# Patient Record
Sex: FEMALE | Race: WHITE | NOT HISPANIC OR LATINO | Employment: OTHER | URBAN - NONMETROPOLITAN AREA
[De-identification: names, ages, dates, MRNs, and addresses within clinical notes are randomized per-mention and may not be internally consistent; named-entity substitution may affect disease eponyms.]

---

## 2018-08-14 ENCOUNTER — OFFICE VISIT (OUTPATIENT)
Dept: INTERNAL MEDICINE | Facility: OTHER | Age: 43
End: 2018-08-14
Attending: NURSE PRACTITIONER
Payer: MEDICARE

## 2018-08-14 VITALS
WEIGHT: 182.5 LBS | SYSTOLIC BLOOD PRESSURE: 110 MMHG | BODY MASS INDEX: 42.23 KG/M2 | TEMPERATURE: 98.2 F | HEART RATE: 76 BPM | DIASTOLIC BLOOD PRESSURE: 62 MMHG | HEIGHT: 55 IN

## 2018-08-14 DIAGNOSIS — M17.0 PRIMARY OSTEOARTHRITIS OF BOTH KNEES: ICD-10-CM

## 2018-08-14 DIAGNOSIS — E66.01 MORBID OBESITY (H): ICD-10-CM

## 2018-08-14 DIAGNOSIS — Z12.31 ENCOUNTER FOR SCREENING MAMMOGRAM FOR BREAST CANCER: ICD-10-CM

## 2018-08-14 DIAGNOSIS — F41.1 GAD (GENERALIZED ANXIETY DISORDER): ICD-10-CM

## 2018-08-14 DIAGNOSIS — L30.9 DERMATITIS: ICD-10-CM

## 2018-08-14 DIAGNOSIS — K21.9 GASTROESOPHAGEAL REFLUX DISEASE WITHOUT ESOPHAGITIS: ICD-10-CM

## 2018-08-14 DIAGNOSIS — N92.6 MENSTRUAL DISORDER: ICD-10-CM

## 2018-08-14 DIAGNOSIS — E03.8 OTHER SPECIFIED HYPOTHYROIDISM: ICD-10-CM

## 2018-08-14 DIAGNOSIS — G47.33 OSA (OBSTRUCTIVE SLEEP APNEA): ICD-10-CM

## 2018-08-14 DIAGNOSIS — Q90.9 DOWN'S SYNDROME: Primary | ICD-10-CM

## 2018-08-14 PROCEDURE — G0463 HOSPITAL OUTPT CLINIC VISIT: HCPCS | Performed by: NURSE PRACTITIONER

## 2018-08-14 PROCEDURE — 99204 OFFICE O/P NEW MOD 45 MIN: CPT | Performed by: NURSE PRACTITIONER

## 2018-08-14 RX ORDER — NORGESTIMATE AND ETHINYL ESTRADIOL 7DAYSX3 28
1 KIT ORAL DAILY
Qty: 84 TABLET | Refills: 3 | Status: SHIPPED | OUTPATIENT
Start: 2018-08-14 | End: 2019-06-08

## 2018-08-14 RX ORDER — TRIAMCINOLONE ACETONIDE 1 MG/G
CREAM TOPICAL
Qty: 30 G | Refills: 3 | Status: SHIPPED | OUTPATIENT
Start: 2018-08-14 | End: 2019-06-11

## 2018-08-14 RX ORDER — CITALOPRAM HYDROBROMIDE 20 MG/1
20 TABLET ORAL DAILY
Qty: 90 TABLET | Refills: 3 | Status: SHIPPED | OUTPATIENT
Start: 2018-08-14 | End: 2019-06-08

## 2018-08-14 RX ORDER — LEVOTHYROXINE SODIUM 175 UG/1
175 TABLET ORAL DAILY
Qty: 90 TABLET | Refills: 3 | Status: SHIPPED | OUTPATIENT
Start: 2018-08-14 | End: 2019-06-08

## 2018-08-14 RX ORDER — ASPIRIN 325 MG
TABLET ORAL
COMMUNITY
Start: 2018-08-14 | End: 2019-01-04

## 2018-08-14 ASSESSMENT — ENCOUNTER SYMPTOMS
AGITATION: 0
NAUSEA: 0
ADENOPATHY: 0
SHORTNESS OF BREATH: 0
DYSURIA: 0
CONSTIPATION: 0
PALPITATIONS: 0
EYE REDNESS: 0
HEARTBURN: 1
SLEEP DISTURBANCE: 0
ABDOMINAL PAIN: 0
COUGH: 0
DIARRHEA: 0
PARESTHESIAS: 0
UNEXPECTED WEIGHT CHANGE: 0
WEAKNESS: 0
BREAST MASS: 0
POLYDIPSIA: 0
ARTHRALGIAS: 0
DYSPHORIC MOOD: 0
EYE DISCHARGE: 0
NUMBNESS: 0
HEMATOCHEZIA: 0
POLYPHAGIA: 0
NERVOUS/ANXIOUS: 0
HEADACHES: 0
VOMITING: 0
WOUND: 0
ROS GI COMMENTS: SEE HPI
FEVER: 0
CONFUSION: 0
MYALGIAS: 0
FREQUENCY: 0
DIZZINESS: 0
WHEEZING: 0
CHILLS: 0
SORE THROAT: 0

## 2018-08-14 ASSESSMENT — ANXIETY QUESTIONNAIRES
2. NOT BEING ABLE TO STOP OR CONTROL WORRYING: NOT AT ALL
5. BEING SO RESTLESS THAT IT IS HARD TO SIT STILL: NOT AT ALL
1. FEELING NERVOUS, ANXIOUS, OR ON EDGE: NOT AT ALL
IF YOU CHECKED OFF ANY PROBLEMS ON THIS QUESTIONNAIRE, HOW DIFFICULT HAVE THESE PROBLEMS MADE IT FOR YOU TO DO YOUR WORK, TAKE CARE OF THINGS AT HOME, OR GET ALONG WITH OTHER PEOPLE: NOT DIFFICULT AT ALL
3. WORRYING TOO MUCH ABOUT DIFFERENT THINGS: NOT AT ALL
7. FEELING AFRAID AS IF SOMETHING AWFUL MIGHT HAPPEN: NOT AT ALL
6. BECOMING EASILY ANNOYED OR IRRITABLE: NOT AT ALL
GAD7 TOTAL SCORE: 0

## 2018-08-14 ASSESSMENT — PATIENT HEALTH QUESTIONNAIRE - PHQ9: 5. POOR APPETITE OR OVEREATING: NOT AT ALL

## 2018-08-14 ASSESSMENT — PAIN SCALES - GENERAL: PAINLEVEL: NO PAIN (0)

## 2018-08-14 NOTE — PROGRESS NOTES
Subjective:  She is here today to establish primary care.  She just moved here a week ago from [place].  She had been living at a group home.  She moved here with her parents.  She has Down syndrome.  She has no congenital cardiac disease.  She has generalized anxiety disorder which is well controlled on Celexa.  She continues to have menses monthly and takes Sprintec due to heavy flow.  She has osteoarthritis of both knees and receives steroid injections about every 3-4 months.  She had this last done a few months ago.  She is not having any pain at this time.  She does continue on diclofenac twice daily for knee pain and that works well.  She has no GI issues except that she will have occasional reflux so she takes Zantac twice daily.  Appetite is good.  She has never had esophagitis or GI bleed or ulcers.  She has hypothyroidism and was diagnosed about 20 years ago.  They are not sure when her last levels were checked.  She does wear hearing aid in her left ear and uses triamcinolone cream every other day to prevent dermatitis.  She has obstructive sleep apnea.  She wears CPAP before but it leaked and caused a corneal ulcer several years ago.  She now refuses to wear CPAP.  She has not had any hospitalizations.  Not sure she has never had mammogram.  She would like to get this scheduled.  She does not have any breast lumps or discharge from nipples.  She is obese.  She does take her dogs for walk usually every day.    Patient Active Problem List   Diagnosis     Down's syndrome     DOROTEO (generalized anxiety disorder)     Menstrual disorder     Primary osteoarthritis of both knees     Gastroesophageal reflux disease without esophagitis     Other specified hypothyroidism     Dermatitis     Encounter for screening mammogram for breast cancer     Morbid obesity (H)     JENNIFER (obstructive sleep apnea)     Past Medical History:   Diagnosis Date     Arthritis of knee     receives injections frequently     Corneal ulcer 2004     caused by leaking of CPAP     JENNIFER (obstructive sleep apnea)     patient refuses CPAP     History reviewed. No pertinent surgical history.  Social History     Social History     Marital status: Single     Spouse name: N/A     Number of children: N/A     Years of education: N/A     Occupational History     Not on file.     Social History Main Topics     Smoking status: Never Smoker     Smokeless tobacco: Never Used     Alcohol use No     Drug use: No     Sexual activity: Not on file     Other Topics Concern     Not on file     Social History Narrative     No narrative on file     Family History   Problem Relation Age of Onset     Asthma Mother      Arthritis Mother      LUNG DISEASE Father      pulmonary fibrosis secondary to chemical exposure     Diabetes Father      Current Outpatient Prescriptions   Medication Sig Dispense Refill     citalopram (CELEXA) 20 MG tablet Take 1 tablet (20 mg) by mouth daily 90 tablet 3     diclofenac (VOLTAREN) 50 MG EC tablet Take 1 tablet (50 mg) by mouth 2 times daily 60 tablet 3     levothyroxine (SYNTHROID/LEVOTHROID) 175 MCG tablet Take 1 tablet (175 mcg) by mouth daily 90 tablet 3     Multiple Vitamins-Minerals (MULTIVITAMIN GUMMIES ADULT) CHEW Take 1 by mouth daily with breakfast       norgestim-eth estrad triphasic (TRINESSA, 28,) 0.18/0.215/0.25 MG-35 MCG per tablet Take 1 tablet by mouth daily 84 tablet 3     ranitidine (ZANTAC) 150 MG tablet Take 1 tablet (150 mg) by mouth 2 times daily 60 tablet 3     triamcinolone (KENALOG) 0.1 % cream Apply to ears every other day 30 g 3     Sulfa drugs      Review of Systems:  Review of Systems   Constitutional: Negative for chills, fever and unexpected weight change.   HENT: Negative for congestion, ear pain and sore throat.    Eyes: Negative for discharge and redness.   Respiratory: Negative for cough, shortness of breath and wheezing.    Cardiovascular: Negative for chest pain, palpitations and peripheral edema.   Gastrointestinal:  "Positive for heartburn. Negative for abdominal pain, constipation, diarrhea, hematochezia, nausea and vomiting.        See HPI   Endocrine: Negative for cold intolerance, heat intolerance, polydipsia, polyphagia and polyuria.   Breasts:  Negative for tenderness, breast mass and discharge.   Genitourinary: Negative for dysuria, frequency and vaginal bleeding.   Musculoskeletal: Negative for arthralgias and myalgias.        See HPI   Skin: Negative for pallor, rash and wound.   Allergic/Immunologic: Negative for immunocompromised state.   Neurological: Negative for dizziness, weakness, numbness, headaches and paresthesias.   Hematological: Negative for adenopathy.   Psychiatric/Behavioral: Negative for agitation, behavioral problems, confusion, dysphoric mood, self-injury and sleep disturbance. The patient is not nervous/anxious.        Objective:   /62 (BP Location: Right arm, Patient Position: Chair, Cuff Size: Adult Large)  Pulse 76  Temp 98.2  F (36.8  C) (Tympanic)  Ht 4' 4.75\" (1.34 m)  Wt 182 lb 8 oz (82.8 kg)  LMP 08/02/2018 (Approximate)  Breastfeeding? No  BMI 46.11 kg/m2  Physical Exam   Constitutional: She is oriented to person, place, and time. She appears well-developed. No distress.   HENT:   Mouth/Throat: Oropharynx is clear and moist. No oropharyngeal exudate.   Eyes: Conjunctivae are normal. No scleral icterus.   Neck: Neck supple. No thyromegaly present.   Cardiovascular: Normal rate and regular rhythm.  Exam reveals no gallop.    Pulmonary/Chest: Effort normal and breath sounds normal. She has no wheezes. She has no rales.   Abdominal: Soft. She exhibits no distension and no mass. There is no tenderness.   Musculoskeletal: She exhibits no edema or tenderness.   Lymphadenopathy:     She has no cervical adenopathy.   Neurological: She is alert and oriented to person, place, and time. Coordination normal.   Skin: Skin is warm. No rash noted. She is not diaphoretic. No pallor. "   Psychiatric: She has a normal mood and affect. Her behavior is normal.   Has Down syndrome.  Pleasant and cooperative.  Delightful young lady   Nursing note and vitals reviewed.      Assessment:    ICD-10-CM    1. Down's syndrome Q90.9 Basic metabolic panel   2. DOROTEO (generalized anxiety disorder) F41.1 citalopram (CELEXA) 20 MG tablet   3. Menstrual disorder N92.6 norgestim-eth estrad triphasic (TRINESSA, 28,) 0.18/0.215/0.25 MG-35 MCG per tablet   4. Primary osteoarthritis of both knees M17.0 diclofenac (VOLTAREN) 50 MG EC tablet     Basic metabolic panel   5. Gastroesophageal reflux disease without esophagitis K21.9 ranitidine (ZANTAC) 150 MG tablet     Basic metabolic panel     CBC with platelets   6. Other specified hypothyroidism E03.8 levothyroxine (SYNTHROID/LEVOTHROID) 175 MCG tablet     Thyrotropin GH     T4 free     Basic metabolic panel   7. Dermatitis L30.9 triamcinolone (KENALOG) 0.1 % cream   8. Encounter for screening mammogram for breast cancer Z12.31 MA Screen Bilateral w/Kermit   9. Morbid obesity (H) E66.01    10. JENNIFER (obstructive sleep apnea) G47.33        Plan:   She will be scheduled for mammography.  Medication refills are provided.  She will continue with same medication and treatment plan.  Discussed that there was an interaction between Celexa and diclofenac since it can increase risk of GI bleed and ulceration.  She currently is having no symptoms.  She does have occasional heartburn which is not new.  Mom will continue to monitor for any concerns.  They would like to stay with same medication and treatment plan even with risk.  Labs today will include TSH, free T4, CBC and basic metabolic panel.  She will continue to take her dog for walks.  Weight loss is encouraged.  She will schedule orthopedic appointment when her knee pain returns.  Otherwise we will follow-up in clinic every 6-12 months, sooner with concerns.    Greater than 45 minutes face-to-face time spent with patient with  greater than 50% in care coordination, counseling, disease management and chart update    JAYASHREE Dorman   8/14/2018  2:48 PM

## 2018-08-14 NOTE — MR AVS SNAPSHOT
After Visit Summary   8/14/2018    Reg Valiente    MRN: [id number]           Patient Information     Date Of Birth          1975        Visit Information        Provider Department      8/14/2018 2:00 PM Mikayla Gtz NP [facility]        Today's Diagnoses     Down's syndrome    -  1    DOROTEO (generalized anxiety disorder)        Menstrual disorder        Primary osteoarthritis of both knees        Gastroesophageal reflux disease without esophagitis        Other specified hypothyroidism        Dermatitis        Encounter for screening mammogram for breast cancer        Morbid obesity (H)        JENNIFER (obstructive sleep apnea)           Follow-ups after your visit        Future tests that were ordered for you today     Open Future Orders        Priority Expected Expires Ordered    MA Screen Bilateral w/Kermit Routine  8/14/2019 8/14/2018    CBC with platelets Routine  8/15/2019 8/14/2018    Thyrotropin GH Routine  8/15/2019 8/14/2018    T4 free Routine  8/15/2019 8/14/2018    Basic metabolic panel Routine  8/15/2019 8/14/2018            Who to contact     If you have questions or need follow up information about today's clinic visit or your schedule please contact [facility] directly at [phone].  Normal or non-critical lab and imaging results will be communicated to you by MyChart, letter or phone within 4 business days after the clinic has received the results. If you do not hear from us within 7 days, please contact the clinic through MyChart or phone. If you have a critical or abnormal lab result, we will notify you by phone as soon as possible.  Submit refill requests through [employer] or call your pharmacy and they will forward the refill request to us. Please allow 3 business days for your refill to be completed.          Additional Information About Your Visit        Care EveryWhere ID     This is your Care EveryWhere ID. This could be  "used by other organizations to access your [place] medical records  [id number]        Your Vitals Were     Pulse Temperature Height Last Period Breastfeeding? BMI (Body Mass Index)    76 98.2  F (36.8  C) (Tympanic) 4' 4.75\" (1.34 m) 08/02/2018 (Approximate) No 46.11 kg/m2       Blood Pressure from Last 3 Encounters:   08/14/18 110/62    Weight from Last 3 Encounters:   08/14/18 182 lb 8 oz (82.8 kg)                 Today's Medication Changes          These changes are accurate as of 8/14/18  2:55 PM.  If you have any questions, ask your nurse or doctor.               Start taking these medicines.        Dose/Directions    citalopram 20 MG tablet   Commonly known as:  celeXA   Used for:  DOROTEO (generalized anxiety disorder)   Started by:  Mikayla Gtz NP        Dose:  20 mg   Take 1 tablet (20 mg) by mouth daily   Quantity:  90 tablet   Refills:  3       diclofenac 50 MG EC tablet   Commonly known as:  VOLTAREN   Used for:  Primary osteoarthritis of both knees   Started by:  Mikayla Gtz NP        Dose:  50 mg   Take 1 tablet (50 mg) by mouth 2 times daily   Quantity:  60 tablet   Refills:  3       levothyroxine 175 MCG tablet   Commonly known as:  SYNTHROID/LEVOTHROID   Used for:  Other specified hypothyroidism   Started by:  Mikayla Gtz NP        Dose:  175 mcg   Take 1 tablet (175 mcg) by mouth daily   Quantity:  90 tablet   Refills:  3       norgestim-eth estrad triphasic 0.18/0.215/0.25 MG-35 MCG per tablet   Commonly known as:  TRINESSA (28)   Used for:  Menstrual disorder   Started by:  Mikayla Gtz NP        Dose:  1 tablet   Take 1 tablet by mouth daily   Quantity:  84 tablet   Refills:  3       ranitidine 150 MG tablet   Commonly known as:  ZANTAC   Used for:  Gastroesophageal reflux disease without esophagitis   Started by:  Mikayla Gtz NP        Dose:  150 mg   Take 1 tablet (150 mg) by mouth 2 times daily   Quantity:  60 tablet   Refills:  3       " triamcinolone 0.1 % cream   Commonly known as:  KENALOG   Used for:  Dermatitis   Started by:  Mikayla Gtz NP        Apply to ears every other day   Quantity:  30 g   Refills:  3            Where to get your medicines      These medications were sent to [facility] - [place], [place] - [address]  [address], [facility] [address]     Phone:  [phone]     citalopram 20 MG tablet    diclofenac 50 MG EC tablet    levothyroxine 175 MCG tablet    norgestim-eth estrad triphasic 0.18/0.215/0.25 MG-35 MCG per tablet    ranitidine 150 MG tablet    triamcinolone 0.1 % cream                Primary Care Provider Office Phone # Fax #    [facility] [phone]       [address]  [facility] [address]        Equal Access to Services     JORGE CHATMAN : Hadii paola sevilla hadasho Soberna, waaxda luqadaha, qaybta kaalmada adeegyada, waxay adamin herminio frye . So [facility] [phone].    ATENCIÓN: Si habla español, tiene a kennedy disposición servicios gratuitos de asistencia lingüística. Osielame al [phone].    We comply with applicable federal civil rights laws and [place] laws. We do not discriminate on the basis of race, color, national origin, age, disability, sex, sexual orientation, or gender identity.            Thank you!     Thank you for choosing [facility]  for your care. Our goal is always to provide you with excellent care. Hearing back from our patients is one way we can continue to improve our services. Please take a few minutes to complete the written survey that you may receive in the mail after your visit with us. Thank you!             Your Updated Medication List - Protect others around you: Learn how to safely use, store and throw away your medicines at www.disposemymeds.org.          This list is accurate as of 8/14/18  2:55 PM.  Always use your most recent med list.                    Brand Name Dispense Instructions for use Diagnosis    citalopram 20 MG tablet    celeXA    90 tablet    Take 1 tablet (20 mg) by mouth daily    DOROTEO (generalized anxiety disorder)       diclofenac 50 MG EC tablet    VOLTAREN    60 tablet    Take 1 tablet (50 mg) by mouth 2 times daily    Primary osteoarthritis of both knees       levothyroxine 175 MCG tablet    SYNTHROID/LEVOTHROID    90 tablet    Take 1 tablet (175 mcg) by mouth daily    Other specified hypothyroidism       MULTIVITAMIN GUMMIES ADULT Chew      Take 1 by mouth daily with breakfast        norgestim-eth estrad triphasic 0.18/0.215/0.25 MG-35 MCG per tablet    TRINESSA (28)    84 tablet    Take 1 tablet by mouth daily    Menstrual disorder       ranitidine 150 MG tablet    ZANTAC    60 tablet    Take 1 tablet (150 mg) by mouth 2 times daily    Gastroesophageal reflux disease without esophagitis       triamcinolone 0.1 % cream    KENALOG    30 g    Apply to ears every other day    Dermatitis

## 2018-08-14 NOTE — NURSING NOTE
"Chief Complaint   Patient presents with     Establish Care       Initial /62 (BP Location: Right arm, Patient Position: Chair, Cuff Size: Adult Large)  Pulse 76  Temp 98.2  F (36.8  C) (Tympanic)  Ht 4' 4.75\" (1.34 m)  Wt 182 lb 8 oz (82.8 kg)  LMP 08/02/2018 (Exact Date)  Breastfeeding? No  BMI 46.11 kg/m2 Estimated body mass index is 46.11 kg/(m^2) as calculated from the following:    Height as of this encounter: 4' 4.75\" (1.34 m).    Weight as of this encounter: 182 lb 8 oz (82.8 kg).      Yareli Dyson LPN, 2:11 PM 8/14/18  "

## 2018-08-15 ASSESSMENT — ANXIETY QUESTIONNAIRES: GAD7 TOTAL SCORE: 0

## 2018-08-15 ASSESSMENT — PATIENT HEALTH QUESTIONNAIRE - PHQ9: SUM OF ALL RESPONSES TO PHQ QUESTIONS 1-9: 0

## 2018-09-25 ENCOUNTER — ALLIED HEALTH/NURSE VISIT (OUTPATIENT)
Dept: FAMILY MEDICINE | Facility: OTHER | Age: 43
End: 2018-09-25
Attending: INTERNAL MEDICINE
Payer: MEDICARE

## 2018-09-25 DIAGNOSIS — Z23 NEED FOR PROPHYLACTIC VACCINATION AND INOCULATION AGAINST INFLUENZA: Primary | ICD-10-CM

## 2018-09-25 PROCEDURE — 90686 IIV4 VACC NO PRSV 0.5 ML IM: CPT

## 2018-09-25 PROCEDURE — G0008 ADMIN INFLUENZA VIRUS VAC: HCPCS

## 2018-09-25 NOTE — MR AVS SNAPSHOT
After Visit Summary   9/25/2018    Reg Valiente    MRN: [id number]           Patient Information     Date Of Birth          1975        Visit Information        Provider Department      9/25/2018 1:15 PM GH INJECTION NURSE [facility]        Today's Diagnoses     Need for prophylactic vaccination and inoculation against influenza    -  1       Follow-ups after your visit        Your next 10 appointments already scheduled     Sep 25, 2018  1:15 PM CDT   Nurse Only with  INJECTION NURSE   [facility]    [address]  [place] [address] [phone]              Who to contact     If you have questions or need follow up information about today's clinic visit or your schedule please contact [facility] directly at [phone].  Normal or non-critical lab and imaging results will be communicated to you by MyChart, letter or phone within 4 business days after the clinic has received the results. If you do not hear from us within 7 days, please contact the clinic through MyChart or phone. If you have a critical or abnormal lab result, we will notify you by phone as soon as possible.  Submit refill requests through [employer] or call your pharmacy and they will forward the refill request to us. Please allow 3 business days for your refill to be completed.          Additional Information About Your Visit        Care EveryWhere ID     This is your Care EveryWhere ID. This could be used by other organizations to access your [facility] medical records  [id number]         Blood Pressure from Last 3 Encounters:   08/14/18 110/62    Weight from Last 3 Encounters:   08/14/18 182 lb 8 oz (82.8 kg)              We Performed the Following     HC FLU VAC PRESRV FREE QUAD SPLIT VIR 3+YRS IM        Primary Care Provider Office Phone # Fax #    [facility] [phone]  [phone]       [address]  [facility] [address]        Equal Access to Services     AISHAJENNY COMPA : Hadii aad ku hadmelinabarbara Joali, waniecyda peterfranklynha, qamaryta kasoledad nanyshawnasha, waxay idiin hayjanicelobo willsmariaelenasean christensen. So [facility] [phone].    ATENCIÓN: Si habla español, tiene a kennedy disposición servicios gratuitos de asistencia lingüística. Llame al [phone].    We comply with applicable federal civil rights laws and [place] laws. We do not discriminate on the basis of race, color, national origin, age, disability, sex, sexual orientation, or gender identity.            Thank you!     Thank you for choosing [facility]  for your care. Our goal is always to provide you with excellent care. Hearing back from our patients is one way we can continue to improve our services. Please take a few minutes to complete the written survey that you may receive in the mail after your visit with us. Thank you!             Your Updated Medication List - Protect others around you: Learn how to safely use, store and throw away your medicines at www.disposemymeds.org.          This list is accurate as of 9/25/18 12:14 PM.  Always use your most recent med list.                   Brand Name Dispense Instructions for use Diagnosis    citalopram 20 MG tablet    celeXA    90 tablet    Take 1 tablet (20 mg) by mouth daily    DOROTEO (generalized anxiety disorder)       diclofenac 50 MG EC tablet    VOLTAREN    60 tablet    Take 1 tablet (50 mg) by mouth 2 times daily    Primary osteoarthritis of both knees       levothyroxine 175 MCG tablet    SYNTHROID/LEVOTHROID    90 tablet    Take 1 tablet (175 mcg) by mouth daily    Other specified hypothyroidism       MULTIVITAMIN GUMMIES ADULT Chew      Take 1 by mouth daily with breakfast        norgestim-eth estrad triphasic 0.18/0.215/0.25 MG-35 MCG per tablet    TRINESSA (28)    84 tablet    Take 1 tablet by mouth daily    Menstrual disorder       ranitidine 150  MG tablet    ZANTAC    60 tablet    Take 1 tablet (150 mg) by mouth 2 times daily    Gastroesophageal reflux disease without esophagitis       triamcinolone 0.1 % cream    KENALOG    30 g    Apply to ears every other day    Dermatitis

## 2018-09-25 NOTE — PROGRESS NOTES

## 2018-12-24 ENCOUNTER — TELEPHONE (OUTPATIENT)
Dept: INTERNAL MEDICINE | Facility: OTHER | Age: 43
End: 2018-12-24

## 2018-12-24 NOTE — TELEPHONE ENCOUNTER
S - Left knee pain worsening in last month.    B - Hx of knee pain. Has had injections which helped some when in [place]. Down Syndrome, can't rate pain.      A - No new injuries. Appears to have a bruise around left knee, with blood under bruised area. Mother denies swelling or redness. Daughter is wincing, visibly uncomfortable. Keeps worsening, unable to do normal activities. Not mobile right now.     R - Mother, judy, is calling for daughter and requesting provider consider anything that can help right now. Agreed to go into ED if necessary or seems unbearable. Jennifer Pinto RN on 12/24/2018 at 10:38 AM

## 2018-12-26 NOTE — TELEPHONE ENCOUNTER
Notified. Transferred to the appointment line.   Yareli Dyson LPN...................12/26/2018   3:13 PM

## 2019-01-04 ENCOUNTER — OFFICE VISIT (OUTPATIENT)
Dept: INTERNAL MEDICINE | Facility: OTHER | Age: 44
End: 2019-01-04
Attending: NURSE PRACTITIONER
Payer: MEDICARE

## 2019-01-04 VITALS
BODY MASS INDEX: 43 KG/M2 | WEIGHT: 170.2 LBS | HEART RATE: 60 BPM | DIASTOLIC BLOOD PRESSURE: 72 MMHG | RESPIRATION RATE: 16 BRPM | SYSTOLIC BLOOD PRESSURE: 110 MMHG

## 2019-01-04 DIAGNOSIS — M17.0 PRIMARY OSTEOARTHRITIS OF BOTH KNEES: Primary | ICD-10-CM

## 2019-01-04 PROCEDURE — G0463 HOSPITAL OUTPT CLINIC VISIT: HCPCS

## 2019-01-04 PROCEDURE — 99213 OFFICE O/P EST LOW 20 MIN: CPT | Performed by: NURSE PRACTITIONER

## 2019-01-04 ASSESSMENT — PAIN SCALES - GENERAL: PAINLEVEL: NO PAIN (0)

## 2019-01-04 NOTE — NURSING NOTE
"Patient presents to the clinic today with complaints of left knee pain.  Patient's mom states there have been multiple crying spells.  Patient's mom states that she used to have injections in the knee.  Mackenzie Cid LPN 1/4/2019   2:41 PM    Chief Complaint   Patient presents with     Knee Pain       Initial /72 (BP Location: Right arm, Patient Position: Sitting, Cuff Size: Adult Regular)   Pulse 60   Resp 16   Wt 77.2 kg (170 lb 3.2 oz)   Breastfeeding? No   BMI 43.00 kg/m   Estimated body mass index is 43 kg/m  as calculated from the following:    Height as of 8/14/18: 1.34 m (4' 4.75\").    Weight as of this encounter: 77.2 kg (170 lb 3.2 oz).  Medication Reconciliation: complete    Mackenzie Cid    "

## 2019-01-04 NOTE — PROGRESS NOTES
Subjective:  She is here today for left knee pain.  She has bilateral arthritis of both knees.  She has received steroid injections in the past that have been beneficial.  She was getting these about every 3-4 months at her previous clinic.  She continues to take diclofenac's twice daily.  Her mom is also given her some Tylenol and that has seemed to help.  She has not had any change in her gait.  No falls or injuries.  She denies current knee pain.  Her mother states that she will deny pain but she can tell that daughter is having pain at her knees with certain activities.  Her mom thinks she would benefit forearm another steroid injection.  She has not had any knee surgeries.    Patient Active Problem List   Diagnosis     Down's syndrome     DOROTEO (generalized anxiety disorder)     Menstrual disorder     Primary osteoarthritis of both knees     Gastroesophageal reflux disease without esophagitis     Other specified hypothyroidism     Dermatitis     Encounter for screening mammogram for breast cancer     Morbid obesity (H)     JENNIFER (obstructive sleep apnea)     Past Medical History:   Diagnosis Date     Arthritis of knee     receives injections frequently     Corneal ulcer 2004    caused by leaking of CPAP     JENNIFER (obstructive sleep apnea)     patient refuses CPAP     History reviewed. No pertinent surgical history.  Social History     Socioeconomic History     Marital status: Single     Spouse name: Not on file     Number of children: Not on file     Years of education: Not on file     Highest education level: Not on file   Social Needs     Financial resource strain: Not on file     Food insecurity - worry: Not on file     Food insecurity - inability: Not on file     Transportation needs - medical: Not on file     Transportation needs - non-medical: Not on file   Occupational History     Not on file   Tobacco Use     Smoking status: Never Smoker     Smokeless tobacco: Never Used   Substance and Sexual Activity      Alcohol use: No     Drug use: No     Sexual activity: Not Currently   Other Topics Concern     Not on file   Social History Narrative     Not on file     Family History   Problem Relation Age of Onset     Asthma Mother      Arthritis Mother      LUNG DISEASE Father         pulmonary fibrosis secondary to chemical exposure     Diabetes Father      Current Outpatient Medications   Medication Sig Dispense Refill     citalopram (CELEXA) 20 MG tablet Take 1 tablet (20 mg) by mouth daily 90 tablet 3     diclofenac (VOLTAREN) 50 MG EC tablet Take 1 tablet (50 mg) by mouth 2 times daily 60 tablet 3     levothyroxine (SYNTHROID/LEVOTHROID) 175 MCG tablet Take 1 tablet (175 mcg) by mouth daily 90 tablet 3     MULTIPLE VITAMIN PO        norgestim-eth estrad triphasic (TRINESSA, 28,) 0.18/0.215/0.25 MG-35 MCG per tablet Take 1 tablet by mouth daily 84 tablet 3     ranitidine (ZANTAC) 150 MG tablet Take 1 tablet (150 mg) by mouth 2 times daily 60 tablet 3     triamcinolone (KENALOG) 0.1 % cream Apply to ears every other day 30 g 3     Sulfa drugs      Review of Systems:  Review of Systems  See HPI  Objective:   /72 (BP Location: Right arm, Patient Position: Sitting, Cuff Size: Adult Regular)   Pulse 60   Resp 16   Wt 77.2 kg (170 lb 3.2 oz)   Breastfeeding? No   BMI 43.00 kg/m    Physical Exam  Pleasant morbidly obese female with Down syndrome.  Alert and oriented x4.  Appears comfortable.  Smiling and in good spirits and accompanied by her mother.  Bilateral knees with crepitation left greater than right.  No tenderness with range of motion.  No ecchymosis, swelling, erythema or warmth.  No popliteal tenderness.    Assessment:    ICD-10-CM    1. Primary osteoarthritis of both knees M17.0 ORTHOPEDICS ADULT REFERRAL       Plan:   She has good range of motion and does not appear to be uncomfortable however her mother feels that she is having more pain than she is actually disclosing.  She would like to be set up to have  corticosteroid injections of the knee left knee.  Patient is referred to either Dr. Peter or 1 of our orthopedic specialist to have this completed.  She will continue with the diclofenac's and may use acetaminophen thousand milligrams twice daily as needed for discomfort.  May also use topical analgesics or warm packs if these provide comfort.    JAYASHREE Dorman   1/4/2019  3:35 PM

## 2019-01-17 DIAGNOSIS — K21.9 GASTROESOPHAGEAL REFLUX DISEASE WITHOUT ESOPHAGITIS: ICD-10-CM

## 2019-01-18 DIAGNOSIS — K21.9 GASTROESOPHAGEAL REFLUX DISEASE WITHOUT ESOPHAGITIS: ICD-10-CM

## 2019-01-18 NOTE — TELEPHONE ENCOUNTER
Prescription approved per [facility] Refill Protocol.  Jennifer Pinto RN on 1/18/2019 at 12:22 PM

## 2019-01-22 NOTE — TELEPHONE ENCOUNTER
ranitidine (ZANTAC) 150 MG tablet 60 tablet 11 1/18/2019  No   Sig: TAKE 1 TABLET BY MOUTH TWICE DAILY   Sent to pharmacy as: ranitidine (ZANTAC) 150 MG tablet   Class: E-Prescribe   Order: [id number]   E-Prescribing Status: Receipt confirmed by pharmacy (1/18/2019 12:22 PM CST)   Printout Tracking     External Result Report   Pharmacy     [facility] [address]     Filled 1/18/2019 #60 X11. Pharmacy alerted. Unable to complete prescription refill per RNMedication Refill Policy.................... Maci Vicente ....................  1/22/2019   8:42 AM

## 2019-01-29 DIAGNOSIS — G89.29 CHRONIC PAIN OF BOTH KNEES: Primary | ICD-10-CM

## 2019-01-29 DIAGNOSIS — M25.562 CHRONIC PAIN OF BOTH KNEES: Primary | ICD-10-CM

## 2019-01-29 DIAGNOSIS — M25.561 CHRONIC PAIN OF BOTH KNEES: Primary | ICD-10-CM

## 2019-03-04 ENCOUNTER — TRANSFERRED RECORDS (OUTPATIENT)
Dept: HEALTH INFORMATION MANAGEMENT | Facility: OTHER | Age: 44
End: 2019-03-04

## 2019-03-18 ENCOUNTER — HOSPITAL ENCOUNTER (OUTPATIENT)
Dept: MAMMOGRAPHY | Facility: OTHER | Age: 44
Discharge: HOME | End: 2019-03-18
Attending: NURSE PRACTITIONER | Admitting: NURSE PRACTITIONER
Payer: MEDICARE

## 2019-03-18 DIAGNOSIS — Z12.31 ENCOUNTER FOR SCREENING MAMMOGRAM FOR BREAST CANCER: ICD-10-CM

## 2019-03-18 PROCEDURE — 77063 BREAST TOMOSYNTHESIS BI: CPT

## 2019-03-26 ENCOUNTER — TELEPHONE (OUTPATIENT)
Dept: INTERNAL MEDICINE | Facility: OTHER | Age: 44
End: 2019-03-26

## 2019-03-26 NOTE — TELEPHONE ENCOUNTER
Patient wanted to remind Sara Vallejo NP, about the form that is in her in box for her to fill out. They also wanted to let her know that Patient is having diarrhea at least three times weekly for the last 6 months and the one dose a day they give her of Imodium is not working. Please Advise.     Zeinab Field LPN on 3/26/2019 at 3:09 PM

## 2019-03-26 NOTE — TELEPHONE ENCOUNTER
Patients Father was notified that Form was ready to  at . Also was notified that the Provider wanted to have the patient seen regarding the diarrhea.  Zeinab Field LPN on 3/26/2019 at 3:45 PM

## 2019-03-26 NOTE — TELEPHONE ENCOUNTER
This form is in [facility] inbox it has not been completed yet.  Yareli Dyson LPN...................3/26/2019   2:27 PM

## 2019-03-26 NOTE — TELEPHONE ENCOUNTER
I was not aware that she had diarrhea nor that she takes imodium.  Sounds like she should have this evaluated. Guardianship paperwork completed

## 2019-05-18 ENCOUNTER — HOSPITAL ENCOUNTER (EMERGENCY)
Facility: OTHER | Age: 44
Discharge: HOME | End: 2019-05-18
Attending: PHYSICIAN ASSISTANT | Admitting: PHYSICIAN ASSISTANT
Payer: MEDICARE

## 2019-05-18 VITALS
OXYGEN SATURATION: 100 % | SYSTOLIC BLOOD PRESSURE: 141 MMHG | RESPIRATION RATE: 20 BRPM | TEMPERATURE: 97 F | DIASTOLIC BLOOD PRESSURE: 86 MMHG | HEART RATE: 69 BPM

## 2019-05-18 DIAGNOSIS — R11.10 VOMITING: ICD-10-CM

## 2019-05-18 LAB
ALBUMIN UR-MCNC: NEGATIVE MG/DL
ANION GAP SERPL CALCULATED.3IONS-SCNC: 6 MMOL/L (ref 3–14)
APPEARANCE UR: CLEAR
BACTERIA #/AREA URNS HPF: ABNORMAL /HPF
BASOPHILS # BLD AUTO: 0 10E9/L (ref 0–0.2)
BASOPHILS NFR BLD AUTO: 0.5 %
BILIRUB UR QL STRIP: NEGATIVE
BUN SERPL-MCNC: 9 MG/DL (ref 7–25)
CALCIUM SERPL-MCNC: 9.1 MG/DL (ref 8.6–10.3)
CHLORIDE SERPL-SCNC: 99 MMOL/L (ref 98–107)
CO2 SERPL-SCNC: 29 MMOL/L (ref 21–31)
COLOR UR AUTO: YELLOW
CREAT SERPL-MCNC: 0.7 MG/DL (ref 0.6–1.2)
DIFFERENTIAL METHOD BLD: NORMAL
EOSINOPHIL # BLD AUTO: 0 10E9/L (ref 0–0.7)
EOSINOPHIL NFR BLD AUTO: 0 %
ERYTHROCYTE [DISTWIDTH] IN BLOOD BY AUTOMATED COUNT: 13.1 % (ref 10–15)
GFR SERPL CREATININE-BSD FRML MDRD: >90 ML/MIN/{1.73_M2}
GLUCOSE SERPL-MCNC: 117 MG/DL (ref 70–105)
GLUCOSE UR STRIP-MCNC: NEGATIVE MG/DL
HCG UR QL: NEGATIVE
HCT VFR BLD AUTO: 39 % (ref 35–47)
HGB BLD-MCNC: 13.1 G/DL (ref 11.7–15.7)
HGB UR QL STRIP: NEGATIVE
IMM GRANULOCYTES # BLD: 0 10E9/L (ref 0–0.4)
IMM GRANULOCYTES NFR BLD: 0.2 %
KETONES UR STRIP-MCNC: NEGATIVE MG/DL
LEUKOCYTE ESTERASE UR QL STRIP: ABNORMAL
LYMPHOCYTES # BLD AUTO: 0.8 10E9/L (ref 0.8–5.3)
LYMPHOCYTES NFR BLD AUTO: 8.9 %
MCH RBC QN AUTO: 32.8 PG (ref 26.5–33)
MCHC RBC AUTO-ENTMCNC: 33.6 G/DL (ref 31.5–36.5)
MCV RBC AUTO: 98 FL (ref 78–100)
MONOCYTES # BLD AUTO: 0.5 10E9/L (ref 0–1.3)
MONOCYTES NFR BLD AUTO: 5.1 %
NEUTROPHILS # BLD AUTO: 7.6 10E9/L (ref 1.6–8.3)
NEUTROPHILS NFR BLD AUTO: 85.3 %
NITRATE UR QL: NEGATIVE
NON-SQ EPI CELLS #/AREA URNS LPF: ABNORMAL /LPF
PH UR STRIP: 7 PH (ref 5–9)
PLATELET # BLD AUTO: 311 10E9/L (ref 150–450)
POTASSIUM SERPL-SCNC: 4 MMOL/L (ref 3.5–5.1)
RBC # BLD AUTO: 3.99 10E12/L (ref 3.8–5.2)
RBC #/AREA URNS AUTO: ABNORMAL /HPF
SODIUM SERPL-SCNC: 134 MMOL/L (ref 134–144)
SOURCE: ABNORMAL
SP GR UR STRIP: 1.01 (ref 1–1.03)
UROBILINOGEN UR STRIP-ACNC: 0.2 EU/DL (ref 0.2–1)
WBC # BLD AUTO: 8.9 10E9/L (ref 4–11)
WBC #/AREA URNS AUTO: ABNORMAL /HPF

## 2019-05-18 PROCEDURE — 80048 BASIC METABOLIC PNL TOTAL CA: CPT | Performed by: PHYSICIAN ASSISTANT

## 2019-05-18 PROCEDURE — 25000128 H RX IP 250 OP 636: Performed by: PHYSICIAN ASSISTANT

## 2019-05-18 PROCEDURE — 99284 EMERGENCY DEPT VISIT MOD MDM: CPT | Mod: 25 | Performed by: PHYSICIAN ASSISTANT

## 2019-05-18 PROCEDURE — 81001 URINALYSIS AUTO W/SCOPE: CPT | Performed by: PHYSICIAN ASSISTANT

## 2019-05-18 PROCEDURE — 87086 URINE CULTURE/COLONY COUNT: CPT | Performed by: PHYSICIAN ASSISTANT

## 2019-05-18 PROCEDURE — 36415 COLL VENOUS BLD VENIPUNCTURE: CPT | Performed by: PHYSICIAN ASSISTANT

## 2019-05-18 PROCEDURE — 85025 COMPLETE CBC W/AUTO DIFF WBC: CPT | Performed by: PHYSICIAN ASSISTANT

## 2019-05-18 PROCEDURE — 81025 URINE PREGNANCY TEST: CPT | Performed by: PHYSICIAN ASSISTANT

## 2019-05-18 PROCEDURE — 93005 ELECTROCARDIOGRAM TRACING: CPT | Performed by: PHYSICIAN ASSISTANT

## 2019-05-18 PROCEDURE — 93010 ELECTROCARDIOGRAM REPORT: CPT | Performed by: INTERNAL MEDICINE

## 2019-05-18 PROCEDURE — 99283 EMERGENCY DEPT VISIT LOW MDM: CPT | Mod: Z6 | Performed by: PHYSICIAN ASSISTANT

## 2019-05-18 PROCEDURE — 96374 THER/PROPH/DIAG INJ IV PUSH: CPT | Performed by: PHYSICIAN ASSISTANT

## 2019-05-18 RX ORDER — ONDANSETRON 2 MG/ML
4 INJECTION INTRAMUSCULAR; INTRAVENOUS ONCE
Status: COMPLETED | OUTPATIENT
Start: 2019-05-18 | End: 2019-05-18

## 2019-05-18 RX ORDER — ONDANSETRON 4 MG/1
4 TABLET, ORALLY DISINTEGRATING ORAL EVERY 8 HOURS PRN
Qty: 10 TABLET | Refills: 0 | Status: SHIPPED | OUTPATIENT
Start: 2019-05-18 | End: 2019-06-06

## 2019-05-18 RX ADMIN — ONDANSETRON HYDROCHLORIDE 4 MG: 2 INJECTION, SOLUTION INTRAMUSCULAR; INTRAVENOUS at 12:29

## 2019-05-18 RX ADMIN — SODIUM CHLORIDE 1000 ML: 9 INJECTION, SOLUTION INTRAVENOUS at 12:29

## 2019-05-18 ASSESSMENT — ENCOUNTER SYMPTOMS
WOUND: 0
BRUISES/BLEEDS EASILY: 0
BACK PAIN: 0
CONFUSION: 0
HEMATURIA: 0
FEVER: 0
ADENOPATHY: 0
CHILLS: 0
SHORTNESS OF BREATH: 0
ABDOMINAL PAIN: 0
CHEST TIGHTNESS: 0

## 2019-05-18 NOTE — DISCHARGE INSTRUCTIONS
Get plenty of fluids and rest. Take medication as needed. I expect episodes of vomiting over the next couple of days, return to ED if you have increasing fevers, pain, intractable pain. Otherwise, follow up with pcp prn.

## 2019-05-18 NOTE — ED AVS SNAPSHOT
[facility]  [address]  [place] [address]  Phone:  [phone]  Fax:  [phone]                                    Reg Valiente   MRN: [id number]    Department:  [facility]   Date of Visit:  5/18/2019           After Visit Summary Signature Page    I have received my discharge instructions, and my questions have been answered. I have discussed any challenges I see with this plan with the nurse or doctor.    ..........................................................................................................................................  Patient/Patient Representative Signature      ..........................................................................................................................................  Patient Representative Print Name and Relationship to Patient    ..................................................               ................................................  Date                                   Time    ..........................................................................................................................................  Reviewed by Signature/Title    ...................................................              ..............................................  Date                                               Time          22EPIC Rev 08/18

## 2019-05-18 NOTE — ED PROVIDER NOTES
History   No chief complaint on file.    HPI  Reg Valiente is a 44 year old female who has a history of Down syndrome is coming by her mother and a chief complaint of vomiting.   accurate history of present illness is difficult to obtain.  It is believed that possibly up to 3 days worth of vomiting, with 1-3 episodes per day.  Patient is afebrile, has no pain or discomfort, no recent travels, no diarrhea, no close sick contacts.  Is reported the patient is still remaining hydrated and is of a normal mental status.    Allergies:  Allergies   Allergen Reactions     Sulfa Drugs        Problem List:    Patient Active Problem List    Diagnosis Date Noted     Down's syndrome 08/14/2018     Priority: Medium     DOORTEO (generalized anxiety disorder) 08/14/2018     Priority: Medium     Menstrual disorder 08/14/2018     Priority: Medium     Primary osteoarthritis of both knees 08/14/2018     Priority: Medium     Gastroesophageal reflux disease without esophagitis 08/14/2018     Priority: Medium     Other specified hypothyroidism 08/14/2018     Priority: Medium     Dermatitis 08/14/2018     Priority: Medium     Encounter for screening mammogram for breast cancer 08/14/2018     Priority: Medium     Morbid obesity (H) 08/14/2018     Priority: Medium     JENNIFER (obstructive sleep apnea) 08/14/2018     Priority: Medium        Past Medical History:    Past Medical History:   Diagnosis Date     Arthritis of knee      Corneal ulcer 2004     JENNIFER (obstructive sleep apnea)        Past Surgical History:    No past surgical history on file.    Family History:    Family History   Problem Relation Age of Onset     Asthma Mother      Arthritis Mother      LUNG DISEASE Father         pulmonary fibrosis secondary to chemical exposure     Diabetes Father        Social History:  Marital Status:  Single [1]  Social History     Tobacco Use     Smoking status: Never Smoker     Smokeless tobacco: Never Used   Substance Use Topics     Alcohol  use: No     Drug use: No        Medications:      citalopram (CELEXA) 20 MG tablet   diclofenac (VOLTAREN) 50 MG EC tablet   levothyroxine (SYNTHROID/LEVOTHROID) 175 MCG tablet   MULTIPLE VITAMIN PO   norgestim-eth estrad triphasic (TRINESSA, 28,) 0.18/0.215/0.25 MG-35 MCG per tablet   ondansetron (ZOFRAN ODT) 4 MG ODT tab   ranitidine (ZANTAC) 150 MG tablet   triamcinolone (KENALOG) 0.1 % cream         Review of Systems   Constitutional: Negative for chills and fever.   HENT: Negative for congestion.    Eyes: Negative for visual disturbance.   Respiratory: Negative for chest tightness and shortness of breath.    Cardiovascular: Negative for chest pain.   Gastrointestinal: Negative for abdominal pain.   Genitourinary: Negative for hematuria.   Musculoskeletal: Negative for back pain.   Skin: Negative for rash and wound.   Neurological: Negative for syncope.   Hematological: Negative for adenopathy. Does not bruise/bleed easily.   Psychiatric/Behavioral: Negative for confusion.       Physical Exam   BP: 141/86  Pulse: 69  Temp: 97  F (36.1  C)  Resp: 20  SpO2: 100 %      Physical Exam   Constitutional: She appears well-developed and well-nourished. No distress.   HENT:   Head: Normocephalic and atraumatic.   Eyes: Conjunctivae are normal. No scleral icterus.   Neck: Neck supple.   Cardiovascular: Normal rate and regular rhythm.   Murmur heard.  Pulmonary/Chest: Effort normal and breath sounds normal. No respiratory distress.   Abdominal: Soft. Bowel sounds are normal. There is no tenderness.   Musculoskeletal: She exhibits no deformity.   Lymphadenopathy:     She has no cervical adenopathy.   Neurological: She is alert.   Skin: Skin is warm and dry. No rash noted. She is not diaphoretic.   Psychiatric: She has a normal mood and affect.       ED Course        Procedures          EKG read at 1205, HR 60, NSR, no ST changes    Critical Care time:  none               No results found for this or any previous visit (from  the past 24 hour(s)).    Medications   0.9% sodium chloride BOLUS (0 mLs Intravenous Stopped 5/18/19 1320)   ondansetron (ZOFRAN) injection 4 mg (4 mg Intravenous Given 5/18/19 1229)       Assessments & Plan (with Medical Decision Making)   Pt nontoxic in NAD. Hx of down's, HPI difficult to obtain. No complaints of pain. VSS, afebrile. Given fluids, zofran.     No concerning findings on lab work. UA has skin cells, will cx. Pt has not vomited while in the ED appears to be thriving. She will try conservative rx at home, her mother is in agreement with plan, returning if worsening. PT discharged.     Levi Miller PA-C    I have reviewed the nursing notes.    I have reviewed the findings, diagnosis, plan and need for follow up with the patient.          Medication List      Started    ondansetron 4 MG ODT tab  Commonly known as:  ZOFRAN ODT  4 mg, Oral, EVERY 8 HOURS PRN            Final diagnoses:   Vomiting       5/18/2019   [facility]     Levi Miller PA  05/21/19 [id number]

## 2019-05-18 NOTE — ED TRIAGE NOTES
Has had emesis x5 in the last 2 days.  Denies diarrhea or belly pain.  No trouble with urinating, urinating once this morning.   Drinking fluids well

## 2019-05-19 LAB
BACTERIA SPEC CULT: NORMAL
SPECIMEN SOURCE: NORMAL

## 2019-06-06 ENCOUNTER — OFFICE VISIT (OUTPATIENT)
Dept: FAMILY MEDICINE | Facility: OTHER | Age: 44
End: 2019-06-06
Attending: NURSE PRACTITIONER
Payer: MEDICARE

## 2019-06-06 VITALS
HEART RATE: 64 BPM | RESPIRATION RATE: 18 BRPM | WEIGHT: 156.6 LBS | BODY MASS INDEX: 36.24 KG/M2 | OXYGEN SATURATION: 93 % | HEIGHT: 55 IN | DIASTOLIC BLOOD PRESSURE: 64 MMHG | TEMPERATURE: 98.6 F | SYSTOLIC BLOOD PRESSURE: 108 MMHG

## 2019-06-06 DIAGNOSIS — R11.10 VOMITING AND DIARRHEA: Primary | ICD-10-CM

## 2019-06-06 DIAGNOSIS — R19.7 VOMITING AND DIARRHEA: Primary | ICD-10-CM

## 2019-06-06 PROCEDURE — G0463 HOSPITAL OUTPT CLINIC VISIT: HCPCS

## 2019-06-06 PROCEDURE — 83516 IMMUNOASSAY NONANTIBODY: CPT | Mod: ZL | Performed by: NURSE PRACTITIONER

## 2019-06-06 PROCEDURE — 99213 OFFICE O/P EST LOW 20 MIN: CPT | Performed by: NURSE PRACTITIONER

## 2019-06-06 PROCEDURE — 36415 COLL VENOUS BLD VENIPUNCTURE: CPT | Mod: ZL | Performed by: NURSE PRACTITIONER

## 2019-06-06 ASSESSMENT — PAIN SCALES - GENERAL: PAINLEVEL: NO PAIN (0)

## 2019-06-06 ASSESSMENT — MIFFLIN-ST. JEOR: SCORE: 1166.74

## 2019-06-06 NOTE — PROGRESS NOTES
Subjective     Reg Valiente is a 44 year old female who presents to clinic today for the following health issues:    HPI   Diarrhea  Onset: about 1 month ago    Description:   Consistency of stool: loose  Blood in stool: no   Number of loose stools in past 24 hours: 1    Progression of Symptoms:  same    Accompanying Signs & Symptoms:  Fever: no   Nausea or vomiting; YES- occurs without warning, does not report upset stomach prior to emesis  Abdominal pain: no   Episodes of constipation: no   Weight loss: YES- has lost approx 13# since January without intention    History:   Ill contacts: no - does go to school three days a week  Recent use of antibiotics: no    Recent travels: no          Recent medication-new or changes(Rx or OTC): no     Precipitating factors:   Eating baked beans will always cause diarrhea; has not eaten baked beans for about a year    Alleviating factors:   nothing    Therapies Tried and outcome:  Pepto, immodium; Outcome: some times it helps, some times it does not help    Patient has Down's Syndrome and history is very difficult to obtain accurately. Mom with her in office today, is currently living at home though is planning to move to a new group home in the semi-near future. Mom has not been able to determine if there is anything that predisposes her to vomiting or diarrhea episodes, patient repeatedly states baked beans is the issue though she has not had this food for at least the past year. Mom further states that she has absolutely no warning when the vomiting may occur. States last week they were at [employer] when she had an episode of vomiting, had not eaten that morning prior to going to the restaurant and abruptly vomited, which landed on the table across the aisle from them. Does mention that Reg has lost some weight, and further states that she thinks some of it may be intentional on the part of Reg. Mom states the ondansetron that she was given on ER discharge did  help, though states that she gave Reg the medication without any nausea; Reg did not have any emesis on those particular days.       Patient Active Problem List   Diagnosis     Down's syndrome     DOROTEO (generalized anxiety disorder)     Menstrual disorder     Primary osteoarthritis of both knees     Gastroesophageal reflux disease without esophagitis     Other specified hypothyroidism     Dermatitis     Encounter for screening mammogram for breast cancer     Morbid obesity (H)     JENNIFER (obstructive sleep apnea)     History reviewed. No pertinent surgical history.    Social History     Tobacco Use     Smoking status: Never Smoker     Smokeless tobacco: Never Used   Substance Use Topics     Alcohol use: No     Family History   Problem Relation Age of Onset     Asthma Mother      Arthritis Mother      LUNG DISEASE Father         pulmonary fibrosis secondary to chemical exposure     Diabetes Father          Current Outpatient Medications   Medication Sig Dispense Refill     citalopram (CELEXA) 20 MG tablet Take 1 tablet (20 mg) by mouth daily 90 tablet 3     diclofenac (VOLTAREN) 50 MG EC tablet Take 1 tablet (50 mg) by mouth 2 times daily 60 tablet 3     levothyroxine (SYNTHROID/LEVOTHROID) 175 MCG tablet Take 1 tablet (175 mcg) by mouth daily 90 tablet 3     MULTIPLE VITAMIN PO        norgestim-eth estrad triphasic (TRINESSA, 28,) 0.18/0.215/0.25 MG-35 MCG per tablet Take 1 tablet by mouth daily 84 tablet 3     ranitidine (ZANTAC) 150 MG tablet TAKE 1 TABLET BY MOUTH TWICE DAILY 60 tablet 11     triamcinolone (KENALOG) 0.1 % cream Apply to ears every other day (Patient not taking: Reported on 6/6/2019) 30 g 3     Allergies   Allergen Reactions     Sulfa Drugs        Reviewed and updated as needed this visit by Provider  Tobacco  Allergies  Meds  Problems  Med Hx  Surg Hx  Fam Hx  Soc Hx          Review of Systems   ROS COMP: As above      Objective    /64   Pulse 64   Temp 98.6  F (37  C) (Temporal)   " Resp 18   Ht 1.34 m (4' 4.75\")   Wt 71 kg (156 lb 9.6 oz)   LMP  (LMP Unknown)   SpO2 93%   BMI 39.57 kg/m    Body mass index is 39.57 kg/m .  Physical Exam   GENERAL: alert, no distress and obese  NECK: no adenopathy, no asymmetry, masses, or scars and thyroid normal to palpation  RESP: lungs clear to auscultation - no rales, rhonchi or wheezes  CV: regular rate and rhythm, normal S1 S2, no S3 or S4, no murmur, click or rub, no peripheral edema and peripheral pulses strong  ABDOMEN: soft, nontender, without hepatosplenomegaly or masses, liver span normal to percussion, bowel sounds normal and exam difficult due to size of abd and extreme tickleishness of pt.   MS: no gross musculoskeletal defects noted, no edema  SKIN: no suspicious lesions or rashes  NEURO: Normal strength and tone, mentation intact and speech normal  PSYCH: mentation appears normal for pt, tangential, affect normal/bright and difficult to focus in visit    Diagnostic Test Results:  Labs reviewed in Epic  Pending        Assessment & Plan     1. Vomiting and diarrhea  Ongoing issue for the last month, episodes of emesis without warning and near constant diarrhea. Was in ER in May for same, labs were completed and there was no concerning findings on exam. She was sent home with zofran. Mom states she did give Reg the zofran without any complaints of nausea, and further states that on the days she gave the medication, she did not have any emesis. Here today as symptoms have continued and they want to know what is causing this. 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 Will complete stool culture and ova/parasite as well as draw a TTG for possible celiac. Will schedule abd US for possible pyloric stenosis and referral has been placed for general surgery for possible colonoscopy. Discussed OTC medications that may improve symptoms, though will " not resolve underlying issue.   - Stool culture; Future  - Ova and Parasite Exam Routine; Future  - US Abdomen Complete; Future  - Tissue transglutaminase Ab IgA and IgG; Future  - GENERAL SURG ADULT REFERRAL  - Tissue transglutaminase Ab IgA and IgG       Yojana Reyes NP  [facility]

## 2019-06-06 NOTE — LETTER
Radha 10, 2019      Reg Valiente  [address] [facility] [address]        Dear ,    We are writing to inform you of your test results.    The blood tests for celiac disease are both negative. Follow up as previously scheduled with general surgery to determine appropriateness of colonoscopy/endoscopy.     Resulted Orders   Tissue transglutaminase Ab IgA and IgG   Result Value Ref Range    Tissue Transglutaminase Antibody IgA 1 <7 U/mL      Comment:      Negative  The tTG-IgA assay has limited utility for patients with decreased levels of   IgA. Screening for celiac disease should include IgA testing to rule out   selective IgA deficiency and to guide selection and interpretation of   serological testing. tTG-IgG testing may be positive in celiac disease   patients with IgA deficiency.      Tissue Transglutaminase Mey IgG 2 <7 U/mL      Comment:      Negative       If you have any questions or concerns, please call the clinic at the number listed above.       Sincerely,        Yojana Reyes NP

## 2019-06-06 NOTE — NURSING NOTE
"Chief Complaint   Patient presents with     RECHECK     Vomitting     Diarrhea     progressing         Initial /64   Pulse 64   Temp 98.6  F (37  C) (Temporal)   Resp 18   Ht 1.34 m (4' 4.75\")   Wt 71 kg (156 lb 9.6 oz)   LMP  (LMP Unknown)   SpO2 93%   BMI 39.57 kg/m   Estimated body mass index is 39.57 kg/m  as calculated from the following:    Height as of this encounter: 1.34 m (4' 4.75\").    Weight as of this encounter: 71 kg (156 lb 9.6 oz).    Medication Reconciliation: complete      Norma J. Gosselin, LPN  "

## 2019-06-07 ENCOUNTER — TELEPHONE (OUTPATIENT)
Dept: FAMILY MEDICINE | Facility: OTHER | Age: 44
End: 2019-06-07

## 2019-06-07 NOTE — TELEPHONE ENCOUNTER
Arturo call mom and let her know that if Reg is not willing to do the BM testing, there really isn't anything else to do right now. She should keep her appt with Dr Kee next week.

## 2019-06-07 NOTE — TELEPHONE ENCOUNTER
Patient mom stopped and cannot get patient to do the BM testing.   Is there something else they can go?   Please call mom or dad back.   Thank You   Georgina Wells on 6/7/2019 at 11:39 AM

## 2019-06-07 NOTE — TELEPHONE ENCOUNTER
After verifying patient's name and date of birth, patient was given the below information.  Norma J. Gosselin....6/7/2019 3:40 PM

## 2019-06-08 DIAGNOSIS — N92.6 MENSTRUAL DISORDER: ICD-10-CM

## 2019-06-08 DIAGNOSIS — E03.8 OTHER SPECIFIED HYPOTHYROIDISM: ICD-10-CM

## 2019-06-08 DIAGNOSIS — F41.1 GAD (GENERALIZED ANXIETY DISORDER): ICD-10-CM

## 2019-06-10 LAB
TTG IGA SER-ACNC: 1 U/ML
TTG IGG SER-ACNC: 2 U/ML

## 2019-06-11 ENCOUNTER — HOSPITAL ENCOUNTER (OUTPATIENT)
Dept: ULTRASOUND IMAGING | Facility: OTHER | Age: 44
Discharge: HOME | End: 2019-06-11
Attending: NURSE PRACTITIONER | Admitting: SURGERY
Payer: MEDICARE

## 2019-06-11 ENCOUNTER — OFFICE VISIT (OUTPATIENT)
Dept: SURGERY | Facility: OTHER | Age: 44
End: 2019-06-11
Attending: NURSE PRACTITIONER
Payer: MEDICARE

## 2019-06-11 VITALS
TEMPERATURE: 99.3 F | HEART RATE: 64 BPM | HEIGHT: 55 IN | RESPIRATION RATE: 16 BRPM | DIASTOLIC BLOOD PRESSURE: 60 MMHG | BODY MASS INDEX: 36.33 KG/M2 | SYSTOLIC BLOOD PRESSURE: 98 MMHG | WEIGHT: 157 LBS

## 2019-06-11 DIAGNOSIS — R11.10 VOMITING AND DIARRHEA: ICD-10-CM

## 2019-06-11 DIAGNOSIS — R19.7 VOMITING AND DIARRHEA: ICD-10-CM

## 2019-06-11 DIAGNOSIS — G47.33 OSA (OBSTRUCTIVE SLEEP APNEA): ICD-10-CM

## 2019-06-11 DIAGNOSIS — K80.50 CHOLEDOCHOLITHIASIS: Primary | ICD-10-CM

## 2019-06-11 PROCEDURE — G0463 HOSPITAL OUTPT CLINIC VISIT: HCPCS | Mod: 25

## 2019-06-11 PROCEDURE — G0463 HOSPITAL OUTPT CLINIC VISIT: HCPCS

## 2019-06-11 PROCEDURE — 76705 ECHO EXAM OF ABDOMEN: CPT

## 2019-06-11 PROCEDURE — 99203 OFFICE O/P NEW LOW 30 MIN: CPT | Performed by: SURGERY

## 2019-06-11 RX ORDER — CHOLESTYRAMINE 4 G/9G
4 POWDER, FOR SUSPENSION ORAL 2 TIMES DAILY WITH MEALS
Qty: 378 G | Refills: 1 | Status: SHIPPED | OUTPATIENT
Start: 2019-06-11 | End: 2019-06-12

## 2019-06-11 ASSESSMENT — PAIN SCALES - GENERAL: PAINLEVEL: NO PAIN (0)

## 2019-06-11 ASSESSMENT — MIFFLIN-ST. JEOR: SCORE: 1168.56

## 2019-06-11 NOTE — PROGRESS NOTES
OFFICE CONSULTATION NOTE  Patient Name: Reg Valiente  Address: [address] [place] [place] [address]  Age:44 year old  Sex: female     Primary Care Physician: Mikayla Gtz NP    I was requested tosee this patient in consultation by Yojana Reyes NP for evaluation of vomiting and diarrhea. A copy of this note will be sent to Mikayla Gtz NP and Yojana.    HPI:  The patient is 44 year old female with episodes of vomiting and diarrhea. These have been going on for more than 6 weeks. The patient was seen in the ED on 5/18. She had normal labs and got some zofran-this seemed to help a bit but patient still having episodes of sudden vomiting with no complaint of pain or nausea before she vomits. She has been having diarrhea as well. She was seen in clinic and US ordered. TTG was checked and was normal. Stool studies ordered-patient hasn't been able to provide an adequate specimen.  Difficult to get an accurate history from patient due to her developmental delay. Her parents report multiple loose stools daily. No light colored stools. Patient hasn't had any dark urine. Patient denies fevers. The US showed stones in the gall bladder, common duct and intrahepatic ducts. No visualized pericholecystic fluid or wall thickening. The common duct was 10 mm.    CONSULTATION ASSESSMENT AND PLAN/RECOMMENDATIONS:   Gall stones, common bile duct stones and intrahepatic stones with biliary dilation-  I discussed with the patient the pathophysiology of gallbladder disease and biliary stones with the patient and her family. This may be the cause of her vomiting and diarrhea or it may not. It needs specialty evaluation and treatment in any case. Discussed option for GI referral and patient's family prefers [facility] physicians. Discussed that patient will need consult and also likely ERCP and other procedures. Encouraged low fat diet in the mean time. They expressed understanding. Will check hepatic panel and  lipase today. Will start cholestyramine to see if that helps with the diarrhea. They will continue to try to obtain a stool sample for testing. We will call with lab results. GI will call them to set up consult/procedure dates. If there is worsening of vomiting or diarrhea, they will call or be seen in the ed.  The patient and her family deny questions at this time    REVIEW OF SYSTEMS  GENERAL: No fevers or chills. Denies fatigue. Weight 170 in January-today 157. 13  [place] weight loss.  HEENT: No sinus drainage. No changes with vision or hearing. No difficulty swallowing.   LYMPHATICS:  No swollen nodes in axilla, neck or groin.  CARDIOVASCULAR: Denies chest pain, palpitations and dyspnea on exertion.  PULMONARY: No shortness of breath or cough. No increase in sputum production. Has JENNIFER  GI: Denies melena, bright red blood instools. No hematemesis. No constipation.  : No dysuria or hematuria.  SKIN: No recent rashes or ulcers.   HEMATOLOGY:  No history of easy bruising or bleeding.  ENDOCRINE:  No history of diabetes. Has hypothyroidism.  NEUROLOGY:  No history of seizures or headaches. No recent motor or sensory changes.  Past Medical History:   Diagnosis Date     Arthritis of knee     receives injections frequently     Corneal ulcer 2004    caused by leaking of CPAP     JENNIFER (obstructive sleep apnea)     patient refuses CPAP     History reviewed. No pertinent surgical history.  CURRENT MEDS  Current Outpatient Medications   Medication     cholestyramine (QUESTRAN) 4 GM/DOSE powder     citalopram (CELEXA) 20 MG tablet     diclofenac (VOLTAREN) 50 MG EC tablet     levothyroxine (SYNTHROID/LEVOTHROID) 175 MCG tablet     MULTIPLE VITAMIN PO     norgestim-eth estrad triphasic (TRINESSA, 28,) 0.18/0.215/0.25 MG-35 MCG per tablet     ranitidine (ZANTAC) 150 MG tablet     No current facility-administered medications for this visit.        Allergies   Allergen Reactions     Sulfa Drugs      Family History   Problem  "Relation Age of Onset     Asthma Mother      Arthritis Mother      LUNG DISEASE Father         pulmonary fibrosis secondary to chemical exposure     Diabetes Father      Social History     Socioeconomic History     Marital status: Single     Spouse name: None     Number of children: None     Years of education: None     Highest education level: None   Occupational History     None   Social Needs     Financial resource strain: None     Food insecurity:     Worry: None     Inability: None     Transportation needs:     Medical: None     Non-medical: None   Tobacco Use     Smoking status: Never Smoker     Smokeless tobacco: Never Used   Substance and Sexual Activity     Alcohol use: No     Drug use: No     Sexual activity: Not Currently   Lifestyle     Physical activity:     Days per week: None     Minutes per session: None     Stress: None   Relationships     Social connections:     Talks on phone: None     Gets together: None     Attends Jewish service: None     Active member of club or organization: None     Attends meetings of clubs or organizations: None     Relationship status: None     Intimate partner violence:     Fear of current or ex partner: None     Emotionally abused: None     Physically abused: None     Forced sexual activity: None   Other Topics Concern     None   Social History Narrative     None        The above history was reviewed 6/11/2019.  PHYSICAL EXAM  Vitals: BP 98/60 (BP Location: Right arm, Patient Position: Sitting, Cuff Size: Adult Regular)   Pulse 64   Temp 99.3  F (37.4  C) (Tympanic)   Resp 16   Ht 1.34 m (4' 4.75\")   Wt 71.2 kg (157 lb)   LMP  (LMP Unknown)   BMI 39.67 kg/m    BMI= Body mass index is 39.67 kg/m .  GENERAL: patient in no acute distress. Pleasant and cooperative with exam and interview.   HEENT: Head-normocephalic. Eyes-no scleral icterus, pupils equal, round, and reactive to light. Nose-no nasal drainage. No lesions. Mouth-oral mucosa pink and moist, no " lesions.  NECK: Supple. No thyroid nodules. Trachea midline.  LYMPHATICS:  No cervical, axillary or supraclavicular adenopathy.  ABDOMEN: Non distended. Bowel sounds active. Soft, no hepatosplenomegaly palpable. No peritoneal signs. Patient denies tenderness but winces with deep palpation in the ruq.   SKIN: Pink, warm and dry. No jaundice.  NEURO:  Cranial nerves II-XII grossly intact. Alert and oriented.  PSYCH: Appropriate mood and affect.    IMAGING/LAB  I personally reviewed patient's recent labs and US. I reviewed the US images with Dr. Mobley-report as follows:  PROCEDURES: US ABDOMEN LIMITED     HISTORY: . has been having ongoing emesis and diarrhea, has no warning  prior to episode, usually vomits once and then done, does not  correlate to meals; Vomiting and diarrhea; Vomiting and diarrhea.     TECHNIQUE: Grayscale ultrasound of the upper abdomen was performed.     COMPARISON: None.      FINDINGS:     LIVER: There is intrahepatic biliary ductal dilatation. Stones are  seen within multiple central intrahepatic ducts, with shadowing. No  suspicious intrahepatic mass is identified.     GALLBLADDER: There is a wall echo shadow sign, with impaction of the  gallbladder by extensive shadowing stones. Trace pericholecystic fluid  is present. No gallbladder wall thickening or sonographic Lala's  sign was elicited. The common bile duct is impacted with stones  measuring up to 10 mm in diameter.     ABDOMINAL AORTA AND IVC: Portions of the superior IVC and abdominal  aorta are visualized.     PANCREAS: No gross pancreatic ductal dilatation is identified.     OTHER: Survey imaging of the right kidney demonstrates no  hydronephrosis. No free fluid is seen.. The stomach appears grossly  normal.                                                                      IMPRESSION:      Choledocholithiasis. Numerous stones are impacted within the common  bile duct, which is distended to at least 10 mm. Stones are  also  present within the central intrahepatic biliary ducts. There is  peripheral intrahepatic biliary ductal dilatation. No gross pancreatic  ductal dilatation seen.     The gallbladder is completely filled with shadowing stones, resulting  in the wall echo shadow sign. There is trace pericholecystic fluid but  no wall thickening or sonographic Lala's sign.     Recommend surgical consultation. Consider MRCP/MR liver for further  assessment if clinically warranted.     SARAH BROWN MD

## 2019-06-11 NOTE — NURSING NOTE
"Chief Complaint   Patient presents with     Consult     vomiting and diarrhea       Initial BP 98/60 (BP Location: Right arm, Patient Position: Sitting, Cuff Size: Adult Regular)   Pulse 64   Temp 99.3  F (37.4  C) (Tympanic)   Resp 16   Ht 1.34 m (4' 4.75\")   Wt 71.2 kg (157 lb)   LMP  (LMP Unknown)   BMI 39.67 kg/m   Estimated body mass index is 39.67 kg/m  as calculated from the following:    Height as of this encounter: 1.34 m (4' 4.75\").    Weight as of this encounter: 71.2 kg (157 lb).  Medication Reconciliation: complete    Britney Gutierrez LPN    "

## 2019-06-12 ENCOUNTER — OFFICE VISIT (OUTPATIENT)
Dept: INTERNAL MEDICINE | Facility: OTHER | Age: 44
End: 2019-06-12
Attending: NURSE PRACTITIONER
Payer: MEDICARE

## 2019-06-12 VITALS
HEART RATE: 64 BPM | SYSTOLIC BLOOD PRESSURE: 120 MMHG | HEIGHT: 55 IN | RESPIRATION RATE: 16 BRPM | OXYGEN SATURATION: 98 % | TEMPERATURE: 97.6 F | WEIGHT: 158.8 LBS | DIASTOLIC BLOOD PRESSURE: 80 MMHG | BODY MASS INDEX: 36.75 KG/M2

## 2019-06-12 DIAGNOSIS — R11.10 VOMITING AND DIARRHEA: ICD-10-CM

## 2019-06-12 DIAGNOSIS — N92.6 MENSTRUAL DISORDER: ICD-10-CM

## 2019-06-12 DIAGNOSIS — F41.1 GAD (GENERALIZED ANXIETY DISORDER): ICD-10-CM

## 2019-06-12 DIAGNOSIS — M17.0 PRIMARY OSTEOARTHRITIS OF BOTH KNEES: ICD-10-CM

## 2019-06-12 DIAGNOSIS — R19.7 VOMITING AND DIARRHEA: ICD-10-CM

## 2019-06-12 DIAGNOSIS — G47.33 OSA (OBSTRUCTIVE SLEEP APNEA): ICD-10-CM

## 2019-06-12 DIAGNOSIS — Q90.9 DOWN'S SYNDROME: Primary | ICD-10-CM

## 2019-06-12 DIAGNOSIS — K21.9 GASTROESOPHAGEAL REFLUX DISEASE WITHOUT ESOPHAGITIS: ICD-10-CM

## 2019-06-12 DIAGNOSIS — K80.50 GALL STONES, COMMON BILE DUCT: ICD-10-CM

## 2019-06-12 DIAGNOSIS — E66.01 MORBID OBESITY (H): ICD-10-CM

## 2019-06-12 DIAGNOSIS — E03.8 OTHER SPECIFIED HYPOTHYROIDISM: ICD-10-CM

## 2019-06-12 PROCEDURE — G0463 HOSPITAL OUTPT CLINIC VISIT: HCPCS | Mod: 25

## 2019-06-12 PROCEDURE — G0463 HOSPITAL OUTPT CLINIC VISIT: HCPCS

## 2019-06-12 PROCEDURE — 99396 PREV VISIT EST AGE 40-64: CPT | Mod: GY | Performed by: NURSE PRACTITIONER

## 2019-06-12 RX ORDER — NORGESTIMATE AND ETHINYL ESTRADIOL
KIT
Qty: 84 TABLET | Refills: 3 | Status: SHIPPED | OUTPATIENT
Start: 2019-06-12 | End: 2019-06-25 | Stop reason: ALTCHOICE

## 2019-06-12 RX ORDER — CITALOPRAM HYDROBROMIDE 20 MG/1
TABLET ORAL
Qty: 90 TABLET | Refills: 3 | Status: SHIPPED | OUTPATIENT
Start: 2019-06-12 | End: 2020-07-06

## 2019-06-12 RX ORDER — LEVOTHYROXINE SODIUM 175 UG/1
TABLET ORAL
Qty: 90 TABLET | Refills: 3 | Status: SHIPPED | OUTPATIENT
Start: 2019-06-12 | End: 2020-07-06

## 2019-06-12 RX ORDER — CHOLESTYRAMINE 4 G/9G
4 POWDER, FOR SUSPENSION ORAL 2 TIMES DAILY WITH MEALS
Qty: 378 G | Refills: 1 | Status: SHIPPED | OUTPATIENT
Start: 2019-06-12 | End: 2019-07-26

## 2019-06-12 ASSESSMENT — ENCOUNTER SYMPTOMS
TREMORS: 0
TROUBLE SWALLOWING: 0
APPETITE CHANGE: 0
VOMITING: 1
ANAL BLEEDING: 0
PALPITATIONS: 0
UNEXPECTED WEIGHT CHANGE: 0
DIZZINESS: 0
ACTIVITY CHANGE: 0
WEAKNESS: 0
MYALGIAS: 0
WOUND: 0
ENDOCRINE NEGATIVE: 1
SHORTNESS OF BREATH: 0
NUMBNESS: 0
FATIGUE: 0
SEIZURES: 0
NAUSEA: 1
FLANK PAIN: 0
SPEECH DIFFICULTY: 0
NERVOUS/ANXIOUS: 1
CHEST TIGHTNESS: 0
ARTHRALGIAS: 1
ABDOMINAL PAIN: 1
HEMATOCHEZIA: 0
ADENOPATHY: 0
DIARRHEA: 1
COUGH: 0

## 2019-06-12 ASSESSMENT — MIFFLIN-ST. JEOR: SCORE: 1189.31

## 2019-06-12 ASSESSMENT — PAIN SCALES - GENERAL: PAINLEVEL: NO PAIN (0)

## 2019-06-12 NOTE — NURSING NOTE
Chief Complaint   Patient presents with     Physical     adrienne forms       Medication Reconciliation: complete  Jane Alejandro LPN  ..................6/12/2019   2:37 PM

## 2019-06-12 NOTE — TELEPHONE ENCOUNTER
Called patient's dad back and they clarified that they would like to try [facility], by [facility].  Britney Gutierrez LPN .......6/12/2019 11:30 AM

## 2019-06-12 NOTE — PROGRESS NOTES
Subjective:  She is here today for physical examination.  She is moving into [facility] next week and needs a physical examination and paperwork completed for the group home.  She is not in a good mood today because she was in the middle of an activity and needed to come to this appointment.  Her parents accompany her to this appointment.  Patient has Down syndrome.  She has dysmenorrhea and is on oral contraceptives.  She is never been sexually active.  She has never had a Pap smear and mom claims her to have that examination as it would cause too much stress for her.  She does have generalized anxiety disorder and is on Celexa which is working well.  She also has hypothyroidism and is on replacement therapy.  No current symptoms of thyroid dysfunction.  She had recent labs in August that were stable.  She has osteoarthritis of both knees and some activities do cause discomfort.  It does help if she uses Voltaren as needed.  She has obstructive sleep apnea and refuses CPAP.  She is morbidly obese.  Over the last couple weeks she has had more problems with nausea vomiting and diarrhea.  This is been intermittent.  She was seen by Dr. Kee yesterday and had labs with a normal hepatic function panel.  She had abdominal ultrasound which showed gallstones, bile duct stones and biliary duct dilation.  She is referred to gastroenterology.  She was prescribed cholestyramine yesterday but has not yet started the medication.  She is not have any pain nausea vomiting or diarrhea at this time.  Last tetanus was 1991.  She does not want this updated today.  She is planning to get a mantoux at group [place].    Patient Active Problem List   Diagnosis     Down's syndrome     DOROTEO (generalized anxiety disorder)     Menstrual disorder     Primary osteoarthritis of both knees     Gastroesophageal reflux disease without esophagitis     Other specified hypothyroidism     Dermatitis     Encounter for screening mammogram for breast  cancer     Morbid obesity (H)     JENNIFER (obstructive sleep apnea)     Past Medical History:   Diagnosis Date     Arthritis of knee     receives injections frequently     Corneal ulcer 2004    caused by leaking of CPAP     JENNIFER (obstructive sleep apnea)     patient refuses CPAP     History reviewed. No pertinent surgical history.  Social History     Socioeconomic History     Marital status: Single     Spouse name: Not on file     Number of children: Not on file     Years of education: Not on file     Highest education level: Not on file   Occupational History     Not on file   Social Needs     Financial resource strain: Not on file     Food insecurity:     Worry: Not on file     Inability: Not on file     Transportation needs:     Medical: Not on file     Non-medical: Not on file   Tobacco Use     Smoking status: Never Smoker     Smokeless tobacco: Never Used   Substance and Sexual Activity     Alcohol use: No     Drug use: No     Sexual activity: Not Currently   Lifestyle     Physical activity:     Days per week: Not on file     Minutes per session: Not on file     Stress: Not on file   Relationships     Social connections:     Talks on phone: Not on file     Gets together: Not on file     Attends [employer] service: Not on file     Active member of club or organization: Not on file     Attends meetings of clubs or organizations: Not on file     Relationship status: Not on file     Intimate partner violence:     Fear of current or ex partner: Not on file     Emotionally abused: Not on file     Physically abused: Not on file     Forced sexual activity: Not on file   Other Topics Concern     Not on file   Social History Narrative     Not on file     Family History   Problem Relation Age of Onset     Asthma Mother      Arthritis Mother      LUNG DISEASE Father         pulmonary fibrosis secondary to chemical exposure     Diabetes Father      Current Outpatient Medications   Medication Sig Dispense Refill     citalopram  "(CELEXA) 20 MG tablet Take 1 tablet (20 mg) by mouth daily 90 tablet 3     diclofenac (VOLTAREN) 50 MG EC tablet Take 1 tablet (50 mg) by mouth 2 times daily 60 tablet 3     levothyroxine (SYNTHROID/LEVOTHROID) 175 MCG tablet Take 1 tablet (175 mcg) by mouth daily 90 tablet 3     MULTIPLE VITAMIN PO        norgestim-eth estrad triphasic (TRINESSA, 28,) 0.18/0.215/0.25 MG-35 MCG per tablet Take 1 tablet by mouth daily 84 tablet 3     ranitidine (ZANTAC) 150 MG tablet TAKE 1 TABLET BY MOUTH TWICE DAILY 60 tablet 11     cholestyramine (QUESTRAN) 4 GM/DOSE powder Take 4 g by mouth 2 times daily (with meals) (Patient not taking: Reported on 6/12/2019) 378 g 1     Sulfa drugs      Review of Systems:  Review of Systems   Constitutional: Negative for activity change, appetite change, fatigue and unexpected weight change.   HENT: Negative for congestion and trouble swallowing.    Eyes: Negative for visual disturbance.        Wears glasses, no recent eye exam   Respiratory: Negative for cough, chest tightness and shortness of breath.    Cardiovascular: Negative for chest pain, palpitations and peripheral edema.   Gastrointestinal: Positive for abdominal pain, diarrhea, nausea and vomiting. Negative for anal bleeding and hematochezia.   Endocrine: Negative.    Breasts:         Normal mammogram 2018   Genitourinary: Negative for flank pain and pelvic pain.   Musculoskeletal: Positive for arthralgias. Negative for myalgias.   Skin: Negative for wound.   Allergic/Immunologic: Negative for immunocompromised state.   Neurological: Negative for dizziness, tremors, seizures, speech difficulty, weakness and numbness.   Hematological: Negative for adenopathy.   Psychiatric/Behavioral: The patient is nervous/anxious.         Down syndrome       Objective:   /80 (BP Location: Right arm, Patient Position: Sitting, Cuff Size: Adult Regular)   Pulse 64   Temp 97.6  F (36.4  C) (Tympanic)   Resp 16   Ht 1.36 m (4' 5.54\")   Wt 72 " kg (158 lb 12.8 oz)   LMP  (LMP Unknown)   SpO2 98%   BMI 38.94 kg/m    Physical Exam  Pleasant morbidly obese young lady with Down syndrome.  She is somewhat reserved today as she is upset with her parents.  Appears comfortable and is in a safe environment.  Skin color pink.  Sclera nonicteric.  Conjunctiva noninflamed.  TMs clear bilaterally.  Wears hearing aid in left ear.  Oral mucosa pink and moist.  Throat without erythema.  Neck supple without adenopathy.  No thyromegaly.  No thyroid tenderness.  Lung fields clear to auscultation.  Cardiovascular regular rate and rhythm with no murmur clicks rubs or S3 auscultated.  Abdomen is soft and obese without masses, tenderness and organomegaly.  No suprapubic tenderness.  Extremities without edema.  She has some excoriated acne lesions on her chin.  No other rashes identified.    Assessment:    ICD-10-CM    1. Down's syndrome Q90.9    2. Menstrual disorder N92.6    3. DOROTEO (generalized anxiety disorder) F41.1    4. Other specified hypothyroidism E03.8    5. Gastroesophageal reflux disease without esophagitis K21.9    6. Gall stones, common bile duct K80.50    7. Primary osteoarthritis of both knees M17.0    8. JENNIFER (obstructive sleep apnea) G47.33    9. Morbid obesity (H) E66.01        Plan:   Patient is up-to-date on laboratory studies.  Paperwork for group home is completed.  She would like to refrain from having Tdap updated today as she is in a poor mood but would be okay at next visit.  She will continue with same medication and treatment plan.  She is in the process of being referred to gastroenterologist due to her recent GI issues.  They are waiting to receive call back on this appointment.  She will start the cholestyramine.  She refuses treatment for her sleep apnea.  Continue with oral contraceptives.  Patient has never been sexually active and declines Pap and pelvic examination.  Her mother feels this is also what is best for patient as it would be quite  upsetting.    JAYASHREE Dorman   6/12/2019  3:12 PM

## 2019-06-12 NOTE — NURSING NOTE
Visual Acuity Screening   Visual acuity OD (right eye): 10/ 32   Visual acuity OS (left eye): 10/ 25   Visual acuity OU (both eyes): 10/ 25   Corrective lenses worn: Yes  Jane Alejandro LPN .............6/12/2019  3:29 PM

## 2019-06-12 NOTE — TELEPHONE ENCOUNTER
[id number] sent Rx request for the following:      CITALOPRAM 20MG TABLET  Sig: TAKE 1 TABLET BY MOUTH EVERY DAY  Last Prescription Date:   8/14/18  Last Fill Qty/Refills:         90, R-3 ([facility])      LEVOTHYROXINE 175MCG TABLET  Sig: TAKE 1 TABLET BY MOUTH DAILY  Last Prescription Date:   8/14/18  Last Fill Qty/Refills:         90, R-3 ([facility])      TRI-PREVIFEM 28-DAY TABLET  Sig: TAKE 1 TABLET BY MOUTH EVERY DAY  Last Prescription Date:   8/14/18  Last Fill Qty/Refills:         84, R-3 ([facility])      Last Office Visit:              Today (PCP)- currently arrived  Future Office visit:           None.    Routing refill request to provider for review/approval because:  Pt currently arrived for appointment with PCP.    Jennifer Oh RN .............. 6/12/2019  2:42 PM

## 2019-06-13 RX ORDER — NORGESTIMATE AND ETHINYL ESTRADIOL 7DAYSX3 28
1 KIT ORAL DAILY
Qty: 84 TABLET | Refills: 3 | Status: SHIPPED | OUTPATIENT
Start: 2019-06-13 | End: 2020-05-26

## 2019-06-13 RX ORDER — LEVOTHYROXINE SODIUM 175 UG/1
175 TABLET ORAL DAILY
Qty: 90 TABLET | Refills: 0 | Status: SHIPPED | OUTPATIENT
Start: 2019-06-13 | End: 2019-06-25 | Stop reason: ALTCHOICE

## 2019-06-13 NOTE — TELEPHONE ENCOUNTER
"Requested Prescriptions   Pending Prescriptions Disp Refills     levothyroxine (SYNTHROID/LEVOTHROID) 175 MCG tablet 90 tablet 3     Sig: Take 1 tablet (175 mcg) by mouth daily       Thyroid Protocol Passed - 6/12/2019 10:37 AM        Passed - Patient is 12 years or older        Passed - Recent (12 mo) or future (30 days) visit within the authorizing provider's specialty     Patient had office visit in the last 12 months or has a visit in the next 30 days with authorizing provider or within the authorizing provider's specialty.  See \"Patient Info\" tab in inbasket, or \"Choose Columns\" in Meds & Orders section of the refill encounter.              Passed - Medication is active on med list        Passed - Normal TSH on file in past 12 months     No lab results found.           Passed - No active pregnancy on record     If patient is pregnant or has had a positive pregnancy test, please check TSH.          Passed - No positive pregnancy test in past 12 months     If patient is pregnant or has had a positive pregnancy test, please check TSH.          norgestim-eth estrad triphasic (TRINESSA, 28,) 0.18/0.215/0.25 MG-35 MCG tablet 84 tablet 3     Sig: Take 1 tablet by mouth daily       Contraceptives Protocol Passed - 6/12/2019 10:37 AM        Passed - Patient is not a current smoker if age is 35 or older        Passed - Recent (12 mo) or future (30 days) visit within the authorizing provider's specialty     Patient had office visit in the last 12 months or has a visit in the next 30 days with authorizing provider or within the authorizing provider's specialty.  See \"Patient Info\" tab in inbasket, or \"Choose Columns\" in Meds & Orders section of the refill encounter.              Passed - Medication is active on med list        Passed - No active pregnancy on record        Passed - No positive pregnancy test in past 12 months        LOV 0612/19  Prescription approved per [facility] Refill Protocol.    "

## 2019-06-14 RX ORDER — CHOLESTYRAMINE 4 G/9G
POWDER, FOR SUSPENSION ORAL
Qty: 756 G | Refills: 1 | OUTPATIENT
Start: 2019-06-14

## 2019-06-17 ENCOUNTER — CARE COORDINATION (OUTPATIENT)
Dept: GASTROENTEROLOGY | Facility: CLINIC | Age: 44
End: 2019-06-17

## 2019-06-17 DIAGNOSIS — K80.50 COMMON BILE DUCT STONE: Primary | ICD-10-CM

## 2019-06-17 NOTE — PROGRESS NOTES
[facility]     Referring provider: Susy Kee MD    Referred to: [facility]     Provider Requested: na     Referral Received:  6/17/19     Records received: in EPIC     Images received: in EPIC    Evaluation for:  Choledocholithiasis     Clinical History (per RN review):     44 year old female with episodes of vomiting and diarrhea. These have been going on for more than 6 weeks. The patient was seen in the ED on 5/18. Patient has Downs Syndrome, parents are contact, lives in group home.     The US showed stones in the gall bladder, common duct and intrahepatic ducts. No visualized pericholecystic fluid or wall thickening. The common duct was 10 mm.    Surg note from Susy Kee MD  6/11/19    Discussed option for GI referral and patient's family prefers [facility] physicians. Discussed that patient will need consult and also likely ERCP    LABS WNL    IMAGING  US ABDOMEN LIMITED- 6/11/19- In EPIC    IMPRESSION:      Choledocholithiasis. Numerous stones are impacted within the common  bile duct, which is distended to at least 10 mm. Stones are also  present within the central intrahepatic biliary ducts. There is  peripheral intrahepatic biliary ductal dilatation. No gross pancreatic  ductal dilatation seen.     The gallbladder is completely filled with shadowing stones, resulting  in the wall echo shadow sign. There is trace pericholecystic fluid but  no wall thickening or sonographic Lala's sign.     Recommend surgical consultation. Consider MRCP/MR liver for further  assessment if clinically warranted.     Current Outpatient Medications   Medication     cholestyramine (QUESTRAN) 4 GM/DOSE powder     citalopram (CELEXA) 20 MG tablet     diclofenac (VOLTAREN) 50 MG EC tablet     levothyroxine (SYNTHROID/LEVOTHROID) 175 MCG tablet     levothyroxine (SYNTHROID/LEVOTHROID) 175 MCG tablet     MULTIPLE VITAMIN PO     norgestim-eth estrad triphasic (TRINESSA, 28,) 0.18/0.215/0.25 MG-35 MCG  tablet     ranitidine (ZANTAC) 150 MG tablet     TRI-PREVIFEM 0.18/0.215/0.25 MG-35 MCG tablet     No current facility-administered medications for this visit.        Past Medical History:   Diagnosis Date     Arthritis of knee     receives injections frequently     Corneal ulcer 2004    caused by leaking of CPAP     JENNIFER (obstructive sleep apnea)     patient refuses CPAP       No past surgical history on file.    Family History   Problem Relation Age of Onset     Asthma Mother      Arthritis Mother      LUNG DISEASE Father         pulmonary fibrosis secondary to chemical exposure     Diabetes Father        Social History     Tobacco Use     Smoking status: Never Smoker     Smokeless tobacco: Never Used   Substance Use Topics     Alcohol use: No         MD review date:   MD Decision for clinic consultation/Orders:            Referral updates/Patient contacted:

## 2019-06-18 NOTE — PROGRESS NOTES
Per Dr. Alvarado    Please schedule ERCP with me next Monday. If she develops fevers, chills or cholangitis, need to get admitted for earlier ERCP   Needs pre-op clearance     Contacted patients Mom, left message regarding procedure and scheduling.    Nida Padilla, RN Care Coordinator

## 2019-06-20 ENCOUNTER — TELEPHONE (OUTPATIENT)
Dept: SURGERY | Facility: OTHER | Age: 44
End: 2019-06-20

## 2019-06-20 NOTE — TELEPHONE ENCOUNTER
Father also had questions about her medicine.  We did give him the [facility] number for gastroenterology.  Referral was sent on 6/13/19 to the [facility]

## 2019-06-20 NOTE — TELEPHONE ENCOUNTER
Father stopped at the desk to talk to Dr Kee.  He states it is important.  She has gallstones.  Dr Kee referred her to someone in the [facility] but no one has called them to make that appointment.  Please advise.

## 2019-06-21 ENCOUNTER — TELEPHONE (OUTPATIENT)
Dept: GASTROENTEROLOGY | Facility: CLINIC | Age: 44
End: 2019-06-21

## 2019-06-21 NOTE — PROGRESS NOTES
Talked to patients dad, Kvng and mom, Pita about procedure indicated. Gabi had reached out to GI in [facility], made an appointment on 7/2, asked that I cancel it so we could do procedure here.     Reviewed ERCP procedure. Patient has Downs Syndrome. Orders placed, referred to scheduling.     Will get pre-op physical done locally and will fax a copy to us along with bringing a hard copy with them. Fax number given. [phone]    Blood thinner -  no  ASA - no  Diabetic - no  NSAIDS: no    A pre-op nurse will call 1-2 days prior to the procedure.     Is advised to be NPO/no solid food 8 hours before the procedure. Ok to drink clear liquids (Water, Apple Juice or Gatorade) up to 2 hours prior to procedure.     Post Procedure: start with clear liquids and then advance as tolerated.     Advised if she had fevers to present to ER right away for treatment. Kvng verbalized understanding.     iNda Padilla, RN Care Coordinator

## 2019-06-21 NOTE — TELEPHONE ENCOUNTER
Spoke to patient's parents in regards to scheduled procedure. Informed them that the patient is scheduled with Dr. Thomas on 7/1/19. Informed them the patient will need an updated pre-op physical within 30 days of her procedure. They stated the patient will have this done locally. Informed them she will need a  and someone to monitor her for 24 hours after the procedure. Informed them all scheduling details will be sent to the address listed on Epic. Address confirmed on this call.     6/21/19 333pm

## 2019-06-21 NOTE — PROGRESS NOTES
Parents state that they have guardianship paperwork and were advised to bring paperwork with to procedure.    Nida Padilla RN Care Coordinator

## 2019-06-25 ENCOUNTER — OFFICE VISIT (OUTPATIENT)
Dept: FAMILY MEDICINE | Facility: OTHER | Age: 44
End: 2019-06-25
Attending: NURSE PRACTITIONER
Payer: MEDICARE

## 2019-06-25 ENCOUNTER — TELEPHONE (OUTPATIENT)
Dept: INTERNAL MEDICINE | Facility: OTHER | Age: 44
End: 2019-06-25

## 2019-06-25 VITALS
SYSTOLIC BLOOD PRESSURE: 108 MMHG | BODY MASS INDEX: 36.38 KG/M2 | DIASTOLIC BLOOD PRESSURE: 72 MMHG | WEIGHT: 157.2 LBS | TEMPERATURE: 98.2 F | OXYGEN SATURATION: 98 % | HEIGHT: 55 IN | RESPIRATION RATE: 16 BRPM

## 2019-06-25 DIAGNOSIS — Q90.9 DOWN'S SYNDROME: ICD-10-CM

## 2019-06-25 DIAGNOSIS — F41.1 GAD (GENERALIZED ANXIETY DISORDER): ICD-10-CM

## 2019-06-25 DIAGNOSIS — R19.7 VOMITING AND DIARRHEA: Primary | ICD-10-CM

## 2019-06-25 DIAGNOSIS — K80.50 CHOLEDOCHOLITHIASIS: ICD-10-CM

## 2019-06-25 DIAGNOSIS — E66.01 MORBID OBESITY (H): ICD-10-CM

## 2019-06-25 DIAGNOSIS — E03.8 OTHER SPECIFIED HYPOTHYROIDISM: ICD-10-CM

## 2019-06-25 DIAGNOSIS — G47.33 OSA (OBSTRUCTIVE SLEEP APNEA): ICD-10-CM

## 2019-06-25 DIAGNOSIS — N92.6 MENSTRUAL DISORDER: Primary | ICD-10-CM

## 2019-06-25 DIAGNOSIS — R11.10 VOMITING AND DIARRHEA: Primary | ICD-10-CM

## 2019-06-25 DIAGNOSIS — K21.9 GASTROESOPHAGEAL REFLUX DISEASE WITHOUT ESOPHAGITIS: ICD-10-CM

## 2019-06-25 PROCEDURE — G0463 HOSPITAL OUTPT CLINIC VISIT: HCPCS

## 2019-06-25 PROCEDURE — 99214 OFFICE O/P EST MOD 30 MIN: CPT | Performed by: NURSE PRACTITIONER

## 2019-06-25 RX ORDER — MULTIVITAMIN
1 TABLET ORAL DAILY
Qty: 30 TABLET | Refills: 11 | Status: SHIPPED | OUTPATIENT
Start: 2019-06-25 | End: 2020-08-19

## 2019-06-25 ASSESSMENT — PAIN SCALES - GENERAL: PAINLEVEL: NO PAIN (0)

## 2019-06-25 ASSESSMENT — MIFFLIN-ST. JEOR: SCORE: 1165.49

## 2019-06-25 NOTE — NURSING NOTE
"Patient presents to clinic today for pre op exam.     Date of Surgery: 7/1/2019   Type of Surgery: ERCP  Surgeon: Dr. Thomas [facility]  Hospital:  [facility]  Fax:     Fever/Chills or other infectious symptoms in past month: no  >10lb weight loss in past two months: yes  O2 SAT: 97%    Health Care Directive/Code status:  Full Code  Hx of blood transfusions:   no  Td up to date:  yes  History of VRE/MRSA:  no Date: NA    Preoperative Evaluation: Obstructive Sleep Apnea screening    S: Snore -  Do you snore loudly? (louder than talking or loud enough to be heard through closed doors) NO  T: Tired - Do you often feel tired, fatigued, or sleepy during the daytime?no  O: Observed - Has anyone ever observed you stop breathing during your sleep?no  P: Pressure - Do you have or are you being treated for high blood pressure?no  B: BMI - BMI greater than 35kg/m2?no  A: Age - Age over 50 years old?no  N: Neck - Neck circumference greater than 40 cm?no 38 cm  G: Gender - Gender: Male?no    Total number of \"YES\" responses:  0    Scoring: Low risk of JENNIFER 0-2  At Risk of JENNIFER: >3 High Risk of JENNIFER: 5-8    Vicente Del Rosario 6/25/2019 1:00 PM    No LMP recorded (lmp unknown).  Medication Reconciliation: complete    Vicente Del Rosario LPN  6/25/2019 1:06 PM    Patient's contact number at [facility] is Maci at [phone]  Or [phone].      "

## 2019-06-25 NOTE — TELEPHONE ENCOUNTER
Called patient's dad and he stated that they do have an appointment at the  on July 1st.  He is questioning how long Reg is supposed to take the Questran for.  Britney Gutierrez LPN .......6/25/2019 8:37 AM

## 2019-06-25 NOTE — TELEPHONE ENCOUNTER
Incoming fax. Patient has moved to [place]. Please send prescription for multivitamin 1 tab daily, so that they may add them to bubble pack. Jane Alejandro LPN .............6/25/2019  8:29 AM

## 2019-06-25 NOTE — TELEPHONE ENCOUNTER
Called patient's parents-her diarrhea has been better but she did have one episode. Recommend she keep taking the cholestyramine until she sees GI/hepatobilliary at the of. They expressed understanding. Susy Kee MD on 6/25/2019 at 11:46 AM

## 2019-06-25 NOTE — PROGRESS NOTES
----------------- PREOPERATIVE EXAM ------------------  6/25/2019    SUBJECTIVE:  Reg Valiente is a 44 year old female here for preoperative optimization.    I was asked to see Reg Valiente by Dr. Alvarado for preoperative optimization     Date of Surgery: July 1, 2019  Type of Surgery: ERCP  Surgeon: Dr Alvarado  Hospital: [facility]    HPI: Presents with both parents for preoperative review for upcoming endoscopic retrograde cholangiopancreatogram for Choledocholithiasis.  She reports she is not having any abdominal pain at this time and the cholestyramine is mildly effective  She had initially presented to the ED with vomiting and diarrhea May 2019.  She saw Dr. Kee in general surgery who had started her on cholestyramine which overall is helpful controlling diarrhea when she remembers to take it.  Was referred to [facility] gastro[facility]ology for above procedure  She recently moved to [facility] for Down syndrome and DD.  She does have a history of JENNIFER with CPAP however parents reports she does not like to wear the CPAP and they believe that she threw it in the garbage and it is not retrievable  Parents reports she has never worn  Consistently.  Parents report no tobacco history and no known surgery history.  She has no known allergies to anesthesia or sedation other than her sulfa allergy--- no known latex or adhesive sensitivity or allergy  No history of cardiac or respiratory conditions--- parents and patient report able to tolerate > 4 METS        Fever/Chills or other infectious symptoms in past month:  none  >10lb weight loss in past two months:  none    Patient Active Problem List    Diagnosis Date Noted     Choledocholithiasis 06/25/2019     Priority: Medium     Down's syndrome 08/14/2018     Priority: Medium     DOROTEO (generalized anxiety disorder) 08/14/2018     Priority: Medium     Menstrual disorder 08/14/2018      Priority: Medium     Primary osteoarthritis of both knees 08/14/2018     Priority: Medium     Gastroesophageal reflux disease without esophagitis 08/14/2018     Priority: Medium     Other specified hypothyroidism 08/14/2018     Priority: Medium     Dermatitis 08/14/2018     Priority: Medium     Encounter for screening mammogram for breast cancer 08/14/2018     Priority: Medium     Morbid obesity (H) 08/14/2018     Priority: Medium     JENNIFER (obstructive sleep apnea) 08/14/2018     Priority: Medium       Past Medical History:   Diagnosis Date     Arthritis of knee     receives injections frequently     Corneal ulcer 2004    caused by leaking of CPAP     JENNIFER (obstructive sleep apnea)     patient refuses CPAP       Past Surgical History:   Procedure Laterality Date     NO HISTORY OF SURGERY      as reported by parents       Family History   Problem Relation Age of Onset     Asthma Mother      Arthritis Mother      LUNG DISEASE Father         pulmonary fibrosis secondary to chemical exposure     Diabetes Father      Cerebrovascular Disease Father 90     Cerebrovascular Disease Maternal Grandmother 90     Heart Failure Paternal Grandfather         uncertain which  replaced     Coronary Artery Disease No family hx of      Heart Disease No family hx of      Bleeding Diathesis No family hx of      Pancreatic Cancer No family hx of      Colorectal Cancer No family hx of        Social History     Tobacco Use     Smoking status: Never Smoker     Smokeless tobacco: Never Used   Substance Use Topics     Alcohol use: No     Drug use: No       Current Outpatient Medications   Medication Sig Dispense Refill     cholestyramine (QUESTRAN) 4 GM/DOSE powder Take 4 g by mouth 2 times daily (with meals) 378 g 1     citalopram (CELEXA) 20 MG tablet TAKE 1 TABLET BY MOUTH EVERY DAY 90 tablet 3     diclofenac (VOLTAREN) 50 MG EC tablet Take 1 tablet (50 mg) by mouth 2 times daily 60 tablet 3     levothyroxine (SYNTHROID/LEVOTHROID) 175  "MCG tablet TAKE 1 TABLET BY MOUTH DAILY 90 tablet 3     Multiple vitamin TABS Take 1 tablet by mouth daily 30 tablet 11     norgestim-eth estrad triphasic (TRINESSA, 28,) 0.18/0.215/0.25 MG-35 MCG tablet Take 1 tablet by mouth daily 84 tablet 3     ranitidine (ZANTAC) 150 MG tablet TAKE 1 TABLET BY MOUTH TWICE DAILY 60 tablet 11       Allergies:  Allergies   Allergen Reactions     Sulfa Drugs        ROS:    Surgical: Parents report no previous surgery history, no family history of adverse or allergies to anesthesia or sedation    Denies family hx of bleeding tendencies, anesthesia complications, or other problems with surgery.     -------------------------------------------------------------    PHYSICAL EXAM:  /72 (BP Location: Left arm, Patient Position: Sitting, Cuff Size: Adult Regular)   Temp 98.2  F (36.8  C) (Tympanic)   Resp 16   Ht 1.334 m (4' 4.5\")   Wt 71.3 kg (157 lb 3.2 oz)   LMP  (LMP Unknown)   Breastfeeding? No   BMI 40.10 kg/m      EXAM:  General Appearance: Pleasant, alert, appropriate appearance for age. No acute distress  Head Exam: Normal. Normocephalic, atraumatic.  Eyes: PERRL, EOMI  Ears: Normal TM's bilaterally. Normal auditory canals and external ears.   OroPharynx: Normal buccal mucosa. Normal pharynx.  Neck: Supple, no masses or nodes, no lymphadenopathy.    Lungs: Normal chest wall and respirations. Clear to auscultation, no wheezes or crackles.  Cardiovascular: Regular rate and rhythm. S1, S2  Gastrointestinal: Soft, nontender, no abnormal masses or organomegaly. BS normal   Musculoskeletal: No edema.  Skin: no concerning or new rashes.  Neurologic Exam: CN 2-12  intact.  Normal gait.    Psychiatric Exam: Alert and oriented, appropriate affect.      EKG:    Final result (Resulted: 5/18/2019 12:04 PM)    Impression     Normal EKG with a rate of 60.  Kalin Anaya MD       ---------------------------------------------------------------    Results for orders placed or " performed during the hospital encounter of 06/11/19   US Abdomen Limited    Narrative    PROCEDURES: US ABDOMEN LIMITED    HISTORY: . has been having ongoing emesis and diarrhea, has no warning  prior to episode, usually vomits once and then done, does not  correlate to meals; Vomiting and diarrhea; Vomiting and diarrhea.    TECHNIQUE: Grayscale ultrasound of the upper abdomen was performed.    COMPARISON: None.     FINDINGS:    LIVER: There is intrahepatic biliary ductal dilatation. Stones are  seen within multiple central intrahepatic ducts, with shadowing. No  suspicious intrahepatic mass is identified.    GALLBLADDER: There is a wall echo shadow sign, with impaction of the  gallbladder by extensive shadowing stones. Trace pericholecystic fluid  is present. No gallbladder wall thickening or sonographic Lala's  sign was elicited. The common bile duct is impacted with stones  measuring up to 10 mm in diameter.    ABDOMINAL AORTA AND IVC: Portions of the superior IVC and abdominal  aorta are visualized.    PANCREAS: No gross pancreatic ductal dilatation is identified.    OTHER: Survey imaging of the right kidney demonstrates no  hydronephrosis. No free fluid is seen.. The stomach appears grossly  normal.      Impression    IMPRESSION:     Choledocholithiasis. Numerous stones are impacted within the common  bile duct, which is distended to at least 10 mm. Stones are also  present within the central intrahepatic biliary ducts. There is  peripheral intrahepatic biliary ductal dilatation. No gross pancreatic  ductal dilatation seen.    The gallbladder is completely filled with shadowing stones, resulting  in the wall echo shadow sign. There is trace pericholecystic fluid but  no wall thickening or sonographic Lala's sign.    Recommend surgical consultation. Consider MRCP/MR liver for further  assessment if clinically warranted.    SARAH BROWN MD       ASSESSEMENT AND PLAN:    Patient is ready and optimized  for schedule procedure    Wrote out specific directions and recommended calling surgery center for specific instructions prior to procedure regarding when to stop fluids  And taking daily medication with sips before n.p.o.    Directed to stop diclofenac--we will after procedure--recommend using ice, topicals, Tylenol as needed    Medications reconciled to the best of my ability with limited information and limited records from previous residential home.        1. Vomiting and diarrhea      2. Down's syndrome      3. DOROTEO (generalized anxiety disorder)      4. Gastroesophageal reflux disease without esophagitis      5. Morbid obesity (H)      6. JENNIFER (obstructive sleep apnea) reports does not wear CPAP mask  Had recommended to bring CPAP mask and machine to surgery center--parents feel it was probably thrown away-in her previous adult foster home    7. Other specified hypothyroidism      8. Choledocholithiasis      PRE OP RECOMMENDATIONS:  Patient is on chronic pain medications none   Patient is on antiplatlet/anticoagulation medication none  Other medications that need adjustment perioperatively none    Other:  Patient was advised to call our office and the surgical services with any change in condition or new symptoms if they were to develop between today and their surgical date.  Especially any cardiopulmonary symptoms or symptoms concerning for an infection.    Mily Zaldivar 6/25/2019

## 2019-06-25 NOTE — PATIENT INSTRUCTIONS
Bring CPAP machine and mask to surgery center    Call surgery center for specific instructions before procedure by Thursday morning to find out when to stop all oral fluids intake  And when to take her regular morning medications with sips before scheduled procedure time  You could skip questran morning of procedure    If at any time she becomes ill or fever or cough--call surgery center    Stop diclofenac until after surgery    Tylenol is always ok to use for knee pain

## 2019-06-25 NOTE — Clinical Note
Please send preoperative notes recent labs, hard copy of EKG with final interpretation for [facility] ERCP procedure scheduled July 1thRob Zaldivar, APRN CNP June 25, 2019

## 2019-07-01 ENCOUNTER — APPOINTMENT (OUTPATIENT)
Dept: GENERAL RADIOLOGY | Facility: CLINIC | Age: 44
End: 2019-07-01
Attending: INTERNAL MEDICINE
Payer: MEDICARE

## 2019-07-01 ENCOUNTER — ANESTHESIA EVENT (OUTPATIENT)
Dept: SURGERY | Facility: CLINIC | Age: 44
End: 2019-07-01
Payer: MEDICARE

## 2019-07-01 ENCOUNTER — ANESTHESIA (OUTPATIENT)
Dept: SURGERY | Facility: CLINIC | Age: 44
End: 2019-07-01
Payer: MEDICARE

## 2019-07-01 ENCOUNTER — HOSPITAL ENCOUNTER (OUTPATIENT)
Facility: CLINIC | Age: 44
Setting detail: OBSERVATION
Discharge: HOME | End: 2019-07-02
Attending: INTERNAL MEDICINE | Admitting: INTERNAL MEDICINE
Payer: MEDICARE

## 2019-07-01 PROBLEM — K91.89 POST-ERCP ACUTE PANCREATITIS: Status: ACTIVE | Noted: 2019-07-01

## 2019-07-01 PROBLEM — K85.90 POST-ERCP ACUTE PANCREATITIS: Status: ACTIVE | Noted: 2019-07-01

## 2019-07-01 LAB
ALBUMIN SERPL-MCNC: 3.1 G/DL (ref 3.4–5)
ALP SERPL-CCNC: 87 U/L (ref 40–150)
ALT SERPL W P-5'-P-CCNC: 47 U/L (ref 0–50)
AMYLASE SERPL-CCNC: 42 U/L (ref 30–110)
AMYLASE SERPL-CCNC: 496 U/L (ref 30–110)
ANION GAP SERPL CALCULATED.3IONS-SCNC: 6 MMOL/L (ref 3–14)
AST SERPL W P-5'-P-CCNC: 33 U/L (ref 0–45)
B-HCG SERPL-ACNC: <1 IU/L (ref 0–5)
BILIRUB SERPL-MCNC: 0.6 MG/DL (ref 0.2–1.3)
BUN SERPL-MCNC: 14 MG/DL (ref 7–30)
CALCIUM SERPL-MCNC: 8.7 MG/DL (ref 8.5–10.1)
CHLORIDE SERPL-SCNC: 107 MMOL/L (ref 94–109)
CO2 SERPL-SCNC: 27 MMOL/L (ref 20–32)
CREAT SERPL-MCNC: 0.73 MG/DL (ref 0.52–1.04)
ERCP: NORMAL
ERYTHROCYTE [DISTWIDTH] IN BLOOD BY AUTOMATED COUNT: 14.3 % (ref 10–15)
GFR SERPL CREATININE-BSD FRML MDRD: >90 ML/MIN/{1.73_M2}
GLUCOSE BLDC GLUCOMTR-MCNC: 125 MG/DL (ref 70–99)
GLUCOSE BLDC GLUCOMTR-MCNC: 88 MG/DL (ref 70–99)
GLUCOSE SERPL-MCNC: 85 MG/DL (ref 70–99)
HCT VFR BLD AUTO: 41.2 % (ref 35–47)
HGB BLD-MCNC: 13 G/DL (ref 11.7–15.7)
LIPASE SERPL-CCNC: 113 U/L (ref 73–393)
LIPASE SERPL-CCNC: ABNORMAL U/L (ref 73–393)
MCH RBC QN AUTO: 32.7 PG (ref 26.5–33)
MCHC RBC AUTO-ENTMCNC: 31.6 G/DL (ref 31.5–36.5)
MCV RBC AUTO: 104 FL (ref 78–100)
PLATELET # BLD AUTO: 272 10E9/L (ref 150–450)
POTASSIUM SERPL-SCNC: 4.1 MMOL/L (ref 3.4–5.3)
PROT SERPL-MCNC: 7.4 G/DL (ref 6.8–8.8)
RBC # BLD AUTO: 3.98 10E12/L (ref 3.8–5.2)
SODIUM SERPL-SCNC: 140 MMOL/L (ref 133–144)
WBC # BLD AUTO: 5.1 10E9/L (ref 4–11)

## 2019-07-01 PROCEDURE — 36000061 ZZH SURGERY LEVEL 3 W FLUORO 1ST 30 MIN - UMMC: Performed by: INTERNAL MEDICINE

## 2019-07-01 PROCEDURE — 36415 COLL VENOUS BLD VENIPUNCTURE: CPT | Performed by: INTERNAL MEDICINE

## 2019-07-01 PROCEDURE — 25800030 ZZH RX IP 258 OP 636: Performed by: PHYSICIAN ASSISTANT

## 2019-07-01 PROCEDURE — 25500064 ZZH RX 255 OP 636: Performed by: INTERNAL MEDICINE

## 2019-07-01 PROCEDURE — 25000132 ZZH RX MED GY IP 250 OP 250 PS 637: Mod: GY | Performed by: PHYSICIAN ASSISTANT

## 2019-07-01 PROCEDURE — 25000125 ZZHC RX 250: Performed by: INTERNAL MEDICINE

## 2019-07-01 PROCEDURE — 83690 ASSAY OF LIPASE: CPT | Performed by: INTERNAL MEDICINE

## 2019-07-01 PROCEDURE — 00000146 ZZHCL STATISTIC GLUCOSE BY METER IP

## 2019-07-01 PROCEDURE — 40000279 XR SURGERY CARM FLUORO GREATER THAN 5 MIN W STILLS: Mod: TC

## 2019-07-01 PROCEDURE — 25800030 ZZH RX IP 258 OP 636

## 2019-07-01 PROCEDURE — G0378 HOSPITAL OBSERVATION PER HR: HCPCS

## 2019-07-01 PROCEDURE — C1769 GUIDE WIRE: HCPCS | Performed by: INTERNAL MEDICINE

## 2019-07-01 PROCEDURE — 36000059 ZZH SURGERY LEVEL 3 EA 15 ADDTL MIN UMMC: Performed by: INTERNAL MEDICINE

## 2019-07-01 PROCEDURE — 25000128 H RX IP 250 OP 636: Performed by: NURSE ANESTHETIST, CERTIFIED REGISTERED

## 2019-07-01 PROCEDURE — 71000027 ZZH RECOVERY PHASE 2 EACH 15 MINS: Performed by: INTERNAL MEDICINE

## 2019-07-01 PROCEDURE — 71000015 ZZH RECOVERY PHASE 1 LEVEL 2 EA ADDTL HR: Performed by: INTERNAL MEDICINE

## 2019-07-01 PROCEDURE — 85027 COMPLETE CBC AUTOMATED: CPT | Performed by: INTERNAL MEDICINE

## 2019-07-01 PROCEDURE — 71000014 ZZH RECOVERY PHASE 1 LEVEL 2 FIRST HR: Performed by: INTERNAL MEDICINE

## 2019-07-01 PROCEDURE — 25000125 ZZHC RX 250: Performed by: NURSE ANESTHETIST, CERTIFIED REGISTERED

## 2019-07-01 PROCEDURE — 27210794 ZZH OR GENERAL SUPPLY STERILE: Performed by: INTERNAL MEDICINE

## 2019-07-01 PROCEDURE — 40000171 ZZH STATISTIC PRE-PROCEDURE ASSESSMENT III: Performed by: INTERNAL MEDICINE

## 2019-07-01 PROCEDURE — 37000009 ZZH ANESTHESIA TECHNICAL FEE, EACH ADDTL 15 MIN: Performed by: INTERNAL MEDICINE

## 2019-07-01 PROCEDURE — 25800030 ZZH RX IP 258 OP 636: Performed by: NURSE ANESTHETIST, CERTIFIED REGISTERED

## 2019-07-01 PROCEDURE — 99207 ZZC APP CREDIT; MD BILLING SHARED VISIT: CPT | Performed by: PHYSICIAN ASSISTANT

## 2019-07-01 PROCEDURE — 25000566 ZZH SEVOFLURANE, EA 15 MIN: Performed by: INTERNAL MEDICINE

## 2019-07-01 PROCEDURE — 80053 COMPREHEN METABOLIC PANEL: CPT | Performed by: INTERNAL MEDICINE

## 2019-07-01 PROCEDURE — C1726 CATH, BAL DIL, NON-VASCULAR: HCPCS | Performed by: INTERNAL MEDICINE

## 2019-07-01 PROCEDURE — 25000132 ZZH RX MED GY IP 250 OP 250 PS 637: Mod: GY | Performed by: INTERNAL MEDICINE

## 2019-07-01 PROCEDURE — 99218 ZZC INITIAL OBSERVATION CARE,LEVL I: CPT | Mod: AI | Performed by: INTERNAL MEDICINE

## 2019-07-01 PROCEDURE — 84702 CHORIONIC GONADOTROPIN TEST: CPT | Performed by: ANESTHESIOLOGY

## 2019-07-01 PROCEDURE — 37000008 ZZH ANESTHESIA TECHNICAL FEE, 1ST 30 MIN: Performed by: INTERNAL MEDICINE

## 2019-07-01 PROCEDURE — 82150 ASSAY OF AMYLASE: CPT | Performed by: INTERNAL MEDICINE

## 2019-07-01 PROCEDURE — C1877 STENT, NON-COAT/COV W/O DEL: HCPCS | Performed by: INTERNAL MEDICINE

## 2019-07-01 PROCEDURE — 25800030 ZZH RX IP 258 OP 636: Performed by: ANESTHESIOLOGY

## 2019-07-01 DEVICE — STENT FREEMAN PANCREA FLEX 4FRX09CM W/O FLANGE SGL PIGTAIL
Type: IMPLANTABLE DEVICE | Status: NON-FUNCTIONAL
Removed: 2019-07-24

## 2019-07-01 DEVICE — IMPLANTABLE DEVICE
Type: IMPLANTABLE DEVICE | Status: NON-FUNCTIONAL
Removed: 2019-07-24

## 2019-07-01 RX ORDER — CHOLESTYRAMINE 4 G/9G
4 POWDER, FOR SUSPENSION ORAL 2 TIMES DAILY
Status: DISCONTINUED | OUTPATIENT
Start: 2019-07-01 | End: 2019-07-02 | Stop reason: HOSPADM

## 2019-07-01 RX ORDER — METOPROLOL TARTRATE 1 MG/ML
1-2 INJECTION, SOLUTION INTRAVENOUS EVERY 5 MIN PRN
Status: DISCONTINUED | OUTPATIENT
Start: 2019-07-01 | End: 2019-07-01 | Stop reason: HOSPADM

## 2019-07-01 RX ORDER — LIDOCAINE 40 MG/G
CREAM TOPICAL
Status: DISCONTINUED | OUTPATIENT
Start: 2019-07-01 | End: 2019-07-01 | Stop reason: HOSPADM

## 2019-07-01 RX ORDER — SODIUM CHLORIDE, SODIUM LACTATE, POTASSIUM CHLORIDE, CALCIUM CHLORIDE 600; 310; 30; 20 MG/100ML; MG/100ML; MG/100ML; MG/100ML
INJECTION, SOLUTION INTRAVENOUS
Status: COMPLETED
Start: 2019-07-01 | End: 2019-07-01

## 2019-07-01 RX ORDER — LIDOCAINE 40 MG/G
CREAM TOPICAL
Status: DISCONTINUED | OUTPATIENT
Start: 2019-07-01 | End: 2019-07-02 | Stop reason: HOSPADM

## 2019-07-01 RX ORDER — INDOMETHACIN 50 MG/1
100 SUPPOSITORY RECTAL
Status: DISCONTINUED | OUTPATIENT
Start: 2019-07-01 | End: 2019-07-01

## 2019-07-01 RX ORDER — NICOTINE POLACRILEX 4 MG
15-30 LOZENGE BUCCAL
Status: DISCONTINUED | OUTPATIENT
Start: 2019-07-01 | End: 2019-07-02 | Stop reason: HOSPADM

## 2019-07-01 RX ORDER — ONDANSETRON 4 MG/1
4 TABLET, ORALLY DISINTEGRATING ORAL EVERY 30 MIN PRN
Status: DISCONTINUED | OUTPATIENT
Start: 2019-07-01 | End: 2019-07-01 | Stop reason: HOSPADM

## 2019-07-01 RX ORDER — EPHEDRINE SULFATE 50 MG/ML
INJECTION, SOLUTION INTRAMUSCULAR; INTRAVENOUS; SUBCUTANEOUS PRN
Status: DISCONTINUED | OUTPATIENT
Start: 2019-07-01 | End: 2019-07-01

## 2019-07-01 RX ORDER — CHOLESTYRAMINE 4 G/9G
4 POWDER, FOR SUSPENSION ORAL 2 TIMES DAILY
Status: DISCONTINUED | OUTPATIENT
Start: 2019-07-01 | End: 2019-07-01

## 2019-07-01 RX ORDER — SODIUM CHLORIDE, SODIUM LACTATE, POTASSIUM CHLORIDE, CALCIUM CHLORIDE 600; 310; 30; 20 MG/100ML; MG/100ML; MG/100ML; MG/100ML
INJECTION, SOLUTION INTRAVENOUS CONTINUOUS
Status: DISCONTINUED | OUTPATIENT
Start: 2019-07-01 | End: 2019-07-01 | Stop reason: HOSPADM

## 2019-07-01 RX ORDER — NALOXONE HYDROCHLORIDE 0.4 MG/ML
.1-.4 INJECTION, SOLUTION INTRAMUSCULAR; INTRAVENOUS; SUBCUTANEOUS
Status: DISCONTINUED | OUTPATIENT
Start: 2019-07-01 | End: 2019-07-02 | Stop reason: HOSPADM

## 2019-07-01 RX ORDER — ONDANSETRON 2 MG/ML
4 INJECTION INTRAMUSCULAR; INTRAVENOUS EVERY 6 HOURS PRN
Status: DISCONTINUED | OUTPATIENT
Start: 2019-07-01 | End: 2019-07-02 | Stop reason: HOSPADM

## 2019-07-01 RX ORDER — LEVOTHYROXINE SODIUM 175 UG/1
175 TABLET ORAL
Status: DISCONTINUED | OUTPATIENT
Start: 2019-07-02 | End: 2019-07-02 | Stop reason: HOSPADM

## 2019-07-01 RX ORDER — CITALOPRAM HYDROBROMIDE 20 MG/1
20 TABLET ORAL DAILY
Status: DISCONTINUED | OUTPATIENT
Start: 2019-07-02 | End: 2019-07-02 | Stop reason: HOSPADM

## 2019-07-01 RX ORDER — NALOXONE HYDROCHLORIDE 0.4 MG/ML
.1-.4 INJECTION, SOLUTION INTRAMUSCULAR; INTRAVENOUS; SUBCUTANEOUS
Status: DISCONTINUED | OUTPATIENT
Start: 2019-07-01 | End: 2019-07-01

## 2019-07-01 RX ORDER — ONDANSETRON 2 MG/ML
4 INJECTION INTRAMUSCULAR; INTRAVENOUS EVERY 30 MIN PRN
Status: DISCONTINUED | OUTPATIENT
Start: 2019-07-01 | End: 2019-07-01 | Stop reason: HOSPADM

## 2019-07-01 RX ORDER — DEXAMETHASONE SODIUM PHOSPHATE 4 MG/ML
INJECTION, SOLUTION INTRA-ARTICULAR; INTRALESIONAL; INTRAMUSCULAR; INTRAVENOUS; SOFT TISSUE PRN
Status: DISCONTINUED | OUTPATIENT
Start: 2019-07-01 | End: 2019-07-01

## 2019-07-01 RX ORDER — INDOMETHACIN 50 MG/1
100 SUPPOSITORY RECTAL
Status: COMPLETED | OUTPATIENT
Start: 2019-07-01 | End: 2019-07-01

## 2019-07-01 RX ORDER — HYDROMORPHONE HYDROCHLORIDE 1 MG/ML
.3-.5 INJECTION, SOLUTION INTRAMUSCULAR; INTRAVENOUS; SUBCUTANEOUS EVERY 5 MIN PRN
Status: DISCONTINUED | OUTPATIENT
Start: 2019-07-01 | End: 2019-07-01 | Stop reason: HOSPADM

## 2019-07-01 RX ORDER — LEVOFLOXACIN 5 MG/ML
INJECTION, SOLUTION INTRAVENOUS PRN
Status: DISCONTINUED | OUTPATIENT
Start: 2019-07-01 | End: 2019-07-01

## 2019-07-01 RX ORDER — FLUMAZENIL 0.1 MG/ML
0.2 INJECTION, SOLUTION INTRAVENOUS
Status: ACTIVE | OUTPATIENT
Start: 2019-07-01 | End: 2019-07-02

## 2019-07-01 RX ORDER — ALBUTEROL SULFATE 0.83 MG/ML
2.5 SOLUTION RESPIRATORY (INHALATION) EVERY 4 HOURS PRN
Status: DISCONTINUED | OUTPATIENT
Start: 2019-07-01 | End: 2019-07-01 | Stop reason: HOSPADM

## 2019-07-01 RX ORDER — ONDANSETRON 2 MG/ML
INJECTION INTRAMUSCULAR; INTRAVENOUS PRN
Status: DISCONTINUED | OUTPATIENT
Start: 2019-07-01 | End: 2019-07-01

## 2019-07-01 RX ORDER — FLUCONAZOLE 2 MG/ML
INJECTION, SOLUTION INTRAVENOUS PRN
Status: DISCONTINUED | OUTPATIENT
Start: 2019-07-01 | End: 2019-07-01

## 2019-07-01 RX ORDER — HYDRALAZINE HYDROCHLORIDE 20 MG/ML
2.5-5 INJECTION INTRAMUSCULAR; INTRAVENOUS EVERY 10 MIN PRN
Status: DISCONTINUED | OUTPATIENT
Start: 2019-07-01 | End: 2019-07-01 | Stop reason: HOSPADM

## 2019-07-01 RX ORDER — FENTANYL CITRATE 50 UG/ML
INJECTION, SOLUTION INTRAMUSCULAR; INTRAVENOUS PRN
Status: DISCONTINUED | OUTPATIENT
Start: 2019-07-01 | End: 2019-07-01

## 2019-07-01 RX ORDER — IOPAMIDOL 510 MG/ML
INJECTION, SOLUTION INTRAVASCULAR PRN
Status: DISCONTINUED | OUTPATIENT
Start: 2019-07-01 | End: 2019-07-01 | Stop reason: HOSPADM

## 2019-07-01 RX ORDER — MULTIVITAMIN,THERAPEUTIC
1 TABLET ORAL DAILY
Status: DISCONTINUED | OUTPATIENT
Start: 2019-07-02 | End: 2019-07-02 | Stop reason: HOSPADM

## 2019-07-01 RX ORDER — DEXTROSE MONOHYDRATE 25 G/50ML
25-50 INJECTION, SOLUTION INTRAVENOUS
Status: DISCONTINUED | OUTPATIENT
Start: 2019-07-01 | End: 2019-07-02 | Stop reason: HOSPADM

## 2019-07-01 RX ORDER — PROPOFOL 10 MG/ML
INJECTION, EMULSION INTRAVENOUS PRN
Status: DISCONTINUED | OUTPATIENT
Start: 2019-07-01 | End: 2019-07-01

## 2019-07-01 RX ORDER — FENTANYL CITRATE 50 UG/ML
25-50 INJECTION, SOLUTION INTRAMUSCULAR; INTRAVENOUS
Status: DISCONTINUED | OUTPATIENT
Start: 2019-07-01 | End: 2019-07-01 | Stop reason: HOSPADM

## 2019-07-01 RX ORDER — LIDOCAINE HYDROCHLORIDE 20 MG/ML
INJECTION, SOLUTION INFILTRATION; PERINEURAL PRN
Status: DISCONTINUED | OUTPATIENT
Start: 2019-07-01 | End: 2019-07-01

## 2019-07-01 RX ORDER — ACETAMINOPHEN 650 MG/1
650 SUPPOSITORY RECTAL EVERY 4 HOURS PRN
Status: DISCONTINUED | OUTPATIENT
Start: 2019-07-01 | End: 2019-07-02 | Stop reason: HOSPADM

## 2019-07-01 RX ORDER — ACETAMINOPHEN 325 MG/1
650 TABLET ORAL EVERY 4 HOURS PRN
Status: DISCONTINUED | OUTPATIENT
Start: 2019-07-01 | End: 2019-07-02 | Stop reason: HOSPADM

## 2019-07-01 RX ORDER — ONDANSETRON 4 MG/1
4 TABLET, ORALLY DISINTEGRATING ORAL EVERY 6 HOURS PRN
Status: DISCONTINUED | OUTPATIENT
Start: 2019-07-01 | End: 2019-07-02 | Stop reason: HOSPADM

## 2019-07-01 RX ORDER — NORGESTIMATE AND ETHINYL ESTRADIOL 7DAYSX3 28
1 KIT ORAL DAILY
Status: DISCONTINUED | OUTPATIENT
Start: 2019-07-02 | End: 2019-07-02 | Stop reason: HOSPADM

## 2019-07-01 RX ADMIN — CHOLESTYRAMINE 4 G: 4 POWDER, FOR SUSPENSION ORAL at 22:32

## 2019-07-01 RX ADMIN — MIDAZOLAM 1 MG: 1 INJECTION INTRAMUSCULAR; INTRAVENOUS at 12:17

## 2019-07-01 RX ADMIN — ONDANSETRON 4 MG: 2 INJECTION INTRAMUSCULAR; INTRAVENOUS at 13:38

## 2019-07-01 RX ADMIN — PROPOFOL 150 MG: 10 INJECTION, EMULSION INTRAVENOUS at 12:28

## 2019-07-01 RX ADMIN — SODIUM CHLORIDE, POTASSIUM CHLORIDE, SODIUM LACTATE AND CALCIUM CHLORIDE: 600; 310; 30; 20 INJECTION, SOLUTION INTRAVENOUS at 23:02

## 2019-07-01 RX ADMIN — SUGAMMADEX 200 MG: 100 INJECTION, SOLUTION INTRAVENOUS at 13:40

## 2019-07-01 RX ADMIN — SODIUM CHLORIDE, POTASSIUM CHLORIDE, SODIUM LACTATE AND CALCIUM CHLORIDE: 600; 310; 30; 20 INJECTION, SOLUTION INTRAVENOUS at 13:45

## 2019-07-01 RX ADMIN — Medication 100 MG: at 12:28

## 2019-07-01 RX ADMIN — SODIUM CHLORIDE, POTASSIUM CHLORIDE, SODIUM LACTATE AND CALCIUM CHLORIDE 500 ML: 600; 310; 30; 20 INJECTION, SOLUTION INTRAVENOUS at 23:00

## 2019-07-01 RX ADMIN — SODIUM CHLORIDE, POTASSIUM CHLORIDE, SODIUM LACTATE AND CALCIUM CHLORIDE 1500 ML: 600; 310; 30; 20 INJECTION, SOLUTION INTRAVENOUS at 18:17

## 2019-07-01 RX ADMIN — MIDAZOLAM 1 MG: 1 INJECTION INTRAMUSCULAR; INTRAVENOUS at 12:15

## 2019-07-01 RX ADMIN — Medication 10 MG: at 12:47

## 2019-07-01 RX ADMIN — PHENYLEPHRINE HYDROCHLORIDE 100 MCG: 10 INJECTION INTRAVENOUS at 13:17

## 2019-07-01 RX ADMIN — DEXAMETHASONE SODIUM PHOSPHATE 4 MG: 4 INJECTION, SOLUTION INTRA-ARTICULAR; INTRALESIONAL; INTRAMUSCULAR; INTRAVENOUS; SOFT TISSUE at 12:43

## 2019-07-01 RX ADMIN — SODIUM CHLORIDE, POTASSIUM CHLORIDE, SODIUM LACTATE AND CALCIUM CHLORIDE: 600; 310; 30; 20 INJECTION, SOLUTION INTRAVENOUS at 12:15

## 2019-07-01 RX ADMIN — RANITIDINE 150 MG: 150 TABLET ORAL at 20:58

## 2019-07-01 RX ADMIN — ROCURONIUM BROMIDE 30 MG: 10 INJECTION INTRAVENOUS at 12:35

## 2019-07-01 RX ADMIN — GLUCAGON HYDROCHLORIDE 0.4 MG: KIT at 13:07

## 2019-07-01 RX ADMIN — LIDOCAINE HYDROCHLORIDE 100 MG: 20 INJECTION, SOLUTION INFILTRATION; PERINEURAL at 12:28

## 2019-07-01 RX ADMIN — FENTANYL CITRATE 100 MCG: 50 INJECTION, SOLUTION INTRAMUSCULAR; INTRAVENOUS at 12:28

## 2019-07-01 RX ADMIN — FENTANYL CITRATE 25 MCG: 50 INJECTION, SOLUTION INTRAMUSCULAR; INTRAVENOUS at 13:50

## 2019-07-01 RX ADMIN — FENTANYL CITRATE 50 MCG: 50 INJECTION, SOLUTION INTRAMUSCULAR; INTRAVENOUS at 13:11

## 2019-07-01 RX ADMIN — LEVOFLOXACIN 500 MG: 5 INJECTION, SOLUTION INTRAVENOUS at 12:35

## 2019-07-01 RX ADMIN — ROCURONIUM BROMIDE 10 MG: 10 INJECTION INTRAVENOUS at 13:11

## 2019-07-01 RX ADMIN — Medication 10 MG: at 12:35

## 2019-07-01 ASSESSMENT — MIFFLIN-ST. JEOR: SCORE: 1227.88

## 2019-07-01 NOTE — PROGRESS NOTES
Amylase was elevated at 496. Pt was doing well and tolerated two apple juice. Lipase is pending    She has received 2 litres of Ringer Lactate    Pt's parents were asked to stay in town and if pt develops any abdominal pain, to be brought to our emergency room    Additionally she was asked to be on a clear liquid diet today and advance only as tolerated

## 2019-07-01 NOTE — ANESTHESIA PREPROCEDURE EVALUATION
Anesthesia Pre-Procedure Evaluation    Patient: Reg Valiente   MRN:     [id number] Gender:   female   Age:    44 year old :      1975        Preoperative Diagnosis: Common Bile Duct Stone   Procedure(s):  Endoscopic Retrograde Cholangiopancreatogram     Past Medical History:   Diagnosis Date     Arthritis of knee     receives injections frequently     Corneal ulcer     caused by leaking of CPAP     Down's syndrome      Gastroesophageal reflux disease      JENNIFER (obstructive sleep apnea)     patient refuses CPAP      Past Surgical History:   Procedure Laterality Date     NO HISTORY OF SURGERY      as reported by parents          Anesthesia Evaluation     .             ROS/MED HX    ENT/Pulmonary:     (+)sleep apnea, , . .    Neurologic:     (+)other neuro Down's Syndrome    Cardiovascular:         METS/Exercise Tolerance:     Hematologic:         Musculoskeletal:         GI/Hepatic:     (+) GERD Asymptomatic on medication,       Renal/Genitourinary:         Endo:     (+) thyroid problem hypothyroidism, Obesity, .      Psychiatric:         Infectious Disease:         Malignancy:         Other:    (+) other significant disability Developmental delay                       PHYSICAL EXAM:   Mental Status/Neuro: A/A/O   Airway: Facies: Challenging; Thick Neck; Micrognathia  Mallampati: IV  Mouth/Opening: Full  TM distance: > 6 cm  Neck ROM: Full   Respiratory: Auscultation: CTAB     Resp. Rate: Normal     Resp. Effort: Normal      CV: Rhythm: Regular  Rate: Age appropriate  Heart: Normal Sounds   Comments:      Dental: Normal                  Lab Results   Component Value Date    WBC 8.9 2019    HGB 13.1 2019    HCT 39.0 2019     2019     2019    POTASSIUM 4.0 2019    CHLORIDE 99 2019    CO2 29 2019    BUN 9 2019    CR 0.70 2019     (H) 2019    FANG 9.1 2019    ALBUMIN 3.6 2019    PROTTOTAL 7.3 2019     "ALT 25 06/11/2019    AST 24 06/11/2019    ALKPHOS 65 06/11/2019    BILITOTAL 0.5 06/11/2019    LIPASE 25 06/11/2019    T4 1.44 08/14/2018    HCG Negative 05/18/2019       Preop Vitals  BP Readings from Last 3 Encounters:   07/01/19 121/72   06/25/19 108/72   06/12/19 120/80    Pulse Readings from Last 3 Encounters:   07/01/19 56   06/12/19 64   06/11/19 64      Resp Readings from Last 3 Encounters:   07/01/19 16   06/25/19 16   06/12/19 16    SpO2 Readings from Last 3 Encounters:   07/01/19 96%   06/25/19 98%   06/12/19 98%      Temp Readings from Last 1 Encounters:   07/01/19 36.8  C (98.2  F) (Oral)    Ht Readings from Last 1 Encounters:   07/01/19 1.448 m (4' 9\")      Wt Readings from Last 1 Encounters:   07/01/19 70.4 kg (155 lb 3.3 oz)    Estimated body mass index is 33.59 kg/m  as calculated from the following:    Height as of this encounter: 1.448 m (4' 9\").    Weight as of this encounter: 70.4 kg (155 lb 3.3 oz).     LDA:  Peripheral IV 05/18/19 Left Hand (Active)   Number of days: 44            Assessment:   ASA SCORE: 3    NPO Status: > 2 hours since completed Clear Liquids; > 6 hours since completed Solid Foods   Documentation: H&P complete; Preop Testing complete; Consents complete   Proceeding: Proceed without further delay  Tobacco Use:  NO Active use of Tobacco/UNKNOWN Tobacco use status     Plan:   Anes. Type:  General   Pre-Induction: Midazolam IV; Acetaminophen PO   Induction:  IV (Standard)   Airway: Oral ETT   Access/Monitoring: PIV   Maintenance: Balanced   Emergence: Procedure Site   Logistics: Same Day Surgery     Postop Pain/Sedation Strategy:  Standard-Options: Opioids PRN     PONV Management:  Adult Risk Factors: Female, Non-Smoker, Postop Opioids                         Joe Richmond MD  "

## 2019-07-01 NOTE — ANESTHESIA CARE TRANSFER NOTE
Patient: Reg Valiente    Procedure(s):  Endoscopic Retrograde Cholangiopancreatogram, dilation, stent , sludge and stone removal, sphincterotomy    Diagnosis: Common Bile Duct Stone  Diagnosis Additional Information: No value filed.    Anesthesia Type:   No value filed.     Note:  Airway :Nasal Cannula  Patient transferred to:PACU  Handoff Report: Identifed the Patient, Identified the Reponsible Provider, Reviewed the pertinent medical history, Discussed the surgical course, Reviewed Intra-OP anesthesia mangement and issues during anesthesia, Set expectations for post-procedure period and Allowed opportunity for questions and acknowledgement of understanding      Vitals: (Last set prior to Anesthesia Care Transfer)    CRNA VITALS  7/1/2019 1323 - 7/1/2019 1409      7/1/2019             NIBP:  104/64    Ht Rate:  75    SpO2:  100 %                Electronically Signed By: EB Gibson CRNA  July 1, 2019  2:09 PM

## 2019-07-01 NOTE — H&P
General acute hospital, [place]    Internal Medicine History and Physical - Gold Service       Date of Admission: 7/1/2019    Assessment & Plan  Reg Valiente is a 44 year old woman with a history of Down's syndrome, generalized anxiety disorder, hypothyroidism, JENNIFER, chronic bilateral knee pain, menstrual disorder, and GERD who had a history of nausea/vomiting and diarrhea with OP abdominal US (6/11/19) showing choledocholithiasis. Patient is s/p ERCP today by Dr. Alvarado who was admitted to observation on 7/1/2019 for post-ERCP acute pancreatitis for hydration and continued monitoring.     Post-ERCP acute pancreatitis:   Choledocholithiasis   Hx of nausea/vomiting and diarrhea   Pt with a hx of N/V/D that started ~6 weeks ago. OP work-up w/ PCP included CMP, CBC, Lipase, TTG which were unremarkable with abdominal US from 6/11/19 showing choledocholithiasis with numerous stones impacted w/in the CBD, which was distended to at least 10mm. No e/o cholecystitis. Saw Dr. Kee, General Surgery on 6/11/19 who recommended referral to GI for likely ERCP and was started on cholestyramine for diarrhea at that time. Ova/Parasite and Stool culture ordered by PCP but have yet to be obtained. She was admitted this morning for scheduled ERCP with Dr. Guru Alvarado. ERCP was notable for very unstable scope position throughout procedure. Double wire cannulation was performed and a PD stent was present briefly but got dislodged and was removed. Cholangiogram showed multiple stones in CBD and intrahepatics. Sludge and majority of stones were removed. However, a significant amount of stones remainedn CBD, therefore, a 10Fr by 7cm Sofflex stent was placed in CBD for adequate biliary drainage for residual stones. Post-op course complicated by elevated Lipase 12,125 (pre-op 113) and amylase 496 (42) consistent with post-ERCP acute pancreatitis. Had received 2L of LR pre-operatively.  GI aware of  elevated lipase and recommend admission to observation for continued hydration and monitoring. Recommend keeping patient NPO overnight with additional 1.5L of LR with reassessment of amylase and lipase in AM. Currently, patient afebrile, VSS. She is without abdominal pain, nausea/vomiting. Abdominal exam benign without guarding, rigidity or rebound tenderness.   -Give 1.5 L bolus of LR overnight per GI recs   -Keep NPO except meds/ice chips overnight; will plan to advance diet in AM pending GI recommendations    -Acetaminophen prn for pain; please call IM if pain worsens   -Will hold narcotics at this time given patient w/o acute pain   -Ondansetron prn for nausea   -Repeat CBC, CMP, amylase and lipase in AM   -Continue PTA cholestyramine   -Will defer stool cultures and ova/parasite studies to PCP and GI   -GI recommends repeat ERCP in 3-4 weeks to re-evaluate and remove the residual stones/sludge and stent   -Avoid aspirin and nonsteroidal anti-inflammatory medicines for 3 days  -BG checks q4hrs while NPO  -Hypoglycemia protocol                                              Hypothyroidism: Continue PTA Levothyroxine 175mcg daily     JENNIFER: Has hx of JENNIFER, however per patient and patient's mother does not use CPAP at nighttime. Deferring at this time.   -Continue continuous pulse ox     GERD: On PTA Ranitidine 150 BID. No acute concerns.   -Continue PTA Ranitidine     Hx of menstrual disorder/menorrhagia:  Hx of menorrhagia, on PTA Norgestim-eth estrad triphasic daily. Currently menstruating.   -Continue PTA OCP     DOROTEO: Continue PTA Celexa 20mg daily     Hx of chronic bilateral knee pain: On PTA diclofenac. Held PTA for ERCP.   -Continue to PTA diclofenac hold for a total of 3 days s/p ERCP     Diet: NPO overnight   Fluids: 1,500mL of LR   DVT Prophylaxis: Pneumatic compression devices   Code Status: Full Code     Disposition Plan   Expected discharge: Tomorrow; recommended to prior living arrangement once  amylase/lipase has improved, patient tolerating normal diet, and pain and nausea controlled and pending Gastroenterology recommendations.     Discussed patient's case and plan collaboratively with Dr. Jared Howell, Internal Medicine who agrees with plan of care.     Ines Beal PA-C  Hospitalist Service  [facility]  Pager [phone]    Chief Complaint   Post-ERCP acute pancreatitis     History is obtained from the patient and the patient's parent(s)    History of Present Illness     Patient and patient's mother report a history of vomiting and diarrhea for approximately 6 weeks. Was seen in the ED on 5/18/19 for similar symptoms, workup was unrevealing at that time and was discharged home with Zofran. She then followed up with her PCP on 6/6/19. At that time, stool culture and ova/parasite and TTG were ordered. TTG was negative and stool culture and ova/parasite unable to be obtained. US of the abdomen was obtained showing choledocholithiasis with numerous stones impacted within the CBD, which was distended to at least 10mm. Stones are also present in the central intrahepatic biliary ductal dilation. The gallbladder was completely filled with shadowing stones, however, without e/o cholecystitis. She was referred to General Surgery and was seen by Dr. Kee on 6/11/19. At that time, Dr. Kee, Surgery recommended specialty evaluation/GI referral for likely ERCP. She was started on cholestyramine at that time to assess if this would help with diarrhea. Hepatic panel/lipase obtained were WNL. She was referred to GI who recommended ERCP for choledocholithasis which was performed this morning by Dr. Guru Alvarado.     Per review of the ERCP post-operative note; the major papilla appeared normal. Scope position was very unstable throughout the procedure. Double wire cannulation was performed. A PD stent was present briefly but got dislodged during biliary interventions and was removed.  "Cholangiogram showed multiple stones in CBD and intrahepatics. Sludge and majority of stones removed. A significant amount of stones remained in CBD. A 10Fr by 7cm Sofflex stent was placed in CBD for adequate biliary drainage since there were residual stones. Pt was observed for two hours and received 2L lactated ringers with recheck of amylase and lipase. Post-operative lipase and amylase noted to be elevated at Lipase 12,125 (pre-op 113) and amylase 496 (pre-op 42) consistent with post-ERCP pancreatitis. CBC and CMP pre-operatively WNL other than glucose 119.     Currently, patient reports no acute concerns. She denies any fevers, chills, abdominal pain, nausea/vomiting, dysuria, urinary retention, chest pain or shortness of breath. She states she, \"feels hungry.\" She is interested in doing a puzzle. Patient's mother reports continued malodorous loose stools, which continue, but are unchanged since her nausea/vomiting and diarrhea started. No hx of melena or hematochezia. Patient reports she is currently on her menstrual period. Does not wear her CPAP machine at nighttime. No other acute concerns at this time.     Review of Systems   10 point ROS performed and negative unless otherwise noted in HPI    Past Medical History    I have reviewed this patient's medical history and updated it with pertinent information if needed.   Past Medical History:   Diagnosis Date     Arthritis of knee     receives injections frequently     Corneal ulcer 2004    caused by leaking of CPAP     Down's syndrome      Gastroesophageal reflux disease      JENNIFER (obstructive sleep apnea)     patient refuses CPAP        Past Surgical History   I have reviewed this patient's surgical history and updated it with pertinent information if needed.  Past Surgical History:   Procedure Laterality Date     NO HISTORY OF SURGERY      as reported by parents        Social History   Social History     Tobacco Use     Smoking status: Never Smoker     " "Smokeless tobacco: Never Used   Substance Use Topics     Alcohol use: No     Drug use: No       Family History   I have reviewed this patient's family history and updated it with pertinent information if needed.   Family History   Problem Relation Age of Onset     Asthma Mother      Arthritis Mother      LUNG DISEASE Father         pulmonary fibrosis secondary to chemical exposure     Diabetes Father      Cerebrovascular Disease Father 90     Cerebrovascular Disease Maternal Grandmother 90     Heart Failure Paternal Grandfather         uncertain which  replaced     Coronary Artery Disease No family hx of      Heart Disease No family hx of      Bleeding Diathesis No family hx of      Pancreatic Cancer No family hx of      Colorectal Cancer No family hx of        Prior to Admission Medications   Prior to Admission Medications   Prescriptions Last Dose Informant Patient Reported? Taking?   Multiple vitamin TABS 6/30/2019 at 0900  No Yes   Sig: Take 1 tablet by mouth daily   cholestyramine (QUESTRAN) 4 GM/DOSE powder 6/29/2019 at 2000  No No   Sig: Take 4 g by mouth 2 times daily (with meals)   citalopram (CELEXA) 20 MG tablet 7/1/2019 at 0700  No Yes   Sig: TAKE 1 TABLET BY MOUTH EVERY DAY   diclofenac (VOLTAREN) 50 MG EC tablet 6/30/2019 at 0900  No Yes   Sig: Take 1 tablet (50 mg) by mouth 2 times daily   levothyroxine (SYNTHROID/LEVOTHROID) 175 MCG tablet 7/1/2019 at 0700  No Yes   Sig: TAKE 1 TABLET BY MOUTH DAILY   norgestim-eth estrad triphasic (TRINESSA, 28,) 0.18/0.215/0.25 MG-35 MCG tablet Unknown at Unknown time  No No   Sig: Take 1 tablet by mouth daily   ranitidine (ZANTAC) 150 MG tablet Unknown at Unknown time  No No   Sig: TAKE 1 TABLET BY MOUTH TWICE DAILY      Facility-Administered Medications: None     Allergies   Allergies   Allergen Reactions     Sulfa Drugs        Physical Exam   /82   Pulse 64   Temp 98.5  F (36.9  C) (Oral)   Resp 16   Ht 1.448 m (4' 9\")   Wt 70.4 kg (155 lb 3.3 " oz)   LMP 07/01/2019   SpO2 98%   BMI 33.59 kg/m    GENERAL: Alert and oriented x 3. No acute distress. Sitting up in bed comfortably. Pleasant and cooperative.   HEENT: Anicteric sclera. PERRL. Mucous membranes moist and without lesions.   CV: RRR. S1, S2. No murmurs appreciated.   RESPIRATORY: Effort normal on room air. Lungs CTAB with no wheezing, rales, rhonchi.   GI: Abdomen obese, soft and non distended, bowel sounds present. No epigastric or abdominal tenderness, rebound, or guarding. No ecchymosis.   MUSCULOSKELETAL: No joint swelling or tenderness.   NEUROLOGICAL: No focal deficits. Moves all extremities.   EXTREMITIES: No peripheral edema. Intact bilateral pedal pulses.   SKIN: No jaundice. No rashes.     Data   Data reviewed today: I reviewed all medications, new labs and imaging results over the last 24 hours.

## 2019-07-01 NOTE — OR NURSING
Patient has Downs syndrome. Is oriented to self and remembers where she is when reminded. Very coorperative

## 2019-07-01 NOTE — OR NURSING
Dr. Alvarado came and seen patient. Dr. Alvarado wants patient to get Lac Ringers at 250 ml for 1000ml and to get amylase and lipase in 2 hours.

## 2019-07-01 NOTE — ANESTHESIA POSTPROCEDURE EVALUATION
Anesthesia POST Procedure Evaluation    Patient: Reg Valiente   MRN:     [id number] Gender:   female   Age:    44 year old :      1975        Preoperative Diagnosis: Common Bile Duct Stone   Procedure(s):  Endoscopic Retrograde Cholangiopancreatogram, dilation, stent , sludge and stone removal, sphincterotomy   Postop Comments: No value filed.       Anesthesia Type:  General  No value filed.    Reportable Event: NO     PAIN: Uncomplicated   Sign Out status: Comfortable, Well controlled pain     PONV: No PONV   Sign Out status:  No Nausea or Vomiting     Neuro/Psych: Uneventful perioperative course   Sign Out Status: Preoperative baseline     Airway/Resp.: Uneventful perioperative course   Sign Out Status: Non labored breathing, age appropriate RR; Resp. Status within EXPECTED Parameters     CV: Uneventful perioperative course   Sign Out status: Appropriate BP and perfusion indices; Appropriate HR/Rhythm     Disposition:   Sign Out in:  PACU  Disposition:  Phase II; Home  Recovery Course: Uneventful  Follow-Up: Not required           Last Anesthesia Record Vitals:  CRNA VITALS  2019 1323 - 2019 1423      2019             NIBP:  104/64    Ht Rate:  75    SpO2:  100 %          Last PACU Vitals:  Vitals Value Taken Time   BP 94/57 2019  3:00 PM   Temp 36.3  C (97.4  F) 2019  2:30 PM   Pulse 64 2019  3:00 PM   Resp 16 2019  2:45 PM   SpO2 93 % 2019  3:04 PM   Temp src     NIBP 104/64 2019  1:59 PM   Pulse     SpO2 100 % 2019  1:59 PM   Resp     Temp     Ht Rate 75 2019  1:59 PM   Temp 2     Vitals shown include unvalidated device data.      Electronically Signed By: Joe Richmond MD, 2019, 3:06 PM

## 2019-07-01 NOTE — DISCHARGE INSTRUCTIONS
Same-Day Surgery   Adult Discharge Orders & Instructions     For 24 hours after surgery:  1. Get plenty of rest.  A responsible adult must stay with you for at least 24 hours after you leave the hospital.   2. Pain medication can slow your reflexes. Do not drive or use heavy equipment.  If you have weakness or tingling, don't drive or use heavy equipment until this feeling goes away.  3. Mixing alcohol and pain medication can cause dizziness and slow your breathing. It can even be fatal. Do not drink alcohol while taking pain medication.  4. Avoid strenuous or risky activities.  Ask for help when climbing stairs.   5. You may feel lightheaded.  If so, sit for a few minutes before standing.  Have someone help you get up.   6. If you have nausea (feel sick to your stomach), drink only clear liquids such as apple juice, ginger ale, broth or 7-Up.  Rest may also help.  Be sure to drink enough fluids.  Move to a regular diet as you feel able. Take pain medications with a small amount of solid food, such as toast or crackers, to avoid nausea.   7. A slight fever is normal. Call the doctor if your fever is over 100 F (37.7 C) (taken under the tongue) or lasts longer than 24 hours.  8. You may have a dry mouth, muscle aches, trouble sleeping or a sore throat.  These symptoms should go away after 24 hours.  9. Do not make important or legal decisions.   Pain Management:      1. Take pain medication (if prescribed) for pain as directed by your physician.        2. WARNING: If the pain medication you have been prescribed contains Tylenol  (acetaminophen), DO NOT take additional doses of Tylenol (acetaminophen).     Call your doctor for any of the followin.  Signs of infection (fever, growing tenderness at the surgery site, severe pain, a large amount of drainage or bleeding, foul-smelling drainage, redness, swelling).    2.  It has been over 8 to 10 hours since surgery and you are still not able to urinate (pee).    3.   Headache for over 24 hours.      To contact a doctor, call _Dr. Guru Alvarado @ [phone]_ or:      [phone] and ask for the Resident On Call for:          __________GI_____ (answered 24 hours a day)      Emergency Department:  [place] [facility]: [phone]  [place] [facility]: [phone]               Rev. 10/2014     Repeat ERCP in 3-4 weeks

## 2019-07-02 VITALS
OXYGEN SATURATION: 100 % | HEIGHT: 57 IN | TEMPERATURE: 98.3 F | WEIGHT: 155.2 LBS | BODY MASS INDEX: 33.48 KG/M2 | HEART RATE: 64 BPM | RESPIRATION RATE: 16 BRPM | SYSTOLIC BLOOD PRESSURE: 108 MMHG | DIASTOLIC BLOOD PRESSURE: 70 MMHG

## 2019-07-02 LAB
ALBUMIN SERPL-MCNC: 2.8 G/DL (ref 3.4–5)
ALP SERPL-CCNC: 75 U/L (ref 40–150)
ALT SERPL W P-5'-P-CCNC: 39 U/L (ref 0–50)
AMYLASE SERPL-CCNC: 1152 U/L (ref 30–110)
ANION GAP SERPL CALCULATED.3IONS-SCNC: 6 MMOL/L (ref 3–14)
AST SERPL W P-5'-P-CCNC: 25 U/L (ref 0–45)
BILIRUB SERPL-MCNC: 0.6 MG/DL (ref 0.2–1.3)
BUN SERPL-MCNC: 9 MG/DL (ref 7–30)
CALCIUM SERPL-MCNC: 8.8 MG/DL (ref 8.5–10.1)
CHLORIDE SERPL-SCNC: 106 MMOL/L (ref 94–109)
CO2 SERPL-SCNC: 26 MMOL/L (ref 20–32)
CREAT SERPL-MCNC: 0.63 MG/DL (ref 0.52–1.04)
ERYTHROCYTE [DISTWIDTH] IN BLOOD BY AUTOMATED COUNT: 14 % (ref 10–15)
GFR SERPL CREATININE-BSD FRML MDRD: >90 ML/MIN/{1.73_M2}
GLUCOSE BLDC GLUCOMTR-MCNC: 114 MG/DL (ref 70–99)
GLUCOSE BLDC GLUCOMTR-MCNC: 131 MG/DL (ref 70–99)
GLUCOSE BLDC GLUCOMTR-MCNC: 96 MG/DL (ref 70–99)
GLUCOSE BLDC GLUCOMTR-MCNC: 96 MG/DL (ref 70–99)
GLUCOSE SERPL-MCNC: 102 MG/DL (ref 70–99)
HCT VFR BLD AUTO: 38.2 % (ref 35–47)
HGB BLD-MCNC: 12.2 G/DL (ref 11.7–15.7)
LIPASE SERPL-CCNC: 7858 U/L (ref 73–393)
MCH RBC QN AUTO: 31.9 PG (ref 26.5–33)
MCHC RBC AUTO-ENTMCNC: 31.9 G/DL (ref 31.5–36.5)
MCV RBC AUTO: 100 FL (ref 78–100)
PLATELET # BLD AUTO: 267 10E9/L (ref 150–450)
POTASSIUM SERPL-SCNC: 3.7 MMOL/L (ref 3.4–5.3)
PROT SERPL-MCNC: 6.7 G/DL (ref 6.8–8.8)
RBC # BLD AUTO: 3.82 10E12/L (ref 3.8–5.2)
SODIUM SERPL-SCNC: 138 MMOL/L (ref 133–144)
WBC # BLD AUTO: 8.5 10E9/L (ref 4–11)

## 2019-07-02 PROCEDURE — 00000146 ZZHCL STATISTIC GLUCOSE BY METER IP

## 2019-07-02 PROCEDURE — G0378 HOSPITAL OBSERVATION PER HR: HCPCS

## 2019-07-02 PROCEDURE — 99207 ZZC APP CREDIT; MD BILLING SHARED VISIT: CPT | Performed by: PHYSICIAN ASSISTANT

## 2019-07-02 PROCEDURE — 83690 ASSAY OF LIPASE: CPT | Performed by: PHYSICIAN ASSISTANT

## 2019-07-02 PROCEDURE — 25000132 ZZH RX MED GY IP 250 OP 250 PS 637: Mod: GY | Performed by: INTERNAL MEDICINE

## 2019-07-02 PROCEDURE — 99217 ZZC OBSERVATION CARE DISCHARGE: CPT | Performed by: STUDENT IN AN ORGANIZED HEALTH CARE EDUCATION/TRAINING PROGRAM

## 2019-07-02 PROCEDURE — 80053 COMPREHEN METABOLIC PANEL: CPT | Performed by: PHYSICIAN ASSISTANT

## 2019-07-02 PROCEDURE — 25000132 ZZH RX MED GY IP 250 OP 250 PS 637: Mod: GY | Performed by: PHYSICIAN ASSISTANT

## 2019-07-02 PROCEDURE — 85027 COMPLETE CBC AUTOMATED: CPT | Performed by: PHYSICIAN ASSISTANT

## 2019-07-02 PROCEDURE — 82150 ASSAY OF AMYLASE: CPT | Performed by: PHYSICIAN ASSISTANT

## 2019-07-02 PROCEDURE — 36415 COLL VENOUS BLD VENIPUNCTURE: CPT | Performed by: PHYSICIAN ASSISTANT

## 2019-07-02 RX ADMIN — THERA TABS 1 TABLET: TAB at 08:27

## 2019-07-02 RX ADMIN — LEVOTHYROXINE SODIUM 175 MCG: 175 TABLET ORAL at 08:27

## 2019-07-02 RX ADMIN — CITALOPRAM HYDROBROMIDE 20 MG: 20 TABLET ORAL at 08:27

## 2019-07-02 RX ADMIN — RANITIDINE 150 MG: 150 TABLET ORAL at 08:27

## 2019-07-02 RX ADMIN — CHOLESTYRAMINE 4 G: 4 POWDER, FOR SUSPENSION ORAL at 08:28

## 2019-07-02 NOTE — PLAN OF CARE
Outpatient/Observation goals to be met before discharge home:  -diagnostic tests and consults completed and resulted - no, AM labs  -vital signs normal or at patient baseline - yes  -tolerating oral intake to maintain hydration - NPO  -safe disposition plan has been identified - no  -amylase and lipase improving - pending     Nurse to notify provider when observation goals have been met and patient is ready for discharge.

## 2019-07-02 NOTE — PROGRESS NOTES
DC instructions given to pt, mom and dad, verbalized understanding.  All belongings with pt, IV DC'd and documented. Pt discharged

## 2019-07-03 ENCOUNTER — PATIENT OUTREACH (OUTPATIENT)
Dept: CARE COORDINATION | Facility: CLINIC | Age: 44
End: 2019-07-03

## 2019-07-03 NOTE — PROGRESS NOTES
Call 2 straight to voice mail  Voice mail box is full  Unable to leave message   No further post discharge calls were made at this time

## 2019-07-08 ENCOUNTER — CARE COORDINATION (OUTPATIENT)
Dept: GASTROENTEROLOGY | Facility: CLINIC | Age: 44
End: 2019-07-08

## 2019-07-08 DIAGNOSIS — K80.50 COMMON BILE DUCT STONE: Primary | ICD-10-CM

## 2019-07-08 NOTE — PROGRESS NOTES
Post ERCP (7/1/19) with Dr. Alvarado: Follow-up     Post procedure recommendations: - Will recommend repeat ERCP in 3-4 weeks to re-evaluate and remove the residual stones/sludge and stent    Orders placed: ERCP     Spoke to parents and she is feeling fine.    Clinic contact and scheduling numbers verified for future questions/concerns.      NATANAEL Paredes Dr., Dr. Torres, & Dr. Alvarado  Advanced Endoscopy  [phone]

## 2019-07-15 ENCOUNTER — TELEPHONE (OUTPATIENT)
Dept: GASTROENTEROLOGY | Facility: CLINIC | Age: 44
End: 2019-07-15

## 2019-07-15 NOTE — TELEPHONE ENCOUNTER
Spoke to patient's father Kvng in regards to scheduled procedure. Informed Kvng the patient is scheduled with Dr. Thomas on 7/24/19. Informed Kvng the patient will need a  and someone to monitor her for 24 hours after the procedure. Informed Kvng all scheduling details will be sent to the address listed on Epic. Address confirmed on this call.     7/15/19 212pm

## 2019-07-22 NOTE — OR NURSING
Pt lives in Group Home. Downs Syndrome. Parents are legal Guardian, will be with pt and were instructed to bring Legal Docs on DOS.

## 2019-07-24 ENCOUNTER — ANESTHESIA (OUTPATIENT)
Dept: SURGERY | Facility: CLINIC | Age: 44
End: 2019-07-24
Payer: MEDICARE

## 2019-07-24 ENCOUNTER — APPOINTMENT (OUTPATIENT)
Dept: GENERAL RADIOLOGY | Facility: CLINIC | Age: 44
End: 2019-07-24
Attending: INTERNAL MEDICINE
Payer: MEDICARE

## 2019-07-24 ENCOUNTER — HOSPITAL ENCOUNTER (OUTPATIENT)
Facility: CLINIC | Age: 44
Discharge: NURSING, REHAB OR LONG-TERM CARE | End: 2019-07-24
Attending: INTERNAL MEDICINE | Admitting: INTERNAL MEDICINE
Payer: MEDICARE

## 2019-07-24 ENCOUNTER — ANESTHESIA EVENT (OUTPATIENT)
Dept: SURGERY | Facility: CLINIC | Age: 44
End: 2019-07-24
Payer: MEDICARE

## 2019-07-24 VITALS
HEART RATE: 69 BPM | WEIGHT: 148.15 LBS | TEMPERATURE: 97.3 F | RESPIRATION RATE: 16 BRPM | HEIGHT: 57 IN | DIASTOLIC BLOOD PRESSURE: 84 MMHG | BODY MASS INDEX: 31.96 KG/M2 | OXYGEN SATURATION: 92 % | SYSTOLIC BLOOD PRESSURE: 125 MMHG

## 2019-07-24 LAB
ALBUMIN SERPL-MCNC: 3 G/DL (ref 3.4–5)
ALP SERPL-CCNC: 536 U/L (ref 40–150)
ALT SERPL W P-5'-P-CCNC: 111 U/L (ref 0–50)
AMYLASE SERPL-CCNC: 39 U/L (ref 30–110)
ANION GAP SERPL CALCULATED.3IONS-SCNC: 5 MMOL/L (ref 3–14)
AST SERPL W P-5'-P-CCNC: 73 U/L (ref 0–45)
B-HCG SERPL-ACNC: <1 IU/L (ref 0–5)
B-HCG SERPL-ACNC: NORMAL IU/L (ref 0–5)
BILIRUB SERPL-MCNC: 0.9 MG/DL (ref 0.2–1.3)
BUN SERPL-MCNC: 12 MG/DL (ref 7–30)
CALCIUM SERPL-MCNC: 9 MG/DL (ref 8.5–10.1)
CHLORIDE SERPL-SCNC: 106 MMOL/L (ref 94–109)
CO2 SERPL-SCNC: 27 MMOL/L (ref 20–32)
CREAT SERPL-MCNC: 0.75 MG/DL (ref 0.52–1.04)
ERYTHROCYTE [DISTWIDTH] IN BLOOD BY AUTOMATED COUNT: 14.1 % (ref 10–15)
GFR SERPL CREATININE-BSD FRML MDRD: >90 ML/MIN/{1.73_M2}
GLUCOSE BLDC GLUCOMTR-MCNC: 91 MG/DL (ref 70–99)
GLUCOSE SERPL-MCNC: 97 MG/DL (ref 70–99)
HCG SERPL QL: ABNORMAL
HCT VFR BLD AUTO: 42.3 % (ref 35–47)
HGB BLD-MCNC: 13.3 G/DL (ref 11.7–15.7)
LIPASE SERPL-CCNC: 110 U/L (ref 73–393)
MCH RBC QN AUTO: 32 PG (ref 26.5–33)
MCHC RBC AUTO-ENTMCNC: 31.4 G/DL (ref 31.5–36.5)
MCV RBC AUTO: 102 FL (ref 78–100)
PLATELET # BLD AUTO: 343 10E9/L (ref 150–450)
POTASSIUM SERPL-SCNC: 3.7 MMOL/L (ref 3.4–5.3)
PROT SERPL-MCNC: 8.1 G/DL (ref 6.8–8.8)
RBC # BLD AUTO: 4.15 10E12/L (ref 3.8–5.2)
SODIUM SERPL-SCNC: 138 MMOL/L (ref 133–144)
WBC # BLD AUTO: 6.7 10E9/L (ref 4–11)

## 2019-07-24 PROCEDURE — 84702 CHORIONIC GONADOTROPIN TEST: CPT | Performed by: INTERNAL MEDICINE

## 2019-07-24 PROCEDURE — 25000566 ZZH SEVOFLURANE, EA 15 MIN: Performed by: INTERNAL MEDICINE

## 2019-07-24 PROCEDURE — 25000125 ZZHC RX 250: Performed by: ANESTHESIOLOGY

## 2019-07-24 PROCEDURE — 36000055 ZZH SURGERY LEVEL 2 W FLUORO 1ST 30 MIN - UMMC: Performed by: INTERNAL MEDICINE

## 2019-07-24 PROCEDURE — C1876 STENT, NON-COA/NON-COV W/DEL: HCPCS | Performed by: INTERNAL MEDICINE

## 2019-07-24 PROCEDURE — 25800030 ZZH RX IP 258 OP 636: Performed by: ANESTHESIOLOGY

## 2019-07-24 PROCEDURE — 37000008 ZZH ANESTHESIA TECHNICAL FEE, 1ST 30 MIN: Performed by: INTERNAL MEDICINE

## 2019-07-24 PROCEDURE — 84703 CHORIONIC GONADOTROPIN ASSAY: CPT | Performed by: INTERNAL MEDICINE

## 2019-07-24 PROCEDURE — 25000125 ZZHC RX 250: Performed by: INTERNAL MEDICINE

## 2019-07-24 PROCEDURE — 71000015 ZZH RECOVERY PHASE 1 LEVEL 2 EA ADDTL HR: Performed by: INTERNAL MEDICINE

## 2019-07-24 PROCEDURE — 25000128 H RX IP 250 OP 636: Performed by: NURSE ANESTHETIST, CERTIFIED REGISTERED

## 2019-07-24 PROCEDURE — 40000279 XR SURGERY CARM FLUORO GREATER THAN 5 MIN W STILLS: Mod: TC

## 2019-07-24 PROCEDURE — 27210794 ZZH OR GENERAL SUPPLY STERILE: Performed by: INTERNAL MEDICINE

## 2019-07-24 PROCEDURE — 71000027 ZZH RECOVERY PHASE 2 EACH 15 MINS: Performed by: INTERNAL MEDICINE

## 2019-07-24 PROCEDURE — 36415 COLL VENOUS BLD VENIPUNCTURE: CPT | Performed by: INTERNAL MEDICINE

## 2019-07-24 PROCEDURE — 25500064 ZZH RX 255 OP 636: Performed by: INTERNAL MEDICINE

## 2019-07-24 PROCEDURE — 71000014 ZZH RECOVERY PHASE 1 LEVEL 2 FIRST HR: Performed by: INTERNAL MEDICINE

## 2019-07-24 PROCEDURE — C1769 GUIDE WIRE: HCPCS | Performed by: INTERNAL MEDICINE

## 2019-07-24 PROCEDURE — 82962 GLUCOSE BLOOD TEST: CPT

## 2019-07-24 PROCEDURE — 36000053 ZZH SURGERY LEVEL 2 EA 15 ADDTL MIN - UMMC: Performed by: INTERNAL MEDICINE

## 2019-07-24 PROCEDURE — 80053 COMPREHEN METABOLIC PANEL: CPT | Performed by: INTERNAL MEDICINE

## 2019-07-24 PROCEDURE — C1726 CATH, BAL DIL, NON-VASCULAR: HCPCS | Performed by: INTERNAL MEDICINE

## 2019-07-24 PROCEDURE — 83690 ASSAY OF LIPASE: CPT | Performed by: INTERNAL MEDICINE

## 2019-07-24 PROCEDURE — 25000125 ZZHC RX 250: Performed by: NURSE ANESTHETIST, CERTIFIED REGISTERED

## 2019-07-24 PROCEDURE — 37000009 ZZH ANESTHESIA TECHNICAL FEE, EACH ADDTL 15 MIN: Performed by: INTERNAL MEDICINE

## 2019-07-24 PROCEDURE — 85027 COMPLETE CBC AUTOMATED: CPT | Performed by: INTERNAL MEDICINE

## 2019-07-24 PROCEDURE — 40000170 ZZH STATISTIC PRE-PROCEDURE ASSESSMENT II: Performed by: INTERNAL MEDICINE

## 2019-07-24 PROCEDURE — 82150 ASSAY OF AMYLASE: CPT | Performed by: INTERNAL MEDICINE

## 2019-07-24 PROCEDURE — 25000128 H RX IP 250 OP 636: Performed by: ANESTHESIOLOGY

## 2019-07-24 DEVICE — STENT JOHLIN PANCREA WEDGE 10FRX20CM W/INTRO G26827
Type: IMPLANTABLE DEVICE | Site: BILE DUCT | Status: NON-FUNCTIONAL
Removed: 2019-08-26

## 2019-07-24 RX ORDER — LEVOFLOXACIN 5 MG/ML
INJECTION, SOLUTION INTRAVENOUS PRN
Status: DISCONTINUED | OUTPATIENT
Start: 2019-07-24 | End: 2019-07-24

## 2019-07-24 RX ORDER — MEPERIDINE HYDROCHLORIDE 25 MG/ML
12.5 INJECTION INTRAMUSCULAR; INTRAVENOUS; SUBCUTANEOUS
Status: DISCONTINUED | OUTPATIENT
Start: 2019-07-24 | End: 2019-07-24 | Stop reason: HOSPADM

## 2019-07-24 RX ORDER — ONDANSETRON 4 MG/1
4 TABLET, ORALLY DISINTEGRATING ORAL EVERY 30 MIN PRN
Status: DISCONTINUED | OUTPATIENT
Start: 2019-07-24 | End: 2019-07-24 | Stop reason: HOSPADM

## 2019-07-24 RX ORDER — ONDANSETRON 2 MG/ML
4 INJECTION INTRAMUSCULAR; INTRAVENOUS ONCE
Status: COMPLETED | OUTPATIENT
Start: 2019-07-24 | End: 2019-07-24

## 2019-07-24 RX ORDER — INDOMETHACIN 50 MG/1
SUPPOSITORY RECTAL PRN
Status: DISCONTINUED | OUTPATIENT
Start: 2019-07-24 | End: 2019-07-24 | Stop reason: HOSPADM

## 2019-07-24 RX ORDER — NALOXONE HYDROCHLORIDE 0.4 MG/ML
.1-.4 INJECTION, SOLUTION INTRAMUSCULAR; INTRAVENOUS; SUBCUTANEOUS
Status: DISCONTINUED | OUTPATIENT
Start: 2019-07-24 | End: 2019-07-24 | Stop reason: HOSPADM

## 2019-07-24 RX ORDER — PROPOFOL 10 MG/ML
INJECTION, EMULSION INTRAVENOUS PRN
Status: DISCONTINUED | OUTPATIENT
Start: 2019-07-24 | End: 2019-07-24

## 2019-07-24 RX ORDER — INDOMETHACIN 50 MG/1
100 SUPPOSITORY RECTAL
Status: DISCONTINUED | OUTPATIENT
Start: 2019-07-24 | End: 2019-07-24 | Stop reason: HOSPADM

## 2019-07-24 RX ORDER — LIDOCAINE HYDROCHLORIDE 20 MG/ML
INJECTION, SOLUTION INFILTRATION; PERINEURAL PRN
Status: DISCONTINUED | OUTPATIENT
Start: 2019-07-24 | End: 2019-07-24

## 2019-07-24 RX ORDER — SODIUM CHLORIDE, SODIUM LACTATE, POTASSIUM CHLORIDE, CALCIUM CHLORIDE 600; 310; 30; 20 MG/100ML; MG/100ML; MG/100ML; MG/100ML
INJECTION, SOLUTION INTRAVENOUS CONTINUOUS
Status: DISCONTINUED | OUTPATIENT
Start: 2019-07-24 | End: 2019-07-24 | Stop reason: HOSPADM

## 2019-07-24 RX ORDER — DEXAMETHASONE SODIUM PHOSPHATE 4 MG/ML
INJECTION, SOLUTION INTRA-ARTICULAR; INTRALESIONAL; INTRAMUSCULAR; INTRAVENOUS; SOFT TISSUE PRN
Status: DISCONTINUED | OUTPATIENT
Start: 2019-07-24 | End: 2019-07-24

## 2019-07-24 RX ORDER — ONDANSETRON 2 MG/ML
INJECTION INTRAMUSCULAR; INTRAVENOUS PRN
Status: DISCONTINUED | OUTPATIENT
Start: 2019-07-24 | End: 2019-07-24

## 2019-07-24 RX ORDER — DIMENHYDRINATE 50 MG/ML
25 INJECTION, SOLUTION INTRAMUSCULAR; INTRAVENOUS
Status: DISCONTINUED | OUTPATIENT
Start: 2019-07-24 | End: 2019-07-24 | Stop reason: HOSPADM

## 2019-07-24 RX ORDER — HYDRALAZINE HYDROCHLORIDE 20 MG/ML
2.5-5 INJECTION INTRAMUSCULAR; INTRAVENOUS EVERY 10 MIN PRN
Status: DISCONTINUED | OUTPATIENT
Start: 2019-07-24 | End: 2019-07-24 | Stop reason: HOSPADM

## 2019-07-24 RX ORDER — HYDROMORPHONE HYDROCHLORIDE 1 MG/ML
.3-.5 INJECTION, SOLUTION INTRAMUSCULAR; INTRAVENOUS; SUBCUTANEOUS EVERY 10 MIN PRN
Status: DISCONTINUED | OUTPATIENT
Start: 2019-07-24 | End: 2019-07-24 | Stop reason: HOSPADM

## 2019-07-24 RX ORDER — IOPAMIDOL 510 MG/ML
INJECTION, SOLUTION INTRAVASCULAR PRN
Status: DISCONTINUED | OUTPATIENT
Start: 2019-07-24 | End: 2019-07-24 | Stop reason: HOSPADM

## 2019-07-24 RX ORDER — FLUMAZENIL 0.1 MG/ML
0.2 INJECTION, SOLUTION INTRAVENOUS
Status: DISCONTINUED | OUTPATIENT
Start: 2019-07-24 | End: 2019-07-24 | Stop reason: HOSPADM

## 2019-07-24 RX ORDER — LIDOCAINE 40 MG/G
CREAM TOPICAL
Status: DISCONTINUED | OUTPATIENT
Start: 2019-07-24 | End: 2019-07-24 | Stop reason: HOSPADM

## 2019-07-24 RX ORDER — EPHEDRINE SULFATE 50 MG/ML
INJECTION, SOLUTION INTRAMUSCULAR; INTRAVENOUS; SUBCUTANEOUS PRN
Status: DISCONTINUED | OUTPATIENT
Start: 2019-07-24 | End: 2019-07-24

## 2019-07-24 RX ORDER — DEXTROSE, SODIUM CHLORIDE, SODIUM LACTATE, POTASSIUM CHLORIDE, AND CALCIUM CHLORIDE 5; .6; .31; .03; .02 G/100ML; G/100ML; G/100ML; G/100ML; G/100ML
500 INJECTION, SOLUTION INTRAVENOUS
Status: COMPLETED | OUTPATIENT
Start: 2019-07-24 | End: 2019-07-24

## 2019-07-24 RX ORDER — ONDANSETRON 2 MG/ML
4 INJECTION INTRAMUSCULAR; INTRAVENOUS EVERY 30 MIN PRN
Status: DISCONTINUED | OUTPATIENT
Start: 2019-07-24 | End: 2019-07-24 | Stop reason: HOSPADM

## 2019-07-24 RX ORDER — FENTANYL CITRATE 50 UG/ML
INJECTION, SOLUTION INTRAMUSCULAR; INTRAVENOUS PRN
Status: DISCONTINUED | OUTPATIENT
Start: 2019-07-24 | End: 2019-07-24

## 2019-07-24 RX ORDER — SCOLOPAMINE TRANSDERMAL SYSTEM 1 MG/1
1 PATCH, EXTENDED RELEASE TRANSDERMAL ONCE
Status: COMPLETED | OUTPATIENT
Start: 2019-07-24 | End: 2019-07-24

## 2019-07-24 RX ORDER — FENTANYL CITRATE 50 UG/ML
25-50 INJECTION, SOLUTION INTRAMUSCULAR; INTRAVENOUS EVERY 5 MIN PRN
Status: DISCONTINUED | OUTPATIENT
Start: 2019-07-24 | End: 2019-07-24 | Stop reason: HOSPADM

## 2019-07-24 RX ADMIN — ONDANSETRON 4 MG: 2 INJECTION INTRAMUSCULAR; INTRAVENOUS at 16:24

## 2019-07-24 RX ADMIN — MIDAZOLAM 2 MG: 1 INJECTION INTRAMUSCULAR; INTRAVENOUS at 13:55

## 2019-07-24 RX ADMIN — SODIUM CHLORIDE, POTASSIUM CHLORIDE, SODIUM LACTATE AND CALCIUM CHLORIDE: 600; 310; 30; 20 INJECTION, SOLUTION INTRAVENOUS at 13:43

## 2019-07-24 RX ADMIN — GLUCAGON HYDROCHLORIDE 0.4 MG: KIT at 15:47

## 2019-07-24 RX ADMIN — ROCURONIUM BROMIDE 10 MG: 10 INJECTION INTRAVENOUS at 15:45

## 2019-07-24 RX ADMIN — Medication 5 MG: at 14:11

## 2019-07-24 RX ADMIN — SCOPALAMINE 1 PATCH: 1 PATCH, EXTENDED RELEASE TRANSDERMAL at 16:28

## 2019-07-24 RX ADMIN — LIDOCAINE HYDROCHLORIDE 80 MG: 20 INJECTION, SOLUTION INFILTRATION; PERINEURAL at 13:55

## 2019-07-24 RX ADMIN — DEXAMETHASONE SODIUM PHOSPHATE 6 MG: 4 INJECTION, SOLUTION INTRA-ARTICULAR; INTRALESIONAL; INTRAMUSCULAR; INTRAVENOUS; SOFT TISSUE at 14:21

## 2019-07-24 RX ADMIN — ROCURONIUM BROMIDE 50 MG: 10 INJECTION INTRAVENOUS at 13:58

## 2019-07-24 RX ADMIN — SODIUM CHLORIDE, SODIUM LACTATE, POTASSIUM CHLORIDE, CALCIUM CHLORIDE AND DEXTROSE MONOHYDRATE 500 ML: 5; 600; 310; 30; 20 INJECTION, SOLUTION INTRAVENOUS at 16:40

## 2019-07-24 RX ADMIN — ONDANSETRON 4 MG: 2 INJECTION INTRAMUSCULAR; INTRAVENOUS at 15:56

## 2019-07-24 RX ADMIN — SUGAMMADEX 200 MG: 100 INJECTION, SOLUTION INTRAVENOUS at 16:07

## 2019-07-24 RX ADMIN — FENTANYL CITRATE 25 MCG: 50 INJECTION, SOLUTION INTRAMUSCULAR; INTRAVENOUS at 15:45

## 2019-07-24 RX ADMIN — PROPOFOL 100 MG: 10 INJECTION, EMULSION INTRAVENOUS at 13:55

## 2019-07-24 RX ADMIN — FENTANYL CITRATE 25 MCG: 50 INJECTION, SOLUTION INTRAMUSCULAR; INTRAVENOUS at 14:46

## 2019-07-24 RX ADMIN — Medication 5 MG: at 14:26

## 2019-07-24 RX ADMIN — FENTANYL CITRATE 25 MCG: 50 INJECTION, SOLUTION INTRAMUSCULAR; INTRAVENOUS at 15:55

## 2019-07-24 RX ADMIN — SODIUM CHLORIDE, POTASSIUM CHLORIDE, SODIUM LACTATE AND CALCIUM CHLORIDE: 600; 310; 30; 20 INJECTION, SOLUTION INTRAVENOUS at 15:50

## 2019-07-24 RX ADMIN — FENTANYL CITRATE 25 MCG: 50 INJECTION, SOLUTION INTRAMUSCULAR; INTRAVENOUS at 15:00

## 2019-07-24 RX ADMIN — FENTANYL CITRATE 50 MCG: 50 INJECTION, SOLUTION INTRAMUSCULAR; INTRAVENOUS at 13:55

## 2019-07-24 RX ADMIN — LEVOFLOXACIN 500 MG: 5 INJECTION, SOLUTION INTRAVENOUS at 14:17

## 2019-07-24 RX ADMIN — Medication 5 MG: at 14:10

## 2019-07-24 ASSESSMENT — MIFFLIN-ST. JEOR: SCORE: 1195.88

## 2019-07-24 NOTE — DISCHARGE INSTRUCTIONS
Same-Day Surgery   Adult Discharge Orders & Instructions     For 24 hours after surgery:  1. Get plenty of rest.  A responsible adult must stay with you for at least 24 hours after you leave the hospital.   2. Pain medication can slow your reflexes. Do not drive or use heavy equipment.  If you have weakness or tingling, don't drive or use heavy equipment until this feeling goes away.  3. Mixing alcohol and pain medication can cause dizziness and slow your breathing. It can even be fatal. Do not drink alcohol while taking pain medication.  4. Avoid strenuous or risky activities.  Ask for help when climbing stairs.   5. You may feel lightheaded.  If so, sit for a few minutes before standing.  Have someone help you get up.   6. If you have nausea (feel sick to your stomach), drink only clear liquids such as apple juice, ginger ale, broth or 7-Up.  Rest may also help.  Be sure to drink enough fluids.  Move to a regular diet as you feel able. Take pain medications with a small amount of solid food, such as toast or crackers, to avoid nausea.   7. A slight fever is normal. Call the doctor if your fever is over 100 F (37.7 C) (taken under the tongue) or lasts longer than 24 hours.  8. You may have a dry mouth, muscle aches, trouble sleeping or a sore throat.  These symptoms should go away after 24 hours.  9. Do not make important or legal decisions.   Pain Management:      1. Take pain medication (if prescribed) for pain as directed by your physician.        2. WARNING: If the pain medication you have been prescribed contains Tylenol  (acetaminophen), DO NOT take additional doses of Tylenol (acetaminophen).     Call your doctor for any of the followin.  Signs of infection (fever, growing tenderness at the surgery site, severe pain, a large amount of drainage or bleeding, foul-smelling drainage, redness, swelling).    2.  It has been over 8 to 10 hours since surgery and you are still not able to urinate (pee).    3.   Headache for over 24 hours.      To contact a doctor, call _Dr. Guru Alvarado @ [phone]__ or:      [phone] and ask for the Resident On Call for:          ________GI__ (answered 24 hours a day)      Emergency Department:  [place] [facility]: [phone]  [place] [facility]: [phone]               Rev. 10/2014

## 2019-07-24 NOTE — ANESTHESIA PREPROCEDURE EVALUATION
Anesthesia Pre-Procedure Evaluation    Patient: Reg Valiente   MRN:     [id number] Gender:   female   Age:    44 year old :      1975        Preoperative Diagnosis: Common Bile Duct Stone   Procedure(s):  Endoscopic Retrograde Cholangiopancreatogram     Past Medical History:   Diagnosis Date     Arthritis of knee     receives injections frequently     Corneal ulcer     caused by leaking of CPAP     Down's syndrome      Gastroesophageal reflux disease      JENNIFER (obstructive sleep apnea)     patient refuses CPAP      Past Surgical History:   Procedure Laterality Date     ENDOSCOPIC RETROGRADE CHOLANGIOPANCREATOGRAM N/A 2019    Procedure: Endoscopic Retrograde Cholangiopancreatogram, dilation, stent , sludge and stone removal, sphincterotomy;  Surgeon: Guru Harper Alvarado MD;  Location: [facility] OR     NO HISTORY OF SURGERY      as reported by parents          Anesthesia Evaluation     . Pt has had prior anesthetic.            ROS/MED HX    ENT/Pulmonary:     (+)sleep apnea, doesn't use CPAP , . .    Neurologic:     (+)other neuro Down's Syndrome    Cardiovascular:         METS/Exercise Tolerance:     Hematologic:         Musculoskeletal:         GI/Hepatic:     (+) GERD Asymptomatic on medication,       Renal/Genitourinary:         Endo:     (+) thyroid problem hypothyroidism, Obesity, .      Psychiatric:         Infectious Disease:         Malignancy:         Other:    (+) other significant disability Developmental delay                       PHYSICAL EXAM:   Mental Status/Neuro: A/A/O   Airway: Facies: Feasible  Mallampati: Not Assessed  Mouth/Opening: Limited  TM distance: < 6 cm  Neck ROM: Limited   Respiratory: Auscultation: CTAB     Resp. Rate: Normal     Resp. Effort: Normal      CV: Rhythm: Regular   Comments:      Dental:  Dental Comments: Full mouth of teeth, with poor occlusion and multiple malpositions. Tongue seems large for rather small oral cavity. Patient not fully  "cooperative, so exam was limited                Lab Results   Component Value Date    WBC 8.5 07/02/2019    HGB 12.2 07/02/2019    HCT 38.2 07/02/2019     07/02/2019     07/02/2019    POTASSIUM 3.7 07/02/2019    CHLORIDE 106 07/02/2019    CO2 26 07/02/2019    BUN 9 07/02/2019    CR 0.63 07/02/2019     (H) 07/02/2019    FANG 8.8 07/02/2019    ALBUMIN 2.8 (L) 07/02/2019    PROTTOTAL 6.7 (L) 07/02/2019    ALT 39 07/02/2019    AST 25 07/02/2019    ALKPHOS 75 07/02/2019    BILITOTAL 0.6 07/02/2019    LIPASE 7,858 (H) 07/02/2019    AMYLASE 1,152 (H) 07/02/2019    T4 1.44 08/14/2018    HCG Negative 05/18/2019       Preop Vitals  BP Readings from Last 3 Encounters:   07/02/19 108/70   06/25/19 108/72   06/12/19 120/80    Pulse Readings from Last 3 Encounters:   07/01/19 64   06/12/19 64   06/11/19 64      Resp Readings from Last 3 Encounters:   07/02/19 16   06/25/19 16   06/12/19 16    SpO2 Readings from Last 3 Encounters:   07/02/19 100%   06/25/19 98%   06/12/19 98%      Temp Readings from Last 1 Encounters:   07/02/19 36.8  C (98.3  F) (Oral)    Ht Readings from Last 1 Encounters:   07/01/19 1.448 m (4' 9\")      Wt Readings from Last 1 Encounters:   07/01/19 70.4 kg (155 lb 3.3 oz)    Estimated body mass index is 33.59 kg/m  as calculated from the following:    Height as of 7/1/19: 1.448 m (4' 9\").    Weight as of 7/1/19: 70.4 kg (155 lb 3.3 oz).     LDA:            Assessment:   ASA SCORE: 3    NPO Status: > 2 hours since completed Clear Liquids; > 6 hours since completed Solid Foods   Documentation: H&P complete; Preop Testing complete; Consents complete   Proceeding: Proceed without further delay  Tobacco Use:  NO Active use of Tobacco/UNKNOWN Tobacco use status     Plan:   Anes. Type:  General   Pre-Induction: Midazolam IV   Induction:  IV (Standard)   Airway: Oral ETT   Access/Monitoring: PIV   Maintenance: Balanced   Emergence: Procedure Site   Logistics: Same Day Surgery     Postop " Pain/Sedation Strategy:  Standard-Options: Opioids PRN     PONV Management:  Adult Risk Factors: Female, Non-Smoker, Postop Opioids  Prevention: Ondansetron; Benzodiazepines     CONSENT: Direct conversation   Plan and risks discussed with: Power of ; Mother   Blood Products: Consent Deferred (Minimal Blood Loss)                         Jennifer Figueroa MD

## 2019-07-24 NOTE — ANESTHESIA CARE TRANSFER NOTE
Patient: Reg Valiente    Procedure(s):  Endoscopic Retrograde Cholangiopancreatography with Billary Stone Extraction,Dilation             and stent exchange    Diagnosis: Common Bile Duct Stone  Diagnosis Additional Information: No value filed.    Anesthesia Type:   No value filed.     Note:  Airway :Nasal Cannula  Patient transferred to:PACU  Comments: Patient transferred to PACU. VSS. + nausea/emesis. Dr. Medellin aware. Scopolamine patch applied behind left ear, D5W LR 500mL bolus started. Report given to receiving RN.Handoff Report: Identifed the Patient, Identified the Reponsible Provider, Reviewed the pertinent medical history, Discussed the surgical course, Reviewed Intra-OP anesthesia mangement and issues during anesthesia, Set expectations for post-procedure period and Allowed opportunity for questions and acknowledgement of understanding      Vitals: (Last set prior to Anesthesia Care Transfer)    CRNA VITALS  7/24/2019 1542 - 7/24/2019 1634      7/24/2019             NIBP:  106/66    Pulse:  76                Electronically Signed By: EB Pimentel CRNA  July 24, 2019  4:34 PM

## 2019-07-24 NOTE — BRIEF OP NOTE
[facility] Brief Operative Note    Pre-operative diagnosis: Common Bile Duct Stone   Post-operative diagnosis Choledocholithiasis    Procedure: Procedure(s):  Endoscopic Retrograde Cholangiopancreatography with Biliary Sphincterotomy and stent exchange   Surgeon: GURU PIERRE   Assistants(s): Edward Martinez MD   Estimated blood loss: None    Specimens: None   Findings: -Extensive choledocholithiasis  -Extension of sphincterotomy and dilation  -Stone extraction  -Stent exchange     Recommendations:                      -Monitor in PACU, then discharge            -Ice chips for now. Light liquid dinner tonight                                                        -Resume medication                                                        -Discharge home    Implants:    Implant Name Type Inv. Item Serial No.  Lot No. LRB No. Used   STENT COTTON-REDDY ([place]) BILIARY SOF-FLEX [id number] Stent STENT COTTON-REDDY ([place]) BILIARY SOF-FLEX [id number]  [employer] [id number] N/A 1   STENT ESCOBAR PANCREA FLEX [id number] W/O FLANGE SGL PIGTAIL Stent STENT ESCOBAR PANCREA FLEX [id number] W/O FLANGE SGL PIGTAIL  [employer] N84- N/A 1          Edward Martinez MD  Interventional Endoscopy Fellow

## 2019-07-24 NOTE — ANESTHESIA POSTPROCEDURE EVALUATION
Anesthesia POST Procedure Evaluation    Patient: Reg Valiente   MRN:     [id number] Gender:   female   Age:    44 year old :      1975        Preoperative Diagnosis: Common Bile Duct Stone   Procedure(s):  Endoscopic Retrograde Cholangiopancreatography with Billary Stone Extraction,Dilation             and stent exchange   Postop Comments: No value filed.       Anesthesia Type:  General  No value filed.    Reportable Event: YES     PAIN: Uncomplicated   Sign Out status: Comfortable, Well controlled pain     PONV: PONV Occurence     Symptoms:  Nausea + recurrent Emesis/Retching     Interventions: Scop. patch; 5-HT3 antagonist (D5LR)   Sign Out status:  No Nausea or Vomiting     Airway/Resp.: Uneventful perioperative course   Sign Out Status: Non labored breathing, age appropriate RR; Resp. Status within EXPECTED Parameters     CV: Uneventful perioperative course   Sign Out status: Appropriate BP and perfusion indices; Appropriate HR/Rhythm     Disposition:   Sign Out in:  PACU  Disposition:  Phase II; Home  Recovery Course: Uneventful           Last Anesthesia Record Vitals:  CRNA VITALS  2019 1542 - 2019 1642      2019             NIBP:  106/66    Pulse:  76          Last PACU Vitals:  Vitals Value Taken Time   /66 2019  4:28 PM   Temp     Pulse 85 2019  4:28 PM   Resp     SpO2 99 % 2019  4:48 PM   Temp src     NIBP 106/66 2019  4:30 PM   Pulse 76 2019  4:30 PM   SpO2     Resp     Temp     Ht Rate     Temp 2     Vitals shown include unvalidated device data.      Electronically Signed By: Opal Medellin MD, 2019, 4:49 PM

## 2019-07-25 LAB — ERCP: NORMAL

## 2019-07-26 ENCOUNTER — PATIENT OUTREACH (OUTPATIENT)
Dept: GASTROENTEROLOGY | Facility: CLINIC | Age: 44
End: 2019-07-26

## 2019-07-26 DIAGNOSIS — K80.50 COMMON BILE DUCT STONE: Primary | ICD-10-CM

## 2019-07-26 RX ORDER — URSODIOL 300 MG/1
300 CAPSULE ORAL 2 TIMES DAILY
Qty: 60 CAPSULE | Refills: 11 | Status: ON HOLD | OUTPATIENT
Start: 2019-07-26 | End: 2019-09-09

## 2019-07-26 NOTE — PROGRESS NOTES
Called [facility] [place] to discuss prescription change, discontinue cholestyramine and start Urisidiol BID per Dr. Alvarado    Med orders placed, eprescribed. Talked to  Maci regarding medication change.    Maci says Reg went back to her day program today, doing OK after procedure.       Nida Padilla, RN Care Coordinator

## 2019-07-29 ENCOUNTER — CARE COORDINATION (OUTPATIENT)
Dept: GASTROENTEROLOGY | Facility: CLINIC | Age: 44
End: 2019-07-29

## 2019-07-29 DIAGNOSIS — K80.50 COMMON BILE DUCT STONE: Primary | ICD-10-CM

## 2019-07-29 NOTE — PROGRESS NOTES
Post ERCP (7/24/19) with Dr. Alvarado: Follow-up     Post procedure recommendations:   - As requested by mother, patient is not tolerating   Cholestryamine, will switch her to Ursodiol 300 mg BID   - Will recommend repeat ERCP in 4 weeks to remove the residual stone in the left intrahepatic  - If patient develops fever, chills, jaundice or passes dark urine or has worsening of LFTs before scheduled ERCP, will recommend patient to call us immediately for consideration of an earlier urgent ERCP    Orders placed: Medication prescribed already.   ERCP      Called group home but no answer. Called Pita (mother) but no answer and unable to leave .      NATANAEL Paredes Dr., Dr. Torres, & Dr. Alvarado  [facility]  [phone]

## 2019-08-05 NOTE — PROGRESS NOTES
Spoke to Maci the  at her group home and she states that Reg is feeling well. Advised of the recommendations.     NATANAEL Paredes Dr., Dr. Torres, & Dr. Alvarado  [facility]  [phone]

## 2019-08-06 ENCOUNTER — TELEPHONE (OUTPATIENT)
Dept: INTERNAL MEDICINE | Facility: OTHER | Age: 44
End: 2019-08-06

## 2019-08-06 NOTE — TELEPHONE ENCOUNTER
Spoke with father. Sports will be bowling and swimming. Patient has recovered from N/V. Patient had stones removed 2 weeks ago. Jane Alejandro LPN .............8/6/2019  8:33 AM

## 2019-08-06 NOTE — TELEPHONE ENCOUNTER
I spoke with patient's parents Niko and Pita.  They report that in the 1990s patient had x-rays to evaluate for AAS instability and these were all normal.  She has no symptoms of AAS.  She will not be doing any diving and will not do the butterfly stroke regardless.  The forms were all completed based on her physical from June 21, 2019 and also postoperative state from recent ERCP with stent placement.  Parents report that she is not having any nausea, vomiting or abdominal pain.  Parents still have to complete a couple of questions on page 1 otherwise form is ready for pickup.

## 2019-08-06 NOTE — TELEPHONE ENCOUNTER
Spoke with patient's father and mother and they both report that around 20 years ago patient did have atlantoaxial instability x-rays that were negative for instability.  She also will be participating in swimming however there will not be a butterfly stroke nor any type of diving.

## 2019-08-09 ENCOUNTER — TELEPHONE (OUTPATIENT)
Dept: GASTROENTEROLOGY | Facility: CLINIC | Age: 44
End: 2019-08-09

## 2019-08-09 NOTE — TELEPHONE ENCOUNTER
LVM for patient's father in regards to scheduled procedure with Dr. Thomas. Left direct line for patient to call to go over scheduling details.     8/9/19 129pm

## 2019-08-12 ENCOUNTER — TELEPHONE (OUTPATIENT)
Dept: GASTROENTEROLOGY | Facility: CLINIC | Age: 44
End: 2019-08-12

## 2019-08-12 NOTE — TELEPHONE ENCOUNTER
Spoke to patient's Father Niko in regards to scheduled procedure. Informed him the patient is scheduled with Dr. Thomas on 8/26/19. Informed him the patient will need an updated pre-op physical within 30 days of her procedure. Niko stated the patient is going to have this done locally. Informed him the patient will need a  and someone to monitor her for 24 hours after the procedure. Informed Niko all scheduling details will be sent to the address listed on Epic. Address confirmed on this call.     8/12/19 1021am

## 2019-08-14 ENCOUNTER — OFFICE VISIT (OUTPATIENT)
Dept: FAMILY MEDICINE | Facility: OTHER | Age: 44
End: 2019-08-14
Attending: NURSE PRACTITIONER
Payer: MEDICARE

## 2019-08-14 ENCOUNTER — HOSPITAL ENCOUNTER (OUTPATIENT)
Dept: GENERAL RADIOLOGY | Facility: OTHER | Age: 44
Discharge: HOME | End: 2019-08-14
Attending: NURSE PRACTITIONER | Admitting: NURSE PRACTITIONER
Payer: MEDICARE

## 2019-08-14 VITALS
TEMPERATURE: 98.7 F | HEART RATE: 61 BPM | BODY MASS INDEX: 33.01 KG/M2 | RESPIRATION RATE: 20 BRPM | DIASTOLIC BLOOD PRESSURE: 74 MMHG | SYSTOLIC BLOOD PRESSURE: 124 MMHG | WEIGHT: 153 LBS | HEIGHT: 57 IN | OXYGEN SATURATION: 99 %

## 2019-08-14 DIAGNOSIS — M25.521 RIGHT ELBOW PAIN: ICD-10-CM

## 2019-08-14 DIAGNOSIS — M25.521 RIGHT ELBOW PAIN: Primary | ICD-10-CM

## 2019-08-14 DIAGNOSIS — M77.01 EPICONDYLITIS ELBOW, MEDIAL, RIGHT: ICD-10-CM

## 2019-08-14 PROCEDURE — G0463 HOSPITAL OUTPT CLINIC VISIT: HCPCS

## 2019-08-14 PROCEDURE — 73070 X-RAY EXAM OF ELBOW: CPT | Mod: RT

## 2019-08-14 PROCEDURE — 99213 OFFICE O/P EST LOW 20 MIN: CPT | Performed by: NURSE PRACTITIONER

## 2019-08-14 PROCEDURE — G0463 HOSPITAL OUTPT CLINIC VISIT: HCPCS | Mod: 25

## 2019-08-14 ASSESSMENT — ENCOUNTER SYMPTOMS
ARTHRALGIAS: 1
JOINT SWELLING: 1

## 2019-08-14 ASSESSMENT — MIFFLIN-ST. JEOR: SCORE: 1217.88

## 2019-08-14 NOTE — PROGRESS NOTES
"  SUBJECTIVE:   Reg Valiente is a pleasant 44 year old down syndrome female who presents to clinic today for the following health issues:    HPI  Patient presents for evaluation of right elbow pain x 12 hours. She notes no pain yesterday, but pain started this morning. She has not treated the pain. She iced it once at school. No history of elbow surgery. No fever/chills. No wounds present. No reported trauma to the arm, but patient is a poor historian. \"I think I just slept on it wrong.\"     Patient Active Problem List    Diagnosis Date Noted     Post-ERCP acute pancreatitis 07/01/2019     Priority: Medium     Choledocholithiasis 06/25/2019     Priority: Medium     Down's syndrome 08/14/2018     Priority: Medium     DOROTEO (generalized anxiety disorder) 08/14/2018     Priority: Medium     Menstrual disorder 08/14/2018     Priority: Medium     Primary osteoarthritis of both knees 08/14/2018     Priority: Medium     Gastroesophageal reflux disease without esophagitis 08/14/2018     Priority: Medium     Other specified hypothyroidism 08/14/2018     Priority: Medium     Dermatitis 08/14/2018     Priority: Medium     Encounter for screening mammogram for breast cancer 08/14/2018     Priority: Medium     Morbid obesity (H) 08/14/2018     Priority: Medium     JENNIFER (obstructive sleep apnea) 08/14/2018     Priority: Medium     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     Mom reports negative AAS evaluation in the 1990's     Corneal ulcer 2004    caused by leaking of CPAP     Down's syndrome      Gastroesophageal reflux disease      JENNIFER (obstructive sleep apnea)     patient refuses CPAP      Past Surgical History:   Procedure Laterality Date     ENDOSCOPIC RETROGRADE CHOLANGIOPANCREATOGRAM N/A 7/1/2019    Procedure: Endoscopic Retrograde Cholangiopancreatogram, dilation, stent , sludge and stone removal, " "sphincterotomy;  Surgeon: Guru Harper Alvarado MD;  Location: [facility] OR     ENDOSCOPIC RETROGRADE CHOLANGIOPANCREATOGRAPHY, EXCHANGE TUBE/STENT N/A 7/24/2019    Procedure: Endoscopic Retrograde Cholangiopancreatography with Billary Stone Extraction,Dilation             and stent exchange;  Surgeon: Guru Harper Alvarado MD;  Location: [facility] OR     NO HISTORY OF SURGERY      as reported by parents       Review of Systems   Musculoskeletal: Positive for arthralgias and joint swelling.        OBJECTIVE:     /74 (BP Location: Left arm, Patient Position: Sitting, Cuff Size: Adult Regular)   Pulse 61   Temp 98.7  F (37.1  C) (Tympanic)   Resp 20   Ht 1.448 m (4' 9\")   Wt 69.4 kg (153 lb)   SpO2 99%   Breastfeeding? No   BMI 33.11 kg/m    Body mass index is 33.11 kg/m .  Physical Exam   Exam is limited due to pain. She holds her right arm to her chest.   She is hesitant to flex elbow and can not bring it to 90 degrees due to pain. Tenderness at medial epicondyl. Shoulder intact. Wrist intact. Pulses intact. No bruising. No deformity. No crepitus noted.     Diagnostic Test Results:  Xray -PROCEDURE:  XR ELBOW RT 2 VW     HISTORY: Right elbow pain.     COMPARISON:  None.     TECHNIQUE:  2 views right elbow.     FINDINGS:  Multiple ossific fragments project over the anterior elbow  joint, possibly free bodies. Mild irregularity along the outer margin  of the right radial neck is suggestive of an age indeterminant  traumatic fracture, correlation with priors would be helpful. Consider  follow-up.       ASSESSMENT/PLAN:   1. Right elbow pain  Does not look like an acute fracture, discussed case with radiologist. Exam was difficult due to patient compliance and pain. Patient has no history of previous injury to this elbow. Consider re-imaging in one week if pain still present. I do not think she would tolerate sitting still for a MRI. We will sling for the next 3 days in addition to " tylenol/ibuprofen as needed and ice therapy. Staff thinks she will be compliant with sling. Follow-up for further imaging next week if no improvement.   - ARM SLING S    2. Epicondylitis elbow, medial, right  Likely some joint inflammation treat with ice, NSAIDs.       Emilia Xiao FNP-[facility] AND [facility]

## 2019-08-14 NOTE — NURSING NOTE
Patient presents to clinic with [place] assistant experiencing right elbow pain with movement since this morning.    Medication Reconciliation: complete    Jennifer Loja LPN

## 2019-08-16 ENCOUNTER — TELEPHONE (OUTPATIENT)
Dept: GASTROENTEROLOGY | Facility: CLINIC | Age: 44
End: 2019-08-16

## 2019-08-16 DIAGNOSIS — K80.50 COMMON BILE DUCT STONE: Primary | ICD-10-CM

## 2019-08-16 NOTE — TELEPHONE ENCOUNTER
Spoke with Shalini from patients group home. She reports the patient is running a low grade fever- 99.3    Denies nausea, vomiting, jaundice, dark urine, chills/night sweats. She thinks that she is feeling ok. She slept through the night, ate breakfast and went to work.     She woke up on the 14th not moving her arm as much. She was in a sling for about a day.  Possible bone calcification but they need another xray.    Call back number is [phone]     Spoke to Dr. Alvarado and he would like to get LFT and CBC. Next steps are depending on the results of those labs.      Spoke to Shalini and she is aware of the plan. Labs faxed to [phone].    NATANAEL Paredes Dr., Dr. Torres, & Dr. Alvarado  [facility]  [phone]

## 2019-08-19 ENCOUNTER — OFFICE VISIT (OUTPATIENT)
Dept: FAMILY MEDICINE | Facility: OTHER | Age: 44
End: 2019-08-19
Attending: PHYSICIAN ASSISTANT
Payer: MEDICARE

## 2019-08-19 VITALS
WEIGHT: 150.4 LBS | SYSTOLIC BLOOD PRESSURE: 102 MMHG | OXYGEN SATURATION: 97 % | DIASTOLIC BLOOD PRESSURE: 62 MMHG | RESPIRATION RATE: 20 BRPM | BODY MASS INDEX: 32.55 KG/M2 | HEART RATE: 62 BPM | TEMPERATURE: 98.4 F

## 2019-08-19 DIAGNOSIS — M25.521 RIGHT ELBOW PAIN: Primary | ICD-10-CM

## 2019-08-19 DIAGNOSIS — K80.50 COMMON BILE DUCT STONE: ICD-10-CM

## 2019-08-19 LAB
ALBUMIN SERPL-MCNC: 3.4 G/DL (ref 3.5–5.7)
ALP SERPL-CCNC: 238 U/L (ref 34–104)
ALT SERPL W P-5'-P-CCNC: 55 U/L (ref 7–52)
AST SERPL W P-5'-P-CCNC: 42 U/L (ref 13–39)
BILIRUB DIRECT SERPL-MCNC: 0.2 MG/DL (ref 0–0.2)
BILIRUB SERPL-MCNC: 0.5 MG/DL (ref 0.3–1)
ERYTHROCYTE [DISTWIDTH] IN BLOOD BY AUTOMATED COUNT: 13.8 % (ref 10–15)
HCT VFR BLD AUTO: 37.4 % (ref 35–47)
HGB BLD-MCNC: 12.2 G/DL (ref 11.7–15.7)
MCH RBC QN AUTO: 32.2 PG (ref 26.5–33)
MCHC RBC AUTO-ENTMCNC: 32.6 G/DL (ref 31.5–36.5)
MCV RBC AUTO: 99 FL (ref 78–100)
PLATELET # BLD AUTO: 348 10E9/L (ref 150–450)
PROT SERPL-MCNC: 7 G/DL (ref 6.4–8.9)
RBC # BLD AUTO: 3.79 10E12/L (ref 3.8–5.2)
WBC # BLD AUTO: 8.1 10E9/L (ref 4–11)

## 2019-08-19 PROCEDURE — 85027 COMPLETE CBC AUTOMATED: CPT | Mod: ZL | Performed by: INTERNAL MEDICINE

## 2019-08-19 PROCEDURE — 99213 OFFICE O/P EST LOW 20 MIN: CPT | Performed by: PHYSICIAN ASSISTANT

## 2019-08-19 PROCEDURE — G0463 HOSPITAL OUTPT CLINIC VISIT: HCPCS

## 2019-08-19 PROCEDURE — 80076 HEPATIC FUNCTION PANEL: CPT | Mod: ZL | Performed by: INTERNAL MEDICINE

## 2019-08-19 PROCEDURE — 36415 COLL VENOUS BLD VENIPUNCTURE: CPT | Mod: ZL | Performed by: INTERNAL MEDICINE

## 2019-08-19 ASSESSMENT — PAIN SCALES - GENERAL: PAINLEVEL: NO PAIN (0)

## 2019-08-19 NOTE — NURSING NOTE
Chief Complaint   Patient presents with     Follow Up         Medication Reconciliation: complete    Magdalene Casanova, LPN

## 2019-08-19 NOTE — PROGRESS NOTES
Nursing Notes:   Magdalene Casanova LPN  8/19/2019 12:45 PM  Signed  Chief Complaint   Patient presents with     Follow Up         Medication Reconciliation: complete    Magdalene Casanova LPN      HPI:    Reg Valiente is a 44 year old female who presents for elbow follow-up.  Patient was previously seen on 8/14/2019.  At that time she was having right elbow pain for 12 hours.  X-ray was suggestive of a possible age-indeterminate traumatic fracture.  Recheck was recommended if symptoms are persistent.  Patient's caregiver states that she wear the right elbow sling for approximately 3 days and then wanted to stop wearing it.  Her elbow pain has resolved.  No further right elbow pain.  No swelling or bruising.  Able to fully move her elbow without discomfort.  Not taking Tylenol or ibuprofen.  Not using ice or heat at this time.    Patient has a history of common bile duct stones.  Has been seen a gastroenterologist.  They requested a CBC and hepatic function panel to be completed.  Patient would like her lab work done today.    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     Mom reports negative AAS evaluation in the 1990's     Corneal ulcer 2004    caused by leaking of CPAP     Down's syndrome      Gastroesophageal reflux disease      JENNIFER (obstructive sleep apnea)     patient refuses CPAP       Past Surgical History:   Procedure Laterality Date     ENDOSCOPIC RETROGRADE CHOLANGIOPANCREATOGRAM N/A 7/1/2019    Procedure: Endoscopic Retrograde Cholangiopancreatogram, dilation, stent , sludge and stone removal, sphincterotomy;  Surgeon: Guru Harper Alvarado MD;  Location: [facility] OR     ENDOSCOPIC RETROGRADE CHOLANGIOPANCREATOGRAPHY, EXCHANGE TUBE/STENT N/A 7/24/2019    Procedure: Endoscopic Retrograde Cholangiopancreatography with Billary Stone Extraction,Dilation             and  stent exchange;  Surgeon: Guru Harper Alvarado MD;  Location: [facility] OR     NO HISTORY OF SURGERY      as reported by parents       Family History   Problem Relation Age of Onset     Asthma Mother      Arthritis Mother      LUNG DISEASE Father         pulmonary fibrosis secondary to chemical exposure     Diabetes Father      Cerebrovascular Disease Father 90     Cerebrovascular Disease Maternal Grandmother 90     Heart Failure Paternal Grandfather         uncertain which  replaced     Coronary Artery Disease No family hx of      Heart Disease No family hx of      Bleeding Diathesis No family hx of      Pancreatic Cancer No family hx of      Colorectal Cancer No family hx of        Social History     Tobacco Use     Smoking status: Never Smoker     Smokeless tobacco: Never Used   Substance Use Topics     Alcohol use: No       Current Outpatient Medications   Medication Sig Dispense Refill     citalopram (CELEXA) 20 MG tablet TAKE 1 TABLET BY MOUTH EVERY DAY 90 tablet 3     diclofenac (VOLTAREN) 50 MG EC tablet Take 1 tablet (50 mg) by mouth 2 times daily 60 tablet 3     levothyroxine (SYNTHROID/LEVOTHROID) 175 MCG tablet TAKE 1 TABLET BY MOUTH DAILY 90 tablet 3     Multiple vitamin TABS Take 1 tablet by mouth daily 30 tablet 11     norgestim-eth estrad triphasic (TRINESSA, 28,) 0.18/0.215/0.25 MG-35 MCG tablet Take 1 tablet by mouth daily 84 tablet 3     ranitidine (ZANTAC) 150 MG tablet TAKE 1 TABLET BY MOUTH TWICE DAILY 60 tablet 11     ursodiol (ACTIGALL) 300 MG capsule Take 1 capsule (300 mg) by mouth 2 times daily With food 60 capsule 11       Allergies   Allergen Reactions     Sulfa Drugs        REVIEW OF SYSTEMS:  Refer to HPI.    EXAM:   Vitals:    /62 (BP Location: Right arm, Patient Position: Sitting, Cuff Size: Adult Regular)   Pulse 62   Temp 98.4  F (36.9  C)   Resp 20   Wt 68.2 kg (150 lb 6.4 oz)   SpO2 97%   BMI 32.55 kg/m      General Appearance: Pleasant, alert,  appropriate appearance for age. No acute distress  Musculoskeletal Exam: Full ROM of upper extremities without discomfort.  Patient has full range of motion of right elbow without discomfort.  No pain with palpation of the right elbow and right upper extremity.  No bruising or swelling appreciated.  Skin: no rash or abnormalities  Neurologic Exam: Nonfocal, normal gross motor, tone coordination and no tremor.  Psychiatric Exam: Alert and oriented -appropriate affect.    PHQ Depression Screen  PHQ-9 SCORE 8/14/2018   PHQ-9 Total Score 0       ASSESSMENT AND PLAN:      ICD-10-CM    1. Right elbow pain M25.521    2. Common bile duct stone K80.50 CBC with platelets     Hepatic panel         Right elbow pain: Pain has currently resolved.  No repeat x-rays are warranted at this time.  Continue close monitoring.  Return if symptoms return or if they change.    History of common bile duct stone: Completed CBC and hepatic panel for monitoring.  Results will be sent to the patient's gastroenterologist for further work-up and treatment recommendations.    Anastasiya Jonas PA-C..................8/19/2019 11:39 AM

## 2019-08-21 ENCOUNTER — PATIENT OUTREACH (OUTPATIENT)
Dept: GASTROENTEROLOGY | Facility: CLINIC | Age: 44
End: 2019-08-21

## 2019-08-21 NOTE — PROGRESS NOTES
Returned call. Verified they talked to staff and had all questions answered.     Nida Padilla, RN Care Coordinator

## 2019-08-23 ENCOUNTER — OFFICE VISIT (OUTPATIENT)
Dept: INTERNAL MEDICINE | Facility: OTHER | Age: 44
End: 2019-08-23
Attending: NURSE PRACTITIONER
Payer: MEDICARE

## 2019-08-23 ENCOUNTER — HOSPITAL ENCOUNTER (OUTPATIENT)
Dept: GENERAL RADIOLOGY | Facility: OTHER | Age: 44
Discharge: HOME | End: 2019-08-23
Attending: NURSE PRACTITIONER | Admitting: NURSE PRACTITIONER
Payer: MEDICARE

## 2019-08-23 VITALS
HEIGHT: 55 IN | BODY MASS INDEX: 34.3 KG/M2 | SYSTOLIC BLOOD PRESSURE: 102 MMHG | TEMPERATURE: 95.6 F | OXYGEN SATURATION: 95 % | WEIGHT: 148.2 LBS | HEART RATE: 81 BPM | RESPIRATION RATE: 16 BRPM | DIASTOLIC BLOOD PRESSURE: 62 MMHG

## 2019-08-23 DIAGNOSIS — Z01.818 PREOPERATIVE EXAMINATION: ICD-10-CM

## 2019-08-23 DIAGNOSIS — G47.33 OSA (OBSTRUCTIVE SLEEP APNEA): ICD-10-CM

## 2019-08-23 DIAGNOSIS — F41.1 GAD (GENERALIZED ANXIETY DISORDER): ICD-10-CM

## 2019-08-23 DIAGNOSIS — E03.8 OTHER SPECIFIED HYPOTHYROIDISM: Primary | ICD-10-CM

## 2019-08-23 DIAGNOSIS — Q90.9 DOWN'S SYNDROME: ICD-10-CM

## 2019-08-23 DIAGNOSIS — M25.512 ACUTE PAIN OF LEFT SHOULDER: ICD-10-CM

## 2019-08-23 DIAGNOSIS — K21.9 GASTROESOPHAGEAL REFLUX DISEASE WITHOUT ESOPHAGITIS: ICD-10-CM

## 2019-08-23 DIAGNOSIS — K80.50 CHOLEDOCHOLITHIASIS: ICD-10-CM

## 2019-08-23 DIAGNOSIS — M19.012 PRIMARY OSTEOARTHRITIS OF LEFT SHOULDER: ICD-10-CM

## 2019-08-23 LAB
ANION GAP SERPL CALCULATED.3IONS-SCNC: 8 MMOL/L (ref 3–14)
BUN SERPL-MCNC: 10 MG/DL (ref 7–25)
CALCIUM SERPL-MCNC: 9.5 MG/DL (ref 8.6–10.3)
CHLORIDE SERPL-SCNC: 99 MMOL/L (ref 98–107)
CO2 SERPL-SCNC: 30 MMOL/L (ref 21–31)
CREAT SERPL-MCNC: 0.85 MG/DL (ref 0.6–1.2)
CRP SERPL-MCNC: 10.2 MG/L
ERYTHROCYTE [DISTWIDTH] IN BLOOD BY AUTOMATED COUNT: 13.5 % (ref 10–15)
ERYTHROCYTE [SEDIMENTATION RATE] IN BLOOD BY WESTERGREN METHOD: 79 MM/H (ref 1–15)
GFR SERPL CREATININE-BSD FRML MDRD: 73 ML/MIN/{1.73_M2}
GLUCOSE SERPL-MCNC: 155 MG/DL (ref 70–105)
HCT VFR BLD AUTO: 39.7 % (ref 35–47)
HGB BLD-MCNC: 13.2 G/DL (ref 11.7–15.7)
MCH RBC QN AUTO: 32.5 PG (ref 26.5–33)
MCHC RBC AUTO-ENTMCNC: 33.2 G/DL (ref 31.5–36.5)
MCV RBC AUTO: 98 FL (ref 78–100)
PLATELET # BLD AUTO: 360 10E9/L (ref 150–450)
POTASSIUM SERPL-SCNC: 3.9 MMOL/L (ref 3.5–5.1)
RBC # BLD AUTO: 4.06 10E12/L (ref 3.8–5.2)
RHEUMATOID FACT SER NEPH-ACNC: <14 IU/ML (ref 0–20)
SODIUM SERPL-SCNC: 137 MMOL/L (ref 134–144)
T4 FREE SERPL-MCNC: 1.32 NG/DL (ref 0.6–1.6)
TSH SERPL DL<=0.05 MIU/L-ACNC: 4.76 IU/ML (ref 0.34–5.6)
URATE SERPL-MCNC: 5.3 MG/DL (ref 4.4–7.6)
WBC # BLD AUTO: 11.7 10E9/L (ref 4–11)

## 2019-08-23 PROCEDURE — 73030 X-RAY EXAM OF SHOULDER: CPT | Mod: LT

## 2019-08-23 PROCEDURE — 36415 COLL VENOUS BLD VENIPUNCTURE: CPT | Mod: ZL | Performed by: NURSE PRACTITIONER

## 2019-08-23 PROCEDURE — 80048 BASIC METABOLIC PNL TOTAL CA: CPT | Mod: ZL | Performed by: NURSE PRACTITIONER

## 2019-08-23 PROCEDURE — 86431 RHEUMATOID FACTOR QUANT: CPT | Mod: ZL | Performed by: NURSE PRACTITIONER

## 2019-08-23 PROCEDURE — 84550 ASSAY OF BLOOD/URIC ACID: CPT | Mod: ZL | Performed by: NURSE PRACTITIONER

## 2019-08-23 PROCEDURE — 99215 OFFICE O/P EST HI 40 MIN: CPT | Performed by: NURSE PRACTITIONER

## 2019-08-23 PROCEDURE — 86618 LYME DISEASE ANTIBODY: CPT | Mod: ZL | Performed by: NURSE PRACTITIONER

## 2019-08-23 PROCEDURE — G0463 HOSPITAL OUTPT CLINIC VISIT: HCPCS | Mod: 25

## 2019-08-23 PROCEDURE — 86140 C-REACTIVE PROTEIN: CPT | Mod: ZL | Performed by: NURSE PRACTITIONER

## 2019-08-23 PROCEDURE — 85027 COMPLETE CBC AUTOMATED: CPT | Mod: ZL | Performed by: NURSE PRACTITIONER

## 2019-08-23 PROCEDURE — 84439 ASSAY OF FREE THYROXINE: CPT | Mod: ZL | Performed by: NURSE PRACTITIONER

## 2019-08-23 PROCEDURE — G0463 HOSPITAL OUTPT CLINIC VISIT: HCPCS

## 2019-08-23 PROCEDURE — 85652 RBC SED RATE AUTOMATED: CPT | Mod: ZL | Performed by: NURSE PRACTITIONER

## 2019-08-23 PROCEDURE — 84443 ASSAY THYROID STIM HORMONE: CPT | Mod: ZL | Performed by: NURSE PRACTITIONER

## 2019-08-23 ASSESSMENT — MIFFLIN-ST. JEOR: SCORE: 1131.86

## 2019-08-23 ASSESSMENT — PAIN SCALES - GENERAL: PAINLEVEL: MODERATE PAIN (5)

## 2019-08-23 NOTE — PROGRESS NOTES
----------------- PREOPERATIVE EXAM ------------------  8/23/2019    SUBJECTIVE:  Reg Valiente is a 44 year old femalehere for preoperative optimization.    I was asked to see Reg Valiente by Dr. Alvarado for a preoperative optimization due to planned ERCP with stent removal.  Patient has had a history of gallstones.  She was having symptoms of weight loss, nausea and vomiting however that has resolved.    Patient has a history of obstructive sleep apnea but intolerant to CPAP, GERD which is alleviated with ranitidine, anxiety disorder and recent history of joint pain.  A couple weeks ago she was complaining of right elbow plain.  She was seen on 2 occasions with a negative evaluation.  That started about a month ago.  The right elbow pain has subsided.  She is now complaining of left shoulder pain.  She is complained of this twice in the last couple of weeks.  She denies any injury.  She is not able to move the shoulder due to pain.  There is no erythema or warmth.  She does take Voltaren tablets twice daily but is not effective today.  She does not do any repetitive activity with the shoulder.  No history of rheumatologic disorders, joint infections nor tick bites.    Nursing Notes:   Jane Alejandro LPN  8/23/2019  9:25 AM  Signed  Chief Complaint   Patient presents with     Pre-Op Exam       Medication Reconciliation: complete  Jane Alejandro LPN  ..................8/23/2019   9:06 AM   Date of Surgery: 8-26-19  Surgeon: Dr. Thomas  Type of Surgery: stent removal  Hospital:  [facility]    Fever/Chills or other infectious symptoms in past month: no  >10lb weight loss in past two months: yes      Health Care Directive/Code status:  yes  Hx of bloodtransfusions:   no   Td up to date:  7-24-91  History of VRE/MRSA:  no    Preoperative Evaluation: Obstructive Sleep Apnea screening    S:Snore -  Do you snore loudly? (louder than talking or loud enough to be heard through closed doors)no  T: Tired -  "Do you often feel tired, fatigued, or sleepy during the daytime?no  O: Observed - Has anyone ever observed you stop breathing during your sleep?no  P: Pressure - Do you have or areyou being treated for high blood pressure?no  B: BMI - BMI greater than 35kg/m2?yes 37.2  A: Age - Age over 50 years old?no  N: Neck - Neck circumferencegreater than 40 cm?no 36.83  G:Gender - Gender: Male?no    Totalnumber of \"YES\" responses:  1    Scoring: Low risk of JENNIFER 0-2  At Risk of JENNIFER: >3 High Risk ofOSA: 5-8    Jane Alejandro LPN LPN....................  8/23/2019   9:06 AM      Patient Active Problem List    Diagnosis Date Noted     Post-ERCP acute pancreatitis 07/01/2019     Priority: Medium     Choledocholithiasis 06/25/2019     Priority: Medium     Down's syndrome 08/14/2018     Priority: Medium     DOROTEO (generalized anxiety disorder) 08/14/2018     Priority: Medium     Menstrual disorder 08/14/2018     Priority: Medium     Primary osteoarthritis of both knees 08/14/2018     Priority: Medium     Gastroesophageal reflux disease without esophagitis 08/14/2018     Priority: Medium     Other specified hypothyroidism 08/14/2018     Priority: Medium     Dermatitis 08/14/2018     Priority: Medium     Encounter for screening mammogram for breast cancer 08/14/2018     Priority: Medium     Morbid obesity (H) 08/14/2018     Priority: Medium     JENNIFER (obstructive sleep apnea) 08/14/2018     Priority: Medium       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     Mom reports negative AAS evaluation in the 1990's     Corneal ulcer 2004    caused by leaking of CPAP     Down's syndrome      Gastroesophageal reflux disease      JENNIFER (obstructive sleep apnea)     patient refuses CPAP       Past Surgical History:   Procedure Laterality Date     ENDOSCOPIC RETROGRADE CHOLANGIOPANCREATOGRAM N/A 7/1/2019    Procedure: Endoscopic " Retrograde Cholangiopancreatogram, dilation, stent , sludge and stone removal, sphincterotomy;  Surgeon: Guru Harper Alvarado MD;  Location: [facility] OR     ENDOSCOPIC RETROGRADE CHOLANGIOPANCREATOGRAPHY, EXCHANGE TUBE/STENT N/A 7/24/2019    Procedure: Endoscopic Retrograde Cholangiopancreatography with Billary Stone Extraction,Dilation             and stent exchange;  Surgeon: Guru Harper Alvarado MD;  Location: [facility] OR     NO HISTORY OF SURGERY      as reported by parents       Current Outpatient Medications   Medication Sig Dispense Refill     citalopram (CELEXA) 20 MG tablet TAKE 1 TABLET BY MOUTH EVERY DAY 90 tablet 3     diclofenac (VOLTAREN) 50 MG EC tablet Take 1 tablet (50 mg) by mouth 2 times daily 60 tablet 3     levothyroxine (SYNTHROID/LEVOTHROID) 175 MCG tablet TAKE 1 TABLET BY MOUTH DAILY 90 tablet 3     Multiple vitamin TABS Take 1 tablet by mouth daily 30 tablet 11     norgestim-eth estrad triphasic (TRINESSA, 28,) 0.18/0.215/0.25 MG-35 MCG tablet Take 1 tablet by mouth daily 84 tablet 3     ranitidine (ZANTAC) 150 MG tablet TAKE 1 TABLET BY MOUTH TWICE DAILY 60 tablet 11     ursodiol (ACTIGALL) 300 MG capsule Take 1 capsule (300 mg) by mouth 2 times daily With food 60 capsule 11       Recent use of ibuprofen, aspirin or aleve: Diclofenac  Diclofenac  Allergies:  Allergies   Allergen Reactions     Sulfa Drugs        Latex allergy  No    Family History   Problem Relation Age of Onset     Asthma Mother      Arthritis Mother      LUNG DISEASE Father         pulmonary fibrosis secondary to chemical exposure     Diabetes Father      Cerebrovascular Disease Father 90     Cerebrovascular Disease Maternal Grandmother 90     Heart Failure Paternal Grandfather         uncertain which  replaced     Coronary Artery Disease No family hx of      Heart Disease No family hx of      Bleeding Diathesis No family hx of      Pancreatic Cancer No family hx of      Colorectal Cancer No  family hx of        Denies family hx of bleeding tendencies, anesthesia complications, or other problems with surgery.      Social History     Socioeconomic History     Marital status: Single     Spouse name: Not on file     Number of children: Not on file     Years of education: Not on file     Highest education level: Not on file   Occupational History     Not on file   Social Needs     Financial resource strain: Not on file     Food insecurity:     Worry: Not on file     Inability: Not on file     Transportation needs:     Medical: Not on file     Non-medical: Not on file   Tobacco Use     Smoking status: Never Smoker     Smokeless tobacco: Never Used   Substance and Sexual Activity     Alcohol use: No     Drug use: No     Sexual activity: Not Currently     Birth control/protection: Pill   Lifestyle     Physical activity:     Days per week: Not on file     Minutes per session: Not on file     Stress: Not on file   Relationships     Social connections:     Talks on phone: Not on file     Gets together: Not on file     Attends Taoism service: Not on file     Active member of club or organization: Not on file     Attends meetings of clubs or organizations: Not on file     Relationship status: Not on file     Intimate partner violence:     Fear of current or ex partner: Not on file     Emotionally abused: Not on file     Physically abused: Not on file     Forced sexual activity: Not on file   Other Topics Concern     Not on file   Social History Narrative    Moving to [facility] June 20, 2019         Surgical ROS:    surgical:  patient denies previous complications from prior surgeries including but not limited to prolonged bleeding, anesthesia complications, dysrhythmias, surgical wound infections, or prolonged hospital stay.      Comprehensive REVIEW OF SYSTEMS:      Constitutional: Negative for fever, chills and fatigue .   Eyes: Negative for irritation and redness .  Ears, nose, mouth, throat, and face:  "Negative for ear drainage, nasal congestion, sore throat and hoarseness .  Respiratory: Negative for cough, sputum production , hemoptysis, dyspnea  and wheezing .  Cardiovascular: Negative for chest discomfort, palpitations and lightheadedness .  Gastrointestinal: Negative for dysphagia, nausea, vomiting, melena, diarrhea, constipation and abdominal pain.  Genitourinary: Negative for frequency, dysuria, urinary incontinence, hematuria.  Integument/breast: Negative for rash.   Hematologic/lymphatic: Negative for easy bruising, bleeding, lymphadenopathy and petechiae.   Musculoskeletal: Negative for myalgias. + for left shoulder pain, acute   Neurological: Negative for headaches, dizziness, vertigo, seizures and weakness.   Psychiatric: Negative for anxiety, depression, panic attacks and suicidal ideations.       -------------------------------------------------------------    PHYSICAL EXAM:  /62 (BP Location: Right arm, Patient Position: Sitting, Cuff Size: Adult Regular)   Pulse 81   Temp 95.6  F (35.3  C) (Temporal)   Resp 16   Ht 1.345 m (4' 4.95\")   Wt 67.2 kg (148 lb 3.2 oz)   SpO2 95%   BMI 37.16 kg/m      EXAM:  General Appearance: Pleasant, alert, appropriate appearance for age. No acute distress. Down syndrome.  Head Exam: Normal. Normocephalic, atraumatic.  Eye Exam: Sclera non-icteric. Conjunctiva non-inflamed.  Ear Exam: Wears hearing aides bilaterally.   OroPharynx Exam: Oral mucosa pink and moist. No erythema or exudate of throat.  Neck Exam: Supple, no masses or lymphadenopathy  Thyroid Exam:  No nodules or thyromegaly, non-tender.  Chest/Respiratory Exam: Normal chest wall and respirations. Clear to auscultation.  Cardiovascular Exam: Regular rate and rhythm. S1, S2, no murmur or S3 gallop.  Gastrointestinal Exam: Soft, nontender, no masses or organomegaly. BS x 4. No abdominal bruit or pulsatile masses.  Lymphatic Exam: No axillary  adenopathy  Musculoskeletal Exam: No swelling, " erythema or warmth of left shoulder and elbow.  Normal ROM of elbow.  Unable to move left shoulder due to pain.  Pain at joint line of left shoulder.  Skin: no rash or abnormalities  Neurologic Exam: appropriate tone, strength and coordination. No tremor.  Psychiatric Exam: Alert and oriented, appropriate affect.    PHQ Depression Screen  PHQ-9 SCORE 8/14/2018   PHQ-9 Total Score 0       Patient can walk up a flight of stairs without becoming short of breath or having chest pain: YES   Patient is able to tolerate greater than 4 METs of activity without any cardiopulmonary symptoms.    LABS:  (non-fasting)     Results for orders placed or performed in visit on 08/23/19   T4 free   Result Value Ref Range    T4 Free 1.32 0.60 - 1.60 ng/dL   Thyrotropin GH   Result Value Ref Range    Thyrotropin 4.76 0.34 - 5.60 IU/mL   Uric acid   Result Value Ref Range    Uric Acid 5.3 4.4 - 7.6 mg/dL   Rheumatoid factor   Result Value Ref Range    Rheumatoid Factor <14 <14 IU/mL   Basic metabolic panel   Result Value Ref Range    Sodium 137 134 - 144 mmol/L    Potassium 3.9 3.5 - 5.1 mmol/L    Chloride 99 98 - 107 mmol/L    Carbon Dioxide 30 21 - 31 mmol/L    Anion Gap 8 3 - 14 mmol/L    Glucose 155 (H) 70 - 105 mg/dL    Urea Nitrogen 10 7 - 25 mg/dL    Creatinine 0.85 0.60 - 1.20 mg/dL    GFR Estimate 73 >60 mL/min/[1.73_m2]    GFR Estimate If Black 88 >60 mL/min/[1.73_m2]    Calcium 9.5 8.6 - 10.3 mg/dL   Erythrocyte sedimentation rate auto   Result Value Ref Range    Sed Rate 79 (H) 1 - 15 mm/h   CRP inflammation   Result Value Ref Range    CRP Inflammation 10.2 (H) <0.5 mg/L   CBC with platelets   Result Value Ref Range    WBC 11.7 (H) 4.0 - 11.0 10e9/L    RBC Count 4.06 3.8 - 5.2 10e12/L    Hemoglobin 13.2 11.7 - 15.7 g/dL    Hematocrit 39.7 35.0 - 47.0 %    MCV 98 78 - 100 fl    MCH 32.5 26.5 - 33.0 pg    MCHC 33.2 31.5 - 36.5 g/dL    RDW 13.5 10.0 - 15.0 %    Platelet Count 360 150 - 450 10e9/L         CXR: Not  applicable    EKG: May 18, 2019: Normal sinus rhythm, rate 60      PROCEDURE:  XR SHOULDER LT G/E 3 VW     HISTORY: Acute pain of left shoulder     COMPARISON:  None.     TECHNIQUE:  2 views of the left shoulder were obtained.     FINDINGS:  There are advanced degenerative changes of the glenohumeral  joint including joint space narrowing and osteophytes. No definite  acute fracture or dislocation, although images are limited slightly by  positioning due to the patient's mental status.                                                                       IMPRESSION: Advanced degenerative disease.       SIVA LAM MD    ---------------------------------------------------------------    No family or personal history of problems with bleeding or anesthetia. Patient's perioperative risk is minimized and no further cardiopulmonary workup is neccesary.  Please contact the office with any questions or concerns.      ICD-10-CM    1. Other specified hypothyroidism E03.8 Thyrotropin GH     T4 free     T4 free     Thyrotropin GH   2. JENNIFER (obstructive sleep apnea) G47.33    3. Gastroesophageal reflux disease without esophagitis K21.9    4. DOROTEO (generalized anxiety disorder) F41.1    5. Down's syndrome Q90.9    6. Acute pain of left shoulder M25.512 CBC with platelets     CRP inflammation     Erythrocyte sedimentation rate auto     Rheumatoid factor     Uric acid     XR Shoulder Left G/E 3 Views     Uric acid     Rheumatoid factor     Erythrocyte sedimentation rate auto     CRP inflammation     CBC with platelets     Lyme Disease Ab with reflex to WB Serum     Lyme Disease Ab with reflex to WB Serum   7. Primary osteoarthritis of left shoulder M19.012    8. Choledocholithiasis K80.50 CBC with platelets     Basic metabolic panel     Basic metabolic panel     CBC with platelets   9. Preoperative examination Z01.818          ASSESSEMNT AND PLAN:  Patient is cleared for ERCP with stent removal on August 26, 2019 at [facility]  [place].  There is a plan in the  future for cholecystectomy.  She is having new pain at the left shoulder which is secondary to advance degenerative arthritis she has elevated sed rate and CRP however this may be secondary to gallbladder disease as well.  She is taking diclofenac tablet twice daily.  Recommend applying ice.  If not calming then would recommend that she be evaluated by orthopedics for further treatment plan.  She will avoid aspirin 1 week prior to surgery.  She will discuss with her surgeon if taking the diclofenac prior to surgery is appropriate.    Greater than 40 minutes were spent in counseling andcoordination of care.    Mikayla Gtz NP..................8/23/2019 9:40 AM

## 2019-08-23 NOTE — NURSING NOTE
"Chief Complaint   Patient presents with     Pre-Op Exam       Medication Reconciliation: complete  Jane Alejandro LPN  ..................8/23/2019   9:06 AM   Date of Surgery: 8-26-19  Surgeon: Dr. Thomas  Type of Surgery: stent removal  [facility]ospital[facility]    Fever/Chills or other infectious symptoms in past month: no  >10lb weight loss in past two months: yes      Health Care Directive/Code status:  yes  Hx of bloodtransfusions:   no   Td up to date:  7-24-91  History of VRE/MRSA:  no    Preoperative Evaluation: Obstructive Sleep Apnea screening    S:Snore -  Do you snore loudly? (louder than talking or loud enough to be heard through closed doors)no  T: Tired - Do you often feel tired, fatigued, or sleepy during the daytime?no  O: Observed - Has anyone ever observed you stop breathing during your sleep?no  P: Pressure - Do you have or areyou being treated for high blood pressure?no  B: BMI - BMI greater than 35kg/m2?yes 37.2  A: Age - Age over 50 years old?no  N: Neck - Neck circumferencegreater than 40 cm?no 36.83  G:Gender - Gender: Male?no    Totalnumber of \"YES\" responses:  1    Scoring: Low risk of JENNIFER 0-2  At Risk of JENNIFER: >3 High Risk ofOSA: 5-8    Jane Alejandro LPN LPN....................  8/23/2019   9:06 AM    "

## 2019-08-25 ENCOUNTER — OFFICE VISIT (OUTPATIENT)
Dept: FAMILY MEDICINE | Facility: OTHER | Age: 44
End: 2019-08-25
Attending: NURSE PRACTITIONER
Payer: MEDICARE

## 2019-08-25 ENCOUNTER — ANESTHESIA EVENT (OUTPATIENT)
Dept: SURGERY | Facility: CLINIC | Age: 44
End: 2019-08-25
Payer: MEDICARE

## 2019-08-25 VITALS
RESPIRATION RATE: 17 BRPM | TEMPERATURE: 100.2 F | OXYGEN SATURATION: 93 % | HEIGHT: 57 IN | HEART RATE: 96 BPM | SYSTOLIC BLOOD PRESSURE: 104 MMHG | WEIGHT: 148 LBS | DIASTOLIC BLOOD PRESSURE: 62 MMHG | BODY MASS INDEX: 31.93 KG/M2

## 2019-08-25 DIAGNOSIS — R50.9 FEVER IN ADULT: Primary | ICD-10-CM

## 2019-08-25 DIAGNOSIS — M25.561 ACUTE PAIN OF RIGHT KNEE: ICD-10-CM

## 2019-08-25 PROCEDURE — G0463 HOSPITAL OUTPT CLINIC VISIT: HCPCS

## 2019-08-25 PROCEDURE — 99213 OFFICE O/P EST LOW 20 MIN: CPT | Performed by: NURSE PRACTITIONER

## 2019-08-25 ASSESSMENT — ENCOUNTER SYMPTOMS
ABDOMINAL PAIN: 0
FREQUENCY: 0
FEVER: 1
NEUROLOGICAL NEGATIVE: 1
GASTROINTESTINAL NEGATIVE: 1
DYSURIA: 0
ARTHRALGIAS: 1

## 2019-08-25 ASSESSMENT — PAIN SCALES - GENERAL: PAINLEVEL: EXTREME PAIN (8)

## 2019-08-25 ASSESSMENT — MIFFLIN-ST. JEOR: SCORE: 1195.2

## 2019-08-25 NOTE — PROGRESS NOTES
SUBJECTIVE:   Reg Valiente is a 44 year old female who presents to clinic today for the following health issues:    EVENS Finney is a 44-year-old female with a history of Down syndrome who presents with her parents as she is c/o of right knee pain x 2-3 days and is is refusing to walk on her right leg and they are using a wheelchair to get her around.  Caregivers report there is been no injury.  Taking Tylenol for symptoms.  She has a history of bilateral osteoarthritis of both knees.  Note that she is now not wanting to drink fluids as she does not want to go to the bathroom because she does not want to walk on the leg.  Today patient is fairly fixated on her weight loss, reporting she has normally weighed 210 lbs and is now at 148.  PCP is assessing this.  Discussing her eating habits with her staff in the group home would likely be beneficial to determine the cause of the weight loss.  Patient was recently evaluated by PCP for her H&P for her upcoming procedure tomorrow where she is to have an ERCP and have the stent removed from her pancreas.  Caregivers are also concerned with her vital signs today, her temp upon rooming is 100.2.  They reported that when they picked her up from the group home the staff had noted she had a fever as well.  This is concerning due to her upcoming surgery, they were to bring her back in should she develop a fever but this is difficult for them as its traveling quite a distance.  They have called the [facility] for direction on what they should do but have not received a call back yet.  Today they are requesting a lab work to  assess the status of her stent and for infection.      Patient Active Problem List    Diagnosis Date Noted     Post-ERCP acute pancreatitis 07/01/2019     Priority: Medium     Choledocholithiasis 06/25/2019     Priority: Medium     Down's syndrome 08/14/2018     Priority: Medium     DOROTEO (generalized anxiety disorder) 08/14/2018     Priority: Medium      Menstrual disorder 08/14/2018     Priority: Medium     Primary osteoarthritis of both knees 08/14/2018     Priority: Medium     Gastroesophageal reflux disease without esophagitis 08/14/2018     Priority: Medium     Other specified hypothyroidism 08/14/2018     Priority: Medium     Dermatitis 08/14/2018     Priority: Medium     Encounter for screening mammogram for breast cancer 08/14/2018     Priority: Medium     Morbid obesity (H) 08/14/2018     Priority: Medium     JENNIFER (obstructive sleep apnea) 08/14/2018     Priority: Medium     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     Mom reports negative AAS evaluation in the 1990's     Corneal ulcer 2004    caused by leaking of CPAP     Down's syndrome      Gastroesophageal reflux disease      JENNIFER (obstructive sleep apnea)     patient refuses CPAP      Past Surgical History:   Procedure Laterality Date     ENDOSCOPIC RETROGRADE CHOLANGIOPANCREATOGRAM N/A 7/1/2019    Procedure: Endoscopic Retrograde Cholangiopancreatogram, dilation, stent , sludge and stone removal, sphincterotomy;  Surgeon: Guru Harper Alvarado MD;  Location: [facility] OR     ENDOSCOPIC RETROGRADE CHOLANGIOPANCREATOGRAPHY, EXCHANGE TUBE/STENT N/A 7/24/2019    Procedure: Endoscopic Retrograde Cholangiopancreatography with Billary Stone Extraction,Dilation             and stent exchange;  Surgeon: Guru Harper Alvarado MD;  Location: [facility] OR     NO HISTORY OF SURGERY      as reported by parents     Family History   Problem Relation Age of Onset     Asthma Mother      Arthritis Mother      LUNG DISEASE Father         pulmonary fibrosis secondary to chemical exposure     Diabetes Father      Cerebrovascular Disease Father 90     Cerebrovascular Disease Maternal Grandmother 90     Heart Failure Paternal Grandfather         uncertain which  replaced     Coronary Artery  "Disease No family hx of      Heart Disease No family hx of      Bleeding Diathesis No family hx of      Pancreatic Cancer No family hx of      Colorectal Cancer No family hx of      Social History     Tobacco Use     Smoking status: Never Smoker     Smokeless tobacco: Never Used   Substance Use Topics     Alcohol use: No     Social History     Social History Narrative    Moving to [facility] home June 20, 2019     Current Outpatient Medications   Medication Sig Dispense Refill     citalopram (CELEXA) 20 MG tablet TAKE 1 TABLET BY MOUTH EVERY DAY 90 tablet 3     diclofenac (VOLTAREN) 50 MG EC tablet Take 1 tablet (50 mg) by mouth 2 times daily 60 tablet 3     levothyroxine (SYNTHROID/LEVOTHROID) 175 MCG tablet TAKE 1 TABLET BY MOUTH DAILY 90 tablet 3     Multiple vitamin TABS Take 1 tablet by mouth daily 30 tablet 11     norgestim-eth estrad triphasic (TRINESSA, 28,) 0.18/0.215/0.25 MG-35 MCG tablet Take 1 tablet by mouth daily 84 tablet 3     ranitidine (ZANTAC) 150 MG tablet TAKE 1 TABLET BY MOUTH TWICE DAILY 60 tablet 11     ursodiol (ACTIGALL) 300 MG capsule Take 1 capsule (300 mg) by mouth 2 times daily With food 60 capsule 11     Allergies   Allergen Reactions     Sulfa Drugs        Review of Systems   Constitutional: Positive for fever.   Gastrointestinal: Negative.  Negative for abdominal pain.   Genitourinary: Negative for dysuria, frequency and urgency.   Musculoskeletal: Positive for arthralgias.   Skin: Negative.    Neurological: Negative.         OBJECTIVE:     /62   Pulse 96   Temp 100.2  F (37.9  C) (Tympanic)   Resp 17   Ht 1.448 m (4' 9\")   Wt 67.1 kg (148 lb)   SpO2 93%   BMI 32.03 kg/m    Body mass index is 32.03 kg/m .  Physical Exam   Constitutional: She appears well-developed and well-nourished. No distress.   HENT:   Head: Normocephalic.   Eyes: Conjunctivae are normal. No scleral icterus.   Pulmonary/Chest: Effort normal.   Musculoskeletal:   Point tenderness to medial aspect of " right knee, no ecchymosis or erythema. Difficult to assess knee due to body habitus and refusal to stand or lift the leg.   Neurological: She is alert.   Skin: Skin is warm and dry. She is not diaphoretic.   Psychiatric: She has a normal mood and affect.   Nursing note and vitals reviewed.      Diagnostic Test Results:  none     ASSESSMENT/PLAN:       ICD-10-CM    1. Fever in adult R50.9 CANCELED: CBC with platelets differential     CANCELED: CRP inflammation   2. Acute pain of right knee M25.561 CANCELED: XR Knee Right 3 Views     PLAN:   Orders were placed for right knee x-ray and lab work to assess her fever.  While parents were waiting, they received a call and have decided to drive down to the [place] to have fever evaluated. They will follow up with PCP or ortho for her knee.  Patient is discharged under the care of her parents as they plan to leave now for the [facility].  Disclaimer:  This note consists of words and symbols derived from keyboarding, dictation, or using voice recognition software. As a result, there may be errors in the script that have gone undetected. Please consider this when interpreting information found in this note.      EB Lomas, NP-C  8/25/2019 at 3:01 PM  [facility]

## 2019-08-25 NOTE — NURSING NOTE
"Chief Complaint   Patient presents with     Musculoskeletal Problem     right knee   patient is here for pain in her right knee. Patient states that it hurts real bad and is a reoccurring problem. Patient has tried taking tylenol with some relief. Patient has had no surgeries on it and has had steriod injections before.     Initial /62   Pulse 96   Temp 100.2  F (37.9  C) (Tympanic)   Resp 17   Ht 1.448 m (4' 9\")   Wt 67.1 kg (148 lb)   SpO2 93%   BMI 32.03 kg/m   Estimated body mass index is 32.03 kg/m  as calculated from the following:    Height as of this encounter: 1.448 m (4' 9\").    Weight as of this encounter: 67.1 kg (148 lb).  Medication Reconciliation: complete    Susy Eisenberg LPN  "

## 2019-08-26 ENCOUNTER — TELEPHONE (OUTPATIENT)
Dept: GASTROENTEROLOGY | Facility: CLINIC | Age: 44
End: 2019-08-26

## 2019-08-26 ENCOUNTER — APPOINTMENT (OUTPATIENT)
Dept: GENERAL RADIOLOGY | Facility: CLINIC | Age: 44
End: 2019-08-26
Attending: INTERNAL MEDICINE
Payer: MEDICARE

## 2019-08-26 ENCOUNTER — ANESTHESIA (OUTPATIENT)
Dept: SURGERY | Facility: CLINIC | Age: 44
End: 2019-08-26
Payer: MEDICARE

## 2019-08-26 ENCOUNTER — HOSPITAL ENCOUNTER (OUTPATIENT)
Facility: CLINIC | Age: 44
Discharge: NURSING, REHAB OR LONG-TERM CARE | End: 2019-08-26
Attending: INTERNAL MEDICINE | Admitting: INTERNAL MEDICINE
Payer: MEDICARE

## 2019-08-26 VITALS
BODY MASS INDEX: 33.06 KG/M2 | HEIGHT: 57 IN | TEMPERATURE: 98.4 F | SYSTOLIC BLOOD PRESSURE: 101 MMHG | DIASTOLIC BLOOD PRESSURE: 61 MMHG | RESPIRATION RATE: 18 BRPM | HEART RATE: 85 BPM | OXYGEN SATURATION: 98 % | WEIGHT: 153.22 LBS

## 2019-08-26 LAB
ALBUMIN SERPL-MCNC: 2.4 G/DL (ref 3.4–5)
ALP SERPL-CCNC: 132 U/L (ref 40–150)
ALT SERPL W P-5'-P-CCNC: 39 U/L (ref 0–50)
AMYLASE SERPL-CCNC: 20 U/L (ref 30–110)
ANION GAP SERPL CALCULATED.3IONS-SCNC: 8 MMOL/L (ref 3–14)
AST SERPL W P-5'-P-CCNC: 16 U/L (ref 0–45)
B BURGDOR IGG+IGM SER QL: 0.2 (ref 0–0.89)
BILIRUB SERPL-MCNC: 0.6 MG/DL (ref 0.2–1.3)
BUN SERPL-MCNC: 12 MG/DL (ref 7–30)
CALCIUM SERPL-MCNC: 9.5 MG/DL (ref 8.5–10.1)
CHLORIDE SERPL-SCNC: 100 MMOL/L (ref 94–109)
CO2 SERPL-SCNC: 30 MMOL/L (ref 20–32)
CREAT SERPL-MCNC: 0.62 MG/DL (ref 0.52–1.04)
ERCP: NORMAL
ERYTHROCYTE [DISTWIDTH] IN BLOOD BY AUTOMATED COUNT: 13.8 % (ref 10–15)
GFR SERPL CREATININE-BSD FRML MDRD: >90 ML/MIN/{1.73_M2}
GLUCOSE BLDC GLUCOMTR-MCNC: 105 MG/DL (ref 70–99)
GLUCOSE SERPL-MCNC: 104 MG/DL (ref 70–99)
HCG SERPL QL: POSITIVE
HCT VFR BLD AUTO: 37.7 % (ref 35–47)
HGB BLD-MCNC: 12.2 G/DL (ref 11.7–15.7)
LIPASE SERPL-CCNC: 54 U/L (ref 73–393)
MCH RBC QN AUTO: 31.9 PG (ref 26.5–33)
MCHC RBC AUTO-ENTMCNC: 32.4 G/DL (ref 31.5–36.5)
MCV RBC AUTO: 98 FL (ref 78–100)
PLATELET # BLD AUTO: 337 10E9/L (ref 150–450)
POTASSIUM SERPL-SCNC: 3.2 MMOL/L (ref 3.4–5.3)
PROT SERPL-MCNC: 8 G/DL (ref 6.8–8.8)
RBC # BLD AUTO: 3.83 10E12/L (ref 3.8–5.2)
SODIUM SERPL-SCNC: 138 MMOL/L (ref 133–144)
WBC # BLD AUTO: 19.2 10E9/L (ref 4–11)

## 2019-08-26 PROCEDURE — 80053 COMPREHEN METABOLIC PANEL: CPT | Performed by: INTERNAL MEDICINE

## 2019-08-26 PROCEDURE — C1769 GUIDE WIRE: HCPCS | Performed by: INTERNAL MEDICINE

## 2019-08-26 PROCEDURE — 83690 ASSAY OF LIPASE: CPT | Performed by: INTERNAL MEDICINE

## 2019-08-26 PROCEDURE — 84703 CHORIONIC GONADOTROPIN ASSAY: CPT | Performed by: ANESTHESIOLOGY

## 2019-08-26 PROCEDURE — 85027 COMPLETE CBC AUTOMATED: CPT | Performed by: INTERNAL MEDICINE

## 2019-08-26 PROCEDURE — 25800030 ZZH RX IP 258 OP 636: Performed by: NURSE ANESTHETIST, CERTIFIED REGISTERED

## 2019-08-26 PROCEDURE — 25000125 ZZHC RX 250: Performed by: NURSE ANESTHETIST, CERTIFIED REGISTERED

## 2019-08-26 PROCEDURE — 37000009 ZZH ANESTHESIA TECHNICAL FEE, EACH ADDTL 15 MIN: Performed by: INTERNAL MEDICINE

## 2019-08-26 PROCEDURE — 36000055 ZZH SURGERY LEVEL 2 W FLUORO 1ST 30 MIN - UMMC: Performed by: INTERNAL MEDICINE

## 2019-08-26 PROCEDURE — C1726 CATH, BAL DIL, NON-VASCULAR: HCPCS | Performed by: INTERNAL MEDICINE

## 2019-08-26 PROCEDURE — 36000053 ZZH SURGERY LEVEL 2 EA 15 ADDTL MIN - UMMC: Performed by: INTERNAL MEDICINE

## 2019-08-26 PROCEDURE — 71000027 ZZH RECOVERY PHASE 2 EACH 15 MINS: Performed by: INTERNAL MEDICINE

## 2019-08-26 PROCEDURE — 40000170 ZZH STATISTIC PRE-PROCEDURE ASSESSMENT II: Performed by: INTERNAL MEDICINE

## 2019-08-26 PROCEDURE — 37000008 ZZH ANESTHESIA TECHNICAL FEE, 1ST 30 MIN: Performed by: INTERNAL MEDICINE

## 2019-08-26 PROCEDURE — 40000279 XR SURGERY CARM FLUORO GREATER THAN 5 MIN W STILLS: Mod: TC

## 2019-08-26 PROCEDURE — 25000128 H RX IP 250 OP 636: Performed by: NURSE ANESTHETIST, CERTIFIED REGISTERED

## 2019-08-26 PROCEDURE — 27210794 ZZH OR GENERAL SUPPLY STERILE: Performed by: INTERNAL MEDICINE

## 2019-08-26 PROCEDURE — 25000125 ZZHC RX 250: Performed by: INTERNAL MEDICINE

## 2019-08-26 PROCEDURE — 71000014 ZZH RECOVERY PHASE 1 LEVEL 2 FIRST HR: Performed by: INTERNAL MEDICINE

## 2019-08-26 PROCEDURE — 36415 COLL VENOUS BLD VENIPUNCTURE: CPT | Performed by: INTERNAL MEDICINE

## 2019-08-26 PROCEDURE — 82150 ASSAY OF AMYLASE: CPT | Performed by: INTERNAL MEDICINE

## 2019-08-26 PROCEDURE — 25000565 ZZH ISOFLURANE, EA 15 MIN: Performed by: INTERNAL MEDICINE

## 2019-08-26 PROCEDURE — 82962 GLUCOSE BLOOD TEST: CPT

## 2019-08-26 PROCEDURE — 25500064 ZZH RX 255 OP 636: Performed by: INTERNAL MEDICINE

## 2019-08-26 PROCEDURE — 71000015 ZZH RECOVERY PHASE 1 LEVEL 2 EA ADDTL HR: Performed by: INTERNAL MEDICINE

## 2019-08-26 RX ORDER — IOPAMIDOL 510 MG/ML
INJECTION, SOLUTION INTRAVASCULAR PRN
Status: DISCONTINUED | OUTPATIENT
Start: 2019-08-26 | End: 2019-08-26 | Stop reason: HOSPADM

## 2019-08-26 RX ORDER — NALOXONE HYDROCHLORIDE 0.4 MG/ML
.1-.4 INJECTION, SOLUTION INTRAMUSCULAR; INTRAVENOUS; SUBCUTANEOUS
Status: CANCELLED | OUTPATIENT
Start: 2019-08-26 | End: 2019-08-27

## 2019-08-26 RX ORDER — LIDOCAINE 40 MG/G
CREAM TOPICAL
Status: DISCONTINUED | OUTPATIENT
Start: 2019-08-26 | End: 2019-08-26 | Stop reason: HOSPADM

## 2019-08-26 RX ORDER — MEPERIDINE HYDROCHLORIDE 25 MG/ML
12.5 INJECTION INTRAMUSCULAR; INTRAVENOUS; SUBCUTANEOUS
Status: DISCONTINUED | OUTPATIENT
Start: 2019-08-26 | End: 2019-08-26 | Stop reason: HOSPADM

## 2019-08-26 RX ORDER — LIDOCAINE HYDROCHLORIDE 20 MG/ML
INJECTION, SOLUTION INFILTRATION; PERINEURAL PRN
Status: DISCONTINUED | OUTPATIENT
Start: 2019-08-26 | End: 2019-08-26

## 2019-08-26 RX ORDER — INDOMETHACIN 50 MG/1
100 SUPPOSITORY RECTAL
Status: COMPLETED | OUTPATIENT
Start: 2019-08-26 | End: 2019-08-26

## 2019-08-26 RX ORDER — FENTANYL CITRATE 50 UG/ML
INJECTION, SOLUTION INTRAMUSCULAR; INTRAVENOUS PRN
Status: DISCONTINUED | OUTPATIENT
Start: 2019-08-26 | End: 2019-08-26

## 2019-08-26 RX ORDER — FLUMAZENIL 0.1 MG/ML
0.2 INJECTION, SOLUTION INTRAVENOUS
Status: CANCELLED | OUTPATIENT
Start: 2019-08-26 | End: 2019-08-27

## 2019-08-26 RX ORDER — ONDANSETRON 2 MG/ML
INJECTION INTRAMUSCULAR; INTRAVENOUS PRN
Status: DISCONTINUED | OUTPATIENT
Start: 2019-08-26 | End: 2019-08-26

## 2019-08-26 RX ORDER — SODIUM CHLORIDE, SODIUM LACTATE, POTASSIUM CHLORIDE, CALCIUM CHLORIDE 600; 310; 30; 20 MG/100ML; MG/100ML; MG/100ML; MG/100ML
INJECTION, SOLUTION INTRAVENOUS CONTINUOUS PRN
Status: DISCONTINUED | OUTPATIENT
Start: 2019-08-26 | End: 2019-08-26

## 2019-08-26 RX ORDER — SODIUM CHLORIDE, SODIUM LACTATE, POTASSIUM CHLORIDE, CALCIUM CHLORIDE 600; 310; 30; 20 MG/100ML; MG/100ML; MG/100ML; MG/100ML
INJECTION, SOLUTION INTRAVENOUS CONTINUOUS
Status: DISCONTINUED | OUTPATIENT
Start: 2019-08-26 | End: 2019-08-26 | Stop reason: HOSPADM

## 2019-08-26 RX ORDER — LEVOFLOXACIN 5 MG/ML
INJECTION, SOLUTION INTRAVENOUS PRN
Status: DISCONTINUED | OUTPATIENT
Start: 2019-08-26 | End: 2019-08-26

## 2019-08-26 RX ORDER — PROPOFOL 10 MG/ML
INJECTION, EMULSION INTRAVENOUS PRN
Status: DISCONTINUED | OUTPATIENT
Start: 2019-08-26 | End: 2019-08-26

## 2019-08-26 RX ORDER — NALOXONE HYDROCHLORIDE 0.4 MG/ML
.1-.4 INJECTION, SOLUTION INTRAMUSCULAR; INTRAVENOUS; SUBCUTANEOUS
Status: DISCONTINUED | OUTPATIENT
Start: 2019-08-26 | End: 2019-08-26 | Stop reason: HOSPADM

## 2019-08-26 RX ORDER — ONDANSETRON 2 MG/ML
4 INJECTION INTRAMUSCULAR; INTRAVENOUS EVERY 30 MIN PRN
Status: DISCONTINUED | OUTPATIENT
Start: 2019-08-26 | End: 2019-08-26 | Stop reason: HOSPADM

## 2019-08-26 RX ORDER — ONDANSETRON 4 MG/1
4 TABLET, ORALLY DISINTEGRATING ORAL EVERY 30 MIN PRN
Status: DISCONTINUED | OUTPATIENT
Start: 2019-08-26 | End: 2019-08-26 | Stop reason: HOSPADM

## 2019-08-26 RX ADMIN — PHENYLEPHRINE HYDROCHLORIDE 100 MCG: 10 INJECTION INTRAVENOUS at 14:12

## 2019-08-26 RX ADMIN — PHENYLEPHRINE HYDROCHLORIDE 100 MCG: 10 INJECTION INTRAVENOUS at 14:22

## 2019-08-26 RX ADMIN — LIDOCAINE HYDROCHLORIDE 60 MG: 20 INJECTION, SOLUTION INFILTRATION; PERINEURAL at 13:33

## 2019-08-26 RX ADMIN — ROCURONIUM BROMIDE 50 MG: 10 INJECTION INTRAVENOUS at 13:33

## 2019-08-26 RX ADMIN — FENTANYL CITRATE 50 MCG: 50 INJECTION, SOLUTION INTRAMUSCULAR; INTRAVENOUS at 13:33

## 2019-08-26 RX ADMIN — SODIUM CHLORIDE, POTASSIUM CHLORIDE, SODIUM LACTATE AND CALCIUM CHLORIDE: 600; 310; 30; 20 INJECTION, SOLUTION INTRAVENOUS at 13:22

## 2019-08-26 RX ADMIN — PHENYLEPHRINE HYDROCHLORIDE 100 MCG: 10 INJECTION INTRAVENOUS at 13:52

## 2019-08-26 RX ADMIN — LEVOFLOXACIN 500 MG: 5 INJECTION, SOLUTION INTRAVENOUS at 13:59

## 2019-08-26 RX ADMIN — PHENYLEPHRINE HYDROCHLORIDE 100 MCG: 10 INJECTION INTRAVENOUS at 14:09

## 2019-08-26 RX ADMIN — PROPOFOL 150 MG: 10 INJECTION, EMULSION INTRAVENOUS at 13:33

## 2019-08-26 RX ADMIN — PHENYLEPHRINE HYDROCHLORIDE 100 MCG: 10 INJECTION INTRAVENOUS at 13:55

## 2019-08-26 RX ADMIN — PHENYLEPHRINE HYDROCHLORIDE 100 MCG: 10 INJECTION INTRAVENOUS at 14:04

## 2019-08-26 RX ADMIN — ONDANSETRON 4 MG: 2 INJECTION INTRAMUSCULAR; INTRAVENOUS at 14:30

## 2019-08-26 RX ADMIN — SUGAMMADEX 150 MG: 100 INJECTION, SOLUTION INTRAVENOUS at 14:30

## 2019-08-26 ASSESSMENT — MIFFLIN-ST. JEOR: SCORE: 1218.88

## 2019-08-26 NOTE — ANESTHESIA CARE TRANSFER NOTE
Patient: Reg Valiente    Procedure(s):  Endoscopic Retrograde Cholangiopancreatogram with biliary stent removal and stone removal    Diagnosis: Common Bile Duct Stone  Diagnosis Additional Information: No value filed.    Anesthesia Type:   General     Note:  Airway :Blow-by  Patient transferred to:PACU  Handoff Report: Identifed the Patient, Identified the Reponsible Provider, Reviewed the pertinent medical history, Discussed the surgical course, Reviewed Intra-OP anesthesia mangement and issues during anesthesia, Set expectations for post-procedure period and Allowed opportunity for questions and acknowledgement of understanding      Vitals: (Last set prior to Anesthesia Care Transfer)    CRNA VITALS  8/26/2019 1407 - 8/26/2019 1450      8/26/2019             Resp Rate (observed):  4  (Abnormal)                 Electronically Signed By: EB Kwong CRNA  August 26, 2019  2:50 PM

## 2019-08-26 NOTE — TELEPHONE ENCOUNTER
CITALOPRAM 20MG TABLETS   Last Written Prescription Date: 6/12/2019  Last Fill Quantity:90,   # refills: 3  Last Office Visit: 8/19/2019  Future Office visit:       Routing refill request to provider for review/approval because:  Drug interaction between Diclofenac and Citalopram  Unable to complete prescription refill per RNMedication Refill Policy.................... Madelyn Sosa RN ....................  8/26/2019   3:52 PM

## 2019-08-26 NOTE — OR NURSING
1638 patient arrived to phase 2 via  Cart with NST , patient dressed and placed in wheelchair, assessment completed , no distress noted , Dr Alvarado at bedside and spoke with parents , 1720 patient left for discharge via wheelchair with NST and parents at side. MF

## 2019-08-26 NOTE — DISCHARGE INSTRUCTIONS
[facility]  Same-Day Surgery   Adult Discharge Orders & Instructions     For 24 hours after surgery    1. Get plenty of rest.  A responsible adult must stay with you for at least 24 hours after you leave the hospital.   2. Do not drive or use heavy equipment.  If you have weakness or tingling, don't drive or use heavy equipment until this feeling goes away.  3. Do not drink alcohol.  4. Avoid strenuous or risky activities.  Ask for help when climbing stairs.   5. You may feel lightheaded.  IF so, sit for a few minutes before standing.  Have someone help you get up.   6. If you have nausea (feel sick to your stomach): Drink only clear liquids such as apple juice, ginger ale, broth or 7-Up.  Rest may also help.  Be sure to drink enough fluids.  Move to a regular diet as you feel able.  7. You may have a slight fever. Call the doctor if your fever is over 100 F (37.7 C) (taken under the tongue) or lasts longer than 24 hours.  8. You may have a dry mouth, a sore throat, muscle aches or trouble sleeping.  These should go away after 24 hours.  9. Do not make important or legal decisions.   Call your doctor for any of the followin.  Signs of infection (fever, growing tenderness at the surgery site, a large amount of drainage or bleeding, severe pain, foul-smelling drainage, redness, swelling).    2. It has been over 8 to 10 hours since surgery and you are still not able to urinate (pass water).      To contact a doctor, call Dr. Guru Alvarado @ [phone] or:    X   [phone] and ask for the resident on call for GI   (answered 24 hours a day)  X   Emergency Department:    [facility]: [phone]       (TTY for hearing impaired: [phone])

## 2019-08-26 NOTE — ANESTHESIA PREPROCEDURE EVALUATION
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-up in      Mom reports negative AAS evaluation in the      Corneal ulcer 2004    caused by leaking of CPAP     Down's syndrome      Gastroesophageal reflux disease      JENNIFER (obstructive sleep apnea)     patient refuses CPAP      Past Surgical History:   Procedure Laterality Date     ENDOSCOPIC RETROGRADE CHOLANGIOPANCREATOGRAM N/A 2019    Procedure: Endoscopic Retrograde Cholangiopancreatogram, dilation, stent , sludge and stone removal, sphincterotomy;  Surgeon: Guru Harper Alvarado MD;  Location: [facility] OR     ENDOSCOPIC RETROGRADE CHOLANGIOPANCREATOGRAPHY, EXCHANGE TUBE/STENT N/A 2019    Procedure: Endoscopic Retrograde Cholangiopancreatography with Billary Stone Extraction,Dilation             and stent exchange;  Surgeon: Guru Harper Alvarado MD;  Location: [facility] OR     NO HISTORY OF SURGERY      as reported by parents          Anesthesia Evaluation     . Pt has had prior anesthetic. Type: General    History of anesthetic complications   - PONV  Review PACU notes from 19      ROS/MED HX    ENT/Pulmonary:     (+)sleep apnea, , . .    Neurologic: Comment: Down syndrome    (+)Developmental delay      Cardiovascular:  - neg cardiovascular ROS   (+) ----. : . . . :. . Previous cardiac testing date:results:date: results:ECG reviewed date:2019 results:NSR date: results:          METS/Exercise Tolerance:     Hematologic:  - neg hematologic  ROS       Musculoskeletal:  - neg musculoskeletal ROS       GI/Hepatic:     (+) GERD Asymptomatic on medication, Other GI/Hepatic  Choledocholithiasis s/p biliary stents      Renal/Genitourinary:  - ROS Renal section negative       Endo:     (+) thyroid problem hypothyroidism, Obesity, .      Psychiatric:  - neg psychiatric ROS       Infectious Disease:  - neg infectious disease ROS       Malignancy:      - no malignancy   Other:    - neg other ROS                     PHYSICAL EXAM:   Mental Status/Neuro: A/A/O   Airway: Facies: Feasible  Mallampati: II  Mouth/Opening: Limited  TM distance: < 6 cm  Neck ROM: Not Assessed   Respiratory: Auscultation: CTAB     Resp. Rate: Normal     Resp. Effort: Normal      CV: Rhythm: Regular  Rate: Age appropriate  Heart: Normal Sounds  Edema: None   Comments:      Dental: Normal Dentition                LABS:  CBC:   Lab Results   Component Value Date    WBC 19.2 (H) 08/26/2019    WBC 11.7 (H) 08/23/2019    HGB 12.2 08/26/2019    HGB 13.2 08/23/2019    HCT 37.7 08/26/2019    HCT 39.7 08/23/2019     08/26/2019     08/23/2019     BMP:   Lab Results   Component Value Date     08/23/2019     07/24/2019    POTASSIUM 3.9 08/23/2019    POTASSIUM 3.7 07/24/2019    CHLORIDE 99 08/23/2019    CHLORIDE 106 07/24/2019    CO2 30 08/23/2019    CO2 27 07/24/2019    BUN 10 08/23/2019    BUN 12 07/24/2019    CR 0.85 08/23/2019    CR 0.75 07/24/2019     (H) 08/23/2019    GLC 97 07/24/2019     COAGS: No results found for: PTT, INR, FIBR  POC:   Lab Results   Component Value Date     (H) 08/26/2019    HCG Negative 05/18/2019    HCGS (A) 07/24/2019     Unsatisfactory specimen - collected in wrong container     OTHER:   Lab Results   Component Value Date    FANG 9.5 08/23/2019    ALBUMIN 3.4 (L) 08/19/2019    PROTTOTAL 7.0 08/19/2019    ALT 55 (H) 08/19/2019    AST 42 (H) 08/19/2019    ALKPHOS 238 (H) 08/19/2019    BILITOTAL 0.5 08/19/2019    LIPASE 110 07/24/2019    AMYLASE 39 07/24/2019    T4 1.32 08/23/2019    CRP 10.2 (H) 08/23/2019    SED 79 (H) 08/23/2019        Preop Vitals    BP  "Readings from Last 3 Encounters:   08/26/19 109/89   08/25/19 104/62   08/23/19 102/62    Pulse Readings from Last 3 Encounters:   08/26/19 79   08/25/19 96   08/23/19 81      Resp Readings from Last 3 Encounters:   08/26/19 18   08/25/19 17   08/23/19 16    SpO2 Readings from Last 3 Encounters:   08/26/19 99%   08/25/19 93%   08/23/19 95%      Temp Readings from Last 1 Encounters:   08/26/19 36.7  C (98  F) (Oral)    Ht Readings from Last 1 Encounters:   08/26/19 1.448 m (4' 9\")      Wt Readings from Last 1 Encounters:   08/26/19 69.5 kg (153 lb 3.5 oz)    Estimated body mass index is 33.16 kg/m  as calculated from the following:    Height as of this encounter: 1.448 m (4' 9\").    Weight as of this encounter: 69.5 kg (153 lb 3.5 oz).     LDA:        Assessment:   ASA SCORE: 3    H&P: History and physical reviewed and following examination; no interval change.   Smoking Status:  Non-Smoker/Unknown   NPO Status: NPO Appropriate     Plan:   Anes. Type:  General   Pre-Medication: None   Induction:  IV (Standard)   Airway: ETT; Oral   Access/Monitoring: PIV   Maintenance: Balanced     Postop Plan:   Postop Pain: Opioids  Postop Sedation/Airway: Not planned  Disposition: Outpatient     PONV Management:   Adult Risk Factors: Female, Non-Smoker, Postop Opioids   Prevention: Ondansetron, Dexamethasone, Dopamine Antagonist     CONSENT: Direct conversation   Plan and risks discussed with: Parents; Patient   Blood Products: Consent Deferred (Minimal Blood Loss)                   Santhosh Weber DO  "

## 2019-08-26 NOTE — TELEPHONE ENCOUNTER
Spoke to both parents and they do not want to cancel the procedure. They are on way to hospital right now.     NATANA[address] Paredes Dr., Dr. Torres, & Dr. Alvarado  [facility]  [phone]

## 2019-08-26 NOTE — ANESTHESIA POSTPROCEDURE EVALUATION
Anesthesia POST Procedure Evaluation    Patient: Reg Valiente   MRN:     [id number] Gender:   female   Age:    44 year old :      1975        Preoperative Diagnosis: Common Bile Duct Stone   Procedure(s):  Endoscopic Retrograde Cholangiopancreatogram with biliary stent removal and stone removal   Postop Comments: No value filed.       Anesthesia Type:  Not documented  General    Reportable Event: NO     PAIN: Uncomplicated   Sign Out status: Comfortable, Well controlled pain     PONV: No PONV   Sign Out status:  No Nausea or Vomiting     Neuro/Psych: Uneventful perioperative course   Sign Out Status: Preoperative baseline; Age appropriate mentation     Airway/Resp.: Uneventful perioperative course   Sign Out Status: Non labored breathing, age appropriate RR; Resp. Status within EXPECTED Parameters     CV: Uneventful perioperative course   Sign Out status: Appropriate BP and perfusion indices; Appropriate HR/Rhythm     Disposition:   Sign Out in:  PACU  Disposition:  Phase II; Home  Recovery Course: Uneventful  Follow-Up: Not required           Last Anesthesia Record Vitals:  CRNA VITALS  2019 1407 - 2019 1507      2019             Resp Rate (observed):  4  (Abnormal)           Last PACU Vitals:  Vitals Value Taken Time   /67 2019  4:00 PM   Temp 36.6  C (97.9  F) 2019  3:15 PM   Pulse 92 2019  4:00 PM   Resp 20 2019  4:00 PM   SpO2 94 % 2019  4:03 PM   Temp src     NIBP     Pulse     SpO2     Resp     Temp     Ht Rate     Temp 2     Vitals shown include unvalidated device data.      Electronically Signed By: Santhosh Weber DO, 2019, 4:04 PM

## 2019-08-26 NOTE — TELEPHONE ENCOUNTER
[facility] Call Center    Phone Message    May a detailed message be left on voicemail: yes    Reason for Call: Other: Pts father called and LVM requesting for pt's ERCP to be cancelled today, 8/26. Thanks!     Action Taken: Message routed to:  [facility] ([facility]): Eliana

## 2019-08-27 ENCOUNTER — PATIENT OUTREACH (OUTPATIENT)
Dept: GASTROENTEROLOGY | Facility: CLINIC | Age: 44
End: 2019-08-27

## 2019-08-27 RX ORDER — CITALOPRAM HYDROBROMIDE 20 MG/1
TABLET ORAL
Qty: 90 TABLET | Refills: 3 | Status: SHIPPED | OUTPATIENT
Start: 2019-08-27 | End: 2020-07-06

## 2019-08-27 NOTE — PROGRESS NOTES
Received a call from Christiano BALLARD from the group home where pt resides. Asking if there is any follow up needed for pt and any changes in medications. Discussed with Dr. Sher for further instructions and or follow up.   Called Christiano with the following up  No stents  All stones removed  Pt has a name of surgeon in [place] as Dr. Sher is recommending that pt have her gallbladder removed.   That parents, sister of pt and pt had this discussion with provider. Pt needs to have cholecystectomy  in 1 to 2 months as high chance of developing stones.  No change in meds  No activity restrictions.    Discussed reasons for pt to go to emergency room  Increase nausea and vomiting  Increase  abdominal or upper right quadrant pain  Abdomen distended   RN given the off number to contact if issues after normal business hours.

## 2019-08-28 ENCOUNTER — OFFICE VISIT (OUTPATIENT)
Dept: INTERNAL MEDICINE | Facility: OTHER | Age: 44
End: 2019-08-28
Attending: NURSE PRACTITIONER
Payer: MEDICARE

## 2019-08-28 ENCOUNTER — HOSPITAL ENCOUNTER (OUTPATIENT)
Dept: GENERAL RADIOLOGY | Facility: OTHER | Age: 44
Discharge: HOME | End: 2019-08-28
Attending: NURSE PRACTITIONER | Admitting: NURSE PRACTITIONER
Payer: MEDICARE

## 2019-08-28 VITALS
SYSTOLIC BLOOD PRESSURE: 100 MMHG | TEMPERATURE: 96.4 F | DIASTOLIC BLOOD PRESSURE: 60 MMHG | HEART RATE: 54 BPM | RESPIRATION RATE: 16 BRPM | OXYGEN SATURATION: 96 %

## 2019-08-28 DIAGNOSIS — M25.571 ACUTE RIGHT ANKLE PAIN: ICD-10-CM

## 2019-08-28 DIAGNOSIS — M25.571 ACUTE RIGHT ANKLE PAIN: Primary | ICD-10-CM

## 2019-08-28 DIAGNOSIS — S93.401A SPRAIN OF RIGHT ANKLE, UNSPECIFIED LIGAMENT, INITIAL ENCOUNTER: ICD-10-CM

## 2019-08-28 DIAGNOSIS — K80.50 CHOLEDOCHOLITHIASIS: ICD-10-CM

## 2019-08-28 PROCEDURE — 73610 X-RAY EXAM OF ANKLE: CPT | Mod: RT

## 2019-08-28 PROCEDURE — G0463 HOSPITAL OUTPT CLINIC VISIT: HCPCS

## 2019-08-28 PROCEDURE — G0463 HOSPITAL OUTPT CLINIC VISIT: HCPCS | Mod: 25

## 2019-08-28 PROCEDURE — 99214 OFFICE O/P EST MOD 30 MIN: CPT | Performed by: NURSE PRACTITIONER

## 2019-08-28 NOTE — NURSING NOTE
Chief Complaint   Patient presents with     difficulty walking       Medication Reconciliation: complete  Jane Alejandro LPN  ..................8/28/2019   10:33 AM

## 2019-08-28 NOTE — PROGRESS NOTES
Subjective:  She is here today for pain at the right ankle which is been occurring for the past 4 days.  She has Down syndrome.  She tells me that she (slept wrong).  Her parents thought maybe it was her right knee bothering her again as she has severe arthritis.  She is morbidly obese.  She is not wanting to walk on the foot.  It is not been red or swollen.  There is pain when she moves the right ankle inward.  No fever or chills.  She did have ERCP with stents from the gallbladder removed on Monday.  She was discharged yesterday and is done well.  No nausea or vomiting.  She needs cholecystectomy and they would like referral to local surgeon it is difficult to get her back and forth to the [facility] for these appointments.    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     Mom reports negative AAS evaluation in the 1990's     Corneal ulcer 2004    caused by leaking of CPAP     Down's syndrome      Gastroesophageal reflux disease      JENNIFER (obstructive sleep apnea)     patient refuses CPAP     Past Surgical History:   Procedure Laterality Date     ENDOSCOPIC RETROGRADE CHOLANGIOPANCREATOGRAM N/A 7/1/2019    Procedure: Endoscopic Retrograde Cholangiopancreatogram, dilation, stent , sludge and stone removal, sphincterotomy;  Surgeon: Guru Harper Alvarado MD;  Location: [facility] OR     ENDOSCOPIC RETROGRADE CHOLANGIOPANCREATOGRAM N/A 8/26/2019    Procedure:  Endoscopic Retrograde Cholangiopancreatogram with biliary stent removal and stone removal;  Surgeon: Guru Harper Alvarado MD;  Location: [facility] OR     ENDOSCOPIC RETROGRADE CHOLANGIOPANCREATOGRAPHY, EXCHANGE TUBE/STENT N/A 7/24/2019    Procedure: Endoscopic Retrograde Cholangiopancreatography with Billary Stone Extraction,Dilation             and stent exchange;  Surgeon: Guru Harper Alvarado MD;  Location: [facility] OR     NO HISTORY OF SURGERY      as reported by parents     Social History     Socioeconomic History     Marital status: Single     Spouse name: Not on file     Number of children: Not on file     Years of education: Not on file     Highest education level: Not on file   Occupational History     Not on file   Social Needs     Financial resource strain: Not on file     Food insecurity:     Worry: Not on file     Inability: Not on file     Transportation needs:     Medical: Not on file     Non-medical: Not on file   Tobacco Use     Smoking status: Never Smoker     Smokeless tobacco: Never Used   Substance and Sexual Activity     Alcohol use: No     Drug use: No     Sexual activity: Not Currently     Birth control/protection: Pill   Lifestyle     Physical activity:     Days per week: Not on file     Minutes per session: Not on file     Stress: Not on file   Relationships     Social connections:     Talks on phone: Not on file     Gets together: Not on file     Attends Judaism service: Not on file     Active member of club or organization: Not on file     Attends meetings of clubs or organizations: Not on file     Relationship status: Not on file     Intimate partner violence:     Fear of current or ex partner: Not on file     Emotionally abused: Not on file     Physically abused: Not on file     Forced sexual activity: Not on file   Other Topics Concern     Parent/sibling w/ CABG, MI or angioplasty before 65F 55M? Not Asked   Social History Narrative    Moving to [facility]  June 20, 2019     Family History   Problem Relation Age of Onset     Asthma Mother      Arthritis Mother      LUNG DISEASE Father         pulmonary fibrosis secondary to chemical exposure     Diabetes Father      Cerebrovascular Disease Father 90     Cerebrovascular Disease Maternal Grandmother 90     Heart Failure Paternal Grandfather         uncertain which  replaced     Coronary Artery Disease No family hx of      Heart Disease No family hx of      Bleeding Diathesis No family hx of      Pancreatic Cancer No family hx of      Colorectal Cancer No family hx of      Current Outpatient Medications   Medication Sig Dispense Refill     citalopram (CELEXA) 20 MG tablet TAKE 1 TABLET BY MOUTH DAILY 90 tablet 3     citalopram (CELEXA) 20 MG tablet TAKE 1 TABLET BY MOUTH EVERY DAY 90 tablet 3     diclofenac (VOLTAREN) 50 MG EC tablet Take 1 tablet (50 mg) by mouth 2 times daily 60 tablet 3     levothyroxine (SYNTHROID/LEVOTHROID) 175 MCG tablet TAKE 1 TABLET BY MOUTH DAILY 90 tablet 3     Multiple vitamin TABS Take 1 tablet by mouth daily 30 tablet 11     norgestim-eth estrad triphasic (TRINESSA, 28,) 0.18/0.215/0.25 MG-35 MCG tablet Take 1 tablet by mouth daily 84 tablet 3     ranitidine (ZANTAC) 150 MG tablet TAKE 1 TABLET BY MOUTH TWICE DAILY 60 tablet 11     ursodiol (ACTIGALL) 300 MG capsule Take 1 capsule (300 mg) by mouth 2 times daily With food 60 capsule 11     Sulfa drugs      Review of Systems:  Review of Systems  See HPI  Objective:   /60 (BP Location: Right arm, Patient Position: Sitting, Cuff Size: Adult Regular)   Pulse 54   Temp 96.4  F (35.8  C) (Tympanic)   Resp 16   SpO2 96%   Physical Exam  Pleasant female with Down syndrome.  Morbidly obese.  Alert and pleasant.  Accompanied by both of her parents.  Skin color pink.  Mucous memories moist.  Lung fields clear to auscultation.  Cardiovascular.  Right knee with crepitation but no pain with flexion or extension.  Mild right ankle swelling.   No erythema or warmth.  No edema of the right lower extremity. she has mild to moderate discomfort with ankle inversion and eversion but normal plantar and dorsiflexion.  No erythema or warmth.  DPPT intact.  Capillary refill less than 3 seconds.  No deep calf tenderness.  Right ankle x-ray:  Moderate arthritic changes with no acute fractures identified, mild soft tissue swelling of the right ankle.  Assessment:    ICD-10-CM    1. Acute right ankle pain M25.571 XR Ankle Right G/E 3 Views   2. Choledocholithiasis K80.50        Plan:   1.  Patient is treated with a soft ankle splint to prevent inversion and eversion of the right ankle.  Up as tolerated for ambulation.  She does take diclofenac.  Follow-up in 2-3 weeks if not resolved, sooner if getting worse.  2.  She is referred to general surgeon for evaluation to discuss cholecystectomy.    JAYASHREE Dorman   8/28/2019  10:49 AM

## 2019-09-03 ENCOUNTER — OFFICE VISIT (OUTPATIENT)
Dept: SURGERY | Facility: OTHER | Age: 44
End: 2019-09-03
Attending: NURSE PRACTITIONER
Payer: MEDICARE

## 2019-09-03 VITALS
HEIGHT: 57 IN | TEMPERATURE: 98.1 F | WEIGHT: 143.4 LBS | SYSTOLIC BLOOD PRESSURE: 104 MMHG | BODY MASS INDEX: 30.94 KG/M2 | DIASTOLIC BLOOD PRESSURE: 60 MMHG | HEART RATE: 66 BPM

## 2019-09-03 DIAGNOSIS — K80.50 CHOLEDOCHOLITHIASIS: ICD-10-CM

## 2019-09-03 PROCEDURE — 99213 OFFICE O/P EST LOW 20 MIN: CPT | Performed by: SURGERY

## 2019-09-03 PROCEDURE — G0463 HOSPITAL OUTPT CLINIC VISIT: HCPCS

## 2019-09-03 ASSESSMENT — PAIN SCALES - GENERAL: PAINLEVEL: NO PAIN (0)

## 2019-09-03 ASSESSMENT — MIFFLIN-ST. JEOR: SCORE: 1174.34

## 2019-09-03 NOTE — NURSING NOTE
"Chief Complaint   Patient presents with     Consult     gall stones       Initial /60 (BP Location: Right arm, Patient Position: Sitting, Cuff Size: Adult Regular)   Pulse 66   Temp 98.1  F (36.7  C) (Temporal)   Wt 65 kg (143 lb 6.4 oz)   Breastfeeding? No   BMI 31.03 kg/m   Estimated body mass index is 31.03 kg/m  as calculated from the following:    Height as of 8/26/19: 1.448 m (4' 9\").    Weight as of this encounter: 65 kg (143 lb 6.4 oz).  Medication Reconciliation: complete    Selena Ashley LPN  "

## 2019-09-03 NOTE — H&P (VIEW-ONLY)
OFFICE CONSULTATION NOTE  Patient Name: Reg Valiente  Address: [address] [facility] [address]  Age:44 year old  Sex: female     Primary Care Physician: Mikayla Gtz    I was requested to see this patient in consultation by Mikayla Gtz for evaluation of hx of choledocholithiasis. A copy of this note will be sent to Mikayla Gtz.    HPI:   The patient is 44 year old female with episodes nausea and vomiting that lead to workup and discovery of choledocholithiasis. These have been going on for since June. The patient was seen 6/11.  An US was ordered and low fat diet was recommended.   The diarrhea has improve. Pt did not have difficulty with any of the ERCPs. The US showed stones and common duct dilation with stones with  no pericholecystic fluid or wall thickening. The common duct was 10mm.    REVIEW OF SYSTEMS  GENERAL: No fevers or chills. Denies fatigue, recent weight loss.  HEENT: No sinus drainage. No changes with vision or hearing. No difficulty swallowing.   LYMPHATICS:  No swollen nodes inaxilla, neck or groin.  CARDIOVASCULAR: Denies chest pain, palpitations and dyspnea on exertion.  PULMONARY: No shortness of breath or cough. No increase in sputum production.  GI: Denies melena, bright red blood instools. No hematemesis. No constipation or diarrhea.  : No dysuria or hematuria.  SKIN: No recent rashes or ulcers.   HEMATOLOGY:  No history of easy bruising or bleeding.  ENDOCRINE:  No history of diabetes orthyroid problems.  NEUROLOGY:  No history of seizures or headaches. No motor or sensory changes.    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     Mom reports negative AAS evaluation in the 1990's     Corneal ulcer 2004    caused by leaking of CPAP     Down's syndrome      Gastroesophageal reflux disease      JENNIFER  (obstructive sleep apnea)     patient refuses CPAP       PAST SURGICAL HISTORY    Past Surgical History:   Procedure Laterality Date     ENDOSCOPIC RETROGRADE CHOLANGIOPANCREATOGRAM N/A 7/1/2019    Procedure: Endoscopic Retrograde Cholangiopancreatogram, dilation, stent , sludge and stone removal, sphincterotomy;  Surgeon: Guru Harper Alvarado MD;  Location: [facility] OR     ENDOSCOPIC RETROGRADE CHOLANGIOPANCREATOGRAM N/A 8/26/2019    Procedure: Endoscopic Retrograde Cholangiopancreatogram with biliary stent removal and stone removal;  Surgeon: Guru Harper Alvarado MD;  Location: [facility] OR     ENDOSCOPIC RETROGRADE CHOLANGIOPANCREATOGRAPHY, EXCHANGE TUBE/STENT N/A 7/24/2019    Procedure: Endoscopic Retrograde Cholangiopancreatography with Billary Stone Extraction,Dilation             and stent exchange;  Surgeon: Guru Harper Alvarado MD;  Location: [facility] OR     NO HISTORY OF SURGERY      as reported by parents       CURRENT MEDS    Current Outpatient Medications on File Prior to Visit:  citalopram (CELEXA) 20 MG tablet TAKE 1 TABLET BY MOUTH DAILY   citalopram (CELEXA) 20 MG tablet TAKE 1 TABLET BY MOUTH EVERY DAY   diclofenac (VOLTAREN) 50 MG EC tablet Take 1 tablet (50 mg) by mouth 2 times daily   levothyroxine (SYNTHROID/LEVOTHROID) 175 MCG tablet TAKE 1 TABLET BY MOUTH DAILY   Multiple vitamin TABS Take 1 tablet by mouth daily   norgestim-eth estrad triphasic (TRINESSA, 28,) 0.18/0.215/0.25 MG-35 MCG tablet Take 1 tablet by mouth daily   ranitidine (ZANTAC) 150 MG tablet TAKE 1 TABLET BY MOUTH TWICE DAILY   ursodiol (ACTIGALL) 300 MG capsule Take 1 capsule (300 mg) by mouth 2 times daily With food     No current facility-administered medications on file prior to visit.   ALLERGIES/SENSITIVITIES  Allergies   Allergen Reactions     Sulfa Drugs      FAMILY HISTORY    Family History   Problem Relation Age of Onset     Asthma Mother      Arthritis Mother      LUNG DISEASE Father          pulmonary fibrosis secondary to chemical exposure     Diabetes Father      Cerebrovascular Disease Father 90     Cerebrovascular Disease Maternal Grandmother 90     Heart Failure Paternal Grandfather         uncertain which  replaced     Coronary Artery Disease No family hx of      Heart Disease No family hx of      Bleeding Diathesis No family hx of      Pancreatic Cancer No family hx of      Colorectal Cancer No family hx of        SOCIAL HISTORY  Social History     Socioeconomic History     Marital status: Single     Spouse name: Not on file     Number of children: Not on file     Years of education: Not on file     Highest education level: Not on file   Occupational History     Not on file   Social Needs     Financial resource strain: Not on file     Food insecurity:     Worry: Not on file     Inability: Not on file     Transportation needs:     Medical: Not on file     Non-medical: Not on file   Tobacco Use     Smoking status: Never Smoker     Smokeless tobacco: Never Used   Substance and Sexual Activity     Alcohol use: No     Drug use: No     Sexual activity: Not Currently     Birth control/protection: Pill   Lifestyle     Physical activity:     Days per week: Not on file     Minutes per session: Not on file     Stress: Not on file   Relationships     Social connections:     Talks on phone: Not on file     Gets together: Not on file     Attends Ep[employer]alian service: Not on file     Active member of club or organization: Not on file     Attends meetings of clubs or organizations: Not on file     Relationship status: Not on file     Intimate partner violence:     Fear of current or ex partner: Not on file     Emotionally abused: Not on file     Physically abused: Not on file     Forced sexual activity: Not on file   Other Topics Concern     Parent/sibling w/ CABG, MI or angioplasty before 65F 55M? Not Asked   Social History Narrative    Moving to [facility] June 20, 2019     The above history was  "reviewed 9/3/2019.  PHYSICAL EXAM  /60 (BP Location: Right arm, Patient Position: Sitting, Cuff Size: Adult Regular)   Pulse 66   Temp 98.1  F (36.7  C) (Temporal)   Ht 1.448 m (4' 9\")   Wt 65 kg (143 lb 6.4 oz)   Breastfeeding? No   BMI 31.03 kg/m      GENERAL: Healthy appearing patient in no acute distress.   HEENT: Pt has small facial features consistent with down syndrome.  Eyes-no scleral icterus, pupils equal, round, and reactive to light. Nose-no nasal drainage. No lesions. Mouth-oral mucosapink and moist, no lesions.  NECK: Supple. No thyroid nodules. Trachea midline.  LYMPHATICS:  No cervical, axillary or supraclavicular adenopathy.  CV: Regular rate and rhythm, no murmurs. No peripheral edema.  LUNGS:  No respiratory distress. Clear bilaterally to auscultation.  ABDOMEN: Non distended. Bowel sounds active. Soft, non-tender, no hepatosplenomegaly or hernias. No peritoneal signs.  SKIN: Pink, warm and dry. Nojaundice. No rash.  NEURO:  Cranial nerves II-XII grossly intact. Alert and oriented.  PSYCH: Appropriate mood and affect.      IMAGING/LAB  I personally reviewed patient's labs and X-rays    CONSULTATION ASSESSMENT AND PLAN/RECOMMENDATIONS:   I discussed with the patient the pathophysiology of gallbladder disease and gallstones with the patient. We specifically discussed the treament of choledocholithiasis with laproscopic cholecystectomy. I explained the risks, benefits and alternatives to surgical treatment including the specific risks of infection, bleeding, injury to the common bile duct and other abdominal organs, post cholecystectomy diarrhea, post op bile leak, and continued abdominal pain. We discussed the possible need for open surgery. The patient expressed understanding and wishes to proceed. Informed consent paperwork was completed.    This will be scheduled at a time that is convenient for the patient. The patient will call with questions or concerns prior to the surgery. The " patient denies questions at this time      Arvind Bailey MD on 9/3/2019 at 1:09 PM

## 2019-09-03 NOTE — PROGRESS NOTES
OFFICE CONSULTATION NOTE  Patient Name: Reg Valiente  Address: [address] [facility] [address]  Age:44 year old  Sex: female     Primary Care Physician: Mikayla Gtz    I was requested to see this patient in consultation by Mikayla Gtz for evaluation of hx of choledocholithiasis. A copy of this note will be sent to Mikayla Gtz.    HPI:   The patient is 44 year old female with episodes nausea and vomiting that lead to workup and discovery of choledocholithiasis. These have been going on for since June. The patient was seen 6/11.  An US was ordered and low fat diet was recommended.   The diarrhea has improve. Pt did not have difficulty with any of the ERCPs. The US showed stones and common duct dilation with stones with  no pericholecystic fluid or wall thickening. The common duct was 10mm.    REVIEW OF SYSTEMS  GENERAL: No fevers or chills. Denies fatigue, recent weight loss.  HEENT: No sinus drainage. No changes with vision or hearing. No difficulty swallowing.   LYMPHATICS:  No swollen nodes inaxilla, neck or groin.  CARDIOVASCULAR: Denies chest pain, palpitations and dyspnea on exertion.  PULMONARY: No shortness of breath or cough. No increase in sputum production.  GI: Denies melena, bright red blood instools. No hematemesis. No constipation or diarrhea.  : No dysuria or hematuria.  SKIN: No recent rashes or ulcers.   HEMATOLOGY:  No history of easy bruising or bleeding.  ENDOCRINE:  No history of diabetes orthyroid problems.  NEUROLOGY:  No history of seizures or headaches. No motor or sensory changes.    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     Mom reports negative AAS evaluation in the 1990's     Corneal ulcer 2004    caused by leaking of CPAP     Down's syndrome      Gastroesophageal reflux disease      JENNIFER  (obstructive sleep apnea)     patient refuses CPAP       PAST SURGICAL HISTORY    Past Surgical History:   Procedure Laterality Date     ENDOSCOPIC RETROGRADE CHOLANGIOPANCREATOGRAM N/A 7/1/2019    Procedure: Endoscopic Retrograde Cholangiopancreatogram, dilation, stent , sludge and stone removal, sphincterotomy;  Surgeon: Guru Harper Alvarado MD;  Location: [facility]     ENDOSCOPIC RETROGRADE CHOLANGIOPANCREATOGRAM N/A 8/26/2019    Procedure: Endoscopic Retrograde Cholangiopancreatogram with biliary stent removal and stone removal;  Surgeon: Guru Harper Alvarado MD;  Location: [facility]     ENDOSCOPIC RETROGRADE CHOLANGIOPANCREATOGRAPHY, EXCHANGE TUBE/STENT N/A 7/24/2019    Procedure: Endoscopic Retrograde Cholangiopancreatography with Billary Stone Extraction,Dilation             and stent exchange;  Surgeon: Guru Harper Alvarado MD;  Location: [facility] OR     NO HISTORY OF SURGERY      as reported by parents       CURRENT MEDS    Current Outpatient Medications on File Prior to Visit:  citalopram (CELEXA) 20 MG tablet TAKE 1 TABLET BY MOUTH DAILY   citalopram (CELEXA) 20 MG tablet TAKE 1 TABLET BY MOUTH EVERY DAY   diclofenac (VOLTAREN) 50 MG EC tablet Take 1 tablet (50 mg) by mouth 2 times daily   levothyroxine (SYNTHROID/LEVOTHROID) 175 MCG tablet TAKE 1 TABLET BY MOUTH DAILY   Multiple vitamin TABS Take 1 tablet by mouth daily   norgestim-eth estrad triphasic (TRINESSA, 28,) 0.18/0.215/0.25 MG-35 MCG tablet Take 1 tablet by mouth daily   ranitidine (ZANTAC) 150 MG tablet TAKE 1 TABLET BY MOUTH TWICE DAILY   ursodiol (ACTIGALL) 300 MG capsule Take 1 capsule (300 mg) by mouth 2 times daily With food     No current facility-administered medications on file prior to visit.   ALLERGIES/SENSITIVITIES  Allergies   Allergen Reactions     Sulfa Drugs      FAMILY HISTORY    Family History   Problem Relation Age of Onset     Asthma Mother      Arthritis Mother      LUNG DISEASE Father          pulmonary fibrosis secondary to chemical exposure     Diabetes Father      Cerebrovascular Disease Father 90     Cerebrovascular Disease Maternal Grandmother 90     Heart Failure Paternal Grandfather         uncertain which  replaced     Coronary Artery Disease No family hx of      Heart Disease No family hx of      Bleeding Diathesis No family hx of      Pancreatic Cancer No family hx of      Colorectal Cancer No family hx of        SOCIAL HISTORY  Social History     Socioeconomic History     Marital status: Single     Spouse name: Not on file     Number of children: Not on file     Years of education: Not on file     Highest education level: Not on file   Occupational History     Not on file   Social Needs     Financial resource strain: Not on file     Food insecurity:     Worry: Not on file     Inability: Not on file     Transportation needs:     Medical: Not on file     Non-medical: Not on file   Tobacco Use     Smoking status: Never Smoker     Smokeless tobacco: Never Used   Substance and Sexual Activity     Alcohol use: No     Drug use: No     Sexual activity: Not Currently     Birth control/protection: Pill   Lifestyle     Physical activity:     Days per week: Not on file     Minutes per session: Not on file     Stress: Not on file   Relationships     Social connections:     Talks on phone: Not on file     Gets together: Not on file     Attends Pentecostal service: Not on file     Active member of club or organization: Not on file     Attends meetings of clubs or organizations: Not on file     Relationship status: Not on file     Intimate partner violence:     Fear of current or ex partner: Not on file     Emotionally abused: Not on file     Physically abused: Not on file     Forced sexual activity: Not on file   Other Topics Concern     Parent/sibling w/ CABG, MI or angioplasty before 65F 55M? Not Asked   Social History Narrative    Moving to [facility] June 20, 2019     The above history was  "reviewed 9/3/2019.  PHYSICAL EXAM  /60 (BP Location: Right arm, Patient Position: Sitting, Cuff Size: Adult Regular)   Pulse 66   Temp 98.1  F (36.7  C) (Temporal)   Ht 1.448 m (4' 9\")   Wt 65 kg (143 lb 6.4 oz)   Breastfeeding? No   BMI 31.03 kg/m      GENERAL: Healthy appearing patient in no acute distress.   HEENT: Pt has small facial features consistent with down syndrome.  Eyes-no scleral icterus, pupils equal, round, and reactive to light. Nose-no nasal drainage. No lesions. Mouth-oral mucosapink and moist, no lesions.  NECK: Supple. No thyroid nodules. Trachea midline.  LYMPHATICS:  No cervical, axillary or supraclavicular adenopathy.  CV: Regular rate and rhythm, no murmurs. No peripheral edema.  LUNGS:  No respiratory distress. Clear bilaterally to auscultation.  ABDOMEN: Non distended. Bowel sounds active. Soft, non-tender, no hepatosplenomegaly or hernias. No peritoneal signs.  SKIN: Pink, warm and dry. Nojaundice. No rash.  NEURO:  Cranial nerves II-XII grossly intact. Alert and oriented.  PSYCH: Appropriate mood and affect.      IMAGING/LAB  I personally reviewed patient's labs and X-rays    CONSULTATION ASSESSMENT AND PLAN/RECOMMENDATIONS:   I discussed with the patient the pathophysiology of gallbladder disease and gallstones with the patient. We specifically discussed the treament of choledocholithiasis with laproscopic cholecystectomy. I explained the risks, benefits and alternatives to surgical treatment including the specific risks of infection, bleeding, injury to the common bile duct and other abdominal organs, post cholecystectomy diarrhea, post op bile leak, and continued abdominal pain. We discussed the possible need for open surgery. The patient expressed understanding and wishes to proceed. Informed consent paperwork was completed.    This will be scheduled at a time that is convenient for the patient. The patient will call with questions or concerns prior to the surgery. The " patient denies questions at this time      Arvind Bailey MD on 9/3/2019 at 1:09 PM

## 2019-09-04 ENCOUNTER — CARE COORDINATION (OUTPATIENT)
Dept: GASTROENTEROLOGY | Facility: CLINIC | Age: 44
End: 2019-09-04

## 2019-09-04 NOTE — PROGRESS NOTES
Post ERCP (8/26/19) with Dr. Alvarado: Follow-up     Post procedure recommendations: No further ERCP is warranted unless there is new cholangitis or worsening LFTs from biliary obstruction    - Will recommend cholecystectomy since the gall bladder was noted to be filled with multiple shadowing stones and bile duct stone is likely to recur unless she has     cholecystectomy. Father wants to pursue this locally as [place] and have multiple difficulties in pursuing further care at [facility]. Recommended him to reach out to his primary provider for a surgical   referral at [place] which is relatively closer and more accessible     Orders placed: None at this time      See note from Elena Jeter.      [address]NA[address] Paredes Dr., Dr. Torres, & Dr. Alvarado  [facility]  [phone]

## 2019-09-05 ENCOUNTER — DOCUMENTATION ONLY (OUTPATIENT)
Dept: OTHER | Facility: CLINIC | Age: 44
End: 2019-09-05

## 2019-09-06 ENCOUNTER — ANESTHESIA EVENT (OUTPATIENT)
Dept: SURGERY | Facility: OTHER | Age: 44
End: 2019-09-06
Payer: MEDICARE

## 2019-09-09 ENCOUNTER — ANESTHESIA (OUTPATIENT)
Dept: SURGERY | Facility: OTHER | Age: 44
End: 2019-09-09
Payer: MEDICARE

## 2019-09-09 ENCOUNTER — HOSPITAL ENCOUNTER (OUTPATIENT)
Facility: OTHER | Age: 44
Discharge: HOME | End: 2019-09-09
Attending: SURGERY | Admitting: SURGERY
Payer: MEDICARE

## 2019-09-09 ENCOUNTER — HOSPITAL ENCOUNTER (OUTPATIENT)
Dept: GENERAL RADIOLOGY | Facility: OTHER | Age: 44
End: 2019-09-09
Attending: SURGERY | Admitting: SURGERY
Payer: MEDICARE

## 2019-09-09 VITALS
TEMPERATURE: 97.3 F | RESPIRATION RATE: 18 BRPM | HEART RATE: 77 BPM | OXYGEN SATURATION: 94 % | DIASTOLIC BLOOD PRESSURE: 64 MMHG | SYSTOLIC BLOOD PRESSURE: 112 MMHG

## 2019-09-09 DIAGNOSIS — Z76.89 ENCOUNTER FOR CHOLECYSTECTOMY: ICD-10-CM

## 2019-09-09 DIAGNOSIS — Z90.49 S/P LAPAROSCOPIC CHOLECYSTECTOMY: Primary | ICD-10-CM

## 2019-09-09 LAB — B-HCG SERPL-ACNC: 1 IU/L

## 2019-09-09 PROCEDURE — 37000009 ZZH ANESTHESIA TECHNICAL FEE, EACH ADDTL 15 MIN: Performed by: SURGERY

## 2019-09-09 PROCEDURE — 25000128 H RX IP 250 OP 636: Performed by: SURGERY

## 2019-09-09 PROCEDURE — 25800025 ZZH RX 258: Performed by: SURGERY

## 2019-09-09 PROCEDURE — 36000063 ZZH SURGERY LEVEL 4 EA 15 ADDTL MIN: Performed by: SURGERY

## 2019-09-09 PROCEDURE — 84702 CHORIONIC GONADOTROPIN TEST: CPT | Mod: GZ | Performed by: SURGERY

## 2019-09-09 PROCEDURE — 25800030 ZZH RX IP 258 OP 636: Performed by: NURSE ANESTHETIST, CERTIFIED REGISTERED

## 2019-09-09 PROCEDURE — 47563 LAPARO CHOLECYSTECTOMY/GRAPH: CPT | Performed by: SURGERY

## 2019-09-09 PROCEDURE — 25000128 H RX IP 250 OP 636: Performed by: NURSE ANESTHETIST, CERTIFIED REGISTERED

## 2019-09-09 PROCEDURE — 37000008 ZZH ANESTHESIA TECHNICAL FEE, 1ST 30 MIN: Performed by: SURGERY

## 2019-09-09 PROCEDURE — 27210794 ZZH OR GENERAL SUPPLY STERILE: Performed by: SURGERY

## 2019-09-09 PROCEDURE — 47563 LAPARO CHOLECYSTECTOMY/GRAPH: CPT | Performed by: NURSE ANESTHETIST, CERTIFIED REGISTERED

## 2019-09-09 PROCEDURE — 25000566 ZZH SEVOFLURANE, EA 15 MIN: Performed by: SURGERY

## 2019-09-09 PROCEDURE — 25000125 ZZHC RX 250: Performed by: NURSE ANESTHETIST, CERTIFIED REGISTERED

## 2019-09-09 PROCEDURE — 88304 TISSUE EXAM BY PATHOLOGIST: CPT

## 2019-09-09 PROCEDURE — 40000306 ZZH STATISTIC PRE PROC ASSESS II: Performed by: SURGERY

## 2019-09-09 PROCEDURE — 71000014 ZZH RECOVERY PHASE 1 LEVEL 2 FIRST HR: Performed by: SURGERY

## 2019-09-09 PROCEDURE — 25500064 ZZH RX 255 OP 636: Performed by: SURGERY

## 2019-09-09 PROCEDURE — 36000093 ZZH SURGERY LEVEL 4 1ST 30 MIN: Performed by: SURGERY

## 2019-09-09 PROCEDURE — 71000027 ZZH RECOVERY PHASE 2 EACH 15 MINS: Performed by: SURGERY

## 2019-09-09 PROCEDURE — 25000125 ZZHC RX 250: Performed by: SURGERY

## 2019-09-09 PROCEDURE — 40000278 XR SURGERY CARM FLUORO LESS THAN 5 MIN

## 2019-09-09 RX ORDER — ONDANSETRON 2 MG/ML
4 INJECTION INTRAMUSCULAR; INTRAVENOUS EVERY 30 MIN PRN
Status: DISCONTINUED | OUTPATIENT
Start: 2019-09-09 | End: 2019-09-09 | Stop reason: HOSPADM

## 2019-09-09 RX ORDER — ATROPINE SULFATE 0.4 MG/ML
AMPUL (ML) INJECTION PRN
Status: DISCONTINUED | OUTPATIENT
Start: 2019-09-09 | End: 2019-09-09

## 2019-09-09 RX ORDER — AMOXICILLIN 250 MG
1 CAPSULE ORAL DAILY
COMMUNITY
Start: 2019-09-09 | End: 2019-09-12

## 2019-09-09 RX ORDER — CEFAZOLIN SODIUM 1 G/3ML
1 INJECTION, POWDER, FOR SOLUTION INTRAMUSCULAR; INTRAVENOUS SEE ADMIN INSTRUCTIONS
Status: DISCONTINUED | OUTPATIENT
Start: 2019-09-09 | End: 2019-09-09 | Stop reason: HOSPADM

## 2019-09-09 RX ORDER — ACYCLOVIR 200 MG/1
CAPSULE ORAL PRN
Status: DISCONTINUED | OUTPATIENT
Start: 2019-09-09 | End: 2019-09-09 | Stop reason: HOSPADM

## 2019-09-09 RX ORDER — KETAMINE HYDROCHLORIDE 50 MG/ML
INJECTION, SOLUTION INTRAMUSCULAR; INTRAVENOUS PRN
Status: DISCONTINUED | OUTPATIENT
Start: 2019-09-09 | End: 2019-09-09

## 2019-09-09 RX ORDER — EPHEDRINE SULFATE 50 MG/ML
INJECTION, SOLUTION INTRAVENOUS PRN
Status: DISCONTINUED | OUTPATIENT
Start: 2019-09-09 | End: 2019-09-09

## 2019-09-09 RX ORDER — ACETAMINOPHEN 10 MG/ML
INJECTION, SOLUTION INTRAVENOUS PRN
Status: DISCONTINUED | OUTPATIENT
Start: 2019-09-09 | End: 2019-09-09

## 2019-09-09 RX ORDER — OXYCODONE AND ACETAMINOPHEN 5; 325 MG/1; MG/1
1 TABLET ORAL EVERY 6 HOURS PRN
Qty: 12 TABLET | Refills: 0 | Status: SHIPPED | OUTPATIENT
Start: 2019-09-09 | End: 2019-09-24

## 2019-09-09 RX ORDER — LIDOCAINE HYDROCHLORIDE 20 MG/ML
INJECTION, SOLUTION INFILTRATION; PERINEURAL PRN
Status: DISCONTINUED | OUTPATIENT
Start: 2019-09-09 | End: 2019-09-09

## 2019-09-09 RX ORDER — DEXAMETHASONE SODIUM PHOSPHATE 4 MG/ML
INJECTION, SOLUTION INTRA-ARTICULAR; INTRALESIONAL; INTRAMUSCULAR; INTRAVENOUS; SOFT TISSUE PRN
Status: DISCONTINUED | OUTPATIENT
Start: 2019-09-09 | End: 2019-09-09

## 2019-09-09 RX ORDER — GLYCOPYRROLATE 0.2 MG/ML
INJECTION, SOLUTION INTRAMUSCULAR; INTRAVENOUS PRN
Status: DISCONTINUED | OUTPATIENT
Start: 2019-09-09 | End: 2019-09-09

## 2019-09-09 RX ORDER — ONDANSETRON 2 MG/ML
INJECTION INTRAMUSCULAR; INTRAVENOUS PRN
Status: DISCONTINUED | OUTPATIENT
Start: 2019-09-09 | End: 2019-09-09

## 2019-09-09 RX ORDER — BUPIVACAINE HYDROCHLORIDE AND EPINEPHRINE 5; 5 MG/ML; UG/ML
INJECTION, SOLUTION PERINEURAL PRN
Status: DISCONTINUED | OUTPATIENT
Start: 2019-09-09 | End: 2019-09-09 | Stop reason: HOSPADM

## 2019-09-09 RX ORDER — FENTANYL CITRATE 50 UG/ML
25-50 INJECTION, SOLUTION INTRAMUSCULAR; INTRAVENOUS EVERY 5 MIN PRN
Status: DISCONTINUED | OUTPATIENT
Start: 2019-09-09 | End: 2019-09-09 | Stop reason: HOSPADM

## 2019-09-09 RX ORDER — SODIUM CHLORIDE, SODIUM LACTATE, POTASSIUM CHLORIDE, CALCIUM CHLORIDE 600; 310; 30; 20 MG/100ML; MG/100ML; MG/100ML; MG/100ML
INJECTION, SOLUTION INTRAVENOUS CONTINUOUS
Status: DISCONTINUED | OUTPATIENT
Start: 2019-09-09 | End: 2019-09-09 | Stop reason: HOSPADM

## 2019-09-09 RX ORDER — PROPOFOL 10 MG/ML
INJECTION, EMULSION INTRAVENOUS CONTINUOUS PRN
Status: DISCONTINUED | OUTPATIENT
Start: 2019-09-09 | End: 2019-09-09

## 2019-09-09 RX ORDER — NEOSTIGMINE METHYLSULFATE 1 MG/ML
VIAL (ML) INJECTION PRN
Status: DISCONTINUED | OUTPATIENT
Start: 2019-09-09 | End: 2019-09-09

## 2019-09-09 RX ORDER — CEFAZOLIN SODIUM 2 G/100ML
2 INJECTION, SOLUTION INTRAVENOUS
Status: COMPLETED | OUTPATIENT
Start: 2019-09-09 | End: 2019-09-09

## 2019-09-09 RX ORDER — KETOROLAC TROMETHAMINE 30 MG/ML
INJECTION, SOLUTION INTRAMUSCULAR; INTRAVENOUS PRN
Status: DISCONTINUED | OUTPATIENT
Start: 2019-09-09 | End: 2019-09-09

## 2019-09-09 RX ORDER — FENTANYL CITRATE 50 UG/ML
INJECTION, SOLUTION INTRAMUSCULAR; INTRAVENOUS PRN
Status: DISCONTINUED | OUTPATIENT
Start: 2019-09-09 | End: 2019-09-09

## 2019-09-09 RX ORDER — PROPOFOL 10 MG/ML
INJECTION, EMULSION INTRAVENOUS PRN
Status: DISCONTINUED | OUTPATIENT
Start: 2019-09-09 | End: 2019-09-09

## 2019-09-09 RX ORDER — ONDANSETRON 4 MG/1
4 TABLET, ORALLY DISINTEGRATING ORAL EVERY 30 MIN PRN
Status: DISCONTINUED | OUTPATIENT
Start: 2019-09-09 | End: 2019-09-09 | Stop reason: HOSPADM

## 2019-09-09 RX ORDER — HYDROMORPHONE HYDROCHLORIDE 1 MG/ML
.3-.5 INJECTION, SOLUTION INTRAMUSCULAR; INTRAVENOUS; SUBCUTANEOUS EVERY 10 MIN PRN
Status: DISCONTINUED | OUTPATIENT
Start: 2019-09-09 | End: 2019-09-09 | Stop reason: HOSPADM

## 2019-09-09 RX ORDER — NALOXONE HYDROCHLORIDE 0.4 MG/ML
.1-.4 INJECTION, SOLUTION INTRAMUSCULAR; INTRAVENOUS; SUBCUTANEOUS
Status: DISCONTINUED | OUTPATIENT
Start: 2019-09-09 | End: 2019-09-09 | Stop reason: HOSPADM

## 2019-09-09 RX ORDER — MEPERIDINE HYDROCHLORIDE 50 MG/ML
12.5 INJECTION INTRAMUSCULAR; INTRAVENOUS; SUBCUTANEOUS
Status: DISCONTINUED | OUTPATIENT
Start: 2019-09-09 | End: 2019-09-09 | Stop reason: HOSPADM

## 2019-09-09 RX ORDER — LIDOCAINE 40 MG/G
CREAM TOPICAL
Status: DISCONTINUED | OUTPATIENT
Start: 2019-09-09 | End: 2019-09-09 | Stop reason: HOSPADM

## 2019-09-09 RX ADMIN — NEOSTIGMINE METHYLSULFATE 4 MG: 1 INJECTION INTRAVENOUS at 14:01

## 2019-09-09 RX ADMIN — KETAMINE HYDROCHLORIDE 25 MG: 50 INJECTION, SOLUTION INTRAMUSCULAR; INTRAVENOUS at 13:18

## 2019-09-09 RX ADMIN — ONDANSETRON HYDROCHLORIDE 4 MG: 2 INJECTION, SOLUTION INTRAMUSCULAR; INTRAVENOUS at 14:42

## 2019-09-09 RX ADMIN — MIDAZOLAM 1 MG: 1 INJECTION INTRAMUSCULAR; INTRAVENOUS at 12:56

## 2019-09-09 RX ADMIN — EPHEDRINE SULFATE 10 MG: 50 INJECTION, SOLUTION INTRAVENOUS at 13:10

## 2019-09-09 RX ADMIN — PROPOFOL 100 MCG/KG/MIN: 10 INJECTION, EMULSION INTRAVENOUS at 13:04

## 2019-09-09 RX ADMIN — EPHEDRINE SULFATE 10 MG: 50 INJECTION, SOLUTION INTRAVENOUS at 13:32

## 2019-09-09 RX ADMIN — ATROPINE SULFATE 0.1 MG: 0.4 INJECTION, SOLUTION INTRAMUSCULAR; INTRAVENOUS; SUBCUTANEOUS at 13:02

## 2019-09-09 RX ADMIN — LIDOCAINE HYDROCHLORIDE 80 MG: 20 INJECTION, SOLUTION INFILTRATION; PERINEURAL at 12:55

## 2019-09-09 RX ADMIN — SODIUM CHLORIDE, POTASSIUM CHLORIDE, SODIUM LACTATE AND CALCIUM CHLORIDE: 600; 310; 30; 20 INJECTION, SOLUTION INTRAVENOUS at 12:54

## 2019-09-09 RX ADMIN — FENTANYL CITRATE 25 MCG: 50 INJECTION, SOLUTION INTRAMUSCULAR; INTRAVENOUS at 14:08

## 2019-09-09 RX ADMIN — ACETAMINOPHEN 1000 MG: 10 INJECTION, SOLUTION INTRAVENOUS at 13:17

## 2019-09-09 RX ADMIN — FENTANYL CITRATE 25 MCG: 50 INJECTION, SOLUTION INTRAMUSCULAR; INTRAVENOUS at 13:46

## 2019-09-09 RX ADMIN — DEXAMETHASONE SODIUM PHOSPHATE 4 MG: 4 INJECTION, SOLUTION INTRA-ARTICULAR; INTRALESIONAL; INTRAMUSCULAR; INTRAVENOUS; SOFT TISSUE at 13:17

## 2019-09-09 RX ADMIN — ROCURONIUM BROMIDE 40 MG: 10 INJECTION INTRAVENOUS at 12:56

## 2019-09-09 RX ADMIN — ONDANSETRON 4 MG: 2 INJECTION INTRAMUSCULAR; INTRAVENOUS at 12:56

## 2019-09-09 RX ADMIN — FENTANYL CITRATE 25 MCG: 50 INJECTION, SOLUTION INTRAMUSCULAR; INTRAVENOUS at 14:05

## 2019-09-09 RX ADMIN — GLYCOPYRROLATE 0.6 MG: 0.2 INJECTION, SOLUTION INTRAMUSCULAR; INTRAVENOUS at 14:01

## 2019-09-09 RX ADMIN — PROPOFOL 140 MG: 10 INJECTION, EMULSION INTRAVENOUS at 12:56

## 2019-09-09 RX ADMIN — CEFAZOLIN SODIUM 2 G: 2 INJECTION, SOLUTION INTRAVENOUS at 13:01

## 2019-09-09 RX ADMIN — KETOROLAC TROMETHAMINE 30 MG: 30 INJECTION, SOLUTION INTRAMUSCULAR at 13:48

## 2019-09-09 RX ADMIN — FENTANYL CITRATE 25 MCG: 50 INJECTION, SOLUTION INTRAMUSCULAR; INTRAVENOUS at 13:18

## 2019-09-09 NOTE — OR NURSING
Patient has been discharged to home at 1600 via wheelchair accompanied by parents    Written discharge instructions were provided to parents.  Prescriptions were filled at [facility] pharmacy and picked up by patients dad. She denies any pain, nausea, or dizziness upon discharge.     Patient and adult caring for them verbalize understanding of discharge instructions including no driving until tomorrow and no longer taking narcotic pain medications - no operating mechanical equipment and no making any important decisions.They understand reason for discharge, and necessary follow-up appointments.      Diana Rooney RN

## 2019-09-09 NOTE — ANESTHESIA POSTPROCEDURE EVALUATION
Patient: Reg Valiente    Procedure(s):  CHOLECYSTECTOMY, LAPAROSCOPIC, WITH CHOLANGIOGRAM    Diagnosis:choloedocholitaia s/p ERCP  Diagnosis Additional Information: No value filed.    Anesthesia Type:  General, ETT    Note:  Anesthesia Post Evaluation    Patient location during evaluation: Phase 2  Patient participation: Able to fully participate in evaluation  Level of consciousness: awake and alert  Pain management: adequate  Airway patency: patent  Cardiovascular status: acceptable  Respiratory status: acceptable  Hydration status: acceptable  PONV: none     Anesthetic complications: None          Last vitals:  Vitals:    09/09/19 1515 09/09/19 1530 09/09/19 1545   BP: 100/66 115/61 112/64   Pulse: 74 79 77   Resp:      Temp:      SpO2: 95% 94%          Electronically Signed By: EB LAWLER CRNA  September 9, 2019  4:35 PM

## 2019-09-09 NOTE — ANESTHESIA CARE TRANSFER NOTE
Patient: Reg Valiente    Procedure(s):  CHOLECYSTECTOMY, LAPAROSCOPIC, WITH CHOLANGIOGRAM    Diagnosis: choloedocholitaia s/p ERCP  Diagnosis Additional Information: No value filed.    Anesthesia Type:   General, ETT     Note:  Airway :Face Mask  Patient transferred to:PACU  Handoff Report: Identifed the Patient, Identified the Reponsible Provider, Reviewed the pertinent medical history, Discussed the surgical course, Reviewed Intra-OP anesthesia mangement and issues during anesthesia, Set expectations for post-procedure period and Allowed opportunity for questions and acknowledgement of understanding      Vitals: (Last set prior to Anesthesia Care Transfer)    CRNA VITALS  9/9/2019 1350 - 9/9/2019 1423      9/9/2019             Resp Rate (observed):  1  (Abnormal)     Resp Rate (set):  10                Electronically Signed By: EB GARCIA CRNA  September 9, 2019  2:23 PM

## 2019-09-09 NOTE — INTERVAL H&P NOTE
Previous qualitative HCG was followed up with quantitative HCG that was 1 (negative range)    Arvind Bailey MD on 9/9/2019 at 12:42 PM

## 2019-09-09 NOTE — BRIEF OP NOTE
[facility]    Brief Operative Note    Pre-operative diagnosis: choloedocholitaia s/p ERCP  Post-operative diagnosis * No post-op diagnosis entered *  Procedure: Procedure(s):  CHOLECYSTECTOMY, LAPAROSCOPIC, WITH CHOLANGIOGRAM  Surgeon: Surgeon(s) and Role:     * Arvind Bailey MD - Primary  Anesthesia: General   Estimated blood loss: Less than 10 ml  Drains: None  Specimens:   ID Type Source Tests Collected by Time Destination   A :  Tissue Gallbladder and Contents SURGICAL PATHOLOGY EXAM Arvind Bailey MD 9/9/2019  1:19 PM      Findings:   None.  Complications: None.  Implants:  * No implants in log *

## 2019-09-09 NOTE — OR NURSING
PACU Transfer Note    Reg Valiente was transferred to 730 via cart.  Equipment used for transport:  none.  Accompanied by:  Rocio Gomez RN  Prescriptions were: signed and faxed to [facility] pharmacy    PACU Respiratory Event Documentation     1) Episodes of Apnea greater than or equal to 10 seconds: 0    2) Bradypnea - less than 8 breaths per minute: 0    3) Pain score on 0 to 10 scale: 0    4) Pain-sedation mismatch (yes or no): no    5) Repeated 02 desaturation less than 90% (yes or no): no    Anesthesia notified? (yes or no): na    Any of the above events occuring repeatedly in separate 30 minute intervals may be considered recurrent PACU respiratory events.    Patient stable and meets phase 1 discharge criteria for transport from PACU.  Simran Gomez RN on 9/9/2019 at 2:56 PM

## 2019-09-09 NOTE — DISCHARGE INSTRUCTIONS
[facility]  Adult Discharge Orders & Instructions      For 24 hours after surgery:  1. Get plenty of rest.  A responsible adult must stay with you for at least 24 hours after you leave the hospital.   2. You may feel lightheaded.  IF so, sit for a few minutes before standing.  Have someone help you get up.   3. You may have a slight fever. Call the doctor if your fever is over 101 F (38.3 C) (taken under the tongue) or lasts longer than 24 hours.  4. You may have a dry mouth, a sore throat, muscle aches or trouble sleeping.  These should go away after 24 hours.  5. Do not make important or legal decisions.  6.   Do not drive or use heavy equipment.  If you have weakness or tingling, don't drive or use heavy equipment until this feeling goes away.                                                                                                                                                                         To contact a doctor, call    [phone]______________

## 2019-09-09 NOTE — OP NOTE
PREOPERATIVE  DIAGNOSIS: Choledocholithiasis.       POSTOPERATIVE DIAGNOSIS:Chronic calculous cholecystitis, recent choledocholithiasis s/p ERCP.      PROCEDURE PERFORMED:  Laparoscopic cholecystectomy with cholangiogram.    SURGEON:  Arvind Bailey MD     ASSISTANT: Circulator: Gloria Pepe RN  Relief Circulator: Lauren Mcqueen RN  Scrub Person: Brenda Gordon  First Assistant: Rosetta Bailey RN  Pre-Op Nurse: Lamont Reed RN    ANESTHESIA: GeneralCRNA Independent: Vidhya Bhagat APRN CRNA    FAMILYPHYSICIAN: Mikayla Gtz    REFERRING PROVIDER:  Mikayla Gtz    INDICATION FOR THE PROCEDURE:  The patient is a 44 year old y.o. female with a  history of nausea and vomiting and was found to have choledocholithiasis. This required several ERCPs.  The patient's ultrasound showed stones and choledocholithiasis.  She developed continued symptoms.  The liver functions were within normal limits.     PROCEDURE:  After adequate general anesthesia, the patient was prepped and draped in the usual sterile fashion.   A supraumbilical 5 mm incision was made and verses needle inserted. This was confirmed by drop test. The abdomen was insufflated with carbondioxide to a pressure of 15 mmHg.  The abdomen entered using the Visiport technique. Under direct vision, an 11 mm epigastric and two 5 mm right-sided ports were placed.  The initial port site was inspected and was unremarkable.  The gallbladder had chronic inflammatory adhesions.  These were taken down bluntly.  The gallbladder was then grasped and held on traction.    Dissection was carried down to  Hair s pouch .  The cystic duct was then able to be visualized.  That was dissected freecircumferentially.  The kirkpatrick clamp and catheter were inserted and flushed.  Cholangiogram images were obtained. The CBD and hepatic ducts showed good filling with no filling defect. The catheter was removed and the cystic duct was triply clipped and  divided.  Adjacent to that was the cystic artery and that was dissected free circumferentially, doubly clipped and divided.  The gallbladder was then taken out of the hepatic bed using the hook cautery and maintaining good hemostasis throughout.  There was no spillage of stones or bile.  Th egallbladder was placed in an pouch and removed through the epigastric port site with some dilation.  There was no spillage.  The right upper quadrant was irrigated with a liter of normal saline.  There was goodhemostasis.  The three 5 mm ports were removed under direct vision.  There was good hemostasis.   The abdomen was deflated and the epigastric port removed.  The epigastric fascia defect was closed with an 0 Vicryl suture.The wound was infiltrated with 0.25% Marcaine and the skin closed with 4-0 Monocryl intracuticular suture.  Exofin was applied.  The patient was taken to the recovery room in stable condition.    Arvind Bailey MD

## 2019-09-09 NOTE — ANESTHESIA PREPROCEDURE EVALUATION
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-up in      Mom reports negative AAS evaluation in the      Corneal ulcer 2004    caused by leaking of CPAP     Down's syndrome      Gastroesophageal reflux disease      JENNIFER (obstructive sleep apnea)     patient refuses CPAP     Past Surgical History:   Procedure Laterality Date     ENDOSCOPIC RETROGRADE CHOLANGIOPANCREATOGRAM N/A 2019    Procedure: Endoscopic Retrograde Cholangiopancreatogram, dilation, stent , sludge and stone removal, sphincterotomy;  Surgeon: Guru Harper Alvarado MD;  Location: [facility] OR     ENDOSCOPIC RETROGRADE CHOLANGIOPANCREATOGRAM N/A 2019    Procedure: Endoscopic Retrograde Cholangiopancreatogram with biliary stent removal and stone removal;  Surgeon: Guru Harper Alvarado MD;  Location: [facility] OR     ENDOSCOPIC RETROGRADE CHOLANGIOPANCREATOGRAPHY, EXCHANGE TUBE/STENT N/A 2019    Procedure: Endoscopic Retrograde Cholangiopancreatography with Billary Stone Extraction,Dilation             and stent exchange;  Surgeon: Guru Harper Alvarado MD;  Location: [facility] OR     NO HISTORY OF SURGERY      as reported by parents       Anesthesia Evaluation     . Pt has had prior anesthetic. Type: General    No history of anesthetic complications          ROS/MED HX    ENT/Pulmonary:     (+)sleep apnea, doesn't use CPAP , . .    Neurologic: Comment: Down's syndrome      Cardiovascular:  - neg cardiovascular ROS       METS/Exercise Tolerance:  >4 METS   Hematologic:  - neg hematologic  ROS       Musculoskeletal:  - neg musculoskeletal ROS       GI/Hepatic:     (+) GERD Asymptomatic on  "medication,       Renal/Genitourinary:         Endo:     (+) thyroid problem hypothyroidism, Obesity, .      Psychiatric:  - neg psychiatric ROS       Infectious Disease:  - neg infectious disease ROS       Malignancy:      - no malignancy   Other:    - neg other ROS                      Physical Exam  Normal systems: cardiovascular, pulmonary and dental    Airway   Mallampati: IV  TM distance: >3 FB  Neck ROM: full  Comment: Small mouth opening.      Dental     Cardiovascular   Rhythm and rate: regular and normal      Pulmonary             Lab Results   Component Value Date    WBC 19.2 (H) 08/26/2019    HGB 12.2 08/26/2019    HCT 37.7 08/26/2019     08/26/2019    CRP 10.2 (H) 08/23/2019    SED 79 (H) 08/23/2019     08/26/2019    POTASSIUM 3.2 (L) 08/26/2019    CHLORIDE 100 08/26/2019    CO2 30 08/26/2019    BUN 12 08/26/2019    CR 0.62 08/26/2019     (H) 08/26/2019    FANG 9.5 08/26/2019    ALBUMIN 2.4 (L) 08/26/2019    PROTTOTAL 8.0 08/26/2019    ALT 39 08/26/2019    AST 16 08/26/2019    ALKPHOS 132 08/26/2019    BILITOTAL 0.6 08/26/2019    LIPASE 54 (L) 08/26/2019    AMYLASE 20 (L) 08/26/2019    T4 1.32 08/23/2019    HCG Negative 05/18/2019    HCGS Positive (A) 08/26/2019       Preop Vitals  BP Readings from Last 3 Encounters:   09/09/19 100/51   09/03/19 104/60   08/28/19 100/60    Pulse Readings from Last 3 Encounters:   09/03/19 66   08/28/19 54   08/26/19 85      Resp Readings from Last 3 Encounters:   09/09/19 16   08/28/19 16   08/26/19 18    SpO2 Readings from Last 3 Encounters:   09/09/19 97%   08/28/19 96%   08/26/19 98%      Temp Readings from Last 1 Encounters:   09/09/19 97.2  F (36.2  C) (Tympanic)    Ht Readings from Last 1 Encounters:   09/03/19 1.448 m (4' 9\")      Wt Readings from Last 1 Encounters:   09/03/19 65 kg (143 lb 6.4 oz)    Estimated body mass index is 31.03 kg/m  as calculated from the following:    Height as of 9/3/19: 1.448 m (4' 9\").    Weight as of 9/3/19: 65 kg " (143 lb 6.4 oz).       Anesthesia Plan      History & Physical Review      ASA Status:  3 .    NPO Status:  > 6 hours    Plan for General and ETT with Intravenous induction. Maintenance will be Balanced.    PONV prophylaxis:  Ondansetron (or other 5HT-3) and Dexamethasone or Solumedrol       Postoperative Care      Consents  Anesthetic plan, risks, benefits and alternatives discussed with:  Patient, legal guardian and Parent (Mother and/or Father)..                 EB LAWLER CRNA

## 2019-09-09 NOTE — INTERVAL H&P NOTE
I saw and examined Reg Valiente.  I have reviewed the history and physical and find no changes to the patient's medical status or condition with the exceptions noted below.     Arvind Bailey MD   12:09 PM 9/9/2019

## 2019-09-09 NOTE — ANESTHESIA CARE TRANSFER NOTE
Patient: Reg Valiente    Procedure(s):  CHOLECYSTECTOMY, LAPAROSCOPIC, WITH CHOLANGIOGRAM    Diagnosis: choloedocholitaia s/p ERCP  Diagnosis Additional Information: No value filed.    Anesthesia Type:   General, ETT     Note:  Airway :Face Mask  Patient transferred to:PACU  Handoff Report: Identifed the Patient, Identified the Reponsible Provider, Reviewed the pertinent medical history, Discussed the surgical course, Reviewed Intra-OP anesthesia mangement and issues during anesthesia, Set expectations for post-procedure period and Allowed opportunity for questions and acknowledgement of understanding      Vitals: (Last set prior to Anesthesia Care Transfer)    CRNA VITALS  9/9/2019 1350 - 9/9/2019 1422      9/9/2019             Resp Rate (observed):  1  (Abnormal)     Resp Rate (set):  10                Electronically Signed By: EB Toney CRNA  September 9, 2019  2:22 PM

## 2019-09-10 ENCOUNTER — TELEPHONE (OUTPATIENT)
Dept: SURGERY | Facility: OTHER | Age: 44
End: 2019-09-10

## 2019-09-10 NOTE — TELEPHONE ENCOUNTER
Emile was told by the pts parents that one med was supposed to be stopped since the surgery.  Please clarify

## 2019-09-12 ENCOUNTER — TELEPHONE (OUTPATIENT)
Dept: SURGERY | Facility: OTHER | Age: 44
End: 2019-09-12

## 2019-09-12 DIAGNOSIS — Z90.49 S/P LAPAROSCOPIC CHOLECYSTECTOMY: ICD-10-CM

## 2019-09-12 RX ORDER — AMOXICILLIN 250 MG
1 CAPSULE ORAL DAILY
Qty: 15 TABLET | Refills: 3 | Status: SHIPPED | OUTPATIENT
Start: 2019-09-12 | End: 2019-09-24

## 2019-09-24 ENCOUNTER — OFFICE VISIT (OUTPATIENT)
Dept: SURGERY | Facility: OTHER | Age: 44
End: 2019-09-24
Attending: SURGERY
Payer: MEDICARE

## 2019-09-24 VITALS
TEMPERATURE: 97.9 F | BODY MASS INDEX: 30.77 KG/M2 | SYSTOLIC BLOOD PRESSURE: 100 MMHG | HEART RATE: 74 BPM | WEIGHT: 142.2 LBS | RESPIRATION RATE: 18 BRPM | DIASTOLIC BLOOD PRESSURE: 70 MMHG

## 2019-09-24 DIAGNOSIS — Z90.49 S/P LAPAROSCOPIC CHOLECYSTECTOMY: Primary | ICD-10-CM

## 2019-09-24 PROCEDURE — 99024 POSTOP FOLLOW-UP VISIT: CPT | Performed by: SURGERY

## 2019-09-24 ASSESSMENT — PAIN SCALES - GENERAL: PAINLEVEL: NO PAIN (0)

## 2019-09-24 NOTE — NURSING NOTE
"Chief Complaint   Patient presents with     Surgical Followup     post-op gallbladder       Initial /70 (BP Location: Right arm, Patient Position: Sitting, Cuff Size: Adult Large)   Pulse 74   Temp 97.9  F (36.6  C) (Tympanic)   Resp 18   Wt 64.5 kg (142 lb 3.2 oz)   Breastfeeding? No   BMI 30.77 kg/m   Estimated body mass index is 30.77 kg/m  as calculated from the following:    Height as of 9/3/19: 1.448 m (4' 9\").    Weight as of this encounter: 64.5 kg (142 lb 3.2 oz).  Medication Reconciliation: complete    Leslie Cool LPN  "

## 2019-09-24 NOTE — PROGRESS NOTES
Patient presents for post surgical visit after laparoscopic cholecystectomy. Patient has done well. No problems with incisions.    /70 (BP Location: Right arm, Patient Position: Sitting, Cuff Size: Adult Large)   Pulse 74   Temp 97.9  F (36.6  C) (Tympanic)   Resp 18   Wt 64.5 kg (142 lb 3.2 oz)   Breastfeeding? No   BMI 30.77 kg/m      General: NAD, pleasant and cooperative with exam and interview.  Abdomen: healing incisions. No sign of infection. No pain with palpation.  Psychiatry: awake, alert and oriented. Appropriate affect.    Assessment/Plan:  44 year old female s/p laparoscopic cholecystectomy for recurrent choledocholithiasis with ERCP.     -  Patient can return to normal activities.  -  Patient/caregivers will call with questions or concerns.    Arvind Bailey MD on 9/24/2019 at 11:15 AM

## 2019-10-16 DIAGNOSIS — E03.8 OTHER SPECIFIED HYPOTHYROIDISM: ICD-10-CM

## 2019-10-16 RX ORDER — LEVOTHYROXINE SODIUM 175 UG/1
175 TABLET ORAL DAILY
Qty: 90 TABLET | Refills: 0 | Status: SHIPPED | OUTPATIENT
Start: 2019-10-16 | End: 2019-12-16

## 2019-10-16 NOTE — TELEPHONE ENCOUNTER
Name from pharmacy: LEVOTHYROXINE 175MCG TABLET          Will file in chart as: levothyroxine (SYNTHROID/LEVOTHROID) 175 MCG tablet    The source prescription was discontinued on 6/25/2019 by Vicente Del Rosario LPN for the following reason: Alternate therapy     To Mikayla Gtz to address. Patient has had recent TSH on 8/23/19 and recent OV with Mikayla Gtz 8/23/19.  Claudette Draper RN...........10/16/2019 2:18 PM

## 2019-10-29 ENCOUNTER — OFFICE VISIT (OUTPATIENT)
Dept: OTOLARYNGOLOGY | Facility: OTHER | Age: 44
End: 2019-10-29
Attending: OTOLARYNGOLOGY
Payer: MEDICARE

## 2019-10-29 ENCOUNTER — ALLIED HEALTH/NURSE VISIT (OUTPATIENT)
Dept: FAMILY MEDICINE | Facility: OTHER | Age: 44
End: 2019-10-29
Payer: MEDICARE

## 2019-10-29 DIAGNOSIS — Z23 NEED FOR PROPHYLACTIC VACCINATION AND INOCULATION AGAINST INFLUENZA: Primary | ICD-10-CM

## 2019-10-29 DIAGNOSIS — H90.6 MIXED HEARING LOSS, BILATERAL: Primary | ICD-10-CM

## 2019-10-29 PROCEDURE — G0463 HOSPITAL OUTPT CLINIC VISIT: HCPCS | Mod: 25

## 2019-10-29 PROCEDURE — 90686 IIV4 VACC NO PRSV 0.5 ML IM: CPT

## 2019-10-29 PROCEDURE — G0008 ADMIN INFLUENZA VIRUS VAC: HCPCS

## 2019-11-15 DIAGNOSIS — M17.0 PRIMARY OSTEOARTHRITIS OF BOTH KNEES: ICD-10-CM

## 2019-11-18 DIAGNOSIS — Z90.49 S/P LAPAROSCOPIC CHOLECYSTECTOMY: Primary | ICD-10-CM

## 2019-11-20 NOTE — TELEPHONE ENCOUNTER
Routing refill request to provider for review/approval because:  Labs out of range:  CBC    LOV: 8/28/19  Li Malik RN on 11/20/2019 at 8:20 AM

## 2019-11-21 RX ORDER — DOCUSATE SODIUM AND SENNOSIDES 8.6; 5 MG/1; MG/1
TABLET, FILM COATED ORAL
Qty: 90 TABLET | Refills: 3 | Status: SHIPPED | OUTPATIENT
Start: 2019-11-21 | End: 2020-09-23

## 2019-11-21 NOTE — TELEPHONE ENCOUNTER
" [facility] request for the following:    STOOL SOFTENER/LAXATIVE 50-8.6 MG tablet    On completed medications as Senna docusate 8.6/50MG from 9/12/19-9/24/19 after laparoscopic Cholecystectomy with Dr. Arvind Bailey.      Last Prescription Date:   9/12/19 with end date of 9/24/19  Last Fill Qty/Refills:         15, R-3    Last Office Visit:             9/24/19    Future Office visit:           None noted     Routing refill request to provider for review/approval because:     Requested Prescriptions   Pending Prescriptions Disp Refills     STOOL SOFTENER/LAXATIVE 50-8.6 MG tablet [Pharmacy Med Name: STOOL SOFTENER W/ LAX TAB] 15 tablet      Sig: TAKE 1 TABLET BY MOUTH DAILY       Laxatives Protocol Failed - 11/21/2019  2:52 PM        Failed - Medication is active on med list        Passed - Patient is age 6 or older        Passed - Recent (12 mo) or future (30 days) visit within the authorizing provider's specialty     Patient has had an office visit with the authorizing provider or a provider within the authorizing providers department within the previous 12 mos or has a future within next 30 days. See \"Patient Info\" tab in inbasket, or \"Choose Columns\" in Meds & Orders section of the refill encounter.              Shirin Hurt RN on 11/21/2019 at 3:05 PM       "

## 2019-11-26 ENCOUNTER — APPOINTMENT (OUTPATIENT)
Dept: OTOLARYNGOLOGY | Facility: OTHER | Age: 44
End: 2019-11-26
Attending: NURSE PRACTITIONER
Payer: MEDICARE

## 2019-11-27 ENCOUNTER — TELEPHONE (OUTPATIENT)
Dept: INTERNAL MEDICINE | Facility: OTHER | Age: 44
End: 2019-11-27

## 2019-11-27 DIAGNOSIS — K21.9 GASTROESOPHAGEAL REFLUX DISEASE WITHOUT ESOPHAGITIS: Primary | ICD-10-CM

## 2019-11-27 RX ORDER — FAMOTIDINE 20 MG/1
20 TABLET, FILM COATED ORAL 2 TIMES DAILY
Qty: 180 TABLET | Refills: 3 | Status: SHIPPED | OUTPATIENT
Start: 2019-11-27 | End: 2020-07-06

## 2019-11-27 NOTE — TELEPHONE ENCOUNTER
Ranitidine 150mg tablet has been recalled pharmacy is wondering what else the patent could take.     Tim Edward LPN .......  11/27/2019  12:36 PM

## 2019-12-16 DIAGNOSIS — E03.8 OTHER SPECIFIED HYPOTHYROIDISM: ICD-10-CM

## 2019-12-17 RX ORDER — LEVOTHYROXINE SODIUM 175 UG/1
175 TABLET ORAL DAILY
Qty: 90 TABLET | Refills: 1 | Status: SHIPPED | OUTPATIENT
Start: 2019-12-17 | End: 2020-05-14

## 2019-12-17 NOTE — TELEPHONE ENCOUNTER
"Requested Prescriptions   Pending Prescriptions Disp Refills     levothyroxine (SYNTHROID/LEVOTHROID) 175 MCG tablet [Pharmacy Med Name: LEVOTHYROXINE 175MCG TABLET] 90 tablet 0     Sig: TAKE 1 TABLET (175 MCG) BY MOUTH DAILY       Thyroid Protocol Passed - 12/16/2019  1:39 PM        Passed - Patient is 12 years or older        Passed - Recent (12 mo) or future (30 days) visit within the authorizing provider's specialty     Patient has had an office visit with the authorizing provider or a provider within the authorizing providers department within the previous 12 mos or has a future within next 30 days. See \"Patient Info\" tab in inbasket, or \"Choose Columns\" in Meds & Orders section of the refill encounter.              Passed - Medication is active on med list        Passed - Normal TSH on file in past 12 months     No lab results found.   8/23/19  9:43 AM     Component Value Flag Ref Range Units Status Collected Lab   T4 Free 1.32                    Passed - No active pregnancy on record     If patient is pregnant or has had a positive pregnancy test, please check TSH.          Passed - No positive pregnancy test in past 12 months     If patient is pregnant or has had a positive pregnancy test, please check TSH.        Called placed to pharmacy for clarification of refills, on 6/12/2019 90 x 3 refills was completed.  Pharmacist clarified that in October 2019 a new script for 90 x 0 refill was completed and then voided the 6/12/2019 refill.  Needing new refills due to voided June script.  Prescription refilled  Yo 90 x 1per RN Medication RefillPolicy.................... Madelyn Sosa RN ....................  12/17/2019   12:39 PM          "

## 2019-12-26 ENCOUNTER — TELEPHONE (OUTPATIENT)
Dept: INTERNAL MEDICINE | Facility: OTHER | Age: 44
End: 2019-12-26

## 2019-12-26 DIAGNOSIS — K21.9 GASTROESOPHAGEAL REFLUX DISEASE WITHOUT ESOPHAGITIS: Primary | ICD-10-CM

## 2019-12-26 NOTE — TELEPHONE ENCOUNTER
Received fax from pharmacy stating that there's a shortage of Famotidine and requesting it to be changed to Ranitidine.    Lyric Joel LPN on 12/26/2019 at 8:38 AM

## 2019-12-27 NOTE — TELEPHONE ENCOUNTER
This medication is now off of the recall list. Jane Alejandro LPN .............12/27/2019  8:47 AM

## 2019-12-31 ENCOUNTER — APPOINTMENT (OUTPATIENT)
Dept: OTOLARYNGOLOGY | Facility: OTHER | Age: 44
End: 2019-12-31
Attending: OTOLARYNGOLOGY
Payer: MEDICARE

## 2020-01-12 ENCOUNTER — OFFICE VISIT (OUTPATIENT)
Dept: FAMILY MEDICINE | Facility: OTHER | Age: 45
End: 2020-01-12
Attending: FAMILY MEDICINE
Payer: MEDICARE

## 2020-01-12 ENCOUNTER — TELEPHONE (OUTPATIENT)
Dept: INTERNAL MEDICINE | Facility: OTHER | Age: 45
End: 2020-01-12

## 2020-01-12 VITALS
RESPIRATION RATE: 16 BRPM | DIASTOLIC BLOOD PRESSURE: 72 MMHG | HEART RATE: 65 BPM | TEMPERATURE: 99.2 F | BODY MASS INDEX: 29.32 KG/M2 | WEIGHT: 135.5 LBS | SYSTOLIC BLOOD PRESSURE: 104 MMHG | OXYGEN SATURATION: 97 %

## 2020-01-12 DIAGNOSIS — K04.7 DENTAL INFECTION: Primary | ICD-10-CM

## 2020-01-12 PROCEDURE — G0463 HOSPITAL OUTPT CLINIC VISIT: HCPCS

## 2020-01-12 PROCEDURE — 99213 OFFICE O/P EST LOW 20 MIN: CPT | Performed by: FAMILY MEDICINE

## 2020-01-12 RX ORDER — AMOXICILLIN 500 MG/1
500 CAPSULE ORAL 2 TIMES DAILY
Qty: 30 CAPSULE | Refills: 0 | Status: SHIPPED | OUTPATIENT
Start: 2020-01-12 | End: 2020-07-06

## 2020-01-12 ASSESSMENT — PAIN SCALES - GENERAL: PAINLEVEL: NO PAIN (0)

## 2020-01-12 NOTE — PROGRESS NOTES
Nursing Notes:   Astrid Lynch LPN  1/12/2020 10:53 AM  Signed  Patient has not been feeling well for 1 day. Their symptoms are left side swelling on face.  Astrid Lynch LPN LPN....................  1/12/2020   10:46 AM    SUBJECTIVE:   Reg Valiente is a 44 year old female who presents to clinic today for the following health issues:    Resident of [facility] since this past summer and here with staff from there.    Noted yesterday that her left  cheek area was swollen and today swollen and slightly red. She has not had fever or complained of pain.     HPI    Patient Active Problem List   Diagnosis     Down's syndrome     DOROTEO (generalized anxiety disorder)     Menstrual disorder     Primary osteoarthritis of both knees     Gastroesophageal reflux disease without esophagitis     Other specified hypothyroidism     Dermatitis     Encounter for screening mammogram for breast cancer     Morbid obesity (H)     JENNIFER (obstructive sleep apnea)     Choledocholithiasis     Post-ERCP acute pancreatitis     Past Surgical History:   Procedure Laterality Date     ENDOSCOPIC RETROGRADE CHOLANGIOPANCREATOGRAM N/A 7/1/2019    Procedure: Endoscopic Retrograde Cholangiopancreatogram, dilation, stent , sludge and stone removal, sphincterotomy;  Surgeon: Guru Harper Alvarado MD;  Location: [facility] OR     ENDOSCOPIC RETROGRADE CHOLANGIOPANCREATOGRAM N/A 8/26/2019    Procedure: Endoscopic Retrograde Cholangiopancreatogram with biliary stent removal and stone removal;  Surgeon: Guru Harper Alvarado MD;  Location: [facility]     ENDOSCOPIC RETROGRADE CHOLANGIOPANCREATOGRAPHY, EXCHANGE TUBE/STENT N/A 7/24/2019    Procedure: Endoscopic Retrograde Cholangiopancreatography with Billary Stone Extraction,Dilation             and stent exchange;  Surgeon: Guru Harper Alvarado MD;  Location: [facility] OR     LAPAROSCOPIC CHOLECYSTECTOMY WITH CHOLANGIOGRAMS N/A 9/9/2019    Procedure: CHOLECYSTECTOMY,  LAPAROSCOPIC, WITH CHOLANGIOGRAM;  Surgeon: Arvind Bailey MD;  Location: [facility] OR     NO HISTORY OF SURGERY      as reported by parents       Social History     Tobacco Use     Smoking status: Never Smoker     Smokeless tobacco: Never Used   Substance Use Topics     Alcohol use: No     Family History   Problem Relation Age of Onset     Asthma Mother      Arthritis Mother      LUNG DISEASE Father         pulmonary fibrosis secondary to chemical exposure     Diabetes Father      Cerebrovascular Disease Father 90     Cerebrovascular Disease Maternal Grandmother 90     Heart Failure Paternal Grandfather         uncertain which  replaced     Coronary Artery Disease No family hx of      Heart Disease No family hx of      Bleeding Diathesis No family hx of      Pancreatic Cancer No family hx of      Colorectal Cancer No family hx of          Current Outpatient Medications   Medication Sig Dispense Refill     amoxicillin (AMOXIL) 500 MG capsule Take 1 capsule (500 mg) by mouth 2 times daily 30 capsule 0     citalopram (CELEXA) 20 MG tablet TAKE 1 TABLET BY MOUTH DAILY 90 tablet 3     citalopram (CELEXA) 20 MG tablet TAKE 1 TABLET BY MOUTH EVERY DAY 90 tablet 3     diclofenac (VOLTAREN) 50 MG EC tablet TAKE 1 TABLET BY MOUTH TWICE A  tablet 3     famotidine (PEPCID) 20 MG tablet Take 1 tablet (20 mg) by mouth 2 times daily 180 tablet 3     levothyroxine (SYNTHROID/LEVOTHROID) 175 MCG tablet TAKE 1 TABLET (175 MCG) BY MOUTH DAILY 90 tablet 1     levothyroxine (SYNTHROID/LEVOTHROID) 175 MCG tablet TAKE 1 TABLET BY MOUTH DAILY 90 tablet 3     Multiple vitamin TABS Take 1 tablet by mouth daily 30 tablet 11     norgestim-eth estrad triphasic (TRINESSA, 28,) 0.18/0.215/0.25 MG-35 MCG tablet Take 1 tablet by mouth daily 84 tablet 3     ranitidine (ZANTAC) 150 MG tablet Take 1 tablet (150 mg) by mouth 2 times daily 60 tablet 3     STOOL SOFTENER/LAXATIVE 50-8.6 MG tablet TAKE 1 TABLET BY MOUTH DAILY 90 tablet 3      Allergies   Allergen Reactions     Sulfa Drugs      Social History     Social History Narrative    Moving to [facility] June 20, 2019    Parents area guardians       Review of Systems     OBJECTIVE:     /72 (BP Location: Right arm, Patient Position: Sitting, Cuff Size: Adult Regular)   Pulse 65   Temp 99.2  F (37.3  C) (Tympanic)   Resp 16   Wt 61.5 kg (135 lb 8 oz)   SpO2 97%   BMI 29.32 kg/m    Body mass index is 29.32 kg/m .  Physical Exam  Vitals signs and nursing note reviewed.   Constitutional:       Appearance: Normal appearance.   HENT:      Mouth/Throat:      Comments: Swelling of left cheek of face with even a bit of puffiness under left eye.  Non tender with palpation of the area.  Teethe with flattening and suspect grinding with several very worn down but no visible caries and unsure which tooth an issue  Neurological:      Mental Status: She is alert.         Diagnostic Test Results:  none     ASSESSMENT/PLAN:           ICD-10-CM    1. Dental infection K04.7 amoxicillin (AMOXIL) 500 MG capsule     Plan:  Prescription for amoxicillin provided.   Monitor for worsening and get her into a dentist. Staff indicates they will see to that and her parents are involved in her care.      Nichole Quiroga MD  [facility]

## 2020-01-12 NOTE — NURSING NOTE
Patient has not been feeling well for 1 day. Their symptoms are left side swelling on face.  Astrid Lynch LPN LPN....................  1/12/2020   10:46 AM

## 2020-01-12 NOTE — TELEPHONE ENCOUNTER
After verifying patient last name and date of birth, confirmed with Raheel, caregiver, rx was correct at- 500 mg amoxicillin by mouth 2 x daily - disp 30. June A NATANAEL Mcguire LPN....................  1/12/2020   4:40 PM

## 2020-01-12 NOTE — PATIENT INSTRUCTIONS
"  Patient Education     Dental Abscess with Facial Cellulitis    A dental abscess is an infection at the base of a tooth. It means a pocket of pus has formed at the tip of a tooth root in your jaw bone. If the infection isn t treated, it can appear as a swelling on the gum near the tooth. More serious infections spread to the face. This causes your face to swell (cellulitis). This is a very serious condition. Once the swelling begins, it can spread quickly.  A dental abscess usually starts with a crack or cavity in a tooth. The pain is often made worse by drinking hot or cold beverages, or biting on hard foods. The pain may spread from the tooth to your ear or the area of your jaw on the same side.  Home care  Follow these tips when caring for yourself at home:    Don't have hot and cold foods and drinks. Your tooth may be sensitive to changes in temperature. Don t chew on the side of the infected tooth.    If your tooth is chipped or cracked, or if there is a large open cavity, put oil of cloves directly on the tooth to relieve pain. You can buy oil of cloves at [employer]. Some pharmacies carry an over-the-counter \"toothache kit.\" This contains a paste that you can put on the exposed tooth to make it less sensitive.    Put a cold pack on your jaw over the sore area to help reduce pain.    You may use over-the-counter medication to ease pain, unless another medicine was prescribed. If you have chronic liver or kidney disease, talk with your health care provider before using acetaminophen or ibuprofen. Also talk with your provider if you ve had a stomach ulcer or GI bleeding.    An antibiotic will be prescribed. Take it exactly as directed. Don t miss any doses.  Follow-up care  Follow up with your dentist or an oral surgeon as advised. Severe cases of cellulitis must be checked again within 24 hours. Once an infection occurs in a tooth, it will continue to be a problem until the infection is drained. This is done " through surgery or a root canal. Or you may need to have your tooth pulled.  Call 911  Call 911 if any of these occur:    Swelling spreads to the upper half of your face or neck    You eyelids begin to swell shut    Unusual drowsiness    Headache or a stiff neck    Weakness or fainting    Difficulty swallowing or breathing  When to seek medical advice  Call your healthcare provider right away if any of these occur:    Pain gets worse or spreads to your neck    Fever of 100.4 F (38 C) or higher, or as directed by your healthcare provider  Date Last Reviewed: 10/1/2016    4565-4590 [employer]. [address] [place] [address]. All rights reserved. This information is not intended as a substitute for professional medical care. Always follow your healthcare professional's instructions.

## 2020-01-21 ENCOUNTER — OFFICE VISIT (OUTPATIENT)
Dept: OTOLARYNGOLOGY | Facility: OTHER | Age: 45
End: 2020-01-21
Attending: OTOLARYNGOLOGY
Payer: MEDICARE

## 2020-01-21 DIAGNOSIS — H61.21 IMPACTED CERUMEN OF RIGHT EAR: Primary | ICD-10-CM

## 2020-01-21 PROCEDURE — G0463 HOSPITAL OUTPT CLINIC VISIT: HCPCS

## 2020-01-29 NOTE — PROGRESS NOTES
document embedded image  Patient Name: Reg Valiente    Address: [address] [place] [address]     YOB: 1975    [place] [id number]    MR Number: [id number]    Phone: [phone]  PCP: Mikayla Gtz NP            Appointment Date: 01/21/20   Visit Provider: Moris Frausto MD    cc: ~    ENT Progress Note    Visit Reasons: Ear Cleaning    HPI  History of Present Illness  Chief complaint:  Ear check    History  The patient is a 44-year-old female with Down syndrome who presents to the office today with her mother and a caregiver for help with ear cleaning.  She does wear hearing aids bilaterally.    Exam  The external auditory canals have a small amount of cerumen present.  It is nearly obstructing on the right and this was cleared away.  The underlying canals and TMs are healthy.  Remainder of the head neck exam is unremarkable    PFSH    PFSH:     Medical History (Updated 01/21/20 @ 12:43 by Moris Frausto MD)    Diagnosis unknown (Acute)  No eCW       A&P  Assessment & Plan  (1) Impacted cerumen, right ear:        Status: Acute        Code(s):  H61.21 - Impacted cerumen, right ear        She will follow up with me as needed.      Moris Frausto MD    01/21/20 1242    <Electronically signed by Moris Frausto MD> 01/21/20 1247

## 2020-04-15 DIAGNOSIS — K21.9 GASTROESOPHAGEAL REFLUX DISEASE WITHOUT ESOPHAGITIS: ICD-10-CM

## 2020-04-15 NOTE — TELEPHONE ENCOUNTER
Routing refill request to provider for review/approval because:    Zantac recalled     Famotidine refilled on 11/27/19 #180 x 3 reffills to [facility].    Called pharmacy and request is to be disregarded.    Li Malik RN on 4/15/2020 at 4:00 PM

## 2020-05-14 DIAGNOSIS — E03.8 OTHER SPECIFIED HYPOTHYROIDISM: ICD-10-CM

## 2020-05-14 RX ORDER — LEVOTHYROXINE SODIUM 175 UG/1
175 TABLET ORAL DAILY
Qty: 90 TABLET | Refills: 0 | Status: SHIPPED | OUTPATIENT
Start: 2020-05-14 | End: 2020-07-20

## 2020-05-14 NOTE — TELEPHONE ENCOUNTER
"Requested Prescriptions   Pending Prescriptions Disp Refills     levothyroxine (SYNTHROID/LEVOTHROID) 175 MCG tablet [Pharmacy Med Name: LEVOTHYROXINE 175MCG TABLET] 90 tablet 1     Sig: TAKE 1 TABLET (175 MCG) BY MOUTH DAILY       Thyroid Protocol Passed - 5/14/2020  9:48 AM        Passed - Patient is 12 years or older        Passed - Recent (12 mo) or future (30 days) visit within the authorizing provider's specialty     Patient has had an office visit with the authorizing provider or a provider within the authorizing providers department within the previous 12 mos or has a future within next 30 days. See \"Patient Info\" tab in inbasket, or \"Choose Columns\" in Meds & Orders section of the refill encounter.              Passed - Medication is active on med list        Passed - Normal TSH on file in past 12 months     No lab results found.           Passed - No active pregnancy on record     If patient is pregnant or has had a positive pregnancy test, please check TSH.          Passed - No positive pregnancy test in past 12 months     If patient is pregnant or has had a positive pregnancy test, please check TSH.           LOV 8/23/19 Tresa  Prescription [place] per [facility] [place].  Brenda J. Goodell, RN on 5/14/2020 at 3:08 PM        "

## 2020-05-26 DIAGNOSIS — N92.6 MENSTRUAL DISORDER: ICD-10-CM

## 2020-05-26 RX ORDER — NORGESTIMATE AND ETHINYL ESTRADIOL 7DAYSX3 28
KIT ORAL
Qty: 84 TABLET | Refills: 0 | Status: SHIPPED | OUTPATIENT
Start: 2020-05-26 | End: 2020-08-18

## 2020-05-26 NOTE — TELEPHONE ENCOUNTER
"Requested Prescriptions   Pending Prescriptions Disp Refills     TRI-LINYAH 0.18/0.215/0.25 MG-35 MCG tablet [Pharmacy Med Name: TRI-LINYAH 28-DAY TABLET] 84 tablet 3     Sig: TAKE 1 TABLET BY MOUTH DAILY       Contraceptives Protocol Passed - 5/26/2020 10:42 AM        Passed - Patient is not a current smoker if age is 35 or older        Passed - Recent (12 mo) or future (30 days) visit within the authorizing provider's specialty     Patient has had an office visit with the authorizing provider or a provider within the authorizing providers department within the previous 12 mos or has a future within next 30 days. See \"Patient Info\" tab in inbasket, or \"Choose Columns\" in Meds & Orders section of the refill encounter.              Passed - Medication is active on med list        Passed - No active pregnancy on record        Passed - No positive pregnancy test in past 12 months           lov 08/28/19  Prescription approved per [facility] Refill Protocol.    "

## 2020-06-17 RX ORDER — FAMOTIDINE 40 MG/1
TABLET, FILM COATED ORAL
Qty: 54 TABLET | OUTPATIENT
Start: 2020-06-17

## 2020-07-06 ENCOUNTER — OFFICE VISIT (OUTPATIENT)
Dept: INTERNAL MEDICINE | Facility: OTHER | Age: 45
End: 2020-07-06
Attending: NURSE PRACTITIONER
Payer: MEDICARE

## 2020-07-06 VITALS
OXYGEN SATURATION: 96 % | BODY MASS INDEX: 29.58 KG/M2 | RESPIRATION RATE: 18 BRPM | DIASTOLIC BLOOD PRESSURE: 76 MMHG | SYSTOLIC BLOOD PRESSURE: 110 MMHG | HEIGHT: 55 IN | HEART RATE: 66 BPM | WEIGHT: 127.8 LBS | TEMPERATURE: 98.5 F

## 2020-07-06 DIAGNOSIS — N92.6 MENSTRUAL DISORDER: ICD-10-CM

## 2020-07-06 DIAGNOSIS — Z00.00 HEALTH CARE MAINTENANCE: Primary | ICD-10-CM

## 2020-07-06 DIAGNOSIS — R42 DIZZINESS: ICD-10-CM

## 2020-07-06 DIAGNOSIS — Z23 NEED FOR TETANUS, DIPHTHERIA, AND ACELLULAR PERTUSSIS (TDAP) VACCINE: ICD-10-CM

## 2020-07-06 DIAGNOSIS — F41.1 GAD (GENERALIZED ANXIETY DISORDER): ICD-10-CM

## 2020-07-06 DIAGNOSIS — Z00.00 HEALTH CARE MAINTENANCE: ICD-10-CM

## 2020-07-06 DIAGNOSIS — Z12.31 ENCOUNTER FOR SCREENING MAMMOGRAM FOR BREAST CANCER: ICD-10-CM

## 2020-07-06 DIAGNOSIS — M17.0 PRIMARY OSTEOARTHRITIS OF BOTH KNEES: ICD-10-CM

## 2020-07-06 DIAGNOSIS — Q90.9 DOWN'S SYNDROME: ICD-10-CM

## 2020-07-06 DIAGNOSIS — K21.9 GASTROESOPHAGEAL REFLUX DISEASE WITHOUT ESOPHAGITIS: ICD-10-CM

## 2020-07-06 LAB
ALBUMIN SERPL-MCNC: 3.4 G/DL (ref 3.5–5.7)
ALBUMIN UR-MCNC: NEGATIVE MG/DL
ALP SERPL-CCNC: 173 U/L (ref 34–104)
ALT SERPL W P-5'-P-CCNC: 58 U/L (ref 7–52)
ANION GAP SERPL CALCULATED.3IONS-SCNC: 5 MMOL/L (ref 3–14)
APPEARANCE UR: CLEAR
AST SERPL W P-5'-P-CCNC: 43 U/L (ref 13–39)
BILIRUB SERPL-MCNC: 0.9 MG/DL (ref 0.3–1)
BILIRUB UR QL STRIP: NEGATIVE
BUN SERPL-MCNC: 23 MG/DL (ref 7–25)
CALCIUM SERPL-MCNC: 9.2 MG/DL (ref 8.6–10.3)
CHLORIDE SERPL-SCNC: 103 MMOL/L (ref 98–107)
CHOLEST SERPL-MCNC: 165 MG/DL
CO2 SERPL-SCNC: 31 MMOL/L (ref 21–31)
COLOR UR AUTO: NORMAL
CREAT SERPL-MCNC: 0.87 MG/DL (ref 0.6–1.2)
ERYTHROCYTE [DISTWIDTH] IN BLOOD BY AUTOMATED COUNT: 14.2 % (ref 10–15)
GFR SERPL CREATININE-BSD FRML MDRD: 70 ML/MIN/{1.73_M2}
GLUCOSE SERPL-MCNC: 82 MG/DL (ref 70–105)
GLUCOSE UR STRIP-MCNC: NEGATIVE MG/DL
HCT VFR BLD AUTO: 39.4 % (ref 35–47)
HDLC SERPL-MCNC: 44 MG/DL (ref 23–92)
HGB BLD-MCNC: 13.1 G/DL (ref 11.7–15.7)
HGB UR QL STRIP: NEGATIVE
KETONES UR STRIP-MCNC: NEGATIVE MG/DL
LDLC SERPL CALC-MCNC: 98 MG/DL
LEUKOCYTE ESTERASE UR QL STRIP: NEGATIVE
MCH RBC QN AUTO: 33.8 PG (ref 26.5–33)
MCHC RBC AUTO-ENTMCNC: 33.2 G/DL (ref 31.5–36.5)
MCV RBC AUTO: 102 FL (ref 78–100)
NITRATE UR QL: NEGATIVE
NONHDLC SERPL-MCNC: 121 MG/DL
PH UR STRIP: 5 PH (ref 5–7)
PLATELET # BLD AUTO: 275 10E9/L (ref 150–450)
POTASSIUM SERPL-SCNC: 4.5 MMOL/L (ref 3.5–5.1)
PROT SERPL-MCNC: 7.2 G/DL (ref 6.4–8.9)
RBC # BLD AUTO: 3.88 10E12/L (ref 3.8–5.2)
SODIUM SERPL-SCNC: 139 MMOL/L (ref 134–144)
SOURCE: NORMAL
SP GR UR STRIP: 1.01 (ref 1–1.03)
T4 FREE SERPL-MCNC: 1.57 NG/DL (ref 0.6–1.6)
TRIGL SERPL-MCNC: 116 MG/DL
TSH SERPL DL<=0.05 MIU/L-ACNC: 0.39 IU/ML (ref 0.34–5.6)
UROBILINOGEN UR STRIP-MCNC: NORMAL MG/DL (ref 0–2)
WBC # BLD AUTO: 8.5 10E9/L (ref 4–11)

## 2020-07-06 PROCEDURE — 93005 ELECTROCARDIOGRAM TRACING: CPT

## 2020-07-06 PROCEDURE — 84443 ASSAY THYROID STIM HORMONE: CPT | Mod: ZL | Performed by: NURSE PRACTITIONER

## 2020-07-06 PROCEDURE — 90715 TDAP VACCINE 7 YRS/> IM: CPT

## 2020-07-06 PROCEDURE — G0463 HOSPITAL OUTPT CLINIC VISIT: HCPCS | Mod: 25

## 2020-07-06 PROCEDURE — 85027 COMPLETE CBC AUTOMATED: CPT | Mod: ZL | Performed by: NURSE PRACTITIONER

## 2020-07-06 PROCEDURE — 84439 ASSAY OF FREE THYROXINE: CPT | Mod: ZL | Performed by: NURSE PRACTITIONER

## 2020-07-06 PROCEDURE — 99396 PREV VISIT EST AGE 40-64: CPT | Performed by: NURSE PRACTITIONER

## 2020-07-06 PROCEDURE — 36415 COLL VENOUS BLD VENIPUNCTURE: CPT | Mod: ZL | Performed by: NURSE PRACTITIONER

## 2020-07-06 PROCEDURE — 90471 IMMUNIZATION ADMIN: CPT

## 2020-07-06 PROCEDURE — 81003 URINALYSIS AUTO W/O SCOPE: CPT | Mod: ZL | Performed by: NURSE PRACTITIONER

## 2020-07-06 PROCEDURE — 80061 LIPID PANEL: CPT | Mod: ZL | Performed by: NURSE PRACTITIONER

## 2020-07-06 PROCEDURE — 93010 ELECTROCARDIOGRAM REPORT: CPT | Performed by: INTERNAL MEDICINE

## 2020-07-06 PROCEDURE — 80053 COMPREHEN METABOLIC PANEL: CPT | Mod: ZL | Performed by: NURSE PRACTITIONER

## 2020-07-06 RX ORDER — CITALOPRAM HYDROBROMIDE 20 MG/1
20 TABLET ORAL DAILY
Qty: 90 TABLET | Refills: 3 | Status: SHIPPED | OUTPATIENT
Start: 2020-07-06 | End: 2021-06-23

## 2020-07-06 RX ORDER — FAMOTIDINE 20 MG/1
20 TABLET, FILM COATED ORAL 2 TIMES DAILY
Qty: 180 TABLET | Refills: 3 | Status: SHIPPED | OUTPATIENT
Start: 2020-07-06 | End: 2021-06-05

## 2020-07-06 ASSESSMENT — ENCOUNTER SYMPTOMS
CHILLS: 0
ADENOPATHY: 0
EYE REDNESS: 0
HEMATURIA: 0
AGITATION: 1
HEADACHES: 0
WOUND: 0
SORE THROAT: 0
CONFUSION: 0
VOMITING: 0
HEARTBURN: 0
SLEEP DISTURBANCE: 0
NUMBNESS: 0
PARESTHESIAS: 0
ARTHRALGIAS: 0
DIARRHEA: 0
EYE DISCHARGE: 0
COUGH: 0
APNEA: 0
FEVER: 0
DIFFICULTY URINATING: 0
PALPITATIONS: 0
CHEST TIGHTNESS: 0
TROUBLE SWALLOWING: 0
NAUSEA: 0
DYSURIA: 0
HEMATOCHEZIA: 0
NERVOUS/ANXIOUS: 0
POLYPHAGIA: 0
WHEEZING: 0
CONSTIPATION: 0
HALLUCINATIONS: 0
SHORTNESS OF BREATH: 0
POLYDIPSIA: 0
MYALGIAS: 0
UNEXPECTED WEIGHT CHANGE: 0
DYSPHORIC MOOD: 0
DIZZINESS: 1
FREQUENCY: 0
ABDOMINAL PAIN: 0

## 2020-07-06 ASSESSMENT — PAIN SCALES - GENERAL: PAINLEVEL: NO PAIN (0)

## 2020-07-06 ASSESSMENT — ANXIETY QUESTIONNAIRES
GAD7 TOTAL SCORE: 0
2. NOT BEING ABLE TO STOP OR CONTROL WORRYING: NOT AT ALL
3. WORRYING TOO MUCH ABOUT DIFFERENT THINGS: NOT AT ALL
1. FEELING NERVOUS, ANXIOUS, OR ON EDGE: NOT AT ALL
IF YOU CHECKED OFF ANY PROBLEMS ON THIS QUESTIONNAIRE, HOW DIFFICULT HAVE THESE PROBLEMS MADE IT FOR YOU TO DO YOUR WORK, TAKE CARE OF THINGS AT HOME, OR GET ALONG WITH OTHER PEOPLE: NOT DIFFICULT AT ALL
5. BEING SO RESTLESS THAT IT IS HARD TO SIT STILL: NOT AT ALL
6. BECOMING EASILY ANNOYED OR IRRITABLE: NOT AT ALL
7. FEELING AFRAID AS IF SOMETHING AWFUL MIGHT HAPPEN: NOT AT ALL

## 2020-07-06 ASSESSMENT — PATIENT HEALTH QUESTIONNAIRE - PHQ9: 5. POOR APPETITE OR OVEREATING: NOT AT ALL

## 2020-07-06 ASSESSMENT — MIFFLIN-ST. JEOR: SCORE: 1035.08

## 2020-07-06 NOTE — PROGRESS NOTES
Subjective:  She is here today for routine healthcare maintenance.  She lives at [facility].  Patient has Down syndrome's.  Staff report that they do notice some sundowning's in the afternoon where she gets a little agitated.  She is forgetful.  This is not worse than usual.  She does take Celexa for anxiety and that seems to be working quite well.  Staff member reports that her parents moved back to [place] and they have not heard from them in a few weeks.  Mom has dementia and it was closer to family in [place].  Parents are still considered guardian.  Patient  does not get Pap smears.  She is never been sexually active.  This is been quite traumatic for her with a previous Pap.  She is on contraception to manage menstrual disorder.  She has GERD that is controlled with ranitidine.  She still has problems with arthritis of her knees.  She uses diclofenac's gel.  She is in need of mammogram and also Tdap.  She has not had Pneumovax 23.  She normally receives influenza vaccine according to staff member once yearly.  Staff member Camila does report that just before appointment while still at the [facility] home partner reported that she was dizzy.  She looked a little pale and it lasted for about a minute.  Shayla states it did not get black.  She did not have any chest pain, chest pressure nor did I feel like her heart was racing.  This is never occurred before.  Staff member also reports that patient drinks a lot of sweet tea.  She does not drink a lot of water.  She has not been outdoors but it has been very hot in the 90 degrees over the last week.      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     Mom reports negative AAS evaluation in the 1990's     Corneal ulcer 2004    caused by leaking of CPAP     Down's syndrome      Gastroesophageal reflux disease      JENNIFER (obstructive sleep apnea)     patient refuses CPAP     Past Surgical History:   Procedure Laterality Date     ENDOSCOPIC RETROGRADE CHOLANGIOPANCREATOGRAM N/A 7/1/2019    Procedure: Endoscopic Retrograde Cholangiopancreatogram, dilation, stent , sludge and stone removal, sphincterotomy;  Surgeon: Guru Harper Alvarado MD;  Location: [facility] OR     ENDOSCOPIC RETROGRADE CHOLANGIOPANCREATOGRAM N/A 8/26/2019    Procedure: Endoscopic Retrograde Cholangiopancreatogram with biliary stent removal and stone removal;  Surgeon: Guru Harper Alvarado MD;  Location: [facility] OR     ENDOSCOPIC RETROGRADE CHOLANGIOPANCREATOGRAPHY, EXCHANGE TUBE/STENT N/A 7/24/2019    Procedure: Endoscopic Retrograde Cholangiopancreatography with Billary Stone Extraction,Dilation             and stent exchange;  Surgeon: Guru Harper Alvarado MD;  Location: [facility] OR     LAPAROSCOPIC CHOLECYSTECTOMY WITH CHOLANGIOGRAMS N/A 9/9/2019    Procedure: CHOLECYSTECTOMY, LAPAROSCOPIC, WITH CHOLANGIOGRAM;  Surgeon: Arvind Bailey MD;  Location: [facility] OR     NO HISTORY OF SURGERY      as reported by parents     Social History     Socioeconomic History     Marital status: Single     Spouse name: Not on file     Number of children: Not on file     Years of education: Not on file     Highest education level: Not on file   Occupational History     Not on file   Social Needs     Financial resource strain: Not on file     Food insecurity     Worry: Not on file     Inability: Not on file     Transportation needs     Medical: Not on file      Non-medical: Not on file   Tobacco Use     Smoking status: Never Smoker     Smokeless tobacco: Never Used   Substance and Sexual Activity     Alcohol use: No     Drug use: No     Sexual activity: Not Currently     Birth control/protection: Pill   Lifestyle     Physical activity     Days per week: Not on file     Minutes per session: Not on file     Stress: Not on file   Relationships     Social connections     Talks on phone: Not on file     Gets together: Not on file     Attends Jewish service: Not on file     Active member of club or organization: Not on file     Attends meetings of clubs or organizations: Not on file     Relationship status: Not on file     Intimate partner violence     Fear of current or ex partner: Not on file     Emotionally abused: Not on file     Physically abused: Not on file     Forced sexual activity: Not on file   Other Topics Concern     Parent/sibling w/ CABG, MI or angioplasty before 65F 55M? Not Asked   Social History Narrative    Moving to [facility] June 20, 2019. July 2020: Parents moved back to [place] because Mom has dementia and [place] is closer to family.  Reg has not heard from parents in several weeks.    Parents are guardians     Family History   Problem Relation Age of Onset     Asthma Mother      Arthritis Mother      Dementia Mother      LUNG DISEASE Father         pulmonary fibrosis secondary to chemical exposure     Diabetes Father      Cerebrovascular Disease Father 90     Cerebrovascular Disease Maternal Grandmother 90     Heart Failure Paternal Grandfather         uncertain which  replaced     Coronary Artery Disease No family hx of      Heart Disease No family hx of      Bleeding Diathesis No family hx of      Pancreatic Cancer No family hx of      Colorectal Cancer No family hx of      Current Outpatient Medications   Medication Sig Dispense Refill     citalopram (CELEXA) 20 MG tablet Take 1 tablet (20 mg) by mouth daily 90 tablet 3     diclofenac  (VOLTAREN) 50 MG EC tablet TAKE 1 TABLET BY MOUTH TWICE A  tablet 3     famotidine (PEPCID) 20 MG tablet Take 1 tablet (20 mg) by mouth 2 times daily 180 tablet 3     levothyroxine (SYNTHROID/LEVOTHROID) 175 MCG tablet TAKE 1 TABLET (175 MCG) BY MOUTH DAILY 90 tablet 0     Multiple vitamin TABS Take 1 tablet by mouth daily 30 tablet 11     STOOL SOFTENER/LAXATIVE 50-8.6 MG tablet TAKE 1 TABLET BY MOUTH DAILY 90 tablet 3     TRI-LINYAH 0.18/0.215/0.25 MG-35 MCG tablet TAKE 1 TABLET BY MOUTH DAILY 84 tablet 0     ranitidine (ZANTAC) 150 MG tablet Take 1 tablet (150 mg) by mouth 2 times daily (Patient not taking: Reported on 7/6/2020) 60 tablet 3     Sulfa drugs      Review of Systems:  Review of Systems   Constitutional: Negative for chills, fever and unexpected weight change.   HENT: Negative for congestion, ear pain, sore throat and trouble swallowing.    Eyes: Negative for discharge and redness.   Respiratory: Negative for apnea, cough, chest tightness, shortness of breath and wheezing.    Cardiovascular: Negative for chest pain, palpitations and peripheral edema.   Gastrointestinal: Negative for abdominal pain, constipation, diarrhea, heartburn, hematochezia, nausea and vomiting.   Endocrine: Negative for cold intolerance, heat intolerance, polydipsia, polyphagia and polyuria.   Breasts:  negative.    Genitourinary: Negative for difficulty urinating, dysuria, frequency, hematuria, pelvic pain and vaginal bleeding.   Musculoskeletal: Negative for arthralgias and myalgias.        Osteoarthritis of knees, difficulty with ambulation.  Morbidly obese.   Skin: Negative for rash and wound.   Allergic/Immunologic: Negative for immunocompromised state.   Neurological: Positive for dizziness. Negative for numbness, headaches and paresthesias.   Hematological: Negative for adenopathy.   Psychiatric/Behavioral: Positive for agitation. Negative for confusion, dysphoric mood, hallucinations and sleep disturbance. The  "patient is not nervous/anxious.        Objective:   /76 (BP Location: Right arm, Patient Position: Sitting, Cuff Size: Adult Regular)   Pulse 66   Temp 98.5  F (36.9  C) (Tympanic)   Resp 18   Ht 1.346 m (4' 5\")   Wt 58 kg (127 lb 12.8 oz)   LMP 06/30/2020 (Exact Date)   SpO2 96%   Breastfeeding No   BMI 31.99 kg/m    Physical Exam  Pleasant female with Down syndrome accompanied by care attendant Camila.  Answer questions fairly appropriately.  Appears comfortable.  Skin color pink.  Sclera nonicteric.  Mucous membranes moist.  TMs clear bilaterally.  Or mucosa pink and moist.  Throat without erythema.  Neck supple without adenopathy.  No thyromegaly.  No carotid bruits.  Lung fields clear to auscultation throughout.  Cardiovascular regular rate and rhythm with no murmur clicks or rubs.  Abdomen soft and obese without masses, tenderness and organomegaly.  No CVA or suprapubic tenderness.  Extremities without edema.  She has crepitation of both of her knees with extension.  Neurologic exam grossly intact.  EKG: Sinus bradycardia, rate 59, normal axis, no ST or T wave abnormalities.    Assessment:    ICD-10-CM    1. Health care maintenance  Z00.00 CBC with platelets     Comprehensive metabolic panel     Thyrotropin [facility]     T4 free     Lipid Profile     *UA reflex to Microscopic     Lipid Profile     T4 free     Thyrotropin [facility]     Comprehensive metabolic panel     CBC with platelets   2. Down's syndrome  Q90.9    3. Menstrual disorder  N92.6    4. Gastroesophageal reflux disease without esophagitis  K21.9 famotidine (PEPCID) 20 MG tablet   5. DOROTEO (generalized anxiety disorder)  F41.1 citalopram (CELEXA) 20 MG tablet   6. Primary osteoarthritis of both knees  M17.0    7. Encounter for screening mammogram for breast cancer  Z12.31 MA Screen Bilateral w/Kermit   8. Need for tetanus, diphtheria, and acellular pertussis (Tdap) vaccine  Z23 TDAP VACCINE   9. Dizziness  R42 CBC with platelets     Comprehensive " metabolic panel     EKG 12-lead, tracing only     EKG 12-lead, tracing only     Comprehensive metabolic panel     CBC with platelets       Plan:   Patient did have an isolated episode of dizziness that lasted about a minute this morning.  She appeared a little pale but otherwise in usual health.  EKG is normal.  If this continues will need additional work-up.  In the meantime we will do labs and urinalysis to include CBC, chemistry panel, TSH, free T4, lipids and UA.  Tdap administered.  She did not want a get Pneumovax this year but would consider next year.  She thought one vaccine today was enough.  She is fearful of blood draws and injections.  May continue to use the diclofenac's gel to her knees to help with her arthritis.  If this worsens we could have her see orthopedics.  Mammogram is scheduled.  Patient does not need Pap smears.    JAYASHREE Dorman   7/6/2020  11:21 AM

## 2020-07-06 NOTE — NURSING NOTE
"Chief Complaint   Patient presents with     Annual Visit         Initial /76 (BP Location: Right arm, Patient Position: Sitting, Cuff Size: Adult Regular)   Pulse 66   Temp 98.5  F (36.9  C) (Tympanic)   Resp 18   Ht 1.346 m (4' 5\")   Wt 58 kg (127 lb 12.8 oz)   LMP 06/30/2020 (Exact Date)   SpO2 96%   Breastfeeding No   BMI 31.99 kg/m   Estimated body mass index is 31.99 kg/m  as calculated from the following:    Height as of this encounter: 1.346 m (4' 5\").    Weight as of this encounter: 58 kg (127 lb 12.8 oz).    Medication Reconciliation: complete      Yusra David  "

## 2020-07-07 LAB — INTERPRETATION ECG - MUSE: NORMAL

## 2020-07-07 ASSESSMENT — ANXIETY QUESTIONNAIRES: GAD7 TOTAL SCORE: 0

## 2020-07-16 DIAGNOSIS — E03.8 OTHER SPECIFIED HYPOTHYROIDISM: ICD-10-CM

## 2020-07-20 LAB — INTERPRETATION ECG - MUSE: NORMAL

## 2020-07-20 RX ORDER — LEVOTHYROXINE SODIUM 175 UG/1
175 TABLET ORAL DAILY
Qty: 90 TABLET | Refills: 0 | Status: SHIPPED | OUTPATIENT
Start: 2020-07-20 | End: 2020-09-16

## 2020-07-21 ENCOUNTER — HOSPITAL ENCOUNTER (OUTPATIENT)
Dept: MAMMOGRAPHY | Facility: OTHER | Age: 45
Discharge: HOME | End: 2020-07-21
Attending: NURSE PRACTITIONER | Admitting: NURSE PRACTITIONER
Payer: MEDICARE

## 2020-07-21 DIAGNOSIS — Z12.31 ENCOUNTER FOR SCREENING MAMMOGRAM FOR BREAST CANCER: ICD-10-CM

## 2020-07-21 PROCEDURE — 77067 SCR MAMMO BI INCL CAD: CPT

## 2020-08-17 DIAGNOSIS — N92.6 MENSTRUAL DISORDER: ICD-10-CM

## 2020-08-18 ENCOUNTER — APPOINTMENT (OUTPATIENT)
Dept: OTOLARYNGOLOGY | Facility: OTHER | Age: 45
End: 2020-08-18
Attending: NURSE PRACTITIONER
Payer: MEDICARE

## 2020-08-18 RX ORDER — NORGESTIMATE AND ETHINYL ESTRADIOL 7DAYSX3 28
KIT ORAL
Qty: 84 TABLET | Refills: 3 | Status: SHIPPED | OUTPATIENT
Start: 2020-08-18 | End: 2021-07-23

## 2020-08-18 NOTE — TELEPHONE ENCOUNTER
[facility] sent Rx request for the following:   TRI-LINYAH 28-DAY TABLET      Sig:  TAKE 1 TABLET BY MOUTH DAILY   Last Prescription Date:   5/26/2020  Last Fill Qty/Refills:         84, R-0    Last Office Visit:              7/6/2020   Future Office visit:           None    Prescription approved per [facility] Refill Protocol.    Pratik Lange RN on 8/18/2020 at 11:32 AM

## 2020-08-19 ENCOUNTER — OFFICE VISIT (OUTPATIENT)
Dept: FAMILY MEDICINE | Facility: OTHER | Age: 45
End: 2020-08-19
Attending: PHYSICIAN ASSISTANT
Payer: MEDICARE

## 2020-08-19 VITALS
WEIGHT: 132.6 LBS | SYSTOLIC BLOOD PRESSURE: 110 MMHG | TEMPERATURE: 97.4 F | HEART RATE: 78 BPM | BODY MASS INDEX: 33.19 KG/M2 | RESPIRATION RATE: 20 BRPM | DIASTOLIC BLOOD PRESSURE: 80 MMHG

## 2020-08-19 DIAGNOSIS — H65.91 OME (OTITIS MEDIA WITH EFFUSION), RIGHT: Primary | ICD-10-CM

## 2020-08-19 PROCEDURE — G0463 HOSPITAL OUTPT CLINIC VISIT: HCPCS

## 2020-08-19 PROCEDURE — 99213 OFFICE O/P EST LOW 20 MIN: CPT | Performed by: PHYSICIAN ASSISTANT

## 2020-08-19 RX ORDER — FAMOTIDINE 40 MG/1
TABLET, FILM COATED ORAL
COMMUNITY
Start: 2020-08-17 | End: 2020-08-19

## 2020-08-19 RX ORDER — MULTIVITAMIN WITH FOLIC ACID 400 MCG
TABLET ORAL
COMMUNITY
Start: 2020-08-14 | End: 2021-02-16

## 2020-08-19 RX ORDER — AMOXICILLIN 875 MG
875 TABLET ORAL 2 TIMES DAILY
Qty: 20 TABLET | Refills: 0 | Status: SHIPPED | OUTPATIENT
Start: 2020-08-19 | End: 2020-08-29

## 2020-08-19 NOTE — PROGRESS NOTES
Nursing Notes:   Magdalene Casanova LPN  8/19/2020 10:05 AM  Signed  Chief Complaint   Patient presents with     Ear Problem     Right         Medication Reconciliation: complete    Magdalene Casanova LPN      HPI:    Reg Valiente is a 45 year old female who presents for right ear concerns.  Currently wears hearing aids.  She has had a difficult time recently with putting on her right hearing aid.  It tends to not work a bit difficult to place and there.  She has had some ear drainage from the right ear canal.  No ear pain, fevers, chills.  Wears earplugs when she showers.  Otherwise feeling well.    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     Mom reports negative AAS evaluation in the 1990's     Corneal ulcer 2004    caused by leaking of CPAP     Down's syndrome      Gastroesophageal reflux disease      JENNIFER (obstructive sleep apnea)     patient refuses CPAP       Past Surgical History:   Procedure Laterality Date     ENDOSCOPIC RETROGRADE CHOLANGIOPANCREATOGRAM N/A 7/1/2019    Procedure: Endoscopic Retrograde Cholangiopancreatogram, dilation, stent , sludge and stone removal, sphincterotomy;  Surgeon: Guru Harper Alvarado MD;  Location: [facility] OR     ENDOSCOPIC RETROGRADE CHOLANGIOPANCREATOGRAM N/A 8/26/2019    Procedure: Endoscopic Retrograde Cholangiopancreatogram with biliary stent removal and stone removal;  Surgeon: Guru Harper Alvarado MD;  Location: [facility] OR     ENDOSCOPIC RETROGRADE CHOLANGIOPANCREATOGRAPHY, EXCHANGE TUBE/STENT N/A 7/24/2019    Procedure: Endoscopic Retrograde Cholangiopancreatography with Billary Stone Extraction,Dilation             and stent exchange;  Surgeon: Guru Harper Alvarado MD;  Location: [facility] OR     LAPAROSCOPIC CHOLECYSTECTOMY WITH CHOLANGIOGRAMS N/A 9/9/2019    Procedure: CHOLECYSTECTOMY, LAPAROSCOPIC, WITH  CHOLANGIOGRAM;  Surgeon: Arvind Bailey MD;  Location: [facility] OR     NO HISTORY OF SURGERY      as reported by parents       Family History   Problem Relation Age of Onset     Asthma Mother      Arthritis Mother      Dementia Mother      LUNG DISEASE Father         pulmonary fibrosis secondary to chemical exposure     Diabetes Father      Cerebrovascular Disease Father 90     Cerebrovascular Disease Maternal Grandmother 90     Heart Failure Paternal Grandfather         uncertain which  replaced     Coronary Artery Disease No family hx of      Heart Disease No family hx of      Bleeding Diathesis No family hx of      Pancreatic Cancer No family hx of      Colorectal Cancer No family hx of        Social History     Tobacco Use     Smoking status: Never Smoker     Smokeless tobacco: Never Used   Substance Use Topics     Alcohol use: No       Current Outpatient Medications   Medication Sig Dispense Refill     amoxicillin (AMOXIL) 875 MG tablet Take 1 tablet (875 mg) by mouth 2 times daily for 10 days 20 tablet 0     citalopram (CELEXA) 20 MG tablet Take 1 tablet (20 mg) by mouth daily 90 tablet 3     diclofenac (VOLTAREN) 50 MG EC tablet TAKE 1 TABLET BY MOUTH TWICE A  tablet 3     famotidine (PEPCID) 20 MG tablet Take 1 tablet (20 mg) by mouth 2 times daily 180 tablet 3     levothyroxine (SYNTHROID/LEVOTHROID) 175 MCG tablet TAKE 1 TABLET (175 MCG) BY MOUTH DAILY 90 tablet 0     Multiple Vitamins-Minerals (TAB-A-MATTHIAS) TABS TAKE 1 TAB BY MOUTH ONCE DAILY       STOOL SOFTENER/LAXATIVE 50-8.6 MG tablet TAKE 1 TABLET BY MOUTH DAILY 90 tablet 3     TRI-LINYAH 0.18/0.215/0.25 MG-35 MCG tablet TAKE 1 TABLET BY MOUTH DAILY 84 tablet 3       Allergies   Allergen Reactions     Sulfa Drugs        REVIEW OF SYSTEMS:  Refer to HPI.    EXAM:   Vitals:    /80 (BP Location: Right arm, Patient Position: Sitting, Cuff Size: Adult Regular)   Pulse 78   Temp 97.4  F (36.3  C)   Resp 20   Wt 60.1 kg (132 lb 9.6 oz)    BMI 33.19 kg/m      General Appearance: Pleasant, alert, appropriate appearance for age. No acute distress  Ear Exam: Unable to fully appreciate the right eardrum due to light yellow drainage in the ear canal.   Normal left TM, grey, translucent, bony landmarks appreciated.   Left TM: Effusion is not present. TM is not bulging. There is no pus appreciated.    Normal auditory canals and external ears. Non-tender.  OroPharynx Exam:  Non-erythematous posterior pharynx with no exudates.   Chest/Respiratory Exam: Normal chest wall and respirations. Clear to auscultation. No retractions appreciated.  Cardiovascular Exam: Regular rate and rhythm. S1, S2, no murmur, click, gallop, or rubs.  Lymphatic Exam: Neg.  Skin: no rash or abnormalities  Psychiatric Exam: Alert and oriented - appropriate affect.    PHQ Depression Screen  PHQ-9 SCORE 8/14/2018   PHQ-9 Total Score 0       LABS:    No results found for any visits on 08/19/20.      ASSESSMENT AND PLAN:      ICD-10-CM    1. OME (otitis media with effusion), right  H65.91 amoxicillin (AMOXIL) 875 MG tablet         Started on amoxicillin for an acute otitis media of the right side.  Encourage recheck in 2 weeks for monitoring.  Return to clinic with change/worsening of symptoms.     Patient Instructions   Ear infection - Antibiotic has been sent to pharmacy. Please take full course of antibiotic even if symptoms have completely resolved. This helps prevent against antibiotic resistance.     Recheck ears in 2 weeks.     Please take tylenol or ibuprofen as needed for ear pain.  Monitor for any fevers or chills.  Please call clinic with any questions or concerns. Please take in a lot of fluids and get rest. Discouraged swimming while patient has the infection.    Encouraged to use warm compresses with a warm washcloth or a heating pad on the lowest setting for 10-15 minutes at a time several times daily for comfort.     You will need to be evaluated if you start to  experience:    Fever higher than 102.5 F (39.2 C)     Sudden and severe pain in the face and head     Trouble seeing or seeing double     Trouble thinking clearly     Swelling or redness around 1 or both eyes     Trouble breathing or a stiff neck    Call 9-1-1 or go to the emergency room if you:    Have trouble breathing     Are drooling because you cannot swallow your saliva     Have swelling of the neck or tongue     Cannot move your neck or have trouble opening your mouth         Anastasiya Jonas PA-C PA-C..................8/19/2020 10:05 AM

## 2020-08-19 NOTE — NURSING NOTE
Chief Complaint   Patient presents with     Ear Problem     Right         Medication Reconciliation: complete    Magdalene Casanova, LPN

## 2020-08-19 NOTE — PATIENT INSTRUCTIONS
Ear infection - Antibiotic has been sent to pharmacy. Please take full course of antibiotic even if symptoms have completely resolved. This helps prevent against antibiotic resistance.     Recheck ears in 2 weeks.     Please take tylenol or ibuprofen as needed for ear pain.  Monitor for any fevers or chills.  Please call clinic with any questions or concerns. Please take in a lot of fluids and get rest. Discouraged swimming while patient has the infection.    Encouraged to use warm compresses with a warm washcloth or a heating pad on the lowest setting for 10-15 minutes at a time several times daily for comfort.     You will need to be evaluated if you start to experience:    Fever higher than 102.5 F (39.2 C)     Sudden and severe pain in the face and head     Trouble seeing or seeing double     Trouble thinking clearly     Swelling or redness around 1 or both eyes     Trouble breathing or a stiff neck    Call 9-1-1 or go to the emergency room if you:    Have trouble breathing     Are drooling because you cannot swallow your saliva     Have swelling of the neck or tongue     Cannot move your neck or have trouble opening your mouth

## 2020-09-02 ENCOUNTER — OFFICE VISIT (OUTPATIENT)
Dept: FAMILY MEDICINE | Facility: OTHER | Age: 45
End: 2020-09-02
Attending: SURGERY
Payer: MEDICARE

## 2020-09-02 VITALS
DIASTOLIC BLOOD PRESSURE: 80 MMHG | RESPIRATION RATE: 24 BRPM | SYSTOLIC BLOOD PRESSURE: 100 MMHG | BODY MASS INDEX: 32.79 KG/M2 | HEART RATE: 72 BPM | WEIGHT: 131 LBS | TEMPERATURE: 97.9 F

## 2020-09-02 DIAGNOSIS — H66.014 RECURRENT ACUTE SUPPURATIVE OTITIS MEDIA OF RIGHT EAR WITH SPONTANEOUS RUPTURE OF TYMPANIC MEMBRANE: Primary | ICD-10-CM

## 2020-09-02 PROCEDURE — 99213 OFFICE O/P EST LOW 20 MIN: CPT | Performed by: PHYSICIAN ASSISTANT

## 2020-09-02 PROCEDURE — G0463 HOSPITAL OUTPT CLINIC VISIT: HCPCS

## 2020-09-02 RX ORDER — CEFDINIR 300 MG/1
300 CAPSULE ORAL 2 TIMES DAILY
Qty: 20 CAPSULE | Refills: 0 | Status: SHIPPED | OUTPATIENT
Start: 2020-09-02 | End: 2020-09-12

## 2020-09-02 NOTE — PROGRESS NOTES
Nursing Notes:   Magdalene Casanova LPN  9/2/2020 10:47 AM  Signed  Chief Complaint   Patient presents with     Follow Up     ear infection         Medication Reconciliation: complete    Magdalene Casanova LPN      HPI:    Reg Valiente is a 45 year old female who presents for follow-up ear infection.  Patient was recently diagnosed with an ear infection on 8/19/2020.  She was started on amoxicillin.  Patient has hearing aids.  Patient states that she did fine with the amoxicillin.  No side effects noted.  No recent fevers, chills, ear pain or ear drainage.  No other cough or cold symptoms.  Her staff states that she is still digging in her right ear.    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     Mom reports negative AAS evaluation in the 1990's     Corneal ulcer 2004    caused by leaking of CPAP     Down's syndrome      Gastroesophageal reflux disease      JENNIFER (obstructive sleep apnea)     patient refuses CPAP       Past Surgical History:   Procedure Laterality Date     ENDOSCOPIC RETROGRADE CHOLANGIOPANCREATOGRAM N/A 7/1/2019    Procedure: Endoscopic Retrograde Cholangiopancreatogram, dilation, stent , sludge and stone removal, sphincterotomy;  Surgeon: Guru Harper Alvarado MD;  Location: [facility]     ENDOSCOPIC RETROGRADE CHOLANGIOPANCREATOGRAM N/A 8/26/2019    Procedure: Endoscopic Retrograde Cholangiopancreatogram with biliary stent removal and stone removal;  Surgeon: Guru Harper Alvarado MD;  Location: [facility]     ENDOSCOPIC RETROGRADE CHOLANGIOPANCREATOGRAPHY, EXCHANGE TUBE/STENT N/A 7/24/2019    Procedure: Endoscopic Retrograde Cholangiopancreatography with Billary Stone Extraction,Dilation             and stent exchange;  Surgeon: Guru Harper Alvarado MD;  Location: [facility]     LAPAROSCOPIC CHOLECYSTECTOMY WITH CHOLANGIOGRAMS N/A 9/9/2019     Procedure: CHOLECYSTECTOMY, LAPAROSCOPIC, WITH CHOLANGIOGRAM;  Surgeon: Arvind Bailey MD;  Location: [facility]     NO HISTORY OF SURGERY      as reported by parents       Family History   Problem Relation Age of Onset     Asthma Mother      Arthritis Mother      Dementia Mother      LUNG DISEASE Father         pulmonary fibrosis secondary to chemical exposure     Diabetes Father      Cerebrovascular Disease Father 90     Cerebrovascular Disease Maternal Grandmother 90     Heart Failure Paternal Grandfather         uncertain which  replaced     Coronary Artery Disease No family hx of      Heart Disease No family hx of      Bleeding Diathesis No family hx of      Pancreatic Cancer No family hx of      Colorectal Cancer No family hx of        Social History     Tobacco Use     Smoking status: Never Smoker     Smokeless tobacco: Never Used   Substance Use Topics     Alcohol use: No       Current Outpatient Medications   Medication Sig Dispense Refill     cefdinir (OMNICEF) 300 MG capsule Take 1 capsule (300 mg) by mouth 2 times daily for 10 days 20 capsule 0     citalopram (CELEXA) 20 MG tablet Take 1 tablet (20 mg) by mouth daily 90 tablet 3     diclofenac (VOLTAREN) 50 MG EC tablet TAKE 1 TABLET BY MOUTH TWICE A  tablet 3     famotidine (PEPCID) 20 MG tablet Take 1 tablet (20 mg) by mouth 2 times daily 180 tablet 3     levothyroxine (SYNTHROID/LEVOTHROID) 175 MCG tablet TAKE 1 TABLET (175 MCG) BY MOUTH DAILY 90 tablet 0     Multiple Vitamins-Minerals (TAB-A-MATTHIAS) TABS TAKE 1 TAB BY MOUTH ONCE DAILY       STOOL SOFTENER/LAXATIVE 50-8.6 MG tablet TAKE 1 TABLET BY MOUTH DAILY 90 tablet 3     TRI-LINYAH 0.18/0.215/0.25 MG-35 MCG tablet TAKE 1 TABLET BY MOUTH DAILY 84 tablet 3       Allergies   Allergen Reactions     Sulfa Drugs        REVIEW OF SYSTEMS:  Refer to HPI.    EXAM:   Vitals:    /80 (BP Location: Right arm, Patient Position: Sitting, Cuff Size: Adult Regular)   Pulse 72   Temp 97.9  F  (36.6  C)   Resp 24   Wt 59.4 kg (131 lb)   BMI 32.79 kg/m      General Appearance: Pleasant, alert, appropriate appearance for age. No acute distress  Ear Exam: Erythematous right TM, translucent, bony landmarks appreciated.  A small hole is appreciated on the right tympanic membrane.  Left TM: Effusion is not present. TM is not bulging. There is no pus appreciated.    Right TM: Effusion is not present. TM is not bulging. There is no pus appreciated.    Normal auditory canals and external ears. Non-tender.  OroPharynx Exam:  Non-erythematous posterior pharynx with no exudates.   Chest/Respiratory Exam: Normal chest wall and respirations. Clear to auscultation. No retractions appreciated.  Cardiovascular Exam: Regular rate and rhythm. S1, S2, no murmur, click, gallop, or rubs.  Skin: no rash or abnormalities  Psychiatric Exam: Alert and oriented - appropriate affect.    PHQ Depression Screen  PHQ-9 SCORE 8/14/2018   PHQ-9 Total Score 0       LABS:    No results found for any visits on 09/02/20.      ASSESSMENT AND PLAN:      ICD-10-CM    1. Recurrent acute suppurative otitis media of right ear with spontaneous rupture of tympanic membrane  H66.014 cefdinir (OMNICEF) 300 MG capsule     OTOLARYNGOLOGY REFERRAL         Patient was started on cefdinir due to a recurrent ear infection.  She was also referred to ENT for consult due to the rupture of the tympanic membrane and recurrent ear infection.    Started on cefdinir.   Please follow up with ENT in 2-3 weeks after antibiotic is completed.     Ear infection - Antibiotic has been sent to pharmacy. Please take full course of antibiotic even if symptoms have completely resolved. This helps prevent against antibiotic resistance.     Please take tylenol or ibuprofen as needed for ear pain.  Monitor for any fevers or chills. Return in 7-10 days if not feeling better. Please call clinic with any questions or concerns. Please take in a lot of fluids and get rest.  Discouraged swimming while patient has the infection.    Encouraged to use warm compresses with a warm washcloth or a heating pad on the lowest setting for 10-15 minutes at a time several times daily for comfort.     May use cough syrup or cough drops.  Using a humidifier works well to break up the congestion. You can also sleep propped up on a couple pillows to decrease symptoms at night.     You will need to be evaluated if you start to experience:    Fever higher than 102.5 F (39.2 C)     Sudden and severe pain in the face and head     Trouble seeing or seeing double     Trouble thinking clearly     Swelling or redness around 1 or both eyes     Trouble breathing or a stiff neck    Call 9-1-1 or go to the emergency room if you:    Have trouble breathing     Are drooling because you cannot swallow your saliva     Have swelling of the neck or tongue     Cannot move your neck or have trouble opening your mouth      Patient Instructions   Started on cefdinir.   Please follow up with ENT in 2-3 weeks after antibiotic is completed.     Ear infection - Antibiotic has been sent to pharmacy. Please take full course of antibiotic even if symptoms have completely resolved. This helps prevent against antibiotic resistance.     Please take tylenol or ibuprofen as needed for ear pain.  Monitor for any fevers or chills. Return in 7-10 days if not feeling better. Please call clinic with any questions or concerns. Please take in a lot of fluids and get rest. Discouraged swimming while patient has the infection.    Encouraged to use warm compresses with a warm washcloth or a heating pad on the lowest setting for 10-15 minutes at a time several times daily for comfort.     May use cough syrup or cough drops.  Using a humidifier works well to break up the congestion. You can also sleep propped up on a couple pillows to decrease symptoms at night.     You will need to be evaluated if you start to experience:    Fever higher than  102.5 F (39.2 C)     Sudden and severe pain in the face and head     Trouble seeing or seeing double     Trouble thinking clearly     Swelling or redness around 1 or both eyes     Trouble breathing or a stiff neck    Call 9-1-1 or go to the emergency room if you:    Have trouble breathing     Are drooling because you cannot swallow your saliva     Have swelling of the neck or tongue     Cannot move your neck or have trouble opening your mouth         Anastasiya Jonas PA-C PA-C..................9/2/2020 10:46 AM

## 2020-09-02 NOTE — PATIENT INSTRUCTIONS
Started on cefdinir.   Please follow up with ENT in 2-3 weeks after antibiotic is completed.     Ear infection - Antibiotic has been sent to pharmacy. Please take full course of antibiotic even if symptoms have completely resolved. This helps prevent against antibiotic resistance.     Please take tylenol or ibuprofen as needed for ear pain.  Monitor for any fevers or chills. Return in 7-10 days if not feeling better. Please call clinic with any questions or concerns. Please take in a lot of fluids and get rest. Discouraged swimming while patient has the infection.    Encouraged to use warm compresses with a warm washcloth or a heating pad on the lowest setting for 10-15 minutes at a time several times daily for comfort.     May use cough syrup or cough drops.  Using a humidifier works well to break up the congestion. You can also sleep propped up on a couple pillows to decrease symptoms at night.     You will need to be evaluated if you start to experience:    Fever higher than 102.5 F (39.2 C)     Sudden and severe pain in the face and head     Trouble seeing or seeing double     Trouble thinking clearly     Swelling or redness around 1 or both eyes     Trouble breathing or a stiff neck    Call 9-1-1 or go to the emergency room if you:    Have trouble breathing     Are drooling because you cannot swallow your saliva     Have swelling of the neck or tongue     Cannot move your neck or have trouble opening your mouth

## 2020-09-15 DIAGNOSIS — E03.8 OTHER SPECIFIED HYPOTHYROIDISM: ICD-10-CM

## 2020-09-16 ENCOUNTER — OFFICE VISIT (OUTPATIENT)
Dept: FAMILY MEDICINE | Facility: OTHER | Age: 45
End: 2020-09-16
Payer: MEDICARE

## 2020-09-16 VITALS
RESPIRATION RATE: 20 BRPM | HEART RATE: 62 BPM | TEMPERATURE: 97.8 F | BODY MASS INDEX: 32.84 KG/M2 | WEIGHT: 131.2 LBS | SYSTOLIC BLOOD PRESSURE: 90 MMHG | DIASTOLIC BLOOD PRESSURE: 62 MMHG

## 2020-09-16 DIAGNOSIS — H72.91 RUPTURED EAR DRUM, RIGHT: Primary | ICD-10-CM

## 2020-09-16 PROCEDURE — 99213 OFFICE O/P EST LOW 20 MIN: CPT | Performed by: PHYSICIAN ASSISTANT

## 2020-09-16 PROCEDURE — G0463 HOSPITAL OUTPT CLINIC VISIT: HCPCS

## 2020-09-16 RX ORDER — LEVOTHYROXINE SODIUM 175 UG/1
175 TABLET ORAL DAILY
Qty: 90 TABLET | Refills: 0 | Status: SHIPPED | OUTPATIENT
Start: 2020-09-16 | End: 2020-12-16

## 2020-09-16 ASSESSMENT — PAIN SCALES - GENERAL: PAINLEVEL: NO PAIN (0)

## 2020-09-16 NOTE — NURSING NOTE
Chief Complaint   Patient presents with     Follow Up     ear         Medication Reconciliation: complete    Magdalene Casanova, LPN

## 2020-09-16 NOTE — PROGRESS NOTES
Nursing Notes:   Magdalene Casanova LPN  9/16/2020 11:02 AM  Signed  Chief Complaint   Patient presents with     Follow Up     ear         Medication Reconciliation: complete    Magdalene Casanova LPN      HPI:    Reg Valiente is a 45 year old female who presents for follow up ear.  Patient was previously seen on 9/2/2020.  At that time patient was diagnosed with a recurrent acute separative otitis media of the right ear with spontaneous rupture of tympanic membrane.  Patient was given a prescription for antibiotics.  Here for ear recheck.  No fevers or chills.  No cough, wheezing, rattling, ear drainage, sore throat.    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     Mom reports negative AAS evaluation in the 1990's     Corneal ulcer 2004    caused by leaking of CPAP     Down's syndrome      Gastroesophageal reflux disease      JENNIFER (obstructive sleep apnea)     patient refuses CPAP       Past Surgical History:   Procedure Laterality Date     ENDOSCOPIC RETROGRADE CHOLANGIOPANCREATOGRAM N/A 7/1/2019    Procedure: Endoscopic Retrograde Cholangiopancreatogram, dilation, stent , sludge and stone removal, sphincterotomy;  Surgeon: Guru Harper Alvarado MD;  Location: [facility]     ENDOSCOPIC RETROGRADE CHOLANGIOPANCREATOGRAM N/A 8/26/2019    Procedure: Endoscopic Retrograde Cholangiopancreatogram with biliary stent removal and stone removal;  Surgeon: Guru Harper Alvarado MD;  Location: [facility]     ENDOSCOPIC RETROGRADE CHOLANGIOPANCREATOGRAPHY, EXCHANGE TUBE/STENT N/A 7/24/2019    Procedure: Endoscopic Retrograde Cholangiopancreatography with Billary Stone Extraction,Dilation             and stent exchange;  Surgeon: Guru Harper Alvarado MD;  Location: [facility]     LAPAROSCOPIC CHOLECYSTECTOMY WITH CHOLANGIOGRAMS N/A 9/9/2019    Procedure: CHOLECYSTECTOMY,  LAPAROSCOPIC, WITH CHOLANGIOGRAM;  Surgeon: Arvind Bailey MD;  Location: [facility]     NO HISTORY OF SURGERY      as reported by parents       Family History   Problem Relation Age of Onset     Asthma Mother      Arthritis Mother      Dementia Mother      LUNG DISEASE Father         pulmonary fibrosis secondary to chemical exposure     Diabetes Father      Cerebrovascular Disease Father 90     Cerebrovascular Disease Maternal Grandmother 90     Heart Failure Paternal Grandfather         uncertain which  replaced     Coronary Artery Disease No family hx of      Heart Disease No family hx of      Bleeding Diathesis No family hx of      Pancreatic Cancer No family hx of      Colorectal Cancer No family hx of        Social History     Tobacco Use     Smoking status: Never Smoker     Smokeless tobacco: Never Used   Substance Use Topics     Alcohol use: No       Current Outpatient Medications   Medication Sig Dispense Refill     citalopram (CELEXA) 20 MG tablet Take 1 tablet (20 mg) by mouth daily 90 tablet 3     diclofenac (VOLTAREN) 50 MG EC tablet TAKE 1 TABLET BY MOUTH TWICE A  tablet 3     famotidine (PEPCID) 20 MG tablet Take 1 tablet (20 mg) by mouth 2 times daily 180 tablet 3     Multiple Vitamins-Minerals (TAB-A-MATTHIAS) TABS TAKE 1 TAB BY MOUTH ONCE DAILY       STOOL SOFTENER/LAXATIVE 50-8.6 MG tablet TAKE 1 TABLET BY MOUTH DAILY 90 tablet 3     TRI-LINYAH 0.18/0.215/0.25 MG-35 MCG tablet TAKE 1 TABLET BY MOUTH DAILY 84 tablet 3     levothyroxine (SYNTHROID/LEVOTHROID) 175 MCG tablet TAKE 1 TABLET (175 MCG) BY MOUTH DAILY 90 tablet 0       Allergies   Allergen Reactions     Sulfa Drugs        REVIEW OF SYSTEMS:  Refer to HPI.    EXAM:   Vitals:    BP 90/62 (BP Location: Right arm, Patient Position: Sitting, Cuff Size: Adult Regular)   Pulse 62   Temp 97.8  F (36.6  C)   Resp 20   Wt 59.5 kg (131 lb 3.2 oz)   BMI 32.84 kg/m      General Appearance: Pleasant, alert, appropriate appearance for age. No  acute distress  Ear Exam: A small hole is appreciated on the right tympanic membrane that is nonerythematous.  Normal left TM, grey, translucent, bony landmarks appreciated.   Left/Right TM: Effusion is not present. TM is not bulging. There is no pus appreciated.    Normal auditory canals and external ears. Non-tender.  OroPharynx Exam:  Non-erythematous posterior pharynx with no exudates.  Chest/Respiratory Exam: Normal chest wall and respirations. Clear to auscultation. No retractions appreciated.  Cardiovascular Exam: Regular rate and rhythm. S1, S2, no murmur, click, gallop, or rubs.  Skin: no rash or abnormalities  Psychiatric Exam: Alert and oriented - appropriate affect.    PHQ Depression Screen  PHQ-9 SCORE 8/14/2018   PHQ-9 Total Score 0       LABS:    No results found for any visits on 09/16/20.      ASSESSMENT AND PLAN:      ICD-10-CM    1. Ruptured ear drum, right  H72.91        Follow up with ENT for an ear recheck.   No new antibiotics are warranted at this time.    Patient Instructions   Follow up with ENT for an ear recheck.        Anastasiya Jonas PA-C PA-C..................9/16/2020 11:01 AM

## 2020-09-16 NOTE — TELEPHONE ENCOUNTER
[id number] sent Rx request for the following:      LEVOTHYROXINE 175MCG TABLET   Sig: TAKE 1 TABLET (175 MCG) BY MOUTH DAILY       Last Prescription Date:   7/20/2020  Last Fill Qty/Refills:         90, R-0    Last Office Visit:              9/2/2020   Future Office visit:           9/16/2020    Prescription refilled per RN Medication Refill Policy.................... Liborio Kendall RN ....................  9/16/2020   2:21 PM

## 2020-09-22 DIAGNOSIS — Z90.49 S/P LAPAROSCOPIC CHOLECYSTECTOMY: ICD-10-CM

## 2020-09-23 RX ORDER — STANDARDIZED SENNA CONCENTRATE AND DOCUSATE SODIUM 8.6; 5 MG/1; MG/1
TABLET ORAL
Qty: 90 TABLET | Refills: 3 | Status: SHIPPED | OUTPATIENT
Start: 2020-09-23 | End: 2021-08-18

## 2020-09-23 NOTE — TELEPHONE ENCOUNTER
"Requested Prescriptions   Pending Prescriptions Disp Refills     SENOKOT S 8.6-50 MG tablet [Pharmacy Med Name: SENOKOT-S 50MG-8.6MG TABLET] 90 tablet 3     Sig: TAKE 1 TABLET BY MOUTH DAILY       Laxatives Protocol Passed - 9/22/2020  4:51 PM        Passed - Patient is age 6 or older        Passed - Recent (12 mo) or future (30 days) visit within the authorizing provider's specialty     Patient has had an office visit with the authorizing provider or a provider within the authorizing providers department within the previous 12 mos or has a future within next 30 days. See \"Patient Info\" tab in inbasket, or \"Choose Columns\" in Meds & Orders section of the refill encounter.              Passed - Medication is active on med list         LOV 9/16/20 Oja  Prescription approved per [facility] Refill Protocol.  Brenda J. Goodell, RN on 9/23/2020 at 2:48 PM      "

## 2020-10-13 ENCOUNTER — OFFICE VISIT (OUTPATIENT)
Dept: OTOLARYNGOLOGY | Facility: OTHER | Age: 45
End: 2020-10-13
Attending: OTOLARYNGOLOGY
Payer: MEDICARE

## 2020-10-13 DIAGNOSIS — M17.0 PRIMARY OSTEOARTHRITIS OF BOTH KNEES: ICD-10-CM

## 2020-10-13 DIAGNOSIS — H72.91 PERFORATION OF TYMPANIC MEMBRANE, RIGHT: Primary | ICD-10-CM

## 2020-10-13 PROCEDURE — G0463 HOSPITAL OUTPT CLINIC VISIT: HCPCS

## 2020-10-14 NOTE — TELEPHONE ENCOUNTER
sent Rx request for the following:   diclofenac (VOLTAREN) 50 MG EC tablet  Sig: TAKE 1 TABLET BY MOUTH TWICE A DAY    Last Prescription Date:   11/20/2019  Last Fill Qty/Refills:         180, R-3    Last Office Visit:              09/16/2020   Future Office visit:           none  Routing refill request to provider for review/approval because:  Labs not current/out of range:  AST, ALT  AST   Date Value Ref Range Status   07/06/2020 43 (H) 13 - 39 U/L Final     Lab Results   Component Value Date    ALT 58 07/06/2020           Unable to complete prescription refill per RN Medication Refill Policy.................... Grisel Mcmanus RN ....................  10/14/2020   1:10 PM

## 2020-10-15 NOTE — PROGRESS NOTES
document embedded image  Patient Name: Reg Valiente    Address: [address] [place] [address]     YOB: 1975    [place] [address]    MR Number: [id number]    Phone: [phone]  PCP: Mikayla Gtz NP            Appointment Date: 10/13/20   Visit Provider: Moris Frausto MD    cc: Mikayla Gtz NP; ~    ENT Progress Note    Visit Reasons: Recurrent Ear infection right ear ruptured TM    HPI  History of Present Illness  Chief complaint:  Follow-up tympanic membrane rupture right side    History  The patient is a 45-year-old female who is here today with a caregiver for follow-up of a tympanic membrane rupture on the right.  She is no longer draining.  She does not notice significant hearing loss.    Exam  The external auditory canals are clear bilaterally.  There is a persisting perforation of the right tympanic membrane without drainage or inflammation.  The left tympanic membrane appears to be intact.  Remainder of the head neck exam is unremarkable    PFSH  PFSH:     Medical History (Updated 10/14/20 @ 13:44 by Moris Frausto MD)    Diagnosis unknown  No eCW       A&P  Assessment & Plan  (1) Tympanic membrane perforation:        Status: Acute        Code(s):  H72.90 - Unspecified perforation of tympanic membrane, unspecified ear        Qualifiers:          Laterality: right  Qualified Code(s): H72.91 - Unspecified perforation of tympanic membrane, right ear  Additional Plan Details  Plan Details: She will follow up in 3 months to confirm closure of the perforation.      Moris Frausto MD    10/14/20 1341    <Electronically signed by Moris Frausto MD> 10/14/20 5940

## 2020-11-11 DIAGNOSIS — E03.8 OTHER SPECIFIED HYPOTHYROIDISM: ICD-10-CM

## 2020-11-11 RX ORDER — LEVOTHYROXINE SODIUM 175 UG/1
175 TABLET ORAL DAILY
Qty: 90 TABLET | Refills: 0 | OUTPATIENT
Start: 2020-11-11

## 2020-11-11 NOTE — TELEPHONE ENCOUNTER
[id number] sent Rx request for the following: SYNTHROID/LEVOTHROID) 175 MCG tablet [Pharmacy Med Name: LEVOTHYROXINE 175MCG TABLET  Sig: TAKE 1 TABLET (175 MCG) BY MOUTH DAILY    Last Prescription Date:   09/16/2020  Last Fill Qty/Refills:         90, R-0      Redundant refill request refused: Too soon:    Patient should have enough to reach 12/15/2020.    Unable to complete prescription refill per RN Medication Refill Policy.................... Lyndsey Taylor RN ....................  11/11/2020   2:44 PM

## 2020-12-16 DIAGNOSIS — E03.8 OTHER SPECIFIED HYPOTHYROIDISM: ICD-10-CM

## 2020-12-16 RX ORDER — LEVOTHYROXINE SODIUM 175 UG/1
175 TABLET ORAL DAILY
Qty: 90 TABLET | Refills: 1 | Status: SHIPPED | OUTPATIENT
Start: 2020-12-16 | End: 2021-06-23

## 2020-12-16 NOTE — TELEPHONE ENCOUNTER
Prescription approved per [facility] Refill Protocol.  LOV and labs: 7/6/2020  Li Malik RN on 12/16/2020 at 9:41 AM

## 2021-01-05 ENCOUNTER — OFFICE VISIT (OUTPATIENT)
Dept: OTOLARYNGOLOGY | Facility: OTHER | Age: 46
End: 2021-01-05
Attending: OTOLARYNGOLOGY
Payer: MEDICARE

## 2021-01-05 DIAGNOSIS — H72.91 UNSPECIFIED PERFORATION OF TYMPANIC MEMBRANE, RIGHT EAR: Primary | ICD-10-CM

## 2021-01-05 DIAGNOSIS — H90.6 MIXED CONDUCTIVE AND SENSORINEURAL HEARING LOSS OF BOTH EARS: ICD-10-CM

## 2021-01-05 PROCEDURE — G0463 HOSPITAL OUTPT CLINIC VISIT: HCPCS

## 2021-01-05 NOTE — NURSING NOTE
Chief Complaint   Patient presents with     Entropion Follow Up     both ears     Hearing aides in both ears    Medication Reconciliation: complete    Sydnie Escobar LPN

## 2021-01-11 NOTE — PROGRESS NOTES
document embedded image  Patient Name: Reg Valiente    Address: [address] [place] [address]     YOB: 1975    [place] [address]    MR Number: [id number]    Phone: [phone]  PCP: Mikayla tGz NP            Appointment Date: 01/05/21   Visit Provider: Moris Frausto MD    cc: Mikayla Gtz NP; ~    ENT Progress Note  Visit Reasons: Recheck Ear Drum / Hearing    HPI  History of Present Illness  Chief complaint:  Follow-up tympanic membrane perforation right side    History  The patient is a 45-year-old female with Down syndrome is here today with her caregiver.  She is having no drainage from either ear at this time.    Exam  The external auditory canals are cleared of debris bilaterally.  The left tympanic membrane is intact.  The right tympanic membrane shows a 30% inferior perforation with clean dry middle ear mucosa.  Audiogram shows a sensorineural hearing loss in the left ear with additional conductive hearing loss in the right ear.  Remainder of the head neck exam is unremarkable    Home Medications     - Last Reconciled 01/06/21 by Eusebia Malik CMA    citalopram 20 mg tablet  20 mg PO DAILY  diclofenac sodium 50 mg tablet,delayed release  50 mg PO BID  famotidine 20 mg tablet  20 mg PO BID  levothyroxine 175 mcg capsule  175 mcg PO DAILY  multivitamin with minerals (Multiple Vitamin-Minerals)  1 tab PO DAILY  norgestimate-ethinyl estradiol 0.18 mg/0.215mg/0.25mg-35 mcg(28)tablet   1 tab PO DAILY  sennosides 8.6 mg-docusate sodium 50 mg tablet   1 tab-cap PO DAILY    Allergies    Sulfa (Sulfonamide Antibiotics) Allergy (Unverified 01/06/21 10:37)  Unknown    PFSH  PFSH:     Medical History (Updated 01/06/21 @ 10:37 by Eusebia Malik CMA)    Diagnosis unknown  No eCW  Ear pressure  Hearing loss     Family History (Updated 01/06/21 @ 10:38 by Eusebia Malik CMA)  Other  Diabetes mellitus       Social History (Updated 01/06/21 @ 10:38 by Eusebia Malik  CMA)  Smoking Status:  Never smoker       A&P  Assessment & Plan  (1) Tympanic membrane perforation:        Status: Acute        Code(s):  H72.90 - Unspecified perforation of tympanic membrane, unspecified ear        Qualifiers:          Laterality: right  Qualified Code(s): H72.91 - Unspecified perforation of tympanic membrane, right ear  (2) Mixed hearing loss, bilateral:        Status: Acute        Code(s):  H90.6 - Mixed conductive and sensorineural hearing loss, bilateral  Additional Plan Details  Plan Details: They were counseled that the perforation remains on the right.  She has substantially greater concur component to her hearing loss on the right than she has on the left.  This potentially could be recovered with surgery on the ear.  We will maintain a conservative posture for now.  They will call if she develops drainage.  We will inspect the ear yearly.      Moris Frausto MD    01/05/21 7090    <Electronically signed by Moris Frausto MD> 01/06/21 4161

## 2021-02-16 DIAGNOSIS — Z00.00 HEALTH CARE MAINTENANCE: Primary | ICD-10-CM

## 2021-02-16 RX ORDER — MULTIVITAMIN WITH FOLIC ACID 400 MCG
TABLET ORAL
Qty: 90 TABLET | Refills: 1 | Status: SHIPPED | OUTPATIENT
Start: 2021-02-16 | End: 2021-08-24

## 2021-02-16 NOTE — TELEPHONE ENCOUNTER
"[id number] sent Rx request for the following:   Multiple Vitamins-Minerals (TAB-A-MATTHIAS) TABS  Sig: TAKE 1 TAB BY MOUTH ONCE DAILY    Last Prescription Date:   08/14/2020 (Historical)  Last Fill Qty/Refills:         0, R-0    Last Office Visit:              07/06/2020 (Tresa)   Future Office visit:           None noted   Vitamin Supplements (Adult) Protocol Passed - 2/16/2021  9:28 AM        Passed - High dose Vitamin D not ordered        Passed - Recent (12 mo) or future (30 days) visit within the authorizing provider's specialty     Patient has had an office visit with the authorizing provider or a provider within the authorizing providers department within the previous 12 mos or has a future within next 30 days. See \"Patient Info\" tab in inbasket, or \"Choose Columns\" in Meds & Orders section of the refill encounter.              Passed - Medication is active on med list           Routing due to historical notation of RX. Unable to complete prescription refill per RN Medication Refill Policy.................... Ami Jorge RN ....................  2/16/2021   10:55 AM        "

## 2021-02-22 ENCOUNTER — OFFICE VISIT (OUTPATIENT)
Dept: FAMILY MEDICINE | Facility: OTHER | Age: 46
End: 2021-02-22
Attending: PHYSICIAN ASSISTANT
Payer: MEDICARE

## 2021-02-22 VITALS
HEART RATE: 64 BPM | WEIGHT: 140 LBS | RESPIRATION RATE: 18 BRPM | TEMPERATURE: 99.4 F | DIASTOLIC BLOOD PRESSURE: 54 MMHG | SYSTOLIC BLOOD PRESSURE: 102 MMHG | BODY MASS INDEX: 35.04 KG/M2

## 2021-02-22 DIAGNOSIS — R39.89 URINARY PROBLEM: Primary | ICD-10-CM

## 2021-02-22 LAB
ALBUMIN UR-MCNC: 10 MG/DL
APPEARANCE UR: ABNORMAL
BACTERIA #/AREA URNS HPF: ABNORMAL /HPF
BILIRUB UR QL STRIP: NEGATIVE
COLOR UR AUTO: YELLOW
GLUCOSE UR STRIP-MCNC: NEGATIVE MG/DL
HGB UR QL STRIP: NEGATIVE
KETONES UR STRIP-MCNC: NEGATIVE MG/DL
LEUKOCYTE ESTERASE UR QL STRIP: NEGATIVE
MUCOUS THREADS #/AREA URNS LPF: PRESENT /LPF
NITRATE UR QL: NEGATIVE
PH UR STRIP: 5.5 PH (ref 5–7)
RBC #/AREA URNS AUTO: 2 /HPF (ref 0–2)
SOURCE: ABNORMAL
SP GR UR STRIP: 1.02 (ref 1–1.03)
SQUAMOUS #/AREA URNS AUTO: 13 /HPF (ref 0–1)
UROBILINOGEN UR STRIP-MCNC: NORMAL MG/DL (ref 0–2)
WBC #/AREA URNS AUTO: 3 /HPF (ref 0–5)

## 2021-02-22 PROCEDURE — G0463 HOSPITAL OUTPT CLINIC VISIT: HCPCS

## 2021-02-22 PROCEDURE — 81001 URINALYSIS AUTO W/SCOPE: CPT | Mod: ZL | Performed by: PHYSICIAN ASSISTANT

## 2021-02-22 PROCEDURE — 99213 OFFICE O/P EST LOW 20 MIN: CPT | Performed by: PHYSICIAN ASSISTANT

## 2021-02-22 PROCEDURE — 87086 URINE CULTURE/COLONY COUNT: CPT | Mod: ZL | Performed by: PHYSICIAN ASSISTANT

## 2021-02-22 NOTE — PROGRESS NOTES
Assessment & Plan     1. Urinary problem  UA without positive signs of UTI. Did order culture for additional evaluation, if positive will treat. Recommend good hygiene. Follow up as needed.   - UA reflex to Microscopic and Culture  - Urine Culture Aerobic Bacterial        Return if symptoms worsen or fail to improve.    Ines Goldman PA-C  [facility]    Subjective   Reg is a 46 year old who presents for the following health issues with a staff member    HPI   Both patient and staff are unsure why they are here. I told them the appointment was made for possible UTI and staff member states she has not noticed any changes. States she does have some concerns with her hygiene after using the restroom but no signs of UTI at this time. No associated fever/chills, urinary frequency or urgency, dysuria, hematuria, flank pain, abdominal pain, nausea, vomiting, diarrhea, constipation, vaginal symptoms.       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     Mom reports negative AAS evaluation in the 1990's     Corneal ulcer 2004    caused by leaking of CPAP     Down's syndrome      Gastroesophageal reflux disease      JENNIFER (obstructive sleep apnea)     patient refuses CPAP       PAST SURGICAL HISTORY:   Past Surgical History:   Procedure Laterality Date     ENDOSCOPIC RETROGRADE CHOLANGIOPANCREATOGRAM N/A 7/1/2019    Procedure: Endoscopic Retrograde Cholangiopancreatogram, dilation, stent , sludge and stone removal, sphincterotomy;  Surgeon: Guru Harper Alvarado MD;  Location: [facility] OR     ENDOSCOPIC RETROGRADE CHOLANGIOPANCREATOGRAM N/A 8/26/2019    Procedure: Endoscopic Retrograde Cholangiopancreatogram with biliary stent removal and stone removal;  Surgeon: Guru Harper Alvarado MD;  Location: [facility] OR     ENDOSCOPIC RETROGRADE  CHOLANGIOPANCREATOGRAPHY, EXCHANGE TUBE/STENT N/A 7/24/2019    Procedure: Endoscopic Retrograde Cholangiopancreatography with Billary Stone Extraction,Dilation             and stent exchange;  Surgeon: Guru Harper Alvarado MD;  Location: [facility] OR     LAPAROSCOPIC CHOLECYSTECTOMY WITH CHOLANGIOGRAMS N/A 9/9/2019    Procedure: CHOLECYSTECTOMY, LAPAROSCOPIC, WITH CHOLANGIOGRAM;  Surgeon: Arvind Bailey MD;  Location: [facility] OR     NO HISTORY OF SURGERY      as reported by parents       FAMILY HISTORY:   Family History   Problem Relation Age of Onset     Asthma Mother      Arthritis Mother      Dementia Mother      LUNG DISEASE Father         pulmonary fibrosis secondary to chemical exposure     Diabetes Father      Cerebrovascular Disease Father 90     Cerebrovascular Disease Maternal Grandmother 90     Heart Failure Paternal Grandfather         uncertain which  replaced     Coronary Artery Disease No family hx of      Heart Disease No family hx of      Bleeding Diathesis No family hx of      Pancreatic Cancer No family hx of      Colorectal Cancer No family hx of        SOCIAL HISTORY:   Social History     Tobacco Use     Smoking status: Never Smoker     Smokeless tobacco: Never Used   Substance Use Topics     Alcohol use: No        Allergies   Allergen Reactions     Sulfa Drugs      Current Outpatient Medications   Medication     citalopram (CELEXA) 20 MG tablet     diclofenac (VOLTAREN) 50 MG EC tablet     famotidine (PEPCID) 20 MG tablet     levothyroxine (SYNTHROID/LEVOTHROID) 175 MCG tablet     Multiple Vitamins-Minerals (TAB-A-MATTHIAS) TABS     SENOKOT S 8.6-50 MG tablet     TRI-LINYAH 0.18/0.215/0.25 MG-35 MCG tablet     No current facility-administered medications for this visit.          Review of Systems   Constitutional, HEENT, cardiovascular, pulmonary, gi and gu systems are negative, except as otherwise noted.      Objective    /54   Pulse 64   Temp 99.4  F (37.4  C)   Resp 18   Wt  63.5 kg (140 lb)   Breastfeeding No   BMI 35.04 kg/m    Body mass index is 35.04 kg/m .  Physical Exam   General: Pleasant, in no apparent distress.  Cardiovascular: Regular rate and rhythm with S1 equal to S2. No murmurs, friction rubs, or gallops.   Respiratory: Lungs are resonant and clear to auscultation bilaterally. No wheezes, crackles, or rhonchi.  Abdomen: Abdomen is non-distended and without bulging flanks, prominent venous markings, or ecchymosis. Active bowel sounds heard in all four quadrants. No tenderness to palpation in all four quadrants. No rebound tenderness or guarding. No palpable masses noted. No hepatosplenomegaly.  Psych: Appropriate mood and affect.    Results for orders placed or performed in visit on 02/22/21   UA reflex to Microscopic and Culture     Status: Abnormal    Specimen: Midstream Urine   Result Value Ref Range    Color Urine Yellow     Appearance Urine Slightly Cloudy     Glucose Urine Negative NEG^Negative mg/dL    Bilirubin Urine Negative NEG^Negative    Ketones Urine Negative NEG^Negative mg/dL    Specific Gravity Urine 1.020 1.003 - 1.035    Blood Urine Negative NEG^Negative    pH Urine 5.5 5.0 - 7.0 pH    Protein Albumin Urine 10 (A) NEG^Negative mg/dL    Urobilinogen mg/dL Normal 0.0 - 2.0 mg/dL    Nitrite Urine Negative NEG^Negative    Leukocyte Esterase Urine Negative NEG^Negative    Source Midstream Urine     RBC Urine 2 0 - 2 /HPF    WBC Urine 3 0 - 5 /HPF    Bacteria Urine Few (A) NEG^Negative /HPF    Squamous Epithelial /HPF Urine 13 (H) 0 - 1 /HPF    Mucous Urine Present (A) NEG^Negative /LPF

## 2021-02-24 LAB
BACTERIA SPEC CULT: NORMAL
SPECIMEN SOURCE: NORMAL

## 2021-05-18 DIAGNOSIS — F41.1 GAD (GENERALIZED ANXIETY DISORDER): ICD-10-CM

## 2021-05-18 RX ORDER — CITALOPRAM HYDROBROMIDE 20 MG/1
20 TABLET ORAL DAILY
Qty: 90 TABLET | Refills: 3 | OUTPATIENT
Start: 2021-05-18

## 2021-05-18 RX ORDER — FAMOTIDINE 40 MG/1
TABLET, FILM COATED ORAL
Qty: 360 TABLET | OUTPATIENT
Start: 2021-05-18

## 2021-05-18 NOTE — TELEPHONE ENCOUNTER
"[id number] sent Rx request for the following:   citalopram (CELEXA) 20 MG tablet  SigTAKE 1 TABLET (20 MG) BY MOUTH DAILY    Last Prescription Date:   07/06/2020  Last Fill Qty/Refills:         90, R-3    SSRIs Protocol Passed - 5/18/2021  9:34 AM       Passed - Recent (12 mo) or future (30 days) visit within the authorizing provider's specialty    Patient has had an office visit with the authorizing provider or a provider within the authorizing providers department within the previous 12 mos or has a future within next 30 days. See \"Patient Info\" tab in inbasket, or \"Choose Columns\" in Meds & Orders section of the refill encounter.             Passed - Medication is active on med list       Passed - Patient is age 18 or older       Passed - No active pregnancy on record       Passed - No positive pregnancy test in last 12 months       famotidine (PEPCID) 40 MG tablet  SigTAKE 1/2 TAB (20MG) BY MOUTH TWICE A DAY    Last Prescription Date:   07/06/2020  Last Fill Qty/Refills:         180, R-3    H2 Blockers Protocol Passed - 5/18/2021  9:34 AM       Passed - Patient is age 12 or older       Passed - Recent (12 mo) or future (30 days) visit within the authorizing provider's specialty    Patient has had an office visit with the authorizing provider or a provider within the authorizing providers department within the previous 12 mos or has a future within next 30 days. See \"Patient Info\" tab in inbasket, or \"Choose Columns\" in Meds & Orders section of the refill encounter.             Passed - Medication is active on med list       Last Office Visit:              07/06/2020 (Tresa)   Future Office visit:           None noted    Redundant refill request refused: Too soon:    Unable to complete prescription refill per RN Medication Refill Policy.................... Ami Gonzáles RN ....................  5/18/2021   12:06 PM        "

## 2021-06-05 ENCOUNTER — OFFICE VISIT (OUTPATIENT)
Dept: FAMILY MEDICINE | Facility: OTHER | Age: 46
End: 2021-06-05
Attending: NURSE PRACTITIONER
Payer: MEDICARE

## 2021-06-05 VITALS
HEART RATE: 70 BPM | OXYGEN SATURATION: 95 % | TEMPERATURE: 100.6 F | SYSTOLIC BLOOD PRESSURE: 98 MMHG | RESPIRATION RATE: 18 BRPM | DIASTOLIC BLOOD PRESSURE: 58 MMHG | WEIGHT: 140.6 LBS | BODY MASS INDEX: 35.19 KG/M2

## 2021-06-05 DIAGNOSIS — J34.89 NASAL DRAINAGE: ICD-10-CM

## 2021-06-05 DIAGNOSIS — R50.9 LOW GRADE FEVER: Primary | ICD-10-CM

## 2021-06-05 LAB
ALBUMIN UR-MCNC: NEGATIVE MG/DL
APPEARANCE UR: CLEAR
BILIRUB UR QL STRIP: NEGATIVE
COLOR UR AUTO: YELLOW
GLUCOSE UR STRIP-MCNC: NEGATIVE MG/DL
HGB UR QL STRIP: ABNORMAL
KETONES UR STRIP-MCNC: NEGATIVE MG/DL
LEUKOCYTE ESTERASE UR QL STRIP: NEGATIVE
MUCOUS THREADS #/AREA URNS LPF: PRESENT /LPF
NITRATE UR QL: NEGATIVE
PH UR STRIP: 7 PH (ref 5–7)
RBC #/AREA URNS AUTO: 61 /HPF (ref 0–2)
SOURCE: ABNORMAL
SP GR UR STRIP: 1.01 (ref 1–1.03)
SQUAMOUS #/AREA URNS AUTO: 4 /HPF (ref 0–1)
UROBILINOGEN UR STRIP-MCNC: NORMAL MG/DL (ref 0–2)
WBC #/AREA URNS AUTO: 1 /HPF (ref 0–5)

## 2021-06-05 PROCEDURE — U0005 INFEC AGEN DETEC AMPLI PROBE: HCPCS | Mod: ZL | Performed by: NURSE PRACTITIONER

## 2021-06-05 PROCEDURE — G0463 HOSPITAL OUTPT CLINIC VISIT: HCPCS

## 2021-06-05 PROCEDURE — 99213 OFFICE O/P EST LOW 20 MIN: CPT | Performed by: NURSE PRACTITIONER

## 2021-06-05 PROCEDURE — C9803 HOPD COVID-19 SPEC COLLECT: HCPCS

## 2021-06-05 PROCEDURE — 81001 URINALYSIS AUTO W/SCOPE: CPT | Mod: ZL | Performed by: NURSE PRACTITIONER

## 2021-06-05 PROCEDURE — U0003 INFECTIOUS AGENT DETECTION BY NUCLEIC ACID (DNA OR RNA); SEVERE ACUTE RESPIRATORY SYNDROME CORONAVIRUS 2 (SARS-COV-2) (CORONAVIRUS DISEASE [COVID-19]), AMPLIFIED PROBE TECHNIQUE, MAKING USE OF HIGH THROUGHPUT TECHNOLOGIES AS DESCRIBED BY CMS-2020-01-R: HCPCS | Mod: ZL | Performed by: NURSE PRACTITIONER

## 2021-06-05 RX ORDER — FAMOTIDINE 40 MG/1
TABLET, FILM COATED ORAL
COMMUNITY
Start: 2021-05-18 | End: 2021-06-21

## 2021-06-05 ASSESSMENT — PAIN SCALES - GENERAL: PAINLEVEL: NO PAIN (0)

## 2021-06-05 NOTE — PROGRESS NOTES
HPI:    Reg Valiente is a 46 year old female  who presents to [facility] today for UTI concerns.    Patient is brought into clinic today by staff from group home, she lives at [place].  Patient with hx of Down Syndrome.  Information is mostly obtained by staff member.    Darker urine for the past few days.  She is on scheduled toileting schedule but does toilet independently.  She does some times holds her urine.  No urinary or bowel incontinence.  She nornally does not get up during the night to urinate but she did get up last night to urinate.  She just finished her menstrual cycle.   Fever up to 100.6 this morning.  Change in behavior the past few days, more argumentative, not herself.  Appetite normal.  Appears under the weather.  Denies abdominal pain or stomach ache.  No vomiting.  Normal bowel movements, just had a bowel movement at clinic.  Yesterday she developed fever, sneezing and congestion.   No cough.  No noted breathing difficulty or shortness of breath.    No known covid exposures or concerns  She attends school at [employer].         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     Mom reports negative AAS evaluation in the 1990's     Corneal ulcer 2004    caused by leaking of CPAP     Down's syndrome      Gastroesophageal reflux disease      JENNIFER (obstructive sleep apnea)     patient refuses CPAP     Past Surgical History:   Procedure Laterality Date     ENDOSCOPIC RETROGRADE CHOLANGIOPANCREATOGRAM N/A 7/1/2019    Procedure: Endoscopic Retrograde Cholangiopancreatogram, dilation, stent , sludge and stone removal, sphincterotomy;  Surgeon: Guru Harper Alvarado MD;  Location: [facility]     ENDOSCOPIC RETROGRADE CHOLANGIOPANCREATOGRAM N/A 8/26/2019    Procedure: Endoscopic Retrograde Cholangiopancreatogram with biliary stent removal and stone removal;  Surgeon: Guru Harper Alvarado  MD Justin;  Location: [facility]     ENDOSCOPIC RETROGRADE CHOLANGIOPANCREATOGRAPHY, EXCHANGE TUBE/STENT N/A 7/24/2019    Procedure: Endoscopic Retrograde Cholangiopancreatography with Billary Stone Extraction,Dilation             and stent exchange;  Surgeon: Guru Harper Alvarado MD;  Location: [facility]     LAPAROSCOPIC CHOLECYSTECTOMY WITH CHOLANGIOGRAMS N/A 9/9/2019    Procedure: CHOLECYSTECTOMY, LAPAROSCOPIC, WITH CHOLANGIOGRAM;  Surgeon: Arvind Bailey MD;  Location: [facility] OR     NO HISTORY OF SURGERY      as reported by parents     Social History     Tobacco Use     Smoking status: Never Smoker     Smokeless tobacco: Never Used   Substance Use Topics     Alcohol use: No     Current Outpatient Medications   Medication Sig Dispense Refill     citalopram (CELEXA) 20 MG tablet Take 1 tablet (20 mg) by mouth daily 90 tablet 3     diclofenac (VOLTAREN) 50 MG EC tablet TAKE 1 TABLET BY MOUTH TWICE A  tablet 3     famotidine (PEPCID) 40 MG tablet TAKE 1/2 TAB (20MG) BY MOUTH TWICE A DAY       levothyroxine (SYNTHROID/LEVOTHROID) 175 MCG tablet Take 1 tablet (175 mcg) by mouth daily 90 tablet 1     Multiple Vitamins-Minerals (TAB-A-MATTHIAS) TABS TAKE 1 TAB BY MOUTH ONCE DAILY 90 tablet 1     SENOKOT S 8.6-50 MG tablet TAKE 1 TABLET BY MOUTH DAILY 90 tablet 3     TRI-LINYAH 0.18/0.215/0.25 MG-35 MCG tablet TAKE 1 TABLET BY MOUTH DAILY 84 tablet 3     Allergies   Allergen Reactions     Sulfa Drugs          Past medical history, past surgical history, current medications and allergies reviewed and accurate to the best of my knowledge.        ROS:  Refer to HPI    BP 98/58 (BP Location: Left arm, Patient Position: Sitting, Cuff Size: Adult Large)   Pulse 70   Temp 100.6  F (38.1  C) (Tympanic)   Resp 18   Wt 63.8 kg (140 lb 9.6 oz)   SpO2 95%   Breastfeeding No   BMI 35.19 kg/m      EXAM:  General Appearance: Well appearing adult female, appropriate appearance for age. No acute distress  Nose: Mild  drainage and congestion   Respiratory: normal chest wall and respirations.  Normal effort.  Clear to auscultation bilaterally, no wheezing, crackles or rhonchi.  No increased work of breathing.  No cough appreciated.  Cardiac: RRR with no murmurs  Abdomen: soft, nontender, no rigidity, no rebound tenderness or guarding, normal bowel sounds present  :  No suprapubic tenderness to palpation.  No CVA tenderness to palpation.    Musculoskeletal:  Equal movement of bilateral upper extremities.  Equal movement of bilateral lower extremities.  Normal gait.    Psychological: normal affect, alert, oriented, and pleasant.       Labs:  Results for orders placed or performed in visit on 06/05/21   UA reflex to Microscopic and Culture     Status: Abnormal    Specimen: Midstream Urine   Result Value Ref Range    Color Urine Yellow     Appearance Urine Clear     Glucose Urine Negative NEG^Negative mg/dL    Bilirubin Urine Negative NEG^Negative    Ketones Urine Negative NEG^Negative mg/dL    Specific Gravity Urine 1.015 1.003 - 1.035    Blood Urine Large (A) NEG^Negative    pH Urine 7.0 5.0 - 7.0 pH    Protein Albumin Urine Negative NEG^Negative mg/dL    Urobilinogen mg/dL Normal 0.0 - 2.0 mg/dL    Nitrite Urine Negative NEG^Negative    Leukocyte Esterase Urine Negative NEG^Negative    Source Midstream Urine     RBC Urine 61 (H) 0 - 2 /HPF    WBC Urine 1 0 - 5 /HPF    Squamous Epithelial /HPF Urine 4 (H) 0 - 1 /HPF    Mucous Urine Present (A) NEG^Negative /LPF             ASSESSMENT/PLAN:    I have reviewed the nursing notes.  I have reviewed the findings, diagnosis, plan and need for follow up with the patient.    1. Low grade fever    - UA reflex to Microscopic and Culture  - Symptomatic COVID-19 Virus (Coronavirus) by PCR    Urinalysis - clear yellow, large blood, negative nitrite, negative leukocyte estrace  Urine microscopic - RBC 61, WBC 1, no bacteria noted    Tylenol PRN fever    Follow up if symptoms persist, worsen, or  concerns    2. Nasal drainage    - Symptomatic COVID-19 Virus (Coronavirus) by PCR    Patient has received both of her Covid vaccines  Patient resides in group home so Covid testing was indicated today.  Covid testing pending        Symptomatic treatment - Encouraged fluids, etc     Follow up if symptoms persist or worsen or concerns      I explained my diagnostic considerations and recommendations to the patient, who voiced understanding and agreement with the treatment plan. All questions were answered. We discussed potential side effects of any prescribed or recommended therapies, as well as expectations for response to treatments.

## 2021-06-05 NOTE — NURSING NOTE
"Patient presents to clinic with staff member, Vincent, who was sent here for fever  and dark cloudy urine.  Chief Complaint   Patient presents with     Urinary Problem       Initial BP 98/58 (BP Location: Left arm, Patient Position: Sitting, Cuff Size: Adult Large)   Pulse 70   Temp 100.6  F (38.1  C) (Tympanic)   Resp 18   Wt 63.8 kg (140 lb 9.6 oz)   SpO2 95%   Breastfeeding No   BMI 35.19 kg/m   Estimated body mass index is 35.19 kg/m  as calculated from the following:    Height as of 7/6/20: 1.346 m (4' 5\").    Weight as of this encounter: 63.8 kg (140 lb 9.6 oz).  Medication Reconciliation: complete    Alice Begum LPN    "

## 2021-06-06 LAB
LABORATORY COMMENT REPORT: NORMAL
SARS-COV-2 RNA RESP QL NAA+PROBE: NEGATIVE
SARS-COV-2 RNA RESP QL NAA+PROBE: NORMAL
SPECIMEN SOURCE: NORMAL
SPECIMEN SOURCE: NORMAL

## 2021-06-21 DIAGNOSIS — F41.1 GAD (GENERALIZED ANXIETY DISORDER): ICD-10-CM

## 2021-06-21 DIAGNOSIS — K21.9 GASTROESOPHAGEAL REFLUX DISEASE WITHOUT ESOPHAGITIS: Primary | ICD-10-CM

## 2021-06-21 DIAGNOSIS — E03.8 OTHER SPECIFIED HYPOTHYROIDISM: ICD-10-CM

## 2021-06-21 RX ORDER — CITALOPRAM HYDROBROMIDE 20 MG/1
20 TABLET ORAL DAILY
Qty: 90 TABLET | Refills: 3 | Status: CANCELLED | OUTPATIENT
Start: 2021-06-21

## 2021-06-22 DIAGNOSIS — N92.6 MENSTRUAL DISORDER: ICD-10-CM

## 2021-06-23 ENCOUNTER — TELEPHONE (OUTPATIENT)
Dept: INTERNAL MEDICINE | Facility: OTHER | Age: 46
End: 2021-06-23

## 2021-06-23 ENCOUNTER — OFFICE VISIT (OUTPATIENT)
Dept: INTERNAL MEDICINE | Facility: OTHER | Age: 46
End: 2021-06-23
Attending: NURSE PRACTITIONER
Payer: MEDICARE

## 2021-06-23 VITALS
DIASTOLIC BLOOD PRESSURE: 64 MMHG | RESPIRATION RATE: 16 BRPM | WEIGHT: 146.6 LBS | SYSTOLIC BLOOD PRESSURE: 104 MMHG | TEMPERATURE: 98.9 F | HEART RATE: 50 BPM | OXYGEN SATURATION: 99 % | BODY MASS INDEX: 36.69 KG/M2

## 2021-06-23 DIAGNOSIS — E03.8 OTHER SPECIFIED HYPOTHYROIDISM: ICD-10-CM

## 2021-06-23 DIAGNOSIS — G30.0 EARLY ONSET ALZHEIMER'S DISEASE WITH BEHAVIORAL DISTURBANCE (H): ICD-10-CM

## 2021-06-23 DIAGNOSIS — Z23 NEED FOR PNEUMOCOCCAL VACCINATION: ICD-10-CM

## 2021-06-23 DIAGNOSIS — R31.29 MICROSCOPIC HEMATURIA: ICD-10-CM

## 2021-06-23 DIAGNOSIS — F02.818 EARLY ONSET ALZHEIMER'S DISEASE WITH BEHAVIORAL DISTURBANCE (H): ICD-10-CM

## 2021-06-23 DIAGNOSIS — R41.89 COGNITIVE IMPAIRMENT: Primary | ICD-10-CM

## 2021-06-23 DIAGNOSIS — R71.8 ELEVATED MCV: ICD-10-CM

## 2021-06-23 DIAGNOSIS — Q90.9 DOWN'S SYNDROME: ICD-10-CM

## 2021-06-23 DIAGNOSIS — F41.1 GAD (GENERALIZED ANXIETY DISORDER): ICD-10-CM

## 2021-06-23 DIAGNOSIS — R79.89 LOW VITAMIN B12 LEVEL: Primary | ICD-10-CM

## 2021-06-23 LAB
ALBUMIN UR-MCNC: NEGATIVE MG/DL
APPEARANCE UR: CLEAR
BILIRUB UR QL STRIP: NEGATIVE
COLOR UR AUTO: YELLOW
ERYTHROCYTE [DISTWIDTH] IN BLOOD BY AUTOMATED COUNT: 13.7 % (ref 10–15)
GLUCOSE UR STRIP-MCNC: NEGATIVE MG/DL
HCT VFR BLD AUTO: 39.4 % (ref 35–47)
HGB BLD-MCNC: 13 G/DL (ref 11.7–15.7)
HGB UR QL STRIP: NEGATIVE
KETONES UR STRIP-MCNC: NEGATIVE MG/DL
LEUKOCYTE ESTERASE UR QL STRIP: NEGATIVE
MCH RBC QN AUTO: 33.5 PG (ref 26.5–33)
MCHC RBC AUTO-ENTMCNC: 33 G/DL (ref 31.5–36.5)
MCV RBC AUTO: 102 FL (ref 78–100)
NITRATE UR QL: NEGATIVE
PH UR STRIP: 6 PH (ref 5–7)
PLATELET # BLD AUTO: 294 10E9/L (ref 150–450)
RBC # BLD AUTO: 3.88 10E12/L (ref 3.8–5.2)
SOURCE: NORMAL
SP GR UR STRIP: 1.02 (ref 1–1.03)
T4 FREE SERPL-MCNC: 1.29 NG/DL (ref 0.6–1.6)
TSH SERPL DL<=0.05 MIU/L-ACNC: 0.31 IU/ML (ref 0.34–5.6)
UROBILINOGEN UR STRIP-MCNC: NORMAL MG/DL (ref 0–2)
VIT B12 SERPL-MCNC: 265 PG/ML (ref 180–914)
WBC # BLD AUTO: 6.1 10E9/L (ref 4–11)

## 2021-06-23 PROCEDURE — 36415 COLL VENOUS BLD VENIPUNCTURE: CPT | Mod: ZL | Performed by: NURSE PRACTITIONER

## 2021-06-23 PROCEDURE — G0463 HOSPITAL OUTPT CLINIC VISIT: HCPCS

## 2021-06-23 PROCEDURE — 81003 URINALYSIS AUTO W/O SCOPE: CPT | Mod: ZL | Performed by: NURSE PRACTITIONER

## 2021-06-23 PROCEDURE — G0009 ADMIN PNEUMOCOCCAL VACCINE: HCPCS

## 2021-06-23 PROCEDURE — 85027 COMPLETE CBC AUTOMATED: CPT | Mod: ZL | Performed by: NURSE PRACTITIONER

## 2021-06-23 PROCEDURE — 84443 ASSAY THYROID STIM HORMONE: CPT | Mod: ZL | Performed by: NURSE PRACTITIONER

## 2021-06-23 PROCEDURE — 82607 VITAMIN B-12: CPT | Mod: ZL | Performed by: NURSE PRACTITIONER

## 2021-06-23 PROCEDURE — 84439 ASSAY OF FREE THYROXINE: CPT | Mod: ZL | Performed by: NURSE PRACTITIONER

## 2021-06-23 PROCEDURE — 99214 OFFICE O/P EST MOD 30 MIN: CPT | Performed by: NURSE PRACTITIONER

## 2021-06-23 PROCEDURE — G0463 HOSPITAL OUTPT CLINIC VISIT: HCPCS | Mod: 25

## 2021-06-23 RX ORDER — CITALOPRAM HYDROBROMIDE 20 MG/1
20 TABLET ORAL DAILY
Qty: 90 TABLET | Refills: 3 | Status: SHIPPED | OUTPATIENT
Start: 2021-06-23 | End: 2022-05-23

## 2021-06-23 RX ORDER — DONEPEZIL HYDROCHLORIDE 5 MG/1
5 TABLET, FILM COATED ORAL AT BEDTIME
Qty: 90 TABLET | Refills: 3 | Status: SHIPPED | OUTPATIENT
Start: 2021-06-23 | End: 2021-07-21

## 2021-06-23 RX ORDER — NORGESTIMATE AND ETHINYL ESTRADIOL 7DAYSX3 28
KIT ORAL
Qty: 84 TABLET | Refills: 3 | OUTPATIENT
Start: 2021-06-23

## 2021-06-23 RX ORDER — LEVOTHYROXINE SODIUM 150 UG/1
150 TABLET ORAL DAILY
Qty: 90 TABLET | Refills: 3 | Status: SHIPPED | OUTPATIENT
Start: 2021-06-23 | End: 2021-11-24

## 2021-06-23 RX ORDER — FAMOTIDINE 40 MG/1
TABLET, FILM COATED ORAL
Qty: 90 TABLET | Refills: 3 | Status: SHIPPED | OUTPATIENT
Start: 2021-06-23 | End: 2022-05-18

## 2021-06-23 RX ORDER — LEVOTHYROXINE SODIUM 175 UG/1
175 TABLET ORAL DAILY
Qty: 90 TABLET | Refills: 1 | OUTPATIENT
Start: 2021-06-23

## 2021-06-23 RX ORDER — LORAZEPAM 0.5 MG/1
0.5 TABLET ORAL DAILY PRN
Qty: 30 TABLET | Refills: 3 | Status: SHIPPED | OUTPATIENT
Start: 2021-06-23 | End: 2021-07-21

## 2021-06-23 RX ORDER — LANOLIN ALCOHOL/MO/W.PET/CERES
1000 CREAM (GRAM) TOPICAL DAILY
Qty: 90 TABLET | Refills: 3 | Status: SHIPPED | OUTPATIENT
Start: 2021-06-23 | End: 2022-05-23

## 2021-06-23 ASSESSMENT — ENCOUNTER SYMPTOMS
TREMORS: 0
WEAKNESS: 0
UNEXPECTED WEIGHT CHANGE: 0
CONSTIPATION: 0
HEMATURIA: 0
SHORTNESS OF BREATH: 0
ACTIVITY CHANGE: 0
SLEEP DISTURBANCE: 0
ABDOMINAL PAIN: 0
FATIGUE: 0
NAUSEA: 0
VOMITING: 0
DIFFICULTY URINATING: 0
ENDOCRINE NEGATIVE: 1
CONFUSION: 1
CHEST TIGHTNESS: 0
DYSURIA: 0
DYSPHORIC MOOD: 0
APPETITE CHANGE: 0
NERVOUS/ANXIOUS: 0
NUMBNESS: 0
DIARRHEA: 0

## 2021-06-23 ASSESSMENT — PAIN SCALES - GENERAL: PAINLEVEL: NO PAIN (0)

## 2021-06-23 NOTE — TELEPHONE ENCOUNTER
[id number] sent Rx request for the following:   TRI-LINYAH 0.18/0.215/0.25 MG-35 MCG tablet  Sig: TAKE 1 TABLET BY MOUTH DAILY    Last Prescription Date:   08/18/2020  Last Fill Qty/Refills:         84, R-3      Redundant refill request refused: Too soon:    Unable to complete prescription refill per RN Medication Refill Policy.................... Ami Gonzáles RN ....................  6/23/2021   4:54 PM
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
## 2021-06-23 NOTE — TELEPHONE ENCOUNTER
levothyroxine (SYNTHROID/LEVOTHROID) 150 MCG tablet 90 tablet 3 6/23/2021  --   Sig - Route: Take 1 tablet (150 mcg) by mouth daily - Oral     To [facility]

## 2021-06-23 NOTE — TELEPHONE ENCOUNTER
Routing refill request to provider for review/approval because:  Medication is reported/historical    LOV: today     Li Malik RN on 6/23/2021 at 3:34 PM

## 2021-06-23 NOTE — TELEPHONE ENCOUNTER
Emile would like documentation regarding her appointment today. Fax: [phone]        Samantha Eisenberg on 6/23/2021 at 2:24 PM

## 2021-06-28 ENCOUNTER — OFFICE VISIT (OUTPATIENT)
Dept: FAMILY MEDICINE | Facility: OTHER | Age: 46
End: 2021-06-28
Attending: NURSE PRACTITIONER
Payer: MEDICARE

## 2021-06-28 VITALS
BODY MASS INDEX: 35.68 KG/M2 | RESPIRATION RATE: 16 BRPM | SYSTOLIC BLOOD PRESSURE: 110 MMHG | DIASTOLIC BLOOD PRESSURE: 70 MMHG | HEART RATE: 72 BPM | WEIGHT: 142.56 LBS | TEMPERATURE: 97.9 F

## 2021-06-28 DIAGNOSIS — H10.32 ACUTE BACTERIAL CONJUNCTIVITIS OF LEFT EYE: Primary | ICD-10-CM

## 2021-06-28 PROCEDURE — 99213 OFFICE O/P EST LOW 20 MIN: CPT | Performed by: NURSE PRACTITIONER

## 2021-06-28 PROCEDURE — G0463 HOSPITAL OUTPT CLINIC VISIT: HCPCS

## 2021-06-28 PROCEDURE — G0463 HOSPITAL OUTPT CLINIC VISIT: HCPCS | Mod: 25

## 2021-06-28 RX ORDER — OFLOXACIN 3 MG/ML
1-2 SOLUTION/ DROPS OPHTHALMIC 4 TIMES DAILY
Qty: 3 ML | Refills: 1 | Status: SHIPPED | OUTPATIENT
Start: 2021-06-28 | End: 2021-07-05

## 2021-06-28 ASSESSMENT — PAIN SCALES - GENERAL: PAINLEVEL: NO PAIN (0)

## 2021-06-28 NOTE — TELEPHONE ENCOUNTER
Per caregiver now, no information needed at this time and this note can be closed.      Britney Guerrier LPN 6/28/2021 1:48 PM

## 2021-06-28 NOTE — PATIENT INSTRUCTIONS
Patient Education     What Is Conjunctivitis?  Conjunctivitis is an irritation or infection. It affects the membrane that covers the white of your eye and the inside of your eyelid (conjunctiva). It can happen to one or both eyes. The membrane swells and the blood vessels enlarge (dilate). This makes your eye red. That's why conjunctivitis is sometimes called red eye or pink eye.     What are the symptoms?  If you have one or more of these symptoms, see an eye healthcare provider:     Redness in and around your eye    Eyes that are puffy and sore    Itching, burning, or stinging eyes    Watery eyes or discharge from your eye    Eyelids that are crusty or stuck together when you wake up in the morning    Pink color in the whites of one or both eyes    Sensitivity to bright light  Getting treatment quickly can help prevent damage to your eyes.   How is it diagnosed?  Conjunctivitis is often a minor eye infection. But it can sometimes become a more serious problem. Some more serious eye diseases have symptoms that look like conjunctivitis. So it's important for an eye healthcare provider to diagnose you. Your eye healthcare provider will ask about your symptoms and any medicines you take. He or she will ask about any illnesses or health conditions you may have. The healthcare provider will also check your eyes with a hand-held light and a special microscope called a slit lamp.   [employer] last reviewed this educational content on 8/1/2020 2000-2021 [employer]. All rights reserved. This information is not intended as a substitute for professional medical care. Always follow your healthcare professional's instructions.

## 2021-06-28 NOTE — PROGRESS NOTES
ASSESSMENT/PLAN:  1. Acute bacterial conjunctivitis of left eye    - ofloxacin (OCUFLOX) 0.3 % ophthalmic solution; Place 1-2 drops Into the left eye 4 times daily for 7 days  Dispense: 3 mL; Refill: 1    Discussed with the patient and her care giver that based on the patient's symptoms and physical exam findings that these are most consistent with bacterial conjunctivitis. The patient was prescribed Ofloxacin ophthalmic drops x 7 days.     May apply a warm moist compress to the left eye.       May use over-the-counter Tylenol or ibuprofen PRN    Discussed warning signs/symptoms indicative of need to f/u    Follow up if symptoms persist or worsen or concerns      I explained my diagnostic considerations and recommendations to the patient, who voiced understanding and agreement with the treatment plan. All questions were answered. We discussed potential side effects of any prescribed or recommended therapies, as well as expectations for response to treatments.        HPI:    Reg Valiente is a 46 year old female  who presents to [facility] today for erythema to left eye. The patient presents to the clinic today with her care giver. The patient lives in [facility]. First noticed symptoms yesterday morning. Denies fevers. The patient wears glasses and states that she does not have any blurry vision. The patient denies pain. She has been itching at her left eye. The patient's care giver reports that she is having yellow crusting/discharge of the left eye. Denies any injury or foreign bodies to the patient's left eye.      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     Mom reports negative AAS evaluation in the 1990's     Corneal ulcer 2004    caused by leaking of CPAP     Down's syndrome      Gastroesophageal reflux disease      JENNIFER (obstructive sleep apnea)     patient refuses CPAP     Past Surgical  History:   Procedure Laterality Date     ENDOSCOPIC RETROGRADE CHOLANGIOPANCREATOGRAM N/A 7/1/2019    Procedure: Endoscopic Retrograde Cholangiopancreatogram, dilation, stent , sludge and stone removal, sphincterotomy;  Surgeon: Guru Harper Alvarado MD;  Location: [facility] OR     ENDOSCOPIC RETROGRADE CHOLANGIOPANCREATOGRAM N/A 8/26/2019    Procedure: Endoscopic Retrograde Cholangiopancreatogram with biliary stent removal and stone removal;  Surgeon: Guru Harper Alvarado MD;  Location: [facility] OR     ENDOSCOPIC RETROGRADE CHOLANGIOPANCREATOGRAPHY, EXCHANGE TUBE/STENT N/A 7/24/2019    Procedure: Endoscopic Retrograde Cholangiopancreatography with Billary Stone Extraction,Dilation             and stent exchange;  Surgeon: Guru Harper Alvarado MD;  Location: [facility] OR     LAPAROSCOPIC CHOLECYSTECTOMY WITH CHOLANGIOGRAMS N/A 9/9/2019    Procedure: CHOLECYSTECTOMY, LAPAROSCOPIC, WITH CHOLANGIOGRAM;  Surgeon: Arvind Bailey MD;  Location: [facility] OR     NO HISTORY OF SURGERY      as reported by parents     Social History     Tobacco Use     Smoking status: Never Smoker     Smokeless tobacco: Never Used   Substance Use Topics     Alcohol use: No     Current Outpatient Medications   Medication Sig Dispense Refill     citalopram (CELEXA) 20 MG tablet Take 1 tablet (20 mg) by mouth daily 90 tablet 3     cyanocobalamin (VITAMIN B-12) 1000 MCG tablet Take 1 tablet (1,000 mcg) by mouth daily 90 tablet 3     diclofenac (VOLTAREN) 50 MG EC tablet TAKE 1 TABLET BY MOUTH TWICE A  tablet 3     donepezil (ARICEPT) 5 MG tablet Take 1 tablet (5 mg) by mouth At Bedtime 90 tablet 3     famotidine (PEPCID) 40 MG tablet TAKE 1/2 TAB (20MG) BY MOUTH TWICE A DAY 90 tablet 3     levothyroxine (SYNTHROID/LEVOTHROID) 150 MCG tablet Take 1 tablet (150 mcg) by mouth daily 90 tablet 3     LORazepam (ATIVAN) 0.5 MG tablet Take 1 tablet (0.5 mg) by mouth daily as needed for agitation or anxiety 30 tablet 3      Multiple Vitamins-Minerals (TAB-A-MATTHIAS) TABS TAKE 1 TAB BY MOUTH ONCE DAILY 90 tablet 1     SENOKOT S 8.6-50 MG tablet TAKE 1 TABLET BY MOUTH DAILY 90 tablet 3     TRI-LINYAH 0.18/0.215/0.25 MG-35 MCG tablet TAKE 1 TABLET BY MOUTH DAILY 84 tablet 3     Allergies   Allergen Reactions     Sulfa Drugs          Past medical history, past surgical history, current medications and allergies reviewed and accurate to the best of my knowledge.        ROS:  Refer to HPI    /70 (BP Location: Right arm, Patient Position: Sitting, Cuff Size: Adult Regular)   Pulse 72   Temp 97.9  F (36.6  C) (Tympanic)   Resp 16   Wt 64.7 kg (142 lb 9 oz)   LMP 06/09/2021 (Within Weeks)   BMI 35.68 kg/m      EXAM:  General Appearance: Well appearing female, appropriate appearance for age. No acute distress  Eyes: left conjunctivae with erythema and irritation, corneas clear, clear drainage noted to the left eye, no crusting, no eyelid swelling, pupils equal   Psychological: normal affect, alert, oriented, and pleasant.

## 2021-06-28 NOTE — NURSING NOTE
"Patient presents to the clinic for concerns about possible pink eye of the left eye, discoloration has been present for the past 24 hours.    Chief Complaint   Patient presents with     Eye Problem       Initial /70 (BP Location: Right arm, Patient Position: Sitting, Cuff Size: Adult Regular)   Pulse 72   Temp 97.9  F (36.6  C) (Tympanic)   Resp 16   Wt 64.7 kg (142 lb 9 oz)   LMP 06/09/2021 (Within Weeks)   BMI 35.68 kg/m   Estimated body mass index is 35.68 kg/m  as calculated from the following:    Height as of 7/6/20: 1.346 m (4' 5\").    Weight as of this encounter: 64.7 kg (142 lb 9 oz).  Medication Reconciliation: complete    Britney Guerrier LPN    "

## 2021-06-28 NOTE — NURSING NOTE
VISION   Wears glasses: worn for testing  Tool used: MATILDA   Right eye:        10/40 (20/80)  Left eye:          10/40 (20/80)    Susy Eisenberg LPN on 6/28/2021 at 2:14 PM

## 2021-07-17 ENCOUNTER — APPOINTMENT (OUTPATIENT)
Dept: CT IMAGING | Facility: OTHER | Age: 46
End: 2021-07-17
Attending: PHYSICIAN ASSISTANT
Payer: MEDICARE

## 2021-07-17 ENCOUNTER — APPOINTMENT (OUTPATIENT)
Dept: GENERAL RADIOLOGY | Facility: OTHER | Age: 46
End: 2021-07-17
Attending: PHYSICIAN ASSISTANT
Payer: MEDICARE

## 2021-07-17 ENCOUNTER — HOSPITAL ENCOUNTER (EMERGENCY)
Facility: OTHER | Age: 46
Discharge: HOME | End: 2021-07-17
Attending: PHYSICIAN ASSISTANT | Admitting: PHYSICIAN ASSISTANT
Payer: MEDICARE

## 2021-07-17 ENCOUNTER — APPOINTMENT (OUTPATIENT)
Dept: ULTRASOUND IMAGING | Facility: OTHER | Age: 46
End: 2021-07-17
Attending: PHYSICIAN ASSISTANT
Payer: MEDICARE

## 2021-07-17 VITALS
BODY MASS INDEX: 35.54 KG/M2 | RESPIRATION RATE: 16 BRPM | TEMPERATURE: 98.8 F | HEART RATE: 57 BPM | DIASTOLIC BLOOD PRESSURE: 108 MMHG | OXYGEN SATURATION: 98 % | SYSTOLIC BLOOD PRESSURE: 126 MMHG | WEIGHT: 142 LBS

## 2021-07-17 DIAGNOSIS — R79.89 ELEVATED LFTS: ICD-10-CM

## 2021-07-17 DIAGNOSIS — T50.905A ADVERSE EFFECT OF DRUG, INITIAL ENCOUNTER: ICD-10-CM

## 2021-07-17 DIAGNOSIS — R41.0 CONFUSION: ICD-10-CM

## 2021-07-17 DIAGNOSIS — R91.1 NODULE OF RIGHT LUNG: ICD-10-CM

## 2021-07-17 LAB
ALBUMIN SERPL-MCNC: 3.2 G/DL (ref 3.5–5.7)
ALBUMIN UR-MCNC: NEGATIVE MG/DL
ALP SERPL-CCNC: 410 U/L (ref 34–104)
ALT SERPL W P-5'-P-CCNC: 96 U/L (ref 7–52)
AMMONIA PLAS-SCNC: 49 UMOL/L (ref 16–53)
ANION GAP SERPL CALCULATED.3IONS-SCNC: 9 MMOL/L (ref 3–14)
APPEARANCE UR: CLEAR
AST SERPL W P-5'-P-CCNC: 74 U/L (ref 13–39)
BACTERIA #/AREA URNS HPF: ABNORMAL /HPF
BASOPHILS # BLD AUTO: 0.1 10E3/UL (ref 0–0.2)
BASOPHILS NFR BLD AUTO: 1 %
BILIRUB SERPL-MCNC: 0.6 MG/DL (ref 0.3–1)
BILIRUB UR QL STRIP: NEGATIVE
BUN SERPL-MCNC: 14 MG/DL (ref 7–25)
CALCIUM SERPL-MCNC: 9 MG/DL (ref 8.6–10.3)
CHLORIDE BLD-SCNC: 99 MMOL/L (ref 98–107)
CO2 SERPL-SCNC: 29 MMOL/L (ref 21–31)
COLOR UR AUTO: YELLOW
CREAT SERPL-MCNC: 0.72 MG/DL (ref 0.6–1.2)
CRP SERPL-MCNC: 86 MG/L
EOSINOPHIL # BLD AUTO: 0.2 10E3/UL (ref 0–0.7)
EOSINOPHIL NFR BLD AUTO: 3 %
ERYTHROCYTE [DISTWIDTH] IN BLOOD BY AUTOMATED COUNT: 12.8 % (ref 10–15)
ETHANOL SERPL-MCNC: <0.01 G/DL
GFR SERPL CREATININE-BSD FRML MDRD: >90 ML/MIN/1.73M2
GLUCOSE BLD-MCNC: 92 MG/DL (ref 70–105)
GLUCOSE UR STRIP-MCNC: NEGATIVE MG/DL
HCT VFR BLD AUTO: 37.3 % (ref 35–47)
HGB BLD-MCNC: 12.4 G/DL (ref 11.7–15.7)
HGB UR QL STRIP: NEGATIVE
IMM GRANULOCYTES # BLD: 0 10E3/UL
IMM GRANULOCYTES NFR BLD: 1 %
KETONES UR STRIP-MCNC: 10 MG/DL
LACTATE SERPL-SCNC: 1.1 MMOL/L (ref 0.7–2)
LEUKOCYTE ESTERASE UR QL STRIP: NEGATIVE
LYMPHOCYTES # BLD AUTO: 2 10E3/UL (ref 0.8–5.3)
LYMPHOCYTES NFR BLD AUTO: 30 %
MCH RBC QN AUTO: 33.2 PG (ref 26.5–33)
MCHC RBC AUTO-ENTMCNC: 33.2 G/DL (ref 31.5–36.5)
MCV RBC AUTO: 100 FL (ref 78–100)
MONOCYTES # BLD AUTO: 0.5 10E3/UL (ref 0–1.3)
MONOCYTES NFR BLD AUTO: 8 %
MUCOUS THREADS #/AREA URNS LPF: PRESENT /LPF
NEUTROPHILS # BLD AUTO: 3.7 10E3/UL (ref 1.6–8.3)
NEUTROPHILS NFR BLD AUTO: 57 %
NITRATE UR QL: NEGATIVE
NRBC # BLD AUTO: 0 10E3/UL
NRBC BLD AUTO-RTO: 0 /100
NT-PROBNP SERPL-MCNC: 115 PG/ML (ref 0–100)
PH UR STRIP: 5.5 [PH] (ref 5–9)
PLATELET # BLD AUTO: 409 10E3/UL (ref 150–450)
POTASSIUM BLD-SCNC: 3.5 MMOL/L (ref 3.5–5.1)
PROCALCITONIN SERPL-MCNC: <0.5 NG/ML
PROT SERPL-MCNC: 7.6 G/DL (ref 6.4–8.9)
RBC # BLD AUTO: 3.73 10E6/UL (ref 3.8–5.2)
RBC URINE: <1 /HPF
SODIUM SERPL-SCNC: 137 MMOL/L (ref 134–144)
SP GR UR STRIP: 1.02 (ref 1–1.03)
SQUAMOUS EPITHELIAL: 2 /HPF
TROPONIN I SERPL-MCNC: 9.8 PG/ML (ref 0–34)
UROBILINOGEN UR STRIP-MCNC: NORMAL MG/DL
WBC # BLD AUTO: 6.4 10E3/UL (ref 4–11)
WBC URINE: 1 /HPF

## 2021-07-17 PROCEDURE — 70450 CT HEAD/BRAIN W/O DYE: CPT | Mod: ME

## 2021-07-17 PROCEDURE — 99284 EMERGENCY DEPT VISIT MOD MDM: CPT | Performed by: PHYSICIAN ASSISTANT

## 2021-07-17 PROCEDURE — 84484 ASSAY OF TROPONIN QUANT: CPT | Performed by: PHYSICIAN ASSISTANT

## 2021-07-17 PROCEDURE — 87040 BLOOD CULTURE FOR BACTERIA: CPT | Mod: 91 | Performed by: PHYSICIAN ASSISTANT

## 2021-07-17 PROCEDURE — 83605 ASSAY OF LACTIC ACID: CPT | Performed by: PHYSICIAN ASSISTANT

## 2021-07-17 PROCEDURE — 99285 EMERGENCY DEPT VISIT HI MDM: CPT | Mod: 25 | Performed by: PHYSICIAN ASSISTANT

## 2021-07-17 PROCEDURE — 84145 PROCALCITONIN (PCT): CPT | Performed by: PHYSICIAN ASSISTANT

## 2021-07-17 PROCEDURE — 86140 C-REACTIVE PROTEIN: CPT | Performed by: PHYSICIAN ASSISTANT

## 2021-07-17 PROCEDURE — 76700 US EXAM ABDOM COMPLETE: CPT

## 2021-07-17 PROCEDURE — 82077 ASSAY SPEC XCP UR&BREATH IA: CPT | Performed by: PHYSICIAN ASSISTANT

## 2021-07-17 PROCEDURE — 82140 ASSAY OF AMMONIA: CPT | Performed by: PHYSICIAN ASSISTANT

## 2021-07-17 PROCEDURE — 93010 ELECTROCARDIOGRAM REPORT: CPT | Performed by: INTERNAL MEDICINE

## 2021-07-17 PROCEDURE — 81001 URINALYSIS AUTO W/SCOPE: CPT | Performed by: PHYSICIAN ASSISTANT

## 2021-07-17 PROCEDURE — 83880 ASSAY OF NATRIURETIC PEPTIDE: CPT | Mod: GZ | Performed by: PHYSICIAN ASSISTANT

## 2021-07-17 PROCEDURE — 85025 COMPLETE CBC W/AUTO DIFF WBC: CPT | Performed by: PHYSICIAN ASSISTANT

## 2021-07-17 PROCEDURE — 80053 COMPREHEN METABOLIC PANEL: CPT | Performed by: PHYSICIAN ASSISTANT

## 2021-07-17 PROCEDURE — 93005 ELECTROCARDIOGRAM TRACING: CPT | Performed by: PHYSICIAN ASSISTANT

## 2021-07-17 PROCEDURE — 36415 COLL VENOUS BLD VENIPUNCTURE: CPT | Performed by: PHYSICIAN ASSISTANT

## 2021-07-17 PROCEDURE — 71046 X-RAY EXAM CHEST 2 VIEWS: CPT

## 2021-07-17 NOTE — ED NOTES
Pt voided 900mL of dark brenda urine. Unable to collect UA at the time due to urine being contaminated with stool. Will provide pt water and may have to straight cath.

## 2021-07-17 NOTE — ED PROVIDER NOTES
History     Chief Complaint   Patient presents with     Chills     Fatigue     HPI  Reg Valiente is a 46 year old female who has a history of Down syndrome.  Her care giver reports that the patient seemed more confused today and not acting herself patient is unable to state correct year..  She is concerned for infection.  She was recently started on Aricept and the caregiver wonders if this could be causing some of the issues.    Allergies:  Allergies   Allergen Reactions     Sulfa Drugs        Problem List:    Patient Active Problem List    Diagnosis Date Noted     Post-ERCP acute pancreatitis 07/01/2019     Priority: Medium     Choledocholithiasis 06/25/2019     Priority: Medium     Down's syndrome 08/14/2018     Priority: Medium     DOROTEO (generalized anxiety disorder) 08/14/2018     Priority: Medium     Menstrual disorder 08/14/2018     Priority: Medium     Primary osteoarthritis of both knees 08/14/2018     Priority: Medium     Gastroesophageal reflux disease without esophagitis 08/14/2018     Priority: Medium     Other specified hypothyroidism 08/14/2018     Priority: Medium     Dermatitis 08/14/2018     Priority: Medium     Encounter for screening mammogram for breast cancer 08/14/2018     Priority: Medium     Morbid obesity (H) 08/14/2018     Priority: Medium     JENNIFER (obstructive sleep apnea) 08/14/2018     Priority: Medium        Past Medical History:    Past Medical History:   Diagnosis Date     Arthritis of knee      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      Corneal ulcer 2004     Down's syndrome      Gastroesophageal reflux disease      JENNIFER (obstructive sleep apnea)        Past Surgical History:    Past Surgical History:   Procedure Laterality Date     ENDOSCOPIC RETROGRADE CHOLANGIOPANCREATOGRAM N/A 7/1/2019    Procedure: Endoscopic Retrograde Cholangiopancreatogram, dilation, stent , sludge and stone removal, sphincterotomy;  Surgeon: Guru Harper Alvarado MD;   Location: [facility] OR     ENDOSCOPIC RETROGRADE CHOLANGIOPANCREATOGRAM N/A 8/26/2019    Procedure: Endoscopic Retrograde Cholangiopancreatogram with biliary stent removal and stone removal;  Surgeon: Guru Harper Alvarado MD;  Location: [facility] OR     ENDOSCOPIC RETROGRADE CHOLANGIOPANCREATOGRAPHY, EXCHANGE TUBE/STENT N/A 7/24/2019    Procedure: Endoscopic Retrograde Cholangiopancreatography with Billary Stone Extraction,Dilation             and stent exchange;  Surgeon: Guru Harper Alvarado MD;  Location: [facility] OR     LAPAROSCOPIC CHOLECYSTECTOMY WITH CHOLANGIOGRAMS N/A 9/9/2019    Procedure: CHOLECYSTECTOMY, LAPAROSCOPIC, WITH CHOLANGIOGRAM;  Surgeon: Arvind Bailey MD;  Location: [facility] OR     NO HISTORY OF SURGERY      as reported by parents       Family History:    Family History   Problem Relation Age of Onset     Asthma Mother      Arthritis Mother      Dementia Mother      LUNG DISEASE Father         pulmonary fibrosis secondary to chemical exposure     Diabetes Father      Cerebrovascular Disease Father 90     Cerebrovascular Disease Maternal Grandmother 90     Heart Failure Paternal Grandfather         uncertain which  replaced     Coronary Artery Disease No family hx of      Heart Disease No family hx of      Bleeding Diathesis No family hx of      Pancreatic Cancer No family hx of      Colorectal Cancer No family hx of        Social History:  Marital Status:  Single [1]  Social History     Tobacco Use     Smoking status: Never Smoker     Smokeless tobacco: Never Used   Substance Use Topics     Alcohol use: No     Drug use: No        Medications:    citalopram (CELEXA) 20 MG tablet  donepezil (ARICEPT) 5 MG tablet  famotidine (PEPCID) 40 MG tablet  levothyroxine (SYNTHROID/LEVOTHROID) 150 MCG tablet  cyanocobalamin (VITAMIN B-12) 1000 MCG tablet  diclofenac (VOLTAREN) 50 MG EC tablet  LORazepam (ATIVAN) 0.5 MG tablet  Multiple Vitamins-Minerals (TAB-A-MATTHIAS) TABS  SENOKOT S 8.6-50 MG  tablet  TRI-LINYAH 0.18/0.215/0.25 MG-35 MCG tablet          Review of Systems   Unable to perform ROS: Other   Constitutional: Negative for fever.   HENT: Negative for facial swelling and sore throat.    Eyes: Negative for pain.   Respiratory: Negative for wheezing and stridor.    Cardiovascular: Negative for chest pain.   Gastrointestinal: Negative for abdominal pain, nausea and vomiting.   Genitourinary: Negative for flank pain.   Musculoskeletal: Negative for back pain and neck pain.   Skin: Negative for pallor.   Neurological: Negative for tremors and seizures.   Psychiatric/Behavioral: Positive for confusion. Negative for agitation.   All other systems reviewed and are negative.      Physical Exam   BP: 122/57  Pulse: 70  Temp: 98.8  F (37.1  C)  Resp: 16  Weight: 64.4 kg (142 lb)  SpO2: 95 %      Physical Exam  Vitals and nursing note reviewed.   Constitutional:       General: She is not in acute distress.     Appearance: She is not ill-appearing or toxic-appearing.   HENT:      Head: No raccoon eyes or Perkins's sign.      Jaw: No trismus.      Right Ear: No drainage or tenderness.      Left Ear: No drainage or tenderness.      Nose: Nose normal.   Eyes:      General: Lids are normal. Gaze aligned appropriately. No scleral icterus.     Extraocular Movements: Extraocular movements intact.      Right eye: No nystagmus.      Left eye: No nystagmus.      Pupils:      Right eye: Pupil is reactive and not sluggish.      Left eye: Pupil is reactive and not sluggish.   Neck:      Vascular: No JVD.      Trachea: No tracheal deviation.   Cardiovascular:      Rate and Rhythm: Normal rate.   Pulmonary:      Effort: Pulmonary effort is normal. No respiratory distress.      Breath sounds: No stridor. No wheezing.      Comments: Lung sounds are clear.  SaO2 is 95% on room air.  She does not appear to be in any respiratory distress.  No tachypnea.  Abdominal:      General: Bowel sounds are normal.      Palpations: There is  no mass.      Tenderness: There is no guarding.      Comments: Abdomen is soft nontender no rebound or masses.   Musculoskeletal:         General: No deformity or signs of injury. Normal range of motion.   Skin:     General: Skin is warm and dry.      Coloration: Skin is not jaundiced or pale.   Neurological:      General: No focal deficit present.      Mental Status: She is alert and oriented to person, place, and time.      GCS: GCS eye subscore is 4. GCS verbal subscore is 5. GCS motor subscore is 6.      Motor: No tremor or seizure activity.   Psychiatric:         Mood and Affect: Mood normal.         ED Course     EKG shows sinus bradycardia with a heart rate of 56.    Results for orders placed or performed during the hospital encounter of 07/17/21 (from the past 24 hour(s))   XR Chest 2 Views    Narrative    PROCEDURE:  XR CHEST 2 VW    HISTORY:  cough.     COMPARISON:  None.    FINDINGS:   The cardiac silhouette is normal in size. The pulmonary vasculature is  normal.  There is a 5 mm diameter right lung nodule. No pleural  effusion or pneumothorax.      Impression    IMPRESSION:  5 mm in diameter right lung nodule.      JAVIER CRAIN MD         SYSTEM ID:  [id number]   CBC with platelets differential    Narrative    The following orders were created for panel order CBC with platelets differential.  Procedure                               Abnormality         Status                     ---------                               -----------         ------                     CBC with platelets and d...[[id number]]  Abnormal            Final result                 Please view results for these tests on the individual orders.   Comprehensive metabolic panel   Result Value Ref Range    Sodium 137 134 - 144 mmol/L    Potassium 3.5 3.5 - 5.1 mmol/L    Chloride 99 98 - 107 mmol/L    Carbon Dioxide (CO2) 29 21 - 31 mmol/L    Anion Gap 9 3 - 14 mmol/L    Urea Nitrogen 14 7 - 25 mg/dL    Creatinine 0.72 0.60 - 1.20 mg/dL     Calcium 9.0 8.6 - 10.3 mg/dL    Glucose 92 70 - 105 mg/dL    Alkaline Phosphatase 410 (H) 34 - 104 U/L    AST 74 (H) 13 - 39 U/L    ALT 96 (H) 7 - 52 U/L    Protein Total 7.6 6.4 - 8.9 g/dL    Albumin 3.2 (L) 3.5 - 5.7 g/dL    Bilirubin Total 0.6 0.3 - 1.0 mg/dL    GFR Estimate >90 >60 mL/min/1.73m2   Lactic acid whole blood   Result Value Ref Range    Lactic Acid 1.1 0.7 - 2.0 mmol/L   Troponin I   Result Value Ref Range    Troponin I 9.8 0.0 - 34.0 pg/mL   Nt probnp inpatient (BNP)   Result Value Ref Range    N terminal Pro BNP Inpatient 115 (H) 0 - 100 pg/mL   CRP inflammation   Result Value Ref Range    CRP Inflammation 86.0 (H) <10.0 mg/L   CBC with platelets and differential   Result Value Ref Range    WBC Count 6.4 4.0 - 11.0 10e3/uL    RBC Count 3.73 (L) 3.80 - 5.20 10e6/uL    Hemoglobin 12.4 11.7 - 15.7 g/dL    Hematocrit 37.3 35.0 - 47.0 %     78 - 100 fL    MCH 33.2 (H) 26.5 - 33.0 pg    MCHC 33.2 31.5 - 36.5 g/dL    RDW 12.8 10.0 - 15.0 %    Platelet Count 409 150 - 450 10e3/uL    % Neutrophils 57 %    % Lymphocytes 30 %    % Monocytes 8 %    % Eosinophils 3 %    % Basophils 1 %    % Immature Granulocytes 1 %    NRBCs per 100 WBC 0 <1 /100    Absolute Neutrophils 3.7 1.6 - 8.3 10e3/uL    Absolute Lymphocytes 2.0 0.8 - 5.3 10e3/uL    Absolute Monocytes 0.5 0.0 - 1.3 10e3/uL    Absolute Eosinophils 0.2 0.0 - 0.7 10e3/uL    Absolute Basophils 0.1 0.0 - 0.2 10e3/uL    Absolute Immature Granulocytes 0.0 <=0.0 10e3/uL    Absolute NRBCs 0.0 10e3/uL   CT Head w/o Contrast    Narrative    PROCEDURE: CT HEAD W/O CONTRAST     HISTORY: Mental status change, unknown cause.    COMPARISON: None.    TECHNIQUE:  Helical images of the head from the foramen magnum to the  vertex were obtained without contrast.    FINDINGS: The ventricles and sulci are normal in volume. No acute  intracranial hemorrhage, mass effect, midline shift, hydrocephalus or  basilar cystern effacement are present.    The grey-white matter  interface is preserved.    The calvarium is intact. The mastoid air cells are clear.  There are  opacified maxillary sinuses bilaterally      Impression    IMPRESSION: Normal brain      JAVIER CRAIN MD         SYSTEM ID:  [id number]   Ammonia (on ice)   Result Value Ref Range    Ammonia 49 16 - 53 umol/L   Procalcitonin   Result Value Ref Range    Procalcitonin <0.50 <5.00 ng/mL   US Abdomen Complete    Narrative    PROCEDURE: US ABDOMEN COMPLETE 7/17/2021 8:30 PM    HISTORY: RUQ pain    COMPARISONS: None.    TECHNIQUE: Ultrasound of the abdomen    FINDINGS: The liver measures 15 cm in length. The liver is free of  masses. There is no biliary ductal enlargement. The gallbladder has  been removed. The aorta is normally tapering. The inferior vena cava  is patent. The pancreas is partially visualized in the body portion  visualized portion appears normal. The spleen appears normal. No renal  masses or hydronephrosis is seen.         Impression    IMPRESSION: Status post cholecystectomy. No biliary ductal enlargement  is seen.    JAVIER CRAIN MD         SYSTEM ID:  [id number]   UA with Microscopic reflex to Culture    Specimen: Urine, Catheter   Result Value Ref Range    Color Urine Yellow Colorless, Straw, Light Yellow, Yellow    Appearance Urine Clear Clear    Glucose Urine Negative Negative mg/dL    Bilirubin Urine Negative Negative    Ketones Urine 10  (A) Negative mg/dL    Specific Gravity Urine 1.021 1.000 - 1.030    Blood Urine Negative Negative    pH Urine 5.5 5.0 - 9.0    Protein Albumin Urine Negative Negative mg/dL    Urobilinogen Urine Normal Normal, 2.0 mg/dL    Nitrite Urine Negative Negative    Leukocyte Esterase Urine Negative Negative    Bacteria Urine Few (A) None Seen /HPF    Mucus Urine Present (A) None Seen /LPF    RBC Urine <1 <=2 /HPF    WBC Urine 1 <=5 /HPF    Squamous Epithelials Urine 2 (H) <=1 /HPF    Narrative    Urine Culture not indicated   Ethanol [facility]   Result Value Ref Range     Alcohol ethyl <0.01 <=0.01 g/dL       Medications - No data to display    Assessments & Plan (with Medical Decision Making)     I have reviewed the nursing notes.    I have reviewed the findings, diagnosis, plan and need for follow up with the patient.      Discharge Medication List as of 7/17/2021  9:22 PM          Final diagnoses:   Confusion   Adverse effect of drug, initial encounter   Elevated LFTs   Nodule of right lung     Afebrile.  Vital signs stable.  Patient's caregiver noticing that patient just seems little more confused than usual.  She does have a history of dementia and was started on Aricept about a month ago.  Unclear if this is causing any adverse reaction.  EKG shows sinus bradycardia with a heart rate of 56.  Troponin is normal.  BNP is minimally elevated at 115 but no previous for comparison.  CRP is elevated at 86.  The patient CBC shows normal white blood cells and no left shift.  Her hemoglobin is 12.4.  CMP is unremarkable except for some elevated LFTs.  Her alk phos is 410, AST is 74, and ALT is 96.  The patient has had a cholecystectomy in the past.  UA shows few bacteria but no significant signs of UTI.  Ammonia levels are normal.  Lactic acid is normal.  Blood cultures are pending.  Procalcitonin is also unremarkable.  No obvious signs of infection.  Chest x-ray shows a 5 mm in diameter right lung nodule, but no signs of consolidation or infiltrate.  CT of the patient's head shows no signs of acute abnormality.  Given elevated LFTs ultrasound of her abdomen was performed and this shows Status post cholecystectomy. No biliary ductal enlargement.  EtOH was also checked and this is unremarkable.  The patient was observed over an extended timeframe in the ER with no signs of acute findings.  I did discuss with the care giver that may be this patient may need adjustment with her Aricept to increase it versus decreasing it.  Follow-up was arranged.  The patient will return home at this time  follow-up if there is any concerns for further evaluation as needed  is seen.  7/17/2021   [facility]     Sivakumar Powers PA-C  07/18/21 1042

## 2021-07-17 NOTE — ED NOTES
"Caregiver says pt is very confused, not acting herself. Pt is unable to state correct year, caregiver says she is usually with it. When caregiver said it was summer pt says \"No it's fall\" and when corrected pt again states it is fall.  Pt has been doing this off and on per caregiver.   "

## 2021-07-17 NOTE — ED NOTES
"Writer tried to contact pt's legal guardians. Writer called all 3 numbers. Writer gets a message that \"number cannot be completed as dialed\". Writer tried multiple times to make sure the number she was dialing was correct, still same message with the home number provided. Writer attempted to call father and mother's cell numbers that are provided, message for both is voicemail box is full. Writer unable to reach Legal Guardians.  "

## 2021-07-17 NOTE — ED TRIAGE NOTES
Pt here with staff from [facility], they report chills, lethargy, confusion and joint pain since yesterday, pt is in no acute distress, VSS, pt out into waiting room to wait for ED room  ED Nursing Triage Note (General)   ________________________________    Reg Valiente is a 46 year old Female that presents to triage private car  With history of  Lethargy and joint pain reported by caregiver  Significant symptoms had onset yesterday   /57   Pulse 70   Temp 98.8  F (37.1  C) (Tympanic)   Resp 16   Wt 64.4 kg (142 lb)   SpO2 95%   BMI 35.54 kg/m  t  Patient appears alert  and oriented, in no acute distress., and cooperative and pleasant behavior.    GCS Total = 14  Airway: intact  Breathing noted as Normal  Circulation Normal  Skin:  Normal  Action taken:  Pt to waiting room      PRE HOSPITAL PRIOR LIVING SITUATION [facility]

## 2021-07-18 LAB
ATRIAL RATE - MUSE: 56 BPM
DIASTOLIC BLOOD PRESSURE - MUSE: NORMAL MMHG
INTERPRETATION ECG - MUSE: NORMAL
P AXIS - MUSE: 58 DEGREES
PR INTERVAL - MUSE: 178 MS
QRS DURATION - MUSE: 74 MS
QT - MUSE: 430 MS
QTC - MUSE: 414 MS
R AXIS - MUSE: 5 DEGREES
SYSTOLIC BLOOD PRESSURE - MUSE: NORMAL MMHG
T AXIS - MUSE: 24 DEGREES
VENTRICULAR RATE- MUSE: 56 BPM

## 2021-07-18 ASSESSMENT — ENCOUNTER SYMPTOMS
FACIAL SWELLING: 0
WHEEZING: 0
VOMITING: 0
NAUSEA: 0
STRIDOR: 0
FLANK PAIN: 0
SORE THROAT: 0
BACK PAIN: 0
TREMORS: 0
AGITATION: 0
FEVER: 0
SEIZURES: 0
ABDOMINAL PAIN: 0
NECK PAIN: 0
EYE PAIN: 0
CONFUSION: 1

## 2021-07-20 PROBLEM — Z09 ENCOUNTER FOR FOLLOW-UP EXAMINATION: Status: ACTIVE | Noted: 2021-07-20

## 2021-07-20 NOTE — PROGRESS NOTES
"Nursing Notes:   Celina Nuñez LPN  7/21/2021  4:08 PM  Signed  Chief Complaint   Patient presents with     RECHECK     ER follow up     Patient presents for ER follow up; adverse reaction to medication.  Patient also has redness in left eye, was taken to [facility] 6/28/21 and treated for eye infection, however, Zeinab, care giver that is with patient, states that patient refused medication off and on.  Eye prescription is finished.  Initial /86 (BP Location: Right arm, Patient Position: Sitting, Cuff Size: Adult Regular)   Pulse 60   Temp 98.4  F (36.9  C) (Tympanic)   Resp 18   Wt 63.5 kg (140 lb)   LMP  (LMP Unknown)   SpO2 95%   Breastfeeding No   BMI 35.04 kg/m   Estimated body mass index is 35.04 kg/m  as calculated from the following:    Height as of 7/6/20: 1.346 m (4' 5\").    Weight as of this encounter: 63.5 kg (140 lb).  Medication Reconciliation: complete  FOOD SECURITY SCREENING QUESTIONS  Hunger Vital Signs:  Within the past 12 months we worried whether our food would run out before we got money to buy more. Never  Within the past 12 months the food we bought just didn't last and we didn't have money to get more. Never      Celina Nuñez LPN         Nursing note reviewed with patient.  Accuracy and completeness verified.     Ms. Valiente is a 46 year old female who presents to the clinic today for an ED follow up visit.      Emergency Department:  [facility] ED    Date of ED Visit:   7/17/21  Reason(s) for ED Visit:    Confusion   Adverse effect of drug, initial encounter   Elevated LFTs   Nodule of right lung     Summary of ED Visit:     Afebrile.  Vital signs stable.  Patient's caregiver noticing that patient just seems little more confused than usual.  She does have a history of dementia and was started on Aricept about a month ago.  Unclear if this is causing any adverse reaction.  EKG shows sinus bradycardia with a heart rate of 56.  Troponin is normal.  BNP is minimally " elevated at 115 but no previous for comparison.  CRP is elevated at 86.  The patient CBC shows normal white blood cells and no left shift.  Her hemoglobin is 12.4.  CMP is unremarkable except for some elevated LFTs.  Her alk phos is 410, AST is 74, and ALT is 96.  The patient has had a cholecystectomy in the past.  UA shows few bacteria but no significant signs of UTI.  Ammonia levels are normal.  Lactic acid is normal.  Blood cultures are pending.  Procalcitonin is also unremarkable.  No obvious signs of infection.  Chest x-ray shows a 5 mm in diameter right lung nodule, but no signs of consolidation or infiltrate.  CT of the patient's head shows no signs of acute abnormality.  Given elevated LFTs ultrasound of her abdomen was performed and this shows Status post cholecystectomy. No biliary ductal enlargement.  EtOH was also checked and this is unremarkable.  The patient was observed over an extended timeframe in the ER with no signs of acute findings.  I did discuss with the care giver that may be this patient may need adjustment with her Aricept to increase it versus decreasing it.  Follow-up was arranged.        Update since discharge:  Caregiver reports patient continues to have increased sleepiness, she is more argumentative with staff.  We did review ED notes indicating recommendation to increase the Aricept.  We also discussed this may not be the proper med for her and discussed alternatives such as Seroquel that we have the option of trying if an increase in the Aricept does not work.    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   All other systems reviewed and are negative.       OBJECTIVE:                                                      /86 (BP Location: Right arm, Patient Position: Sitting, Cuff Size: Adult Regular)   Pulse 60   Temp 98.4  F (36.9  C) (Tympanic)   Resp 18   Wt 63.5 kg (140 lb)   LMP  (LMP Unknown)   SpO2 95%   Breastfeeding No   BMI 35.04 kg/m      Estimated body mass index is 35.04 kg/m  as calculated from the following:    Height as of 7/6/20: 1.346 m (4' 5\").    Weight as of this encounter: 63.5 kg (140 lb).     PHYSICAL EXAM:  Physical Exam  Vitals and nursing note reviewed.   Constitutional:       Appearance: Normal appearance.   HENT:      Head: Normocephalic and atraumatic.   Cardiovascular:      Rate and Rhythm: Normal rate and regular rhythm.      Heart sounds: Normal heart sounds.   Pulmonary:      Effort: Pulmonary effort is normal.      Breath sounds: Normal breath sounds.   Musculoskeletal:         General: Normal range of motion.   Skin:     General: Skin is warm and dry.   Neurological:      Mental Status: Mental status is at baseline.         ASSESSMENT/PLAN:                                                      1. Encounter for follow-up examination  Patient is pleasant and appropriate at appointment today.  We will do additional work-up at this time with recheck of labs that were abnormal in ED.- CBC and Differential, unremarkable with exception of slightly elevated MCH.  - Comprehensive metabolic panel, alk phos elevated at 313, AST elevated at 61, ALT elevated at 93 and albumin is slightly low at 3.1 otherwise unremarkable.  Would recommend recheck in 1 to 2 weeks.    2. CRP elevated  - CRP inflammation, still elevated at 40 " however improved from ED visit.    3. Down's syndrome    4. DOROTEO (generalized anxiety disorder)  Continue on citalopram 20 mg daily.    5. Early onset Alzheimer's disease with behavioral disturbance (H)  We will increase dose of donepezil today to 10 mg at bedtime. Side effects of medication reviewed and discussed that if there are any concerns recommend reporting symptoms and consider discontinuation.  Patient will recheck in 1 month, sooner if behaviors do not improve with increased dose.  Consider starting Seroquel if behaviors do not improve in two weeks, especially if anxiety related.  Will discontinue lorazepam 0.5 mg daily as needed for disruptive behaviors that are not easily redirected per staff as it involves increased paperwork and patient doesn't take it.  - donepezil (ARICEPT) 10 MG tablet; Take 1 tablet (10 mg) by mouth At Bedtime  Dispense: 30 tablet; Refill: 0    6. Abnormal results of liver function studies   - Comprehensive metabolic panel, alk phos elevated at 313, AST elevated at 61, ALT elevated at 93 and albumin is slightly low at 3.1 otherwise unremarkable.  Would recommend recheck in 1 to 2 weeks.    I explained my diagnostic considerations and recommendations to the patient, who voiced understanding and agreement with the treatment plan. All questions were answered. We discussed potential side effects of any prescribed or recommended therapies, as well as expectations for response to treatments.     Return in about 2 weeks (around 8/4/2021) for Lab Work - CMP - recheck in 2 weeks..       EB Díaz, AGNP-C  Internal Medicine  [facility]    07/22/2021 6:13 PM    Total time spent with this patient was 20 minutes which included chart review, visualization and interpretation of labs/images, time spent with the patient and documentation.

## 2021-07-21 ENCOUNTER — OFFICE VISIT (OUTPATIENT)
Dept: INTERNAL MEDICINE | Facility: OTHER | Age: 46
End: 2021-07-21
Attending: NURSE PRACTITIONER
Payer: MEDICARE

## 2021-07-21 VITALS
BODY MASS INDEX: 32.4 KG/M2 | WEIGHT: 140 LBS | RESPIRATION RATE: 18 BRPM | HEIGHT: 55 IN | SYSTOLIC BLOOD PRESSURE: 124 MMHG | HEART RATE: 60 BPM | DIASTOLIC BLOOD PRESSURE: 86 MMHG | TEMPERATURE: 98.4 F | OXYGEN SATURATION: 95 %

## 2021-07-21 DIAGNOSIS — Z09 ENCOUNTER FOR FOLLOW-UP EXAMINATION: ICD-10-CM

## 2021-07-21 DIAGNOSIS — R94.5 ABNORMAL RESULTS OF LIVER FUNCTION STUDIES: ICD-10-CM

## 2021-07-21 DIAGNOSIS — R79.82 CRP ELEVATED: Primary | ICD-10-CM

## 2021-07-21 DIAGNOSIS — F02.818 EARLY ONSET ALZHEIMER'S DISEASE WITH BEHAVIORAL DISTURBANCE (H): ICD-10-CM

## 2021-07-21 DIAGNOSIS — Q90.9 DOWN'S SYNDROME: ICD-10-CM

## 2021-07-21 DIAGNOSIS — G30.0 EARLY ONSET ALZHEIMER'S DISEASE WITH BEHAVIORAL DISTURBANCE (H): ICD-10-CM

## 2021-07-21 DIAGNOSIS — F41.1 GAD (GENERALIZED ANXIETY DISORDER): ICD-10-CM

## 2021-07-21 LAB
ALBUMIN SERPL-MCNC: 3.1 G/DL (ref 3.5–5.7)
ALP SERPL-CCNC: 313 U/L (ref 34–104)
ALT SERPL W P-5'-P-CCNC: 93 U/L (ref 7–52)
ANION GAP SERPL CALCULATED.3IONS-SCNC: 5 MMOL/L (ref 3–14)
AST SERPL W P-5'-P-CCNC: 61 U/L (ref 13–39)
BASOPHILS # BLD MANUAL: 0.1 10E3/UL (ref 0–0.2)
BASOPHILS NFR BLD MANUAL: 1 %
BILIRUB SERPL-MCNC: 0.5 MG/DL (ref 0.3–1)
BUN SERPL-MCNC: 11 MG/DL (ref 7–25)
CALCIUM SERPL-MCNC: 9.1 MG/DL (ref 8.6–10.3)
CHLORIDE BLD-SCNC: 100 MMOL/L (ref 98–107)
CO2 SERPL-SCNC: 30 MMOL/L (ref 21–31)
CREAT SERPL-MCNC: 0.73 MG/DL (ref 0.6–1.2)
CRP SERPL-MCNC: 40 MG/L
EOSINOPHIL # BLD MANUAL: 0.1 10E3/UL (ref 0–0.7)
EOSINOPHIL NFR BLD MANUAL: 2 %
ERYTHROCYTE [DISTWIDTH] IN BLOOD BY AUTOMATED COUNT: 12.9 % (ref 10–15)
GFR SERPL CREATININE-BSD FRML MDRD: >90 ML/MIN/1.73M2
GLUCOSE BLD-MCNC: 92 MG/DL (ref 70–105)
HCT VFR BLD AUTO: 38.4 % (ref 35–47)
HGB BLD-MCNC: 12.8 G/DL (ref 11.7–15.7)
LYMPHOCYTES # BLD MANUAL: 1.1 10E3/UL (ref 0.8–5.3)
LYMPHOCYTES NFR BLD MANUAL: 19 %
MCH RBC QN AUTO: 33.3 PG (ref 26.5–33)
MCHC RBC AUTO-ENTMCNC: 33.3 G/DL (ref 31.5–36.5)
MCV RBC AUTO: 100 FL (ref 78–100)
MONOCYTES # BLD MANUAL: 0.2 10E3/UL (ref 0–1.3)
MONOCYTES NFR BLD MANUAL: 4 %
NEUTROPHILS # BLD MANUAL: 4.4 10E3/UL (ref 1.6–8.3)
NEUTROPHILS NFR BLD MANUAL: 74 %
PLAT MORPH BLD: NORMAL
PLATELET # BLD AUTO: 382 10E3/UL (ref 150–450)
POTASSIUM BLD-SCNC: 3.6 MMOL/L (ref 3.5–5.1)
PROT SERPL-MCNC: 7.2 G/DL (ref 6.4–8.9)
RBC # BLD AUTO: 3.84 10E6/UL (ref 3.8–5.2)
RBC MORPH BLD: NORMAL
SODIUM SERPL-SCNC: 135 MMOL/L (ref 134–144)
WBC # BLD AUTO: 6 10E3/UL (ref 4–11)

## 2021-07-21 PROCEDURE — 85027 COMPLETE CBC AUTOMATED: CPT | Mod: ZL | Performed by: NURSE PRACTITIONER

## 2021-07-21 PROCEDURE — 36415 COLL VENOUS BLD VENIPUNCTURE: CPT | Mod: ZL | Performed by: NURSE PRACTITIONER

## 2021-07-21 PROCEDURE — 99213 OFFICE O/P EST LOW 20 MIN: CPT | Performed by: NURSE PRACTITIONER

## 2021-07-21 PROCEDURE — 82247 BILIRUBIN TOTAL: CPT | Mod: ZL | Performed by: NURSE PRACTITIONER

## 2021-07-21 PROCEDURE — G0463 HOSPITAL OUTPT CLINIC VISIT: HCPCS

## 2021-07-21 PROCEDURE — 86140 C-REACTIVE PROTEIN: CPT | Mod: ZL | Performed by: NURSE PRACTITIONER

## 2021-07-21 PROCEDURE — 82040 ASSAY OF SERUM ALBUMIN: CPT | Mod: ZL | Performed by: NURSE PRACTITIONER

## 2021-07-21 RX ORDER — DONEPEZIL HYDROCHLORIDE 10 MG/1
10 TABLET, FILM COATED ORAL AT BEDTIME
Qty: 30 TABLET | Refills: 0 | Status: SHIPPED | OUTPATIENT
Start: 2021-07-21 | End: 2021-08-04

## 2021-07-21 ASSESSMENT — PAIN SCALES - GENERAL: PAINLEVEL: NO PAIN (0)

## 2021-07-21 ASSESSMENT — MIFFLIN-ST. JEOR: SCORE: 1084.62

## 2021-07-21 NOTE — NURSING NOTE
"Chief Complaint   Patient presents with     RECHECK     ER follow up     Patient presents for ER follow up; adverse reaction to medication.  Patient also has redness in left eye, was taken to [facility] 6/28/21 and treated for eye infection, however, Zeinab, care giver that is with patient, states that patient refused medication off and on.  Eye prescription is finished.  Initial /86 (BP Location: Right arm, Patient Position: Sitting, Cuff Size: Adult Regular)   Pulse 60   Temp 98.4  F (36.9  C) (Tympanic)   Resp 18   Wt 63.5 kg (140 lb)   LMP  (LMP Unknown)   SpO2 95%   Breastfeeding No   BMI 35.04 kg/m   Estimated body mass index is 35.04 kg/m  as calculated from the following:    Height as of 7/6/20: 1.346 m (4' 5\").    Weight as of this encounter: 63.5 kg (140 lb).  Medication Reconciliation: complete  FOOD SECURITY SCREENING QUESTIONS  Hunger Vital Signs:  Within the past 12 months we worried whether our food would run out before we got money to buy more. Never  Within the past 12 months the food we bought just didn't last and we didn't have money to get more. Never      Celina Nuñez LPN    "

## 2021-07-21 NOTE — LETTER
July 21, 2021      Reg Valiente  [address] [address]        Dear ,    We are writing to inform you of your test results.    Resulted Orders   CRP inflammation   Result Value Ref Range    CRP Inflammation 40.0 (H) <10.0 mg/L   Comprehensive metabolic panel   Result Value Ref Range    Sodium 135 134 - 144 mmol/L    Potassium 3.6 3.5 - 5.1 mmol/L    Chloride 100 98 - 107 mmol/L    Carbon Dioxide (CO2) 30 21 - 31 mmol/L    Anion Gap 5 3 - 14 mmol/L    Urea Nitrogen 11 7 - 25 mg/dL    Creatinine 0.73 0.60 - 1.20 mg/dL    Calcium 9.1 8.6 - 10.3 mg/dL    Glucose 92 70 - 105 mg/dL    Alkaline Phosphatase 313 (H) 34 - 104 U/L    AST 61 (H) 13 - 39 U/L    ALT 93 (H) 7 - 52 U/L    Protein Total 7.2 6.4 - 8.9 g/dL    Albumin 3.1 (L) 3.5 - 5.7 g/dL    Bilirubin Total 0.5 0.3 - 1.0 mg/dL    GFR Estimate >90 >60 mL/min/1.73m2      Comment:      As of July 11, 2021, eGFR is calculated by the CKD-EPI creatinine equation, without race adjustment. eGFR can be influenced by muscle mass, exercise, and diet. The reported eGFR is an estimation only and is only applicable if the renal function is stable.   CBC with platelets and differential   Result Value Ref Range    WBC Count 6.0 4.0 - 11.0 10e3/uL    RBC Count 3.84 3.80 - 5.20 10e6/uL    Hemoglobin 12.8 11.7 - 15.7 g/dL    Hematocrit 38.4 35.0 - 47.0 %     78 - 100 fL    MCH 33.3 (H) 26.5 - 33.0 pg    MCHC 33.3 31.5 - 36.5 g/dL    RDW 12.9 10.0 - 15.0 %    Platelet Count 382 150 - 450 10e3/uL     If you have any questions or concerns, please call the clinic at the number listed above.     Sincerely,      Nalini Nguyen NP

## 2021-07-22 ASSESSMENT — ENCOUNTER SYMPTOMS
SLEEP DISTURBANCE: 1
CONFUSION: 1

## 2021-07-23 DIAGNOSIS — N92.6 MENSTRUAL DISORDER: ICD-10-CM

## 2021-07-23 LAB
BACTERIA BLD CULT: NO GROWTH
BACTERIA BLD CULT: NO GROWTH

## 2021-07-23 RX ORDER — NORGESTIMATE AND ETHINYL ESTRADIOL 7DAYSX3 28
1 KIT ORAL DAILY
Qty: 84 TABLET | Refills: 3 | Status: SHIPPED | OUTPATIENT
Start: 2021-07-23 | End: 2022-06-20

## 2021-07-23 NOTE — TELEPHONE ENCOUNTER
[facility] sent Rx request for the following:      Requested Prescriptions   Pending Prescriptions Disp Refills     norgestim-eth estrad triphasic (TRI-LINYAH) 0.18/0.215/0.25 MG-35 MCG tablet 84 tablet 3     Sig: Take 1 tablet by mouth daily       Contraceptives Protocol Passed - 7/23/2021  8:28 AM          Last Prescription Date:   8/18/2020  Last Fill Qty/Refills:         84, R-3  Last Office Visit:              7/21/2021 ([facility])   Future Office visit:           8/4/2021 ([facility])  Routing refill request to provider for review/approval because:  Per new protocol. Vivian Jorge RN ....................  7/23/2021   8:41 AM

## 2021-07-29 ENCOUNTER — OFFICE VISIT (OUTPATIENT)
Dept: FAMILY MEDICINE | Facility: OTHER | Age: 46
End: 2021-07-29
Attending: PHYSICIAN ASSISTANT
Payer: MEDICARE

## 2021-07-29 VITALS
OXYGEN SATURATION: 98 % | HEART RATE: 74 BPM | RESPIRATION RATE: 18 BRPM | WEIGHT: 139 LBS | SYSTOLIC BLOOD PRESSURE: 120 MMHG | BODY MASS INDEX: 32.17 KG/M2 | TEMPERATURE: 98.7 F | DIASTOLIC BLOOD PRESSURE: 64 MMHG | HEIGHT: 55 IN

## 2021-07-29 DIAGNOSIS — H10.32 ACUTE BACTERIAL CONJUNCTIVITIS OF LEFT EYE: Primary | ICD-10-CM

## 2021-07-29 PROCEDURE — 99213 OFFICE O/P EST LOW 20 MIN: CPT | Performed by: PHYSICIAN ASSISTANT

## 2021-07-29 PROCEDURE — G0463 HOSPITAL OUTPT CLINIC VISIT: HCPCS

## 2021-07-29 RX ORDER — POLYMYXIN B SULFATE AND TRIMETHOPRIM 1; 10000 MG/ML; [USP'U]/ML
1-2 SOLUTION OPHTHALMIC EVERY 4 HOURS
Qty: 6 ML | Refills: 0 | Status: SHIPPED | OUTPATIENT
Start: 2021-07-29 | End: 2021-08-08

## 2021-07-29 RX ORDER — CIPROFLOXACIN HYDROCHLORIDE 3.5 MG/ML
1-2 SOLUTION/ DROPS TOPICAL EVERY 6 HOURS
Qty: 4 ML | Refills: 0 | Status: CANCELLED | OUTPATIENT
Start: 2021-07-29 | End: 2021-08-08

## 2021-07-29 RX ORDER — DONEPEZIL HYDROCHLORIDE 5 MG/1
TABLET, FILM COATED ORAL
COMMUNITY
Start: 2021-07-24 | End: 2021-07-29

## 2021-07-29 ASSESSMENT — MIFFLIN-ST. JEOR: SCORE: 1080.08

## 2021-07-29 ASSESSMENT — PAIN SCALES - GENERAL: PAINLEVEL: NO PAIN (0)

## 2021-07-29 NOTE — PATIENT INSTRUCTIONS
Please refer to your AVS for follow up and pain/symptoms management recommendations (I.e.: medications, helpful conservative treatment modalities, appropriate follow up if need to a specialist or family practice, etc.). Please return to urgent care if your symptoms change or worsen.     Discharge instructions:  -If you were prescribed a medication(s), please take this as prescribed/directed  -Monitor your symptoms, if changing/worsening, return to UC/ER or PCP for follow up    Bacterial Conjunctivitis  -If placed antibiotic - please use as directed (wash hands before putting in eye).   -Avoid touching the eye, as conjunctivitis/pink eye, is very contagious.   -Wash your hands regularly before touching your eye, if you must touch it. Wash your pillow case daily or every other day until symptoms clear up.   -Do not share cosmetics, eye drops or contacts with other individuals.   -Warm compresses are recommended as needed.   -For pain control (if needed), if you are able to take Ibuprofen and Tylenol, we recommend alternating these (see note below). Do not wear a patch over your eye (unless directed to do so).    -Alternate every 4 hours as needed. I.e.: Ibuprofen at 8am, Tylenol 12pm, Ibuprofen 4pm    -Daily maximum of Tylenol is 4000mg (recommend staying under 3000mg)   -Daily maximum of Ibuprofen is 1200mg (take no more than six 200mg pills a day)    You were prescribed an antibiotic, please take into consideration the following information:  - Take entire course of antibiotic even if you start to feel better.

## 2021-07-29 NOTE — NURSING NOTE
"Chief Complaint   Patient presents with     Eye Problem     Patient presented to the clinic with a left eye infection. She was in last month and got eye drops for it at that time and now it is back.    Initial /64 (BP Location: Right arm, Patient Position: Sitting, Cuff Size: Adult Large)   Pulse 74   Temp 98.7  F (37.1  C) (Tympanic)   Resp 18   Ht 1.345 m (4' 4.95\")   Wt 63 kg (139 lb)   LMP  (LMP Unknown)   SpO2 98%   Breastfeeding No   BMI 34.86 kg/m   Estimated body mass index is 34.86 kg/m  as calculated from the following:    Height as of this encounter: 1.345 m (4' 4.95\").    Weight as of this encounter: 63 kg (139 lb).     FOOD SECURITY SCREENING QUESTIONS  Hunger Vital Signs:  Within the past 12 months we worried whether our food would run out before we got money to buy more. Never  Within the past 12 months the food we bought just didn't last and we didn't have money to get more. Never      Medication Reconciliation: Complete      Kirsty Henderson LPN   "

## 2021-07-29 NOTE — PROGRESS NOTES
ASSESSMENT/PLAN:    I have reviewed the nursing notes.  I have reviewed the findings, diagnosis, plan and need for follow up with the patient.    (H10.32) Acute bacterial conjunctivitis of left eye  (primary encounter diagnosis)  Comment: see below  Plan: trimethoprim-polymyxin b (POLYTRIM) 58654-8.1 UNIT/ML-% ophthalmic solution       Vital signs stable.  Physical exam consistent with pinkeye of left eye, there is drainage that is white/green in nature, matting and erythema of conjunctive a.  Right eye appears to be spared at this time.  Patient recently did have pinkeye approximate 1 month prior as well.  We will change her to Polytrim drops 1 to 2 drops into the left eye only every 4 hours while awake for 10 days.  They understand the importance of good hand hygiene is pinkeye is very contagious.  Warm compresses to the eye as needed, and complete entire course of antibiotic even if symptoms resolve prior to this if changing or worsening symptoms occur they are agreeable to return for reevaluation. Patient is in agreement and understanding of the above treatment plan. All questions and concerns were addressed and answered to patient's satisfaction. AVS reviewed with patient.     Discussed warning signs/symptoms indicative of need to f/u    Follow up if symptoms persist or worsen or concerns    I explained my diagnostic considerations and recommendations to the patient, who voiced understanding and agreement with the treatment plan. All questions were answered. We discussed potential side effects of any prescribed or recommended therapies, as well as expectations for response to treatments.    Génesis Garcia PA-C  7/29/2021  3:43 PM    HPI:    Reg Valiente is a 46 year old female  who presents to [facility] today for concerns of left eye infection, she was in last month and was on Ofloxacin drops, since completing these, eye infection has returned.     Eye Sx: discharge/drainage, mattering,  redness  Problem/Context: none  History: none    Contact or glasses use: YES, glasses  Changes in vision: No  Change in eye ROM: No  Presence of Flashers/Floaters: No  Discharge description: yellow, white  Presence of URI Symptoms: No  Eyelid (any symptoms): No  Any dental or jaw pain: No  Any exposure to trauma or chemical burns to eye: No    Treatments Tried: none    Prior eye or sinus surgeries: No  Similar symptoms in the past: Yes    PCP: ARYAN Gtz    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     Mom reports negative AAS evaluation in the 1990's     Corneal ulcer 2004    caused by leaking of CPAP     Down's syndrome      Gastroesophageal reflux disease      JENNIFER (obstructive sleep apnea)     patient refuses CPAP     Past Surgical History:   Procedure Laterality Date     ENDOSCOPIC RETROGRADE CHOLANGIOPANCREATOGRAM N/A 7/1/2019    Procedure: Endoscopic Retrograde Cholangiopancreatogram, dilation, stent , sludge and stone removal, sphincterotomy;  Surgeon: Guru Harper Alvarado MD;  Location: [facility] OR     ENDOSCOPIC RETROGRADE CHOLANGIOPANCREATOGRAM N/A 8/26/2019    Procedure: Endoscopic Retrograde Cholangiopancreatogram with biliary stent removal and stone removal;  Surgeon: Guru Harper Alvarado MD;  Location: [facility] OR     ENDOSCOPIC RETROGRADE CHOLANGIOPANCREATOGRAPHY, EXCHANGE TUBE/STENT N/A 7/24/2019    Procedure: Endoscopic Retrograde Cholangiopancreatography with Billary Stone Extraction,Dilation             and stent exchange;  Surgeon: Guru Harper Alvarado MD;  Location: [facility] OR     LAPAROSCOPIC CHOLECYSTECTOMY WITH CHOLANGIOGRAMS N/A 9/9/2019    Procedure: CHOLECYSTECTOMY, LAPAROSCOPIC, WITH CHOLANGIOGRAM;  Surgeon: Arvind Bailey MD;  Location: [facility] OR     NO HISTORY OF SURGERY      as reported by parents     Social History     Tobacco Use     Smoking  "status: Never Smoker     Smokeless tobacco: Never Used   Substance Use Topics     Alcohol use: No     Current Outpatient Medications   Medication Sig Dispense Refill     citalopram (CELEXA) 20 MG tablet Take 1 tablet (20 mg) by mouth daily 90 tablet 3     cyanocobalamin (VITAMIN B-12) 1000 MCG tablet Take 1 tablet (1,000 mcg) by mouth daily 90 tablet 3     diclofenac (VOLTAREN) 50 MG EC tablet TAKE 1 TABLET BY MOUTH TWICE A  tablet 3     donepezil (ARICEPT) 10 MG tablet Take 1 tablet (10 mg) by mouth At Bedtime 30 tablet 0     famotidine (PEPCID) 40 MG tablet TAKE 1/2 TAB (20MG) BY MOUTH TWICE A DAY 90 tablet 3     levothyroxine (SYNTHROID/LEVOTHROID) 150 MCG tablet Take 1 tablet (150 mcg) by mouth daily 90 tablet 3     Multiple Vitamins-Minerals (TAB-A-MATTHIAS) TABS TAKE 1 TAB BY MOUTH ONCE DAILY 90 tablet 1     norgestim-eth estrad triphasic (TRI-LINYAH) 0.18/0.215/0.25 MG-35 MCG tablet Take 1 tablet by mouth daily 84 tablet 3     SENOKOT S 8.6-50 MG tablet TAKE 1 TABLET BY MOUTH DAILY 90 tablet 3     Allergies   Allergen Reactions     Sulfa Drugs      Past medical history, past surgical history, current medications and allergies reviewed and accurate to the best of my knowledge.      ROS:  Refer to HPI    /64 (BP Location: Right arm, Patient Position: Sitting, Cuff Size: Adult Large)   Pulse 74   Temp 98.7  F (37.1  C) (Tympanic)   Resp 18   Ht 1.345 m (4' 4.95\")   Wt 63 kg (139 lb)   LMP  (LMP Unknown)   SpO2 98%   Breastfeeding No   BMI 34.86 kg/m      EXAM:  General Appearance: Well appearing 46-year old female, appropriate appearance for age. No acute distress  Eyes: Left eye has erythematous conjunctive and matting of eyelashes upper and lower eyelids, white/green discharge noted from medial canthus, right eye conjunctivae normal without erythema or irritation, corneas clear, no drainage or crusting, no eyelid swelling, pupils equal   Respiratory: normal chest wall and respirations.  " Normal effort.  Clear to auscultation bilaterally, no wheezing, crackles or rhonchi.  No increased work of breathing.  No cough appreciated.  Cardiac: RRR with no murmurs  Psychological: normal affect, alert, oriented, and pleasant.     Labs:  None     Xray:  None

## 2021-08-04 ENCOUNTER — OFFICE VISIT (OUTPATIENT)
Dept: INTERNAL MEDICINE | Facility: OTHER | Age: 46
End: 2021-08-04
Attending: NURSE PRACTITIONER
Payer: MEDICARE

## 2021-08-04 ENCOUNTER — HOSPITAL ENCOUNTER (OUTPATIENT)
Dept: GENERAL RADIOLOGY | Facility: OTHER | Age: 46
End: 2021-08-04
Attending: NURSE PRACTITIONER
Payer: MEDICARE

## 2021-08-04 VITALS
HEART RATE: 71 BPM | RESPIRATION RATE: 18 BRPM | OXYGEN SATURATION: 97 % | WEIGHT: 140.2 LBS | SYSTOLIC BLOOD PRESSURE: 116 MMHG | DIASTOLIC BLOOD PRESSURE: 66 MMHG | TEMPERATURE: 98 F | BODY MASS INDEX: 35.16 KG/M2

## 2021-08-04 DIAGNOSIS — R06.2 WHEEZING: Primary | ICD-10-CM

## 2021-08-04 DIAGNOSIS — F41.1 GAD (GENERALIZED ANXIETY DISORDER): ICD-10-CM

## 2021-08-04 DIAGNOSIS — F02.818 DEMENTIA ASSOCIATED WITH OTHER UNDERLYING DISEASE WITH BEHAVIORAL DISTURBANCE (H): ICD-10-CM

## 2021-08-04 DIAGNOSIS — J84.115 RESPIRATORY BRONCHIOLITIS INTERSTITIAL LUNG DISEASE (H): ICD-10-CM

## 2021-08-04 DIAGNOSIS — R06.2 WHEEZING: ICD-10-CM

## 2021-08-04 DIAGNOSIS — Q90.9 DOWN'S SYNDROME: ICD-10-CM

## 2021-08-04 PROCEDURE — 99215 OFFICE O/P EST HI 40 MIN: CPT | Performed by: NURSE PRACTITIONER

## 2021-08-04 PROCEDURE — G0463 HOSPITAL OUTPT CLINIC VISIT: HCPCS | Mod: 25

## 2021-08-04 PROCEDURE — 71046 X-RAY EXAM CHEST 2 VIEWS: CPT

## 2021-08-04 RX ORDER — MONTELUKAST SODIUM 10 MG/1
10 TABLET ORAL AT BEDTIME
Qty: 14 TABLET | Refills: 0 | Status: SHIPPED | OUTPATIENT
Start: 2021-08-04 | End: 2021-10-13

## 2021-08-04 ASSESSMENT — ENCOUNTER SYMPTOMS
CONFUSION: 1
COUGH: 1
WHEEZING: 1

## 2021-08-04 ASSESSMENT — ANXIETY QUESTIONNAIRES
7. FEELING AFRAID AS IF SOMETHING AWFUL MIGHT HAPPEN: NOT AT ALL
4. TROUBLE RELAXING: SEVERAL DAYS
5. BEING SO RESTLESS THAT IT IS HARD TO SIT STILL: NOT AT ALL
8. IF YOU CHECKED OFF ANY PROBLEMS, HOW DIFFICULT HAVE THESE MADE IT FOR YOU TO DO YOUR WORK, TAKE CARE OF THINGS AT HOME, OR GET ALONG WITH OTHER PEOPLE?: VERY DIFFICULT
7. FEELING AFRAID AS IF SOMETHING AWFUL MIGHT HAPPEN: NOT AT ALL
1. FEELING NERVOUS, ANXIOUS, OR ON EDGE: SEVERAL DAYS
GAD7 TOTAL SCORE: 6
2. NOT BEING ABLE TO STOP OR CONTROL WORRYING: NOT AT ALL
GAD7 TOTAL SCORE: 6
GAD7 TOTAL SCORE: 6
3. WORRYING TOO MUCH ABOUT DIFFERENT THINGS: SEVERAL DAYS
6. BECOMING EASILY ANNOYED OR IRRITABLE: NEARLY EVERY DAY

## 2021-08-04 ASSESSMENT — PAIN SCALES - GENERAL: PAINLEVEL: NO PAIN (0)

## 2021-08-04 NOTE — PROGRESS NOTES
"Answers for HPI/ROS submitted by the patient on 2021  DOROTEO 7 TOTAL SCORE: 6    Reg Valiente  : 1975 Age: 46 year old Sex: female MRN: [id number]    CC:   Chief Complaint   Patient presents with     Recheck Medication     Aricept     DOROTEO Score:    DOROTEO-7 SCORE 2018   Total Score - - 6 (mild anxiety)   Total Score 0 0 6     PHQ-2 Score:     PHQ-2 (  [employer]) 2021   Q1: Little interest or pleasure in doing things 0 0   Q2: Feeling down, depressed or hopeless 0 0   PHQ-2 Score 0 0          Tobacco Use      Smoking status: Never Smoker      Smokeless tobacco: Never Used    NURSE'S NOTES:    Nursing Notes:   Celina Nuñez LPN  2021  2:36 PM  Signed  Chief Complaint   Patient presents with     Recheck Medication     Aricept       Initial /66 (BP Location: Right arm, Patient Position: Sitting, Cuff Size: Adult Regular)   Pulse 71   Temp 98  F (36.7  C) (Tympanic)   Resp 18   Wt 63.6 kg (140 lb 3.2 oz)   LMP  (LMP Unknown)   SpO2 97%   Breastfeeding No   BMI 35.16 kg/m   Estimated body mass index is 35.16 kg/m  as calculated from the following:    Height as of 21: 1.345 m (4' 4.95\").    Weight as of this encounter: 63.6 kg (140 lb 3.2 oz).  Medication Reconciliation: complete  FOOD SECURITY SCREENING QUESTIONS  Hunger Vital Signs:  Within the past 12 months we worried whether our food would run out before we got money to buy more. Never  Within the past 12 months the food we bought just didn't last and we didn't have money to get more. Never      Celina Nuñez LPN       Nursing note reviewed with patient.  Accuracy and completeness verified.      SUBJECTIVE:                                                      HPI:   Reg Valiente presents to the clinic today accompanied by her caregiver for follow-up on behaviors.  2 weeks prior she was seen for increased agitation and arguing with staff and difficulty sleeping.  She does have a history of " dementia and was started on Aricept about a month ago.  Caregiver reports that the supervisor does not want her on Aricept anymore as she has not noticed any noticeable changes.  Patient is still having issues with memory especially and remembering to use the toilet appropriately she is having episodes of fecal incontinence, arguing with staff, becoming combative with staff with cares when she is incontinent.  She is having to wear a pull-up briefs.  Caregiver does report however she is sleeping well at night.    She is also had a cough.    Reg Valiente also presents with:    Patient Active Problem List   Diagnosis     Down's syndrome     DOROTEO (generalized anxiety disorder)     Menstrual disorder     Primary osteoarthritis of both knees     Gastroesophageal reflux disease without esophagitis     Other specified hypothyroidism     Dermatitis     Encounter for screening mammogram for breast cancer     Morbid obesity (H)     JENNIFER (obstructive sleep apnea)     Choledocholithiasis     Post-ERCP acute pancreatitis     Encounter for follow-up examination     Current Code Status:  Prior    REVIEW OF SYSTEMS:    Review of Systems   Constitutional:        Staff accompanying patient today reports that her supervisor wishes to discontinue the Aricept   Respiratory: Positive for cough and wheezing.    Cardiovascular: Positive for leg swelling (Chronic).   Gastrointestinal:        Episodes of fecal incontinence, having to wear a brief   Psychiatric/Behavioral: Positive for behavioral problems (Arguing with staff, combative with staff with cares) and confusion (Issues with memory).   All other systems reviewed and are negative.      Problem List/PMH: Reviewed in EMR, and made relevant updates today.  Medications: Reviewed in EMR, and made relevant updates today.  Allergies: Reviewed in EMR, and made relevant updates today.    OBJECTIVE:                                                      /66 (BP Location: Right arm,  Patient Position: Sitting, Cuff Size: Adult Regular)   Pulse 71   Temp 98  F (36.7  C) (Tympanic)   Resp 18   Wt 63.6 kg (140 lb 3.2 oz)   LMP  (LMP Unknown)   SpO2 97%   Breastfeeding No   BMI 35.16 kg/m      Current Pain Score:   No Pain (0)     Physical Exam  Vitals and nursing note reviewed.   Constitutional:       Appearance: Normal appearance. She is obese.   HENT:      Head: Normocephalic and atraumatic.      Nose: Congestion and rhinorrhea present.   Cardiovascular:      Rate and Rhythm: Normal rate and regular rhythm.      Heart sounds: Normal heart sounds.   Pulmonary:      Breath sounds: Wheezing present.   Musculoskeletal:         General: Normal range of motion.      Right lower leg: Edema present.      Left lower leg: Edema present.   Skin:     General: Skin is warm and dry.   Neurological:      Mental Status: She is alert. Mental status is at baseline.       BP Readings from Last 3 Encounters:   08/04/21 116/66   07/29/21 120/64   07/21/21 124/86        Vitals:    08/04/21 1416   Weight: 63.6 kg (140 lb 3.2 oz)        The 10-year ASCVD risk score (Calli DARON Jr., et al., 2013) is: 0.7%    Values used to calculate the score:      Age: 46 years      Sex: Female      Is Non- : No      Diabetic: No      Tobacco smoker: No      Systolic Blood Pressure: 116 mmHg      Is BP treated: No      HDL Cholesterol: 44 mg/dL      Total Cholesterol: 165 mg/dL    Diagnostics Completed at this Visit:    Results for orders placed or performed during the hospital encounter of 08/04/21   XR Chest 2 Views     Status: None    Narrative    Exam:  XR CHEST 2 VW    HISTORY: Wheezing.    COMPARISON:  7/17/2021    FINDINGS:     The cardiomediastinal contours are stable.     There is mildly prominent central interstitial markings as well as  some probable bronchial wall thickening. 5 mm right lower lung nodule  is again demonstrated.    No pleural effusion or pneumothorax.    No acute osseous  abnormality. No subdiaphragmatic free air.      Impression    IMPRESSION:      Mildly prominent central interstitial markings and probable bronchial  wall thickening which can be seen in the setting of bronchiolitis or  reactive airways.      5 mm right lower lung nodule is again demonstrated.    CARLA RODRIGUES MD         SYSTEM ID:  [id number]        ASSESSMENT AND PLAN:    Wheezing  X-ray indicated probable bronchial wall thickening, differential includes bronchiolitis or reactive airway disease we will do a trial of treatment with montelukast and see if this improves her symptoms  - XR Chest 2 Views  - montelukast (SINGULAIR) 10 MG tablet  Dispense: 14 tablet; Refill: 0    Respiratory bronchiolitis interstitial lung disease (H)  - montelukast (SINGULAIR) 10 MG tablet  Dispense: 14 tablet; Refill: 0     DOROTEO (generalized anxiety disorder)  Down's syndrome  Dementia associated with other underlying disease with behavioral disturbance (H)  Discontinue Aricept.  Staff will monitor behaviors for the next couple of weeks.  We discussed starting Seroquel but I do not want to make too many changes today.  We will readdress this in 2 weeks.  Continue escitalopram 20 mg daily.  Unsure if incontinence is behavior or truly advancement of dementia.  Discussed with caregiver how to have staff redirect patient when she becomes argumentative and possibly implementing a toileting program.    I explained my diagnostic considerations and recommendations to the patient, who voiced understanding and agreement with the treatment plan. All questions were answered. We discussed potential side effects of any prescribed or recommended therapies, as well as expectations for response to treatments.    Patient was advised to allow up to 2 days for response on lab results via MyChart and or letter to be sent.    FOLLOW-UP:    Return in about 2 weeks (around 8/18/2021) for Recheck on behaviors and incontinence.     Clinic :  [phone]  Appointment line: [phone]     EB Díaz, [facility]-  Internal Medicine  08/04/2021 5:38 PM  _____________________________________________________________________________________    Total time spent with this patient was 56 minutes which included chart review, visualization and interpretation of labs and/or images, time spent with patient, and documentation.

## 2021-08-04 NOTE — NURSING NOTE
"Chief Complaint   Patient presents with     Recheck Medication     Aricept       Initial /66 (BP Location: Right arm, Patient Position: Sitting, Cuff Size: Adult Regular)   Pulse 71   Temp 98  F (36.7  C) (Tympanic)   Resp 18   Wt 63.6 kg (140 lb 3.2 oz)   LMP  (LMP Unknown)   SpO2 97%   Breastfeeding No   BMI 35.16 kg/m   Estimated body mass index is 35.16 kg/m  as calculated from the following:    Height as of 7/29/21: 1.345 m (4' 4.95\").    Weight as of this encounter: 63.6 kg (140 lb 3.2 oz).  Medication Reconciliation: complete  FOOD SECURITY SCREENING QUESTIONS  Hunger Vital Signs:  Within the past 12 months we worried whether our food would run out before we got money to buy more. Never  Within the past 12 months the food we bought just didn't last and we didn't have money to get more. Never      Celina Nuñez LPN    "

## 2021-08-05 ASSESSMENT — ANXIETY QUESTIONNAIRES: GAD7 TOTAL SCORE: 6

## 2021-08-16 NOTE — TELEPHONE ENCOUNTER
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  Angi  reports that she quit smoking about 26 years ago. Her smoking use included cigarettes. She has a 8.00 pack-year smoking history. She has never used smokeless tobacco.  Angi has No Known Allergies.  Patient Emotional screening was completed 2021; DoD/VA Pain Supplemental Questionnaire score was 11/40.  Visit Number of Current Episode: 2  Initial Pain Ratin/10        Subjective: Angi Orellana is a 58 year old female returns with LT hip pain and bilateral buttock pain, rated today at 5/10. She reports feeling achy over the weekend with no sharp or shooting pain symptoms.  Mechanism of Injury: No injury.  Frequency/Quality:  Comes and goes described as stiffness but can be sharp/shooting.  Change in complaint/VAS: Complaint has stayed the same since the onset and the pain scale is presently rated at:  At best 0/10; at worst 7/10.  Modifying factors: Relieved by Tylenol or anti inflammatory.  Aggravated by sitting, standing, or walking for a prolonged time.  Sleeping affected: Yes.  ADL/Functional Deficits: Explains walking and putting her shoes on has become difficult. Also states that she is highly inflexible and has never been able to touch her toes.      Objective:  • Inspection/Observation: Neck and spine have no noted deformities or signs of swelling or inflammation.   cholestyramine (QUESTRAN) 4 GM/DOSE powder    Was sent to [facility] and they do not carry the medication. Pt's father is requesting the medication be sent to [facility] instead. Please call pt's father.        Gait is normal.  Posture is upright.    • Postural Analysis: Bony features of the hips are of equal height bilaterally.  Anterior pelvic tilt. Hyperlordosis.   • Spinal Segmental Joint Restriction: L5-S1 and bilateral SI joints exhibited limited passive joint motion and segmental restriction with tenderness upon palpation.  • Motor pattern restrictions or accommodations include: No lumbar flexion. Mild pain with lumbar extension. Limited and fixed lumbar flexion.  • Tissue Tone Changes: Soft tissue palpation revealed increased or decreased tone and tenderness in the following muscle groups:  Weakness LT gluteals with increased tension.  Hypertonic lumbar spine, dominantly on the RT.  • General Tenderness to Palpation is noted over L5 spinous process, sacrum base, and LT greater trocanter.  • Beck's Test performed bilaterally.  Positive for facet pain at L5-S1.   • Iliac Compression Test performed bilaterally. Positive for increased SI joint discomfort on the left and right (equal).  • Matthew Test performed bilaterally.  Positive for hip flexor contracture on the left and right (equal).  • Fabere Júnior performed bilaterally.  Positive for hip and or SI joint discomfort on the LT.      Diagnostic tests reviewed:   XR HIP 1 VIEW BILATERAL AND PELVIS  taken on 07/28/2021  FINDINGS/IMPRESSION:     Degenerative changes of the sacroiliac joints and pubic symphysis. Normal  hip joint spaces bilaterally. No fractures. No soft tissue abnormalities.      Diagnostic tests ordered:   XR LUMBAR SPINE 2 OR 3 VIEWS ordered 08/09/2021  FINDINGS:     5 nonrib-bearing lumbar vertebra. The vertebral bodies are normal height.  No fracture.     L5-S1 mild disc height loss. The lumbar disc spaces are otherwise  preserved. Moderate sclerotic facet degeneration throughout the lumbar  spine.     Exaggerated lumbar lordosis. Alignment is otherwise normal.       IMPRESSION:    1. No acute fracture or malalignment.  2. L5-S1 mild disc  degeneration, and multilevel moderate facet  degeneration.      Assessment:   Angi Orellana demonstrates the following Complicating Factors/Comorbidities: None.  DIAGNOSIS:  Upon consideration of the information available I have diagnosed her with:  1. Chronic bilateral low back pain without sciatica    2. Segmental and somatic dysfunction of lumbar region    3. Segmental and somatic dysfunction of sacral region    4. Sacral back pain    5. Segmental and somatic dysfunction of pelvic region    6. Left hip pain        Plan:  • Today's Treatment included Chiropractic manipulative therapy to the following joints:  L5-S1 and bilateral SI joints.  These motor segments exhibited limited passive joint motion and segmental restriction and tenderness on palpation.  • Manual deep tissue myofascial release of the lumbar paraspinal muscles noted above as taut and tender in order to decrease spasm, muscular restriction of motion, and pain.  • Spinal manipulation included Long Y-axis traction for 5 minutes at 1.5 inches to the lumbar spine in order to decrease segmental restriction and pain, and improve neuromechanical function.  • Assisted stretching of the hip flexors and lumbar paraspinal musculature to decrease segmental restriction and pain, and improve neuromechanical function.  • Attended postural exercises aimed at activating underutilized muscle groups included:  None today.      Goals of Care:  Short Term:  50% reduction in LT hip pain in the next 3 weeks.  Long Term Goals to be obtained by end of this plan of care include:   1. Patient independent with modified and progressed home exercise program.  2. Patient exhibits decreased symptoms by 60% or greater of current Visual Analog Pain Scale Score.  3. Patient’s active and passive range-of-motion restored to symmetrical with no/minimal pain.   4. Patient able to tolerate standing/sitting activities for greater than 60 minutes without pain/difficulty or  accomodation.  5. Patient able to walk without pain or difficulty for 45 minutes or greater.   6. Patient able to perform most activities of daily living and instrumental activities of daily living without pain/difficulty.  7. Patient’s DOD/VA pain questionnaire will be decreased by 50-70%.        Patient Instruction/Education/Home Exercises:   • Single knee to chest stretches.  • Patient was instructed to apply ice for 15 minutes to area of soreness as needed for pain management.  • Avoid uneven surfaces when walking and exercising.  • She was asked to increase ice application each day to 4 sessions.      Response to Treatment:  Treatment post check revealed a positive repsonse.    No adverse effects were reported. Other treatment options discussed with patient: None today.    Treatment Frequency:   2 times per week. Assess progress after a trial course of treatment of about 8 visits.     Follow-up: Friday      On 08/16/2021, Sury ENGLE CT scribed the services personally performed by Dr. Dago Haywood. The documentation recorded by the scribe accurately and completely reflects the service(s) I personally performed and the decisions made by me.

## 2021-08-18 ENCOUNTER — OFFICE VISIT (OUTPATIENT)
Dept: INTERNAL MEDICINE | Facility: OTHER | Age: 46
End: 2021-08-18
Attending: NURSE PRACTITIONER
Payer: MEDICARE

## 2021-08-18 VITALS
OXYGEN SATURATION: 97 % | BODY MASS INDEX: 31.66 KG/M2 | SYSTOLIC BLOOD PRESSURE: 112 MMHG | HEIGHT: 55 IN | WEIGHT: 136.8 LBS | TEMPERATURE: 97.6 F | RESPIRATION RATE: 18 BRPM | DIASTOLIC BLOOD PRESSURE: 70 MMHG | HEART RATE: 85 BPM

## 2021-08-18 DIAGNOSIS — Q90.9 DOWN'S SYNDROME: ICD-10-CM

## 2021-08-18 DIAGNOSIS — G30.0 EARLY ONSET ALZHEIMER'S DISEASE WITH BEHAVIORAL DISTURBANCE (H): Primary | ICD-10-CM

## 2021-08-18 DIAGNOSIS — Z90.49 S/P LAPAROSCOPIC CHOLECYSTECTOMY: ICD-10-CM

## 2021-08-18 DIAGNOSIS — R41.89 COGNITIVE IMPAIRMENT: ICD-10-CM

## 2021-08-18 DIAGNOSIS — F02.818 EARLY ONSET ALZHEIMER'S DISEASE WITH BEHAVIORAL DISTURBANCE (H): Primary | ICD-10-CM

## 2021-08-18 PROCEDURE — G0463 HOSPITAL OUTPT CLINIC VISIT: HCPCS

## 2021-08-18 PROCEDURE — 99212 OFFICE O/P EST SF 10 MIN: CPT | Performed by: NURSE PRACTITIONER

## 2021-08-18 RX ORDER — AMOXICILLIN 250 MG
1 CAPSULE ORAL DAILY PRN
Qty: 90 TABLET | Refills: 3 | COMMUNITY
Start: 2021-08-18 | End: 2021-09-08

## 2021-08-18 RX ORDER — QUETIAPINE FUMARATE 25 MG/1
25 TABLET, FILM COATED ORAL
Qty: 30 TABLET | Refills: 0 | Status: SHIPPED | OUTPATIENT
Start: 2021-08-18 | End: 2021-09-07

## 2021-08-18 ASSESSMENT — ENCOUNTER SYMPTOMS
SLEEP DISTURBANCE: 1
CONFUSION: 1

## 2021-08-18 ASSESSMENT — MIFFLIN-ST. JEOR: SCORE: 1070.1

## 2021-08-18 ASSESSMENT — PAIN SCALES - GENERAL: PAINLEVEL: NO PAIN (0)

## 2021-08-18 NOTE — PROGRESS NOTES
"Reg Valiente  : 1975 Age: 46 year old Sex: female MRN: [id number]    CC:   Chief Complaint   Patient presents with     RECHECK     follow up       DOROTEO Score:    DOROTEO-7 SCORE 2018   Total Score - - 6 (mild anxiety)   Total Score 0 0 6       PHQ-2 Score:     PHQ-2 (  [employer]) 2021   Q1: Little interest or pleasure in doing things 1 0   Q2: Feeling down, depressed or hopeless 1 0   PHQ-2 Score 2 0            Tobacco Use      Smoking status: Never Smoker      Smokeless tobacco: Never Used      NURSE'S NOTES:    Nursing Notes:   Celina Nuñez LPN  2021  2:13 PM  Signed  Chief Complaint   Patient presents with     RECHECK     follow up       Initial /70 (BP Location: Right arm, Patient Position: Sitting, Cuff Size: Adult Regular)   Pulse 85   Temp 97.6  F (36.4  C) (Tympanic)   Resp 18   Ht 1.345 m (4' 4.95\")   Wt 62.1 kg (136 lb 12.8 oz)   LMP  (LMP Unknown)   SpO2 97%   Breastfeeding No   BMI 34.30 kg/m   Estimated body mass index is 34.3 kg/m  as calculated from the following:    Height as of this encounter: 1.345 m (4' 4.95\").    Weight as of this encounter: 62.1 kg (136 lb 12.8 oz).  Medication Reconciliation: complete  FOOD SECURITY SCREENING QUESTIONS  Hunger Vital Signs:  Within the past 12 months we worried whether our food would run out before we got money to buy more. Never  Within the past 12 months the food we bought just didn't last and we didn't have money to get more. Never    Advance care plan reviewed      Celina Nuñez LPN         Nursing note reviewed with patient.  Accuracy and completeness verified.      SUBJECTIVE:                                                      HPI:   Reg Valiente presents to the clinic today for a 2-week recheck.  At prior visit we had discontinued the Aricept as with patient was having adverse behaviors such as throwing BM, aggression with staff, acting out.  Staff with patient today reports " "behaviors have been better since stopping the Aricept.  She reports she continues to have some mild behaviors and they are trying to track if it is staff related.  She does know of to staff that patient seems to interact with negatively more.  Facility nurse would like to try a low-dose of Seroquel to see if this helps.    Facility nurse would also like to change patient's docusate senna medication to as needed.  She continues to have loose stools.    Reg Valiente also presents with:    Patient Active Problem List   Diagnosis     Down's syndrome     DOROTEO (generalized anxiety disorder)     Menstrual disorder     Primary osteoarthritis of both knees     Gastroesophageal reflux disease without esophagitis     Other specified hypothyroidism     Dermatitis     Encounter for screening mammogram for breast cancer     Morbid obesity (H)     JENNIFER (obstructive sleep apnea)     Choledocholithiasis     Post-ERCP acute pancreatitis     Encounter for follow-up examination     Dementia associated with other underlying disease with behavioral disturbance (H)       Current Code Status:  Prior    REVIEW OF SYSTEMS:    Review of Systems   Neurological:        History of dementia and Down syndrome   Psychiatric/Behavioral: Positive for behavioral problems, confusion and sleep disturbance.   All other systems reviewed and are negative.      Problem List/PMH: Reviewed in EMR, and made relevant updates today.  Medications: Reviewed in EMR, and made relevant updates today.  Allergies: Reviewed in EMR, and made relevant updates today.    OBJECTIVE:                                                      /70 (BP Location: Right arm, Patient Position: Sitting, Cuff Size: Adult Regular)   Pulse 85   Temp 97.6  F (36.4  C) (Tympanic)   Resp 18   Ht 1.345 m (4' 4.95\")   Wt 62.1 kg (136 lb 12.8 oz)   LMP  (LMP Unknown)   SpO2 97%   Breastfeeding No   BMI 34.30 kg/m      Current Pain Score:   No Pain (0)     Physical Exam  Vitals and " nursing note reviewed.   Constitutional:       Appearance: Normal appearance. She is obese.   HENT:      Head: Normocephalic and atraumatic.   Eyes:      Conjunctiva/sclera: Conjunctivae normal.   Musculoskeletal:         General: Normal range of motion.      Right lower leg: Edema (Chronic) present.      Left lower leg: Edema (Chronic) present.   Skin:     General: Skin is warm and dry.   Neurological:      Mental Status: She is alert. Mental status is at baseline.   Psychiatric:         Speech: Speech is rapid and pressured and tangential.         Cognition and Memory: Cognition is impaired. Memory is impaired.         Judgment: Judgment is impulsive.          BP Readings from Last 3 Encounters:   08/18/21 112/70   08/04/21 116/66   07/29/21 120/64        Vitals:    08/18/21 1406   Weight: 62.1 kg (136 lb 12.8 oz)        The 10-year ASCVD risk score (Calli NERI Jr., et al., 2013) is: 0.7%    Values used to calculate the score:      Age: 46 years      Sex: Female      Is Non- : No      Diabetic: No      Tobacco smoker: No      Systolic Blood Pressure: 112 mmHg      Is BP treated: No      HDL Cholesterol: 44 mg/dL      Total Cholesterol: 165 mg/dL    Diagnostics Completed at this Visit:    No results found for any visits on 08/18/21.     ASSESSMENT AND PLAN:    Loose stool  Staff is requesting per nurse that this order be changed to daily as needed.  - senna-docusate (SENOKOT S) 8.6-50 MG tablet  Dispense: 90 tablet; Refill: 3    Early onset Alzheimer's disease with behavioral disturbance (H)  Cognitive impairment  Down's syndrome,   - QUEtiapine (SEROQUEL) 25 MG tablet  Dispense: 30 tablet; Refill: 0, to be given at suppertime.  They will continue to monitor sleep and behaviors.    I explained my diagnostic considerations and recommendations to the patient, who voiced understanding and agreement with the treatment plan. All questions were answered. We discussed potential side effects of any  prescribed or recommended therapies, as well as expectations for response to treatments.    Patient was advised to allow up to 2 days for response on lab results via MyChart and or letter to be sent.    FOLLOW-UP:    Return in about 4 weeks (around 9/15/2021) for Recheck on behavior with new med of Seroquel.     Clinic : [phone]  Appointment line: [phone]     EB Díaz, AGNP-C  Internal Medicine  08/18/2021 2:52 PM  _____________________________________________________________________________________    Total time spent with this patient was 17 minutes which included chart review, visualization and interpretation of labs and/or images, time spent with patient, and documentation.

## 2021-08-18 NOTE — NURSING NOTE
"Chief Complaint   Patient presents with     RECHECK     follow up       Initial /70 (BP Location: Right arm, Patient Position: Sitting, Cuff Size: Adult Regular)   Pulse 85   Temp 97.6  F (36.4  C) (Tympanic)   Resp 18   Ht 1.345 m (4' 4.95\")   Wt 62.1 kg (136 lb 12.8 oz)   LMP  (LMP Unknown)   SpO2 97%   Breastfeeding No   BMI 34.30 kg/m   Estimated body mass index is 34.3 kg/m  as calculated from the following:    Height as of this encounter: 1.345 m (4' 4.95\").    Weight as of this encounter: 62.1 kg (136 lb 12.8 oz).  Medication Reconciliation: complete  FOOD SECURITY SCREENING QUESTIONS  Hunger Vital Signs:  Within the past 12 months we worried whether our food would run out before we got money to buy more. Never  Within the past 12 months the food we bought just didn't last and we didn't have money to get more. Never    Advance care plan reviewed      Celina Nuñez LPN    "

## 2021-08-23 DIAGNOSIS — Q90.9 DOWN'S SYNDROME: ICD-10-CM

## 2021-08-23 DIAGNOSIS — F02.818 EARLY ONSET ALZHEIMER'S DISEASE WITH BEHAVIORAL DISTURBANCE (H): ICD-10-CM

## 2021-08-23 DIAGNOSIS — Z00.00 HEALTH CARE MAINTENANCE: ICD-10-CM

## 2021-08-23 DIAGNOSIS — R41.89 COGNITIVE IMPAIRMENT: ICD-10-CM

## 2021-08-23 DIAGNOSIS — G30.0 EARLY ONSET ALZHEIMER'S DISEASE WITH BEHAVIORAL DISTURBANCE (H): ICD-10-CM

## 2021-08-24 RX ORDER — MULTIVITAMIN WITH FOLIC ACID 400 MCG
TABLET ORAL
Qty: 90 TABLET | Refills: 3 | Status: SHIPPED | OUTPATIENT
Start: 2021-08-24 | End: 2022-11-30

## 2021-08-24 RX ORDER — QUETIAPINE FUMARATE 25 MG/1
25 TABLET, FILM COATED ORAL
Qty: 30 TABLET | Refills: 0 | OUTPATIENT
Start: 2021-08-24

## 2021-08-24 NOTE — TELEPHONE ENCOUNTER
QUEtiapine (SEROQUEL) 25 MG tablet 30 tablet 0 8/18/2021  --   Sig - Route: Take 1 tablet (25 mg) by mouth daily (with dinner) -      To [facility].  Refill request to soon  Patient to follow up  Li Malik RN on 8/24/2021 at 12:21 PM

## 2021-08-24 NOTE — TELEPHONE ENCOUNTER
Prescription approved per [facility] Refill Protocol.  LVO 8/18/2021   Disp Refills Start End KATRIN   Multiple Vitamins-Minerals (TAB-A-MATTHIAS) TABS 90 tablet 1 2/16/2021  No   Sig: TAKE 1 TAB BY MOUTH ONCE DAILY     Li Malik RN on 8/24/2021 at 12:20 PM

## 2021-08-28 DIAGNOSIS — Z90.49 S/P LAPAROSCOPIC CHOLECYSTECTOMY: ICD-10-CM

## 2021-08-30 RX ORDER — STANDARDIZED SENNA CONCENTRATE AND DOCUSATE SODIUM 8.6; 5 MG/1; MG/1
TABLET ORAL
Qty: 120 TABLET | Refills: 0 | OUTPATIENT
Start: 2021-08-30

## 2021-08-31 RX ORDER — DONEPEZIL HYDROCHLORIDE 10 MG/1
TABLET, FILM COATED ORAL
Qty: 30 TABLET | Refills: 0 | OUTPATIENT
Start: 2021-08-31

## 2021-08-31 NOTE — TELEPHONE ENCOUNTER
[id number] [place] sent Rx request for the following:   donepezil (ARICEPT) 10 MG tablet  SigTAKE 1 TABLET (10 MG) BY MOUTH AT BEDTIME      This Order Has Been Discontinued    Order Status Reason By On   Discontinued Discontinued by another Health Care Provider Nalini Nguyen NP 8/4/21 7023     Unable to complete prescription refill per RN Medication Refill Policy.................... Ami Gonzáles RN ....................  8/31/2021   10:13 AM

## 2021-09-02 DIAGNOSIS — F02.818 EARLY ONSET ALZHEIMER'S DISEASE WITH BEHAVIORAL DISTURBANCE (H): ICD-10-CM

## 2021-09-02 DIAGNOSIS — R41.89 COGNITIVE IMPAIRMENT: ICD-10-CM

## 2021-09-02 DIAGNOSIS — Q90.9 DOWN'S SYNDROME: ICD-10-CM

## 2021-09-02 DIAGNOSIS — G30.0 EARLY ONSET ALZHEIMER'S DISEASE WITH BEHAVIORAL DISTURBANCE (H): ICD-10-CM

## 2021-09-02 RX ORDER — DONEPEZIL HYDROCHLORIDE 10 MG/1
10 TABLET, FILM COATED ORAL AT BEDTIME
Qty: 30 TABLET | Refills: 0 | OUTPATIENT
Start: 2021-09-02

## 2021-09-02 NOTE — TELEPHONE ENCOUNTER
[facility] of [place] sent Rx request for the following:      donepezil (ARICEPT) 10 MG tablet (Discontinued) 30 tablet 0 7/21/2021 8/4/2021 No   Sig - Route: Take 1 tablet (10 mg) by mouth At Bedtime - Oral     Unable to complete prescription refill per RN Medication Refill Policy. Jennifer Oh RN .............. 9/2/2021  9:45 AM

## 2021-09-07 DIAGNOSIS — Z90.49 S/P LAPAROSCOPIC CHOLECYSTECTOMY: ICD-10-CM

## 2021-09-07 RX ORDER — QUETIAPINE FUMARATE 25 MG/1
25 TABLET, FILM COATED ORAL
Qty: 30 TABLET | Refills: 0 | Status: SHIPPED | OUTPATIENT
Start: 2021-09-07 | End: 2021-10-13

## 2021-09-07 NOTE — TELEPHONE ENCOUNTER
[facility] sent Rx request for the following:      Requested Prescriptions   Pending Prescriptions Disp Refills     QUEtiapine (SEROQUEL) 25 MG tablet 30 tablet 0     Sig: Take 1 tablet (25 mg) by mouth daily (with dinner)       Antipsychotic Medications Failed - 9/7/2021  8:22 AM        Failed - Lipid panel on file within the past 12 months     Recent Labs   Lab Test 07/06/20  1108   CHOL 165   TRIG 116   HDL 44   LDL 98   NHDL 121                       Last Prescription Date:   8/18/2021  Last Fill Qty/Refills:         30, R-0  Last Office Visit:              8/18/2021 (Patrick)   Future Office visit:           None  Routing refill request to provider for review/approval because:  Unable to complete prescription refill per RN Medication Refill Policy.   Vivian Jorge RN ....................  9/7/2021   8:25 AM

## 2021-09-08 RX ORDER — AMOXICILLIN 250 MG
1 CAPSULE ORAL DAILY PRN
Qty: 90 TABLET | Refills: 3 | Status: SHIPPED | OUTPATIENT
Start: 2021-09-08 | End: 2021-11-23

## 2021-09-08 NOTE — TELEPHONE ENCOUNTER
"[id number] sent Rx request for the following:      Requested Prescriptions   Pending Prescriptions Disp Refills     senna-docusate (SENOKOT S) 8.6-50 MG tablet 90 tablet 3     Sig: Take 1 tablet by mouth daily as needed for constipation       Laxatives Protocol Passed - 9/8/2021  2:47 PM        Passed - Patient is age 6 or older        Passed - Recent (12 mo) or future (30 days) visit within the authorizing provider's specialty     Patient has had an office visit with the authorizing provider or a provider within the authorizing providers department within the previous 12 mos or has a future within next 30 days. See \"Patient Info\" tab in inbasket, or \"Choose Columns\" in Meds & Orders section of the refill encounter.              Passed - Medication is active on med list             Last Prescription Date:   Historical Rx  Last Office Visit:              8/18/2021   Future Office visit:           None noted  Routing refill request to provider for review/approval because:  Medication is reported/historical  Unable to complete prescription refill per RN Medication Refill Policy.   Lyric Johnson RN on 9/8/2021 at 2:54 PM    "

## 2021-09-22 DIAGNOSIS — G30.0 EARLY ONSET ALZHEIMER'S DISEASE WITH BEHAVIORAL DISTURBANCE (H): Primary | ICD-10-CM

## 2021-09-22 DIAGNOSIS — F02.818 EARLY ONSET ALZHEIMER'S DISEASE WITH BEHAVIORAL DISTURBANCE (H): Primary | ICD-10-CM

## 2021-09-22 DIAGNOSIS — M17.0 PRIMARY OSTEOARTHRITIS OF BOTH KNEES: ICD-10-CM

## 2021-09-22 RX ORDER — DONEPEZIL HYDROCHLORIDE 5 MG/1
5 TABLET, FILM COATED ORAL AT BEDTIME
Qty: 90 TABLET | Refills: 3 | OUTPATIENT
Start: 2021-09-22

## 2021-09-22 NOTE — TELEPHONE ENCOUNTER
[id number] sent Rx request for the following:    Requested Prescriptions   Pending Prescriptions Disp Refills     diclofenac (VOLTAREN) 50 MG EC tablet 180 tablet 3     Sig: TAKE 1 TABLET BY MOUTH TWICE A DAY   Last Prescription Date:   10/15/20  Last Fill Qty/Refills:         180, R-3   Last Office Visit:              8/18/21  Future Office visit:           None  Routing refill request to provider for review/approval because:    NSAID Medications Failed - 9/22/2021 11:03 AM        Failed - Normal ALT on file in past 12 months        Failed - Normal AST on file in past 12 months     Unable to complete prescription refill per RN Medication Refill Policy. Jennifer Oh RN .............. 9/22/2021  11:09 AM

## 2021-09-22 NOTE — TELEPHONE ENCOUNTER
[facility] of [place] sent Rx request for the following:    donepezil (ARICEPT) 10 MG tablet (Discontinued) 30 tablet 0 7/21/2021 8/4/2021 No   Sig - Route: Take 1 tablet (10 mg) by mouth At Bedtime - Oral     Unable to complete prescription refill per RN Medication Refill Policy. Jennifer Oh RN .............. 9/22/2021  11:04 AM

## 2021-09-27 DIAGNOSIS — F02.818 EARLY ONSET ALZHEIMER'S DISEASE WITH BEHAVIORAL DISTURBANCE (H): ICD-10-CM

## 2021-09-27 DIAGNOSIS — G30.0 EARLY ONSET ALZHEIMER'S DISEASE WITH BEHAVIORAL DISTURBANCE (H): ICD-10-CM

## 2021-09-29 RX ORDER — DONEPEZIL HYDROCHLORIDE 5 MG/1
5 TABLET, FILM COATED ORAL AT BEDTIME
Qty: 90 TABLET | Refills: 2 | OUTPATIENT
Start: 2021-09-29

## 2021-09-29 NOTE — TELEPHONE ENCOUNTER
"Aricept not on current medication list.  Per OV on 8/4/2021  \"Discontinue Aricept.\"    Li Malik RN on 9/29/2021 at 12:02 PM    "

## 2021-10-13 ENCOUNTER — OFFICE VISIT (OUTPATIENT)
Dept: INTERNAL MEDICINE | Facility: OTHER | Age: 46
End: 2021-10-13
Attending: NURSE PRACTITIONER
Payer: MEDICARE

## 2021-10-13 VITALS
WEIGHT: 135.6 LBS | DIASTOLIC BLOOD PRESSURE: 70 MMHG | SYSTOLIC BLOOD PRESSURE: 114 MMHG | HEART RATE: 82 BPM | TEMPERATURE: 98.1 F | RESPIRATION RATE: 16 BRPM | BODY MASS INDEX: 34 KG/M2 | OXYGEN SATURATION: 98 %

## 2021-10-13 DIAGNOSIS — F02.818 EARLY ONSET ALZHEIMER'S DISEASE WITH BEHAVIORAL DISTURBANCE (H): ICD-10-CM

## 2021-10-13 DIAGNOSIS — J45.909 ASTHMA, UNSPECIFIED ASTHMA SEVERITY, UNSPECIFIED WHETHER COMPLICATED, UNSPECIFIED WHETHER PERSISTENT: ICD-10-CM

## 2021-10-13 DIAGNOSIS — R41.89 COGNITIVE IMPAIRMENT: ICD-10-CM

## 2021-10-13 DIAGNOSIS — Q90.9 DOWN'S SYNDROME: ICD-10-CM

## 2021-10-13 DIAGNOSIS — G30.0 EARLY ONSET ALZHEIMER'S DISEASE WITH BEHAVIORAL DISTURBANCE (H): ICD-10-CM

## 2021-10-13 PROCEDURE — G0463 HOSPITAL OUTPT CLINIC VISIT: HCPCS

## 2021-10-13 PROCEDURE — 99212 OFFICE O/P EST SF 10 MIN: CPT | Performed by: NURSE PRACTITIONER

## 2021-10-13 RX ORDER — QUETIAPINE FUMARATE 25 MG/1
25 TABLET, FILM COATED ORAL
Qty: 90 TABLET | Refills: 3 | Status: SHIPPED | OUTPATIENT
Start: 2021-10-13 | End: 2022-09-02

## 2021-10-13 ASSESSMENT — ENCOUNTER SYMPTOMS
SLEEP DISTURBANCE: 1
CONFUSION: 1

## 2021-10-13 ASSESSMENT — PAIN SCALES - GENERAL: PAINLEVEL: NO PAIN (0)

## 2021-10-13 NOTE — PROGRESS NOTES
"Reg Valiente  : 1975 Age: 46 year old Sex: female MRN: [id number]    CC:   Chief Complaint   Patient presents with     Recheck Medication     Refills     DOROTEO Score:    DOROTEO-7 SCORE 2018   Total Score - - 6 (mild anxiety)   Total Score 0 0 6     PHQ-2 Score:     PHQ-2 (  [employer]) 10/13/2021 2021   Q1: Little interest or pleasure in doing things 0 1   Q2: Feeling down, depressed or hopeless 0 1   PHQ-2 Score 0 2          Tobacco Use      Smoking status: Never Smoker      Smokeless tobacco: Never Used      NURSE'S NOTES:    Nursing Notes:   Celina Nuñez LPN  10/13/2021 11:54 AM  Signed  Chief Complaint   Patient presents with     Recheck Medication     Refills       Initial /70 (BP Location: Right arm, Patient Position: Sitting, Cuff Size: Adult Regular)   Pulse 82   Temp 98.1  F (36.7  C) (Temporal)   Resp 16   Wt 61.5 kg (135 lb 9.6 oz)   LMP 2021 (Approximate)   SpO2 98%   Breastfeeding No   BMI 34.00 kg/m   Estimated body mass index is 34 kg/m  as calculated from the following:    Height as of 21: 1.345 m (4' 4.95\").    Weight as of this encounter: 61.5 kg (135 lb 9.6 oz).  Medication Reconciliation: complete  FOOD SECURITY SCREENING QUESTIONS  Hunger Vital Signs:  Within the past 12 months we worried whether our food would run out before we got money to buy more. Never  Within the past 12 months the food we bought just didn't last and we didn't have money to get more. Never    Advance care plan reviewed      Celina Nuñez LPN         Nursing note reviewed with patient.  Accuracy and completeness verified.      SUBJECTIVE:                                                      HPI:   Reg Valiente presents to the clinic today for a recheck on medication. She had been trialed on Aricept which was discontinued as with patient was having adverse behaviors such as throwing BM, aggression with staff, acting out.  Staff with patient reported behaviors " have been better since stopping the Aricept.  Facility nurse requested to try a low-dose of Seroquel to see if this helps which was ordered. They are here today for follow up.    Staff accompanying the patient today reports she is doing well on the Seroquel stating things are good.  Staff reports patient's bowel issues are much better after changing the senna to as needed.  Staff reports they have noticed a decrease in her anxiety since starting the Seroquel.    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   All other systems reviewed and are negative.      Problem List/PMH: Reviewed in EMR, and made relevant updates today.  Medications: Reviewed in EMR, and made relevant updates today.  Allergies: Reviewed in EMR, and made relevant updates today.    OBJECTIVE:                                                      /70 (BP Location: Right arm, Patient Position: Sitting, Cuff Size: Adult Regular)   Pulse 82   Temp 98.1  F (36.7  C) (Temporal)   Resp 16   Wt 61.5 kg (135 lb 9.6 oz)   LMP 09/29/2021 (Approximate)   SpO2 98%   Breastfeeding No   BMI 34.00 kg/m      Current Pain Score:   No Pain (0)     Physical  Exam  Vitals and nursing note reviewed.   Constitutional:       Appearance: Normal appearance. She is obese.   HENT:      Head: Normocephalic and atraumatic.   Eyes:      Conjunctiva/sclera: Conjunctivae normal.   Musculoskeletal:         General: Normal range of motion.      Right lower leg: Edema (Chronic) present.      Left lower leg: Edema (Chronic) present.   Skin:     General: Skin is warm and dry.   Neurological:      Mental Status: She is alert. Mental status is at baseline.   Psychiatric:         Speech: Speech is rapid and pressured and tangential.         Cognition and Memory: Cognition is impaired. Memory is impaired.         Judgment: Judgment is impulsive.        BP Readings from Last 3 Encounters:   10/13/21 114/70   08/18/21 112/70   08/04/21 116/66      Vitals:    10/13/21 1132   Weight: 61.5 kg (135 lb 9.6 oz)     The 10-year ASCVD risk score (Calli NERI Jr., et al., 2013) is: 0.7%    Values used to calculate the score:      Age: 46 years      Sex: Female      Is Non- : No      Diabetic: No      Tobacco smoker: No      Systolic Blood Pressure: 114 mmHg      Is BP treated: No      HDL Cholesterol: 44 mg/dL      Total Cholesterol: 165 mg/dL    Diagnostics Completed at this Visit:    No results found for any visits on 10/13/21.     ASSESSMENT AND PLAN:    Loose stool  Resolved with decreasing senna.    Early onset Alzheimer's disease with behavioral disturbance (H)  Cognitive impairment  Down's syndrome,   - QUEtiapine (SEROQUEL) 25 MG tablet  Dispense: 30 tablet; Refill: 0, to be given at suppertime.  They will continue to monitor sleep and behaviors.    I explained my diagnostic considerations and recommendations to the patient, who voiced understanding and agreement with the treatment plan. All questions were answered. We discussed potential side effects of any prescribed or recommended therapies, as well as expectations for response to treatments.    Patient was advised to  allow up to 2 days for response on lab results via MyChart and or letter to be sent.    FOLLOW-UP:    Return if symptoms worsen or fail to improve.     Clinic : [phone]  Appointment line: [phone]     EB Díaz, [facility]  Internal Medicine  10/13/2021 2:37 PM  _____________________________________________________________________________________    Total time spent with this patient was 10 minutes which included chart review, visualization and interpretation of labs and/or images, time spent with patient, and documentation.

## 2021-10-13 NOTE — NURSING NOTE
"Chief Complaint   Patient presents with     Recheck Medication     Refills       Initial /70 (BP Location: Right arm, Patient Position: Sitting, Cuff Size: Adult Regular)   Pulse 82   Temp 98.1  F (36.7  C) (Temporal)   Resp 16   Wt 61.5 kg (135 lb 9.6 oz)   LMP 09/29/2021 (Approximate)   SpO2 98%   Breastfeeding No   BMI 34.00 kg/m   Estimated body mass index is 34 kg/m  as calculated from the following:    Height as of 8/18/21: 1.345 m (4' 4.95\").    Weight as of this encounter: 61.5 kg (135 lb 9.6 oz).  Medication Reconciliation: complete  FOOD SECURITY SCREENING QUESTIONS  Hunger Vital Signs:  Within the past 12 months we worried whether our food would run out before we got money to buy more. Never  Within the past 12 months the food we bought just didn't last and we didn't have money to get more. Never    Advance care plan reviewed      Celina Nuñez LPN    "

## 2021-11-23 ENCOUNTER — HOSPITAL ENCOUNTER (OUTPATIENT)
Dept: GENERAL RADIOLOGY | Facility: OTHER | Age: 46
End: 2021-11-23
Attending: NURSE PRACTITIONER
Payer: MEDICARE

## 2021-11-23 ENCOUNTER — OFFICE VISIT (OUTPATIENT)
Dept: INTERNAL MEDICINE | Facility: OTHER | Age: 46
End: 2021-11-23
Attending: NURSE PRACTITIONER
Payer: MEDICARE

## 2021-11-23 VITALS
RESPIRATION RATE: 16 BRPM | SYSTOLIC BLOOD PRESSURE: 110 MMHG | DIASTOLIC BLOOD PRESSURE: 64 MMHG | TEMPERATURE: 98.4 F | HEART RATE: 68 BPM | BODY MASS INDEX: 35.51 KG/M2 | WEIGHT: 141.6 LBS | OXYGEN SATURATION: 97 %

## 2021-11-23 DIAGNOSIS — F02.818 DEMENTIA ASSOCIATED WITH OTHER UNDERLYING DISEASE WITH BEHAVIORAL DISTURBANCE (H): ICD-10-CM

## 2021-11-23 DIAGNOSIS — M17.0 PRIMARY OSTEOARTHRITIS OF BOTH KNEES: ICD-10-CM

## 2021-11-23 DIAGNOSIS — Z00.00 HEALTH CARE MAINTENANCE: Primary | ICD-10-CM

## 2021-11-23 DIAGNOSIS — Q90.9 DOWN'S SYNDROME: ICD-10-CM

## 2021-11-23 DIAGNOSIS — R91.1 PULMONARY NODULE, RIGHT: ICD-10-CM

## 2021-11-23 DIAGNOSIS — R15.9 INCONTINENCE OF FECES, UNSPECIFIED FECAL INCONTINENCE TYPE: ICD-10-CM

## 2021-11-23 DIAGNOSIS — K21.9 GASTROESOPHAGEAL REFLUX DISEASE WITHOUT ESOPHAGITIS: ICD-10-CM

## 2021-11-23 DIAGNOSIS — F41.1 GAD (GENERALIZED ANXIETY DISORDER): ICD-10-CM

## 2021-11-23 DIAGNOSIS — G47.33 OSA (OBSTRUCTIVE SLEEP APNEA): ICD-10-CM

## 2021-11-23 DIAGNOSIS — R13.10 DYSPHAGIA, UNSPECIFIED TYPE: ICD-10-CM

## 2021-11-23 DIAGNOSIS — E03.8 OTHER SPECIFIED HYPOTHYROIDISM: ICD-10-CM

## 2021-11-23 DIAGNOSIS — Z12.31 ENCOUNTER FOR SCREENING MAMMOGRAM FOR BREAST CANCER: ICD-10-CM

## 2021-11-23 LAB
HOLD SPECIMEN: NORMAL
T4 FREE SERPL-MCNC: 1.37 NG/DL (ref 0.6–1.6)
TSH SERPL DL<=0.005 MIU/L-ACNC: 0.33 MU/L (ref 0.4–4)

## 2021-11-23 PROCEDURE — 99396 PREV VISIT EST AGE 40-64: CPT | Performed by: NURSE PRACTITIONER

## 2021-11-23 PROCEDURE — 71046 X-RAY EXAM CHEST 2 VIEWS: CPT

## 2021-11-23 PROCEDURE — 84439 ASSAY OF FREE THYROXINE: CPT | Mod: ZL | Performed by: NURSE PRACTITIONER

## 2021-11-23 PROCEDURE — 36415 COLL VENOUS BLD VENIPUNCTURE: CPT | Mod: ZL | Performed by: NURSE PRACTITIONER

## 2021-11-23 PROCEDURE — 84443 ASSAY THYROID STIM HORMONE: CPT | Mod: ZL | Performed by: NURSE PRACTITIONER

## 2021-11-23 ASSESSMENT — ENCOUNTER SYMPTOMS
LIGHT-HEADEDNESS: 0
SHORTNESS OF BREATH: 0
VOMITING: 0
SLEEP DISTURBANCE: 0
HEMATURIA: 0
CONFUSION: 1
ROS GI COMMENTS: SEE HPI
WOUND: 0
DIFFICULTY URINATING: 0
ENDOCRINE NEGATIVE: 1
DYSPHORIC MOOD: 0
DYSURIA: 0
FATIGUE: 0
CONSTIPATION: 0
ABDOMINAL PAIN: 0
TROUBLE SWALLOWING: 1
DIARRHEA: 0
ANAL BLEEDING: 0
BLOOD IN STOOL: 0
CHEST TIGHTNESS: 0
DIZZINESS: 0
COUGH: 0
UNEXPECTED WEIGHT CHANGE: 0

## 2021-11-23 ASSESSMENT — PAIN SCALES - GENERAL: PAINLEVEL: NO PAIN (0)

## 2021-11-23 NOTE — PROGRESS NOTES
"Medicare Wellness Visit   Ms. Valiente is a 46 year old female who presents today who presents for Preventive Visit.      Are you in the first 12 months of your Medicare coverage?  No    Health Risk Assessment     Do you feel safe in your environment? Yes    Physical Health:    In general, how would you rate your overall physical health? good    Outside of work, how many days during the week do you exercise? 4-5 days/week    Outside of work, approximately how many minutes a day do you exercise?less than 15 minutes    If you drink alcohol do you typically have >3 drinks per day or >7 drinks per week? No    Do you usually eat at least 4 servings of fruit and vegetables a day, include whole grains & fiber and avoid regularly eating high fat or \"junk\" foods? Yes    Do you have any problems taking medications regularly?  No    Do you have any side effects from medications? none    Needs assistance for the following daily activities: transportation, shopping, preparing meals, housework, laundry, money management and taking medicine    Which of the following safety concerns are present in your home?  none identified     Hearing impairment: No    In the past 6 months, have you been bothered by leaking of urine? no      Screening     Fall risk       Cognitive Screening Not appropriate due to mental handicap    Do you have sleep apnea, excessive snoring or daytime drowsiness?: no    Breast cancer screening: unknown     Regular dental visits: 01/2022    Eye exam with in 2 years: 02/2022      End of Life Planning     Have you ever done Advance Care Planning? (For example, a Health Directive, POLST, or a discussion with a medical provider or your loved ones about your wishes): Yes, advance care planning is on file.      End of Life Planning:  Patient currently has an advanced directive: { :792603}        Medical Record Update     Reviewed and updated as needed this visit by clinical staff  Tobacco  Allergies  Meds  Med Hx  " "Surg Hx  Fam Hx  Soc Hx      Reviewed and updated as needed this visit by Provider                Current Outpatient Medications   Medication     citalopram (CELEXA) 20 MG tablet     cyanocobalamin (VITAMIN B-12) 1000 MCG tablet     diclofenac (VOLTAREN) 50 MG EC tablet     famotidine (PEPCID) 40 MG tablet     levothyroxine (SYNTHROID/LEVOTHROID) 150 MCG tablet     Multiple Vitamin (TAB-A-MATTHIAS) TABS     norgestim-eth estrad triphasic (TRI-LINYAH) 0.18/0.215/0.25 MG-35 MCG tablet     QUEtiapine (SEROQUEL) 25 MG tablet     senna-docusate (SENOKOT S) 8.6-50 MG tablet     No current facility-administered medications for this visit.          Current providers sharing in care for this patient include:   Patient Care Team:  Mikayla Gtz NP as PCP - General (Nurse Practitioner)  Génesis Garcia PA-C as Assigned Musculoskeletal Provider  Nalini Nguyen NP as Assigned PCP     Subjective:   ***    Review of Systems       OBJECTIVE:     Vitals:    11/23/21 0840   BP: 110/64   BP Location: Right arm   Patient Position: Sitting   Cuff Size: Adult Regular   Pulse: 68   Resp: 16   Temp: 98.4  F (36.9  C)   TempSrc: Tympanic   SpO2: 97%   Weight: 64.2 kg (141 lb 9.6 oz)        Estimated body mass index is 35.51 kg/m  as calculated from the following:    Height as of 8/18/21: 1.345 m (4' 4.95\").    Weight as of this encounter: 64.2 kg (141 lb 9.6 oz).     Physical Exam      Labs reviewed in EPIC    ASSESSMENT / PLAN:       ICD-10-CM    1. Other specified hypothyroidism  E03.8    2. Down's syndrome  Q90.9    3. DOROTEO (generalized anxiety disorder)  F41.1    4. Primary osteoarthritis of both knees  M17.0    5. Gastroesophageal reflux disease without esophagitis  K21.9    6. JENNIFER (obstructive sleep apnea)  G47.33    7. Dementia associated with other underlying disease with behavioral disturbance (H)  F02.81    8. Pulmonary nodule, right  R91.1          Preventative Care Guidelines and Health Counseling "     COUNSELING:  Reviewed preventive health counseling  Special attention given to:   Preventative screening  Regular exercise  Healthy diet/nutrition  Bladder control   Immunizations   {Medicare Counselin}    110/64   {BP Counseling- Complete if BP >= 120/80  (Optional):285310}    Body mass index is 35.51 kg/m . {Weight Management Plan (ACO) Complete if BMI is abnormal-  Ages 18-64  BMI >24.9.  Age 65+ with BMI <23 or >30 (Optional):563494}    {Tobacco Cessation -- Complete if patient is a smoker (Optional):668705}    Appropriate preventive services were discussed with this patient, including applicable screening as appropriate for cardiovascular disease, diabetes, osteopenia/osteoporosis, and glaucoma.  As appropriate for age/gender, discussed screening for colorectal cancer, prostate cancer, breast cancer, and cervical cancer. Checklist reviewing preventive services available has been given to the patient.    Reviewed patients plan of care and provided an AVS. The Basic Care Plan (routine screening as documented in Health Maintenance) for patient meets the Care Plan requirement. This Care Plan has been established and reviewed with the Patient and any available family members.    Counseling Resources:  ATP IV Guidelines  Pooled Cohorts Equation Calculator  Breast Cancer Risk Calculator  FRAX Risk Assessment  ICSI Preventive Guidelines  Dietary Guidelines for Americans,   USDA's MyPlate  ASA Prophylaxis  Lung CA Screening    PLAN:  ***  2021  8:42 AM  [facility]

## 2021-11-23 NOTE — PROGRESS NOTES
ASSESSMENT:    ICD-10-CM    1. Health care maintenance  Z00.00    2. Other specified hypothyroidism  E03.8 T4 free     TSH     T4 free     TSH   3. Down's syndrome  Q90.9    4. DOROTEO (generalized anxiety disorder)  F41.1    5. Primary osteoarthritis of both knees  M17.0    6. Gastroesophageal reflux disease without esophagitis  K21.9    7. Dysphagia, unspecified type  R13.10 Speech Therapy Referral   8. JENNIFER (obstructive sleep apnea)  G47.33    9. Dementia associated with other underlying disease with behavioral disturbance (H)  F02.81    10. Pulmonary nodule, right  R91.1 XR Chest 2 Views   11. Incontinence of feces, unspecified fecal incontinence type  R15.9 Miscellaneous Order for DME - ONLY FOR DME   12. Encounter for screening mammogram for breast cancer  Z12.31 MA Screening Digital Bilateral       PLAN:  Check TSH and free T4 today.  Incontinent pads ordered.  Up-to-date on medication refills.  We will set her up for mammogram.  Also repeat chest x-ray.  She would have difficulty with chest CT due to her apprehension and anxious behavior from dementia and Down syndrome.  She is low risk.  If no change in size of pulmonary nodule on chest x-ray then we will not proceed with further evaluation at this time.  Will order speech therapy eval due to intermittent dysphagia.  Could consider stopping contraception and checking FSH and estrogen level in 8 weeks to determine if she is reached menopausal age.  At this time will hold off and consider next year.  She is low risk for adverse effects of contraception.    SUBJECTIVE:    She is here today for routine healthcare maintenance and chronic disease management.  She has hypothyroidism and is due for thyroid labs.  Her care provider reports that she has had bowel incontinence for about 3 months now usually a couple of times a week.  She is not having diarrhea.  She seems to just to forget that she has to have a bowel movement.  They would like an order for depends.   Anxiety and behaviors are well controlled on Seroquel and Celexa.  She continues to take Voltaren for arthritis of knees and that is worked well.  She is on a PPI.  No evidence of GI blood loss.  She has obstructive sleep apnea which is not treated with CPAP.  She would not be able to be compliant.  She has had some intermittent trouble with swallowing.  She will have food in her mouth and will forget to swallow and sometimes it will cause her to cough.  She has not been evaluated by speech therapy.  By reviewing previous imaging she did have a chest x-ray twice this summer a month apart which both identified a 5 mm right lung nodule.  She does not use tobacco and no family history of lung cancer.  She has no symptoms.  She does not have Pap examinations, never been sexually active.  She is on oral contraception for menstrual disorder as menses was troubling for her.    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)     Asthma     PAST MEDICAL HISTORY:  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     Mom reports negative AAS evaluation in the 1990's     Corneal ulcer 2004    caused by leaking of CPAP     Down's syndrome      Gastroesophageal reflux disease      JENNIFER (obstructive sleep apnea)     patient refuses CPAP     SURGICAL HISTORY:  Past Surgical History:   Procedure Laterality  Date     ENDOSCOPIC RETROGRADE CHOLANGIOPANCREATOGRAM N/A 7/1/2019    Procedure: Endoscopic Retrograde Cholangiopancreatogram, dilation, stent , sludge and stone removal, sphincterotomy;  Surgeon: Guru Harper Alvarado MD;  Location: [facility] OR     ENDOSCOPIC RETROGRADE CHOLANGIOPANCREATOGRAM N/A 8/26/2019    Procedure: Endoscopic Retrograde Cholangiopancreatogram with biliary stent removal and stone removal;  Surgeon: Guru Harper Alvarado MD;  Location: [facility] OR     ENDOSCOPIC RETROGRADE CHOLANGIOPANCREATOGRAPHY, EXCHANGE TUBE/STENT N/A 7/24/2019    Procedure: Endoscopic Retrograde Cholangiopancreatography with Billary Stone Extraction,Dilation             and stent exchange;  Surgeon: Guru Harper Alvarado MD;  Location: [facility] OR     LAPAROSCOPIC CHOLECYSTECTOMY WITH CHOLANGIOGRAMS N/A 9/9/2019    Procedure: CHOLECYSTECTOMY, LAPAROSCOPIC, WITH CHOLANGIOGRAM;  Surgeon: Arvind Bailey MD;  Location: [facility] OR     NO HISTORY OF SURGERY      as reported by parents       SOCIAL HISTORY:  Social History     Socioeconomic History     Marital status: Single     Spouse name: Not on file     Number of children: Not on file     Years of education: Not on file     Highest education level: Not on file   Occupational History     Not on file   Tobacco Use     Smoking status: Never Smoker     Smokeless tobacco: Never Used   Vaping Use     Vaping Use: Never used   Substance and Sexual Activity     Alcohol use: No     Drug use: No     Sexual activity: Not Currently     Birth control/protection: Pill   Other Topics Concern     Parent/sibling w/ CABG, MI or angioplasty before 65F 55M? Not Asked   Social History Narrative    Moving to [facility] June 20, 2019. July 2020: Parents moved back to [place] because Mom has dementia and [place] is closer to family.  Face times parents occasionally    Parents and sister Camila and brother in law Steve are guardians     Social Determinants of Health     Financial  Resource Strain: Not on file   Food Insecurity: Not on file   Transportation Needs: Not on file   Physical Activity: Not on file   Stress: Not on file   Social Connections: Not on file   Intimate Partner Violence: Not on file   Housing Stability: Not on file     FAMILYHISTORY:  Family History   Problem Relation Age of Onset     Asthma Mother      Arthritis Mother      Dementia Mother      LUNG DISEASE Father         pulmonary fibrosis secondary to chemical exposure     Diabetes Father      Cerebrovascular Disease Father 90     Cerebrovascular Disease Maternal Grandmother 90     Heart Failure Paternal Grandfather         uncertain which  replaced     Coronary Artery Disease No family hx of      Heart Disease No family hx of      Bleeding Diathesis No family hx of      Pancreatic Cancer No family hx of      Colorectal Cancer No family hx of      CURRENT MEDICATIONS:   Current Outpatient Medications   Medication Sig Dispense Refill     citalopram (CELEXA) 20 MG tablet Take 1 tablet (20 mg) by mouth daily 90 tablet 3     cyanocobalamin (VITAMIN B-12) 1000 MCG tablet Take 1 tablet (1,000 mcg) by mouth daily 90 tablet 3     diclofenac (VOLTAREN) 50 MG EC tablet TAKE 1 TABLET BY MOUTH TWICE A  tablet 2     famotidine (PEPCID) 40 MG tablet TAKE 1/2 TAB (20MG) BY MOUTH TWICE A DAY 90 tablet 3     levothyroxine (SYNTHROID/LEVOTHROID) 150 MCG tablet Take 1 tablet (150 mcg) by mouth daily 90 tablet 3     Multiple Vitamin (TAB-A-MATTHIAS) TABS TAKE 1 TAB BY MOUTH ONCE DAILY 90 tablet 3     norgestim-eth estrad triphasic (TRI-LINYAH) 0.18/0.215/0.25 MG-35 MCG tablet Take 1 tablet by mouth daily 84 tablet 3     QUEtiapine (SEROQUEL) 25 MG tablet Take 1 tablet (25 mg) by mouth daily (with dinner) 90 tablet 3     ALLERGIES:  Sulfa drugs    REVIEW OF SYSTEMS:  Review of Systems   Constitutional: Negative for fatigue and unexpected weight change.   HENT: Positive for trouble swallowing. Negative for mouth sores.     Respiratory: Negative for cough, chest tightness and shortness of breath.    Cardiovascular: Negative for chest pain and leg swelling.   Gastrointestinal: Negative for abdominal pain, anal bleeding, blood in stool, constipation, diarrhea and vomiting.        See HPI   Endocrine: Negative.    Genitourinary: Negative for difficulty urinating, dysuria, hematuria and vaginal bleeding.   Musculoskeletal: Negative for gait problem.        See hpi   Skin: Negative for rash and wound.   Neurological: Negative for dizziness and light-headedness.   Psychiatric/Behavioral: Positive for confusion. Negative for dysphoric mood and sleep disturbance.        See hpi         OBJECTIVE:  /64 (BP Location: Right arm, Patient Position: Sitting, Cuff Size: Adult Regular)   Pulse 68   Temp 98.4  F (36.9  C) (Tympanic)   Resp 16   Wt 64.2 kg (141 lb 9.6 oz)   SpO2 97%   BMI 35.51 kg/m    EXAM:   Pleasant morbidly obese female accompanied by caregiver.  Patient does repetitively report that she missed school today.  By the end of the appointment she was very frustrated that she had to be in clinic so long.  Skin color pink.  Sclera nonicteric.  TMs clear.  Patient wearing facial mask secondary to pandemic.  Neck supple and without adenopathy.  No thyromegaly.  Lung fields clear to auscultation.  Cardiovascular regular, no S3.  Abdomen is soft and without masses, tenderness and organomegaly.  Morbidly obese.  Extremities without edema.  Gait stable.    Healthcare maintenance form for group home completed and return to caregiver.      Mikayla Gtz, ARYAN

## 2021-11-23 NOTE — NURSING NOTE
"Chief Complaint   Patient presents with     Medicare Visit       FOOD SECURITY SCREENING QUESTIONS  Hunger Vital Signs:  Within the past 12 months we worried whether our food would run out before we got money to buy more. Never  Within the past 12 months the food we bought just didn't last and we didn't have money to get more. Never  Ami Bradford LPN 11/23/2021 8:42 AM      Initial /64 (BP Location: Right arm, Patient Position: Sitting, Cuff Size: Adult Regular)   Pulse 68   Temp 98.4  F (36.9  C) (Tympanic)   Resp 16   Wt 64.2 kg (141 lb 9.6 oz)   SpO2 97%   BMI 35.51 kg/m   Estimated body mass index is 35.51 kg/m  as calculated from the following:    Height as of 8/18/21: 1.345 m (4' 4.95\").    Weight as of this encounter: 64.2 kg (141 lb 9.6 oz).  Medication Reconciliation: complete    Ami Bradford LPN  "

## 2021-11-24 DIAGNOSIS — E03.8 OTHER SPECIFIED HYPOTHYROIDISM: Primary | ICD-10-CM

## 2021-11-24 RX ORDER — LEVOTHYROXINE SODIUM 137 UG/1
137 TABLET ORAL DAILY
Qty: 90 TABLET | Refills: 3 | Status: SHIPPED | OUTPATIENT
Start: 2021-11-24 | End: 2022-10-20

## 2021-12-15 ENCOUNTER — HOSPITAL ENCOUNTER (OUTPATIENT)
Dept: MAMMOGRAPHY | Facility: OTHER | Age: 46
Discharge: HOME | End: 2021-12-15
Attending: NURSE PRACTITIONER | Admitting: NURSE PRACTITIONER
Payer: MEDICARE

## 2021-12-15 DIAGNOSIS — Z12.31 ENCOUNTER FOR SCREENING MAMMOGRAM FOR BREAST CANCER: ICD-10-CM

## 2021-12-15 PROCEDURE — 77063 BREAST TOMOSYNTHESIS BI: CPT

## 2022-01-19 ENCOUNTER — OFFICE VISIT (OUTPATIENT)
Dept: INTERNAL MEDICINE | Facility: OTHER | Age: 47
End: 2022-01-19
Attending: NURSE PRACTITIONER
Payer: MEDICARE

## 2022-01-19 VITALS
WEIGHT: 146.6 LBS | DIASTOLIC BLOOD PRESSURE: 80 MMHG | SYSTOLIC BLOOD PRESSURE: 106 MMHG | OXYGEN SATURATION: 97 % | RESPIRATION RATE: 16 BRPM | BODY MASS INDEX: 36.76 KG/M2 | TEMPERATURE: 98.6 F | HEART RATE: 80 BPM

## 2022-01-19 DIAGNOSIS — E03.8 OTHER SPECIFIED HYPOTHYROIDISM: Primary | ICD-10-CM

## 2022-01-19 LAB
T4 FREE SERPL-MCNC: 1.09 NG/DL (ref 0.6–1.6)
TSH SERPL DL<=0.005 MIU/L-ACNC: 0.62 MU/L (ref 0.4–4)

## 2022-01-19 PROCEDURE — G0463 HOSPITAL OUTPT CLINIC VISIT: HCPCS

## 2022-01-19 PROCEDURE — 99213 OFFICE O/P EST LOW 20 MIN: CPT | Performed by: NURSE PRACTITIONER

## 2022-01-19 PROCEDURE — 36415 COLL VENOUS BLD VENIPUNCTURE: CPT | Mod: ZL | Performed by: NURSE PRACTITIONER

## 2022-01-19 PROCEDURE — 84439 ASSAY OF FREE THYROXINE: CPT | Mod: ZL | Performed by: NURSE PRACTITIONER

## 2022-01-19 PROCEDURE — 84443 ASSAY THYROID STIM HORMONE: CPT | Mod: ZL | Performed by: NURSE PRACTITIONER

## 2022-01-19 ASSESSMENT — ANXIETY QUESTIONNAIRES
GAD7 TOTAL SCORE: 0
GAD7 TOTAL SCORE: 0
7. FEELING AFRAID AS IF SOMETHING AWFUL MIGHT HAPPEN: NOT AT ALL
3. WORRYING TOO MUCH ABOUT DIFFERENT THINGS: NOT AT ALL
5. BEING SO RESTLESS THAT IT IS HARD TO SIT STILL: NOT AT ALL
6. BECOMING EASILY ANNOYED OR IRRITABLE: NOT AT ALL
1. FEELING NERVOUS, ANXIOUS, OR ON EDGE: NOT AT ALL
7. FEELING AFRAID AS IF SOMETHING AWFUL MIGHT HAPPEN: NOT AT ALL
4. TROUBLE RELAXING: NOT AT ALL
GAD7 TOTAL SCORE: 0
2. NOT BEING ABLE TO STOP OR CONTROL WORRYING: NOT AT ALL

## 2022-01-19 ASSESSMENT — PATIENT HEALTH QUESTIONNAIRE - PHQ9
SUM OF ALL RESPONSES TO PHQ QUESTIONS 1-9: 0
SUM OF ALL RESPONSES TO PHQ QUESTIONS 1-9: 0
10. IF YOU CHECKED OFF ANY PROBLEMS, HOW DIFFICULT HAVE THESE PROBLEMS MADE IT FOR YOU TO DO YOUR WORK, TAKE CARE OF THINGS AT HOME, OR GET ALONG WITH OTHER PEOPLE: NOT DIFFICULT AT ALL

## 2022-01-19 NOTE — NURSING NOTE
"Chief Complaint   Patient presents with     Follow Up     thyroid levels        FOOD SECURITY SCREENING QUESTIONS:    The next two questions are to help us understand your food security.  If you are feeling you need any assistance in this area, we have resources available to support you today.    Hunger Vital Signs:  Within the past 12 months we worried whether our food would run out before we got money to buy more. Never  Within the past 12 months the food we bought just didn't last and we didn't have money to get more. Never  Morenita Edward LPN,LPN on 1/19/2022 at 10:05 AM      Initial /80 (BP Location: Right arm, Patient Position: Sitting, Cuff Size: Adult Regular)   Pulse 80   Temp 98.6  F (37  C) (Temporal)   Resp 16   Wt 66.5 kg (146 lb 9.6 oz)   SpO2 97%   BMI 36.76 kg/m   Estimated body mass index is 36.76 kg/m  as calculated from the following:    Height as of 8/18/21: 1.345 m (4' 4.95\").    Weight as of this encounter: 66.5 kg (146 lb 9.6 oz).  Medication Reconciliation: complete  Morenita Edward LPN    "

## 2022-01-19 NOTE — PROGRESS NOTES
ASSESSMENT:    ICD-10-CM    1. Other specified hypothyroidism  E03.8 T4 free     TSH     T4 free     TSH       PLAN:  Continue levothyroxine 13 7 mcg daily.  Check TSH and free T4 at this visit.  We will contact with lab results once available and any changes needed.    SUBJECTIVE:    She is here today to follow-up on hypothyroidism.  Levothyroxine dose was reduced from 150 mcg daily down to 137 mcg daily 2 months ago.  She has been tolerating the new dose.  Does not seem to have any symptoms of overtreated hypothyroidism.    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)     Asthma     PAST MEDICAL HISTORY:  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     Mom reports negative AAS evaluation in the 1990's     Corneal ulcer 2004    caused by leaking of CPAP     Down's syndrome      Gastroesophageal reflux disease      JENNIFER (obstructive sleep apnea)     patient refuses CPAP     SURGICAL HISTORY:  Past Surgical History:   Procedure Laterality Date     ENDOSCOPIC RETROGRADE CHOLANGIOPANCREATOGRAM N/A 7/1/2019    Procedure: Endoscopic Retrograde Cholangiopancreatogram, dilation, stent , sludge and stone removal, sphincterotomy;  Surgeon: Guru Harper Alvarado MD;  Location: [facility] OR     ENDOSCOPIC RETROGRADE CHOLANGIOPANCREATOGRAM N/A 8/26/2019    Procedure: Endoscopic  Retrograde Cholangiopancreatogram with biliary stent removal and stone removal;  Surgeon: Guru Harper Alvarado MD;  Location: [facility] OR     ENDOSCOPIC RETROGRADE CHOLANGIOPANCREATOGRAPHY, EXCHANGE TUBE/STENT N/A 7/24/2019    Procedure: Endoscopic Retrograde Cholangiopancreatography with Billary Stone Extraction,Dilation             and stent exchange;  Surgeon: Guru Harper Alvarado MD;  Location: [facility] OR     LAPAROSCOPIC CHOLECYSTECTOMY WITH CHOLANGIOGRAMS N/A 9/9/2019    Procedure: CHOLECYSTECTOMY, LAPAROSCOPIC, WITH CHOLANGIOGRAM;  Surgeon: Arvind Bailey MD;  Location: [facility]     NO HISTORY OF SURGERY      as reported by parents       SOCIAL HISTORY:  Social History     Socioeconomic History     Marital status: Single     Spouse name: Not on file     Number of children: Not on file     Years of education: Not on file     Highest education level: Not on file   Occupational History     Not on file   Tobacco Use     Smoking status: Never Smoker     Smokeless tobacco: Never Used   Vaping Use     Vaping Use: Never used   Substance and Sexual Activity     Alcohol use: No     Drug use: No     Sexual activity: Not Currently     Birth control/protection: Pill   Other Topics Concern     Parent/sibling w/ CABG, MI or angioplasty before 65F 55M? Not Asked   Social History Narrative    Moving to [facility] June 20, 2019. July 2020: Parents moved back to [place] because Mom has dementia and [place] is closer to family.  Face times parents occasionally    Parents and sister Camila and brother in law Steve are guardians     Social Determinants of Health     Financial Resource Strain: Not on file   Food Insecurity: Not on file   Transportation Needs: Not on file   Physical Activity: Not on file   Stress: Not on file   Social Connections: Not on file   Intimate Partner Violence: Not on file   Housing Stability: Not on file     FAMILYHISTORY:  Family History   Problem Relation Age of Onset     Asthma  Mother      Arthritis Mother      Dementia Mother      LUNG DISEASE Father         pulmonary fibrosis secondary to chemical exposure     Diabetes Father      Cerebrovascular Disease Father 90     Cerebrovascular Disease Maternal Grandmother 90     Heart Failure Paternal Grandfather         uncertain which  replaced     Coronary Artery Disease No family hx of      Heart Disease No family hx of      Bleeding Diathesis No family hx of      Pancreatic Cancer No family hx of      Colorectal Cancer No family hx of      CURRENT MEDICATIONS:   Current Outpatient Medications   Medication Sig Dispense Refill     citalopram (CELEXA) 20 MG tablet Take 1 tablet (20 mg) by mouth daily 90 tablet 3     cyanocobalamin (VITAMIN B-12) 1000 MCG tablet Take 1 tablet (1,000 mcg) by mouth daily 90 tablet 3     diclofenac (VOLTAREN) 50 MG EC tablet TAKE 1 TABLET BY MOUTH TWICE A  tablet 2     famotidine (PEPCID) 40 MG tablet TAKE 1/2 TAB (20MG) BY MOUTH TWICE A DAY 90 tablet 3     levothyroxine (SYNTHROID/LEVOTHROID) 137 MCG tablet Take 1 tablet (137 mcg) by mouth daily 90 tablet 3     Multiple Vitamin (TAB-A-MATTHIAS) TABS TAKE 1 TAB BY MOUTH ONCE DAILY 90 tablet 3     norgestim-eth estrad triphasic (TRI-LINYAH) 0.18/0.215/0.25 MG-35 MCG tablet Take 1 tablet by mouth daily 84 tablet 3     QUEtiapine (SEROQUEL) 25 MG tablet Take 1 tablet (25 mg) by mouth daily (with dinner) 90 tablet 3     ALLERGIES:  Sulfa drugs    REVIEW OF SYSTEMS:  Review of Systems  Denies diarrhea, weight loss, palpitations    OBJECTIVE:  /80 (BP Location: Right arm, Patient Position: Sitting, Cuff Size: Adult Regular)   Pulse 80   Temp 98.6  F (37  C) (Temporal)   Resp 16   Wt 66.5 kg (146 lb 9.6 oz)   SpO2 97%   BMI 36.76 kg/m    EXAM:   Pleasant female with Down syndrome accompanied by caregiver from [facility].  Well-groomed and well-dressed.  Skin color pink.  Neck supple and without adenopathy or thyromegaly.  Lung fields clear to  auscultation.  Cardiovascular regular.        Mikayla Gtz NP      Answers for HPI/ROS submitted by the patient on 1/19/2022  If you checked off any problems, how difficult have these problems made it for you to do your work, take care of things at home, or get along with other people?: Not difficult at all  PHQ9 TOTAL SCORE: 0  DOROTEO 7 TOTAL SCORE: 0  Since last visit, patient describes the following symptoms:: None  How many servings of fruits and vegetables do you eat daily?: 2-3  On average, how many sweetened beverages do you drink each day (Examples: soda, juice, sweet tea, etc.  Do NOT count diet or artificially sweetened beverages)?: 2  How many minutes a day do you exercise enough to make your heart beat faster?: 9 or less  How many days a week do you exercise enough to make your heart beat faster?: 3 or less  How many days per week do you miss taking your medication?: 0

## 2022-01-20 ASSESSMENT — PATIENT HEALTH QUESTIONNAIRE - PHQ9: SUM OF ALL RESPONSES TO PHQ QUESTIONS 1-9: 0

## 2022-01-20 ASSESSMENT — ANXIETY QUESTIONNAIRES: GAD7 TOTAL SCORE: 0

## 2022-01-24 ENCOUNTER — TELEPHONE (OUTPATIENT)
Dept: INTERNAL MEDICINE | Facility: OTHER | Age: 47
End: 2022-01-24
Payer: MEDICARE

## 2022-02-02 ENCOUNTER — TELEPHONE (OUTPATIENT)
Dept: INTERNAL MEDICINE | Facility: OTHER | Age: 47
End: 2022-02-02
Payer: MEDICARE

## 2022-02-02 NOTE — TELEPHONE ENCOUNTER
Reason for call: Request for results.    Name of test or procedure: Labs - thyroid levels    Date of test or procedure: 1/19/22    Location of test or procedure: [facility]    Preferred method for responding to this message: Telephone Call    Phone number patient can be reached at: Other phone number:  [phone]    If we can't reach you directly, may we leave a detailed response at the number you provided?Sariah Hall on 2/2/2022 at 11:35 AM

## 2022-02-24 ENCOUNTER — HOSPITAL ENCOUNTER (OUTPATIENT)
Dept: GENERAL RADIOLOGY | Facility: OTHER | Age: 47
Discharge: HOME | End: 2022-02-24
Attending: NURSE PRACTITIONER | Admitting: NURSE PRACTITIONER
Payer: MEDICARE

## 2022-02-24 ENCOUNTER — HOSPITAL ENCOUNTER (OUTPATIENT)
Dept: SPEECH THERAPY | Facility: OTHER | Age: 47
Setting detail: THERAPIES SERIES
End: 2022-02-24
Attending: NURSE PRACTITIONER
Payer: MEDICARE

## 2022-02-24 DIAGNOSIS — R13.10 DYSPHAGIA, UNSPECIFIED TYPE: ICD-10-CM

## 2022-02-24 PROCEDURE — 92611 MOTION FLUOROSCOPY/SWALLOW: CPT | Mod: GN

## 2022-02-24 PROCEDURE — 74230 X-RAY XM SWLNG FUNCJ C+: CPT

## 2022-02-24 RX ORDER — BARIUM SULFATE 400 MG/ML
SUSPENSION ORAL ONCE
Status: COMPLETED | OUTPATIENT
Start: 2022-02-24 | End: 2022-02-24

## 2022-02-24 RX ADMIN — BARIUM SULFATE 2 ML: 400 SUSPENSION ORAL at 10:54

## 2022-02-24 RX ADMIN — BARIUM SULFATE 2 ML: 400 SUSPENSION ORAL at 10:55

## 2022-03-02 NOTE — PROGRESS NOTES
"   03/02/22 1000       Present No   General Information   Type Of Visit Initial   Start Of Care Date 02/24/22   Referring Physician Dr. Vallejo   Orders Evaluate And Treat   Medical Diagnosis Downs Syndrome, Dementia   Living Environment Group Home   Patient/family Goals To ensure pt is safe for PO intake   General Information Comments Pt presents to [facility] with caregiver Mo, pt is very anxious about this evaluation and frequently asking \"can we get this done with\". Mo and Shirin (rad tech) were able to calm her down enough to participate in evaluation. Pt moved around during imaging, however due to pt's anxiety and participation level, repeat imaging was not appropriate. Reg is a poor historian, reporting that she doesn't have any trouble swallowing. Mo reports that she hasn't worked with Reg during meal times lately, however she has been known to cough during meals. Pt agreeable to PO trials.    Clinical Swallow Evaluation   Oral Musculature generally intact   Structural Abnormalities none present   Dentition present and adequate   Mucosal Quality adequate   Mandibular Strength and Mobility intact   Oral Labial Strength and Mobility WFL   Lingual Strength and Mobility WFL   Velar Elevation intact   Buccal Strength and Mobility intact   Laryngeal Function Cough;Voicing initiated   [facility] Evaluation   VFSS Additional Documentation Yes   [facility] Eval: Thin Liquid Texture Trial   Mode of Presentation, Thin Liquid cup;fed by clinician   Order of Presentation 1   Preparatory Phase WFL   Oral Phase, Thin Liquid Residue in oral cavity   Pharyngeal Phase, Thin Liquid WFL   Rosenbek's Penetration Aspiration Scale: Thin Liquid Trial Results 1 - no aspiration, contrast does not enter airway   Diagnostic Statement Due to pt anxiety, she was moving in her chair during this trial and imaging was not able to capture the whole course of the swallow. This interpretation reflects what was able to be seen " clearly on imaging. Pt demonstrating with WNL oral prep phase, mod oral residuals following pharyngeal swallow, no overt s/s of aspiration/penetration observed. Pt swallowing residuals with no overt s/s of aspiration/penetration. Dysphagia impacted by pt's anxiety during this trial.    [facility] Eval: Slightly Thick Liquids   Mode of Presentation cup;fed by clinician   Order of Presentation 2   Preparatory Phase WFL   Oral Phase Residue in oral cavity   Pharyngeal Phase WFL   Rosenbek's Penetration Aspiration Scale 1 - no aspiration, contrast does not enter airway   Diagnostic Statement Pt demonstrating with WNL oral prep phase, min oral residuals following pharyngeal swallow (imaging cut before residuals cleared), no overt s/s of aspiration/penetration observed.    [facility] Eval: Mildly Thick Liquids    Mode of Presentation cup;fed by clinician   Order of Presentation 3   Preparatory Phase WFL   Oral Phase WFL;Residue in oral cavity  (Trace residuals, WNL)   Pharyngeal Phase WFL   Rosenbek's Penetration Aspiration Scale 1 - no aspiration, contrast does not enter airway   Diagnostic Statement Pt demonstrating with WNL oral prep phase, trace residuals in oral cavity following initial swallow, however this is WNL, no overt s/s of aspiration/penetration observed.   [facility] Eval: Moderately Thick Liquids    Mode of Presentation spoon;fed by clinician   Order of Presentation 4   Preparatory Phase Other (see comments)  (Imaging started after this phase)   Oral Phase other (see comments);Residue in oral cavity  (imaging started during this phase)   Pharyngeal Phase WFL   Rosenbek's Penetration Aspiration Scale 1 - no aspiration, contrast does not enter airway   Diagnostic Statement Imaging was not initiated until bolus was in pharynx, this interpretation is based on only what is captured in imaging. Pt demonstrating with mod oral residuals following initial swallow, no overt s/s of aspiration/penetration observed. Imaging cut before  residuals were cleared.    [facility] Eval: Puree Solid Texture Trial   Mode of Presentation, Puree spoon;self-fed   Order of Presentation 5   Preparatory Phase WFL   Oral Phase, Puree Residue in oral cavity   Pharyngeal Phase, Puree WFL   Rosenbek's Penetration Aspiration Scale: Puree Food Trial Results 1 - no aspiration, contrast does not enter airway   Diagnostic Statement Pt demonstrating min residuals in oral cavity, trace residuals in vallecula, no overt s/s of aspiration/penetration observed.    [facility] Eval: Minced & Moist    Mode of Presentation spoon;fed by clinician   Order of Presentation 6   Preparatory Phase Other (see comments)  (imaging not started )   Oral Phase Premature pharyngeal entry   Pharyngeal Phase Delayed swallow reflex   Rosenbek's Penetration Aspiration Scale 1 - no aspiration, contrast does not enter airway   Diagnostic Statement Imaging not started until after oral prep phase, interpreation based on available imaging. Pt demonsrating premature pharyngeal entry to level of vallecula prior to initiation of swallow (delayed onset of pharyngeal swallow), no overt s/s of aspiration/penetration observed.    [facility] Eval: Soft & Bite Sized   Mode of Presentation spoon;fed by clinician   Order of Presentation 7   Preparatory Phase WFL   Oral Phase Residue in oral cavity   Pharyngeal Phase WFL   Rosenbek's Penetration Aspiration Scale 1 - no aspiration, contrast does not enter airway   Diagnostic Statement Pt demonstrating with WNL oral prep phase, min residuals in oral cavity, and no overt s/s of aspiration/penetration obsereved. Imaging cut prior to clearing oral residuals.   [facility] Eval: Regular Texture Trial (Solid)   Mode of Presentation spoon;fed by clinician   Order of Presentation 8   Preparatory Phase WFL   Oral Phase Impaired mastication;Residue in oral cavity   Pharyngeal Phase WFL   Rosenbek's Penetration Aspiration Scale 1 - no aspiration, contrast does not enter airway   Diagnostic Statement Pt  demonstrating WNL oral prep phase, impaired mastication patterns evidenced by mashing pattern vs rotary pattern, and min oral residuals. No overt s/s of aspiration/penetration were observed.    Swallow Compensations   Swallow Compensations No compensations were used   Educational Assessment   Barriers to Learning Cognitive   Esophageal Phase of Swallow   Patient reports or presents with symptoms of esophageal dysphagia No   Swallow Eval: Clinical Impressions   Skilled Criteria for Therapy Intervention No problems identified which require skilled intervention   Functional Assessment Scale (FAS) 6   Dysphagia Outcome Severity Scale (FABIAN) Level 6 - FABIAN   Diet texture recommendations Regular diet;Thin liquids (level 0)   Risks and Benefits of Treatment have been explained. Yes   Patient, family and/or staff in agreement with Plan of Care Yes   Clinical Impression Comments Pt demonstrating with WNL oral prep phases, occasional oral residuals following initial pharyngeal swallow, disorganized mastication pattern with solid textures, and no overt s/s of aspiration/penetration observed. Given Reg's level of anxiety during this evaluation as well as her cognitive level, this evaluation may not fully represent her abilities, however, no overt s/s of aspiration/penetration was observed and she is safe for PO intake at this time. Therapist attempted to discuss findings at end of evaluation, however Reg was very eager to get to school, so therapist is to call group home with results. Mo and Reg in agreement with plan.    Total Session Time   SLP Eval: VideoFluoroscopic Swallow function Minutes (68608) 60   Total Evaluation Time 60

## 2022-03-04 ENCOUNTER — OFFICE VISIT (OUTPATIENT)
Dept: FAMILY MEDICINE | Facility: OTHER | Age: 47
End: 2022-03-04
Attending: NURSE PRACTITIONER
Payer: MEDICARE

## 2022-03-04 VITALS
SYSTOLIC BLOOD PRESSURE: 104 MMHG | DIASTOLIC BLOOD PRESSURE: 54 MMHG | TEMPERATURE: 98.3 F | BODY MASS INDEX: 36.01 KG/M2 | HEART RATE: 60 BPM | OXYGEN SATURATION: 97 % | WEIGHT: 143.6 LBS | RESPIRATION RATE: 16 BRPM

## 2022-03-04 DIAGNOSIS — H10.33 ACUTE CONJUNCTIVITIS OF BOTH EYES, UNSPECIFIED ACUTE CONJUNCTIVITIS TYPE: ICD-10-CM

## 2022-03-04 DIAGNOSIS — B37.2 CANDIDIASIS OF SKIN: Primary | ICD-10-CM

## 2022-03-04 PROCEDURE — 99213 OFFICE O/P EST LOW 20 MIN: CPT | Performed by: NURSE PRACTITIONER

## 2022-03-04 PROCEDURE — G0463 HOSPITAL OUTPT CLINIC VISIT: HCPCS

## 2022-03-04 RX ORDER — POLYMYXIN B SULFATE AND TRIMETHOPRIM 1; 10000 MG/ML; [USP'U]/ML
1-2 SOLUTION OPHTHALMIC 4 TIMES DAILY
Qty: 10 ML | Refills: 0 | Status: SHIPPED | OUTPATIENT
Start: 2022-03-04 | End: 2022-03-11

## 2022-03-04 RX ORDER — NYSTATIN 100000 U/G
CREAM TOPICAL 2 TIMES DAILY
Qty: 30 G | Refills: 0 | Status: SHIPPED | OUTPATIENT
Start: 2022-03-04 | End: 2022-04-11

## 2022-03-04 RX ORDER — TRIAMCINOLONE ACETONIDE 1 MG/G
CREAM TOPICAL 3 TIMES DAILY
Qty: 80 G | Refills: 0 | Status: SHIPPED | OUTPATIENT
Start: 2022-03-04 | End: 2022-04-11

## 2022-03-04 RX ORDER — MUPIROCIN 20 MG/G
OINTMENT TOPICAL 3 TIMES DAILY
Qty: 30 G | Refills: 0 | Status: SHIPPED | OUTPATIENT
Start: 2022-03-04 | End: 2022-04-11

## 2022-03-04 ASSESSMENT — PAIN SCALES - GENERAL: PAINLEVEL: NO PAIN (0)

## 2022-03-04 NOTE — PROGRESS NOTES
ASSESSMENT/PLAN:    I have reviewed the nursing notes.  I have reviewed the findings, diagnosis, plan and need for follow up with the patient.    1. Candidiasis of skin    - triamcinolone (KENALOG) 0.1 % external cream; Apply topically 3 times daily Combine small amount with nystatin cream in palm of hand and apply as directed  Dispense: 80 g; Refill: 0  - nystatin (MYCOSTATIN) 626144 UNIT/GM external cream; Apply topically 2 times daily Combine small amount with triamcinolone cream in palm of hand and apply as directed  Dispense: 30 g; Refill: 0  - mupirocin (BACTROBAN) 2 % external ointment; Apply topically 3 times daily Apply to open areas of rash  Dispense: 30 g; Refill: 0  -see care instructions on AVS and discussed with caregiver    2. Acute conjunctivitis of both eyes, unspecified acute conjunctivitis type    - trimethoprim-polymyxin b (POLYTRIM) 43562-6.1 UNIT/ML-% ophthalmic solution; Place 1-2 drops into both eyes 4 times daily for 7 days  Dispense: 10 mL; Refill: 0  -written and oral instructions given to caregiver.    -Follow-up with primary care provider if symptoms worsen or persist    Explanation of diagnostic considerations and recommendations given to the patient, who voiced understanding and agreement with the treatment plan. All questions were answered.     HPI:    Reg Valiente is a 47 year old female  who presents to [facility] today for red right eye.  Accompanied by care giver. Unknown start date.  Has sores in butt crack.    Possible discharge right eye. Positive hx pink eye.  Caregiver can tell by non verbal means that Reg is not feeling well.    Gradual onset of redness in buttock crease over the week. Very itchy.  Caregiver reports pt is not consistent with cares after elimination .  Pt is quite sedentary.  Caregiver hasn't been with pt for a week till today. Pt is poor historian related to her dementia.  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     Mom reports negative AAS evaluation in the 1990's     Corneal ulcer 2004    caused by leaking of CPAP     Down's syndrome      Gastroesophageal reflux disease      JENNIFER (obstructive sleep apnea)     patient refuses CPAP     Past Surgical History:   Procedure Laterality Date     ENDOSCOPIC RETROGRADE CHOLANGIOPANCREATOGRAM N/A 7/1/2019    Procedure: Endoscopic Retrograde Cholangiopancreatogram, dilation, stent , sludge and stone removal, sphincterotomy;  Surgeon: Guru Harper Alvarado MD;  Location: [facility] OR     ENDOSCOPIC RETROGRADE CHOLANGIOPANCREATOGRAM N/A 8/26/2019    Procedure: Endoscopic Retrograde Cholangiopancreatogram with biliary stent removal and stone removal;  Surgeon: Guru Harper Alvarado MD;  Location: [facility] OR     ENDOSCOPIC RETROGRADE CHOLANGIOPANCREATOGRAPHY, EXCHANGE TUBE/STENT N/A 7/24/2019    Procedure: Endoscopic Retrograde Cholangiopancreatography with Billary Stone Extraction,Dilation             and stent exchange;  Surgeon: Guru Harper Alvarado MD;  Location: [facility] OR     LAPAROSCOPIC CHOLECYSTECTOMY WITH CHOLANGIOGRAMS N/A 9/9/2019    Procedure: CHOLECYSTECTOMY, LAPAROSCOPIC, WITH CHOLANGIOGRAM;  Surgeon: Arvind Bailey MD;  Location:  OR     NO HISTORY OF SURGERY      as reported by parents     Social History     Tobacco Use     Smoking status: Never Smoker     Smokeless tobacco: Never Used   Substance Use Topics     Alcohol use: No     Current Outpatient Medications   Medication Sig Dispense Refill     citalopram (CELEXA) 20 MG tablet Take 1 tablet (20 mg) by mouth daily 90 tablet 3     cyanocobalamin (VITAMIN B-12) 1000 MCG tablet Take 1 tablet (1,000 mcg) by mouth daily 90 tablet 3     diclofenac (VOLTAREN) 50 MG EC tablet TAKE 1 TABLET BY MOUTH TWICE A  tablet 2     famotidine (PEPCID) 40 MG tablet TAKE 1/2 TAB (20MG) BY  MOUTH TWICE A DAY 90 tablet 3     levothyroxine (SYNTHROID/LEVOTHROID) 137 MCG tablet Take 1 tablet (137 mcg) by mouth daily 90 tablet 3     Multiple Vitamin (TAB-A-MATTHIAS) TABS TAKE 1 TAB BY MOUTH ONCE DAILY 90 tablet 3     norgestim-eth estrad triphasic (TRI-LINYAH) 0.18/0.215/0.25 MG-35 MCG tablet Take 1 tablet by mouth daily 84 tablet 3     QUEtiapine (SEROQUEL) 25 MG tablet Take 1 tablet (25 mg) by mouth daily (with dinner) 90 tablet 3     Sennosides-Docusate Sodium (SB DOCUSATE SODIUM/SENNA PO)        Allergies   Allergen Reactions     Sulfa Drugs          Past medical history, past surgical history, current medications and allergies reviewed and accurate to the best of my knowledge.        ROS:  Refer to HPI    /54 (BP Location: Right arm, Patient Position: Sitting, Cuff Size: Adult Regular)   Pulse 60   Temp 98.3  F (36.8  C) (Tympanic)   Resp 16   Wt 65.1 kg (143 lb 9.6 oz)   SpO2 97%   Breastfeeding No   BMI 36.01 kg/m      EXAM:  General Appearance: Appropriate appearance for patient. No acute distress.  Eyes: conjunctivae bilaterally injected, corneas clear, no drainage or crusting, no eyelid swelling, pupils equal, round and reactive.  Pt resistant to eye exam.   Respiratory:  Normal effort.  No increased work of breathing.  No cough appreciated.  Musculoskeletal:  Equal movement of bilateral upper extremities.  Equal movement of bilateral lower extremities.  Normal gait.    Dermatological: rash consistent with candidiasis present in buttock fold and onto buttocks bilaterally.  Rash is moist, erythematous, satellite papules.   Less than 1 cm open area near crease on each buttock.    Psychological: alert, pleasant, poor historian, dementia

## 2022-03-04 NOTE — PATIENT INSTRUCTIONS
-If placed antibiotic - please use as directed (wash hands before putting in eye).   -Avoid touching the eye, as conjunctivitis/pink eye, is very contagious.   -Wash your hands regularly before touching your eye, if you must touch it. Wash your pillow case daily or every other day until symptoms clear up. Consider wiping surfaces touched by patient with anti-bacterial agent such as door knobs/handles, toys, etc  -Do not share cosmetics, eye drops or contacts with other individuals.   -Warm compresses are recommended as needed to remove drainage. Cool compresses for discomfort management .   -For pain control (if needed), take Ibuprofen or acetaminophen, if additional pain control is needed, ibuprofen and acetaminophen can be alternated every 3 hours.      Monitor your symptoms, if changing/worsening, follow with primary provider or ophthalmologist.    Wash rash and area between buttocks 4 times daily, air dry or gently pat dry and apply mixture of nystatin and triamcinolone cream to rash 4 times daily. Please assist Reg with cleaning between buttocks and lizzy area.    Mupirocin ointment to open areas in the rash.   Follow with primary provider if rash worsens or persists.    Observe for worsening infection from open areas.                Patient Education     Candida Skin Infection (Adult)   Candida is a type of yeast. It grows naturally on the skin and in the mouth. If it grows out of control, it can cause an infection. Candida can cause infections in the genital area, skin folds, in the mouth, and under the breasts. Anyone can get this infection. It is more common in a person with a weak immune system, such as from diabetes, HIV, or cancer. It s also more common in someone who has been on antibiotic therapy. And it s more common in people who are overweight or who have incontinence. Wearing tight-fitting clothing and taking part in activities with lots of skin-to-skin contact can also put you at risk.  Candida  causes the skin to become bright red and inflamed. The border of the infected part of the skin is often raised. The infection causes pain and itching. Sometimes the skin peels and bleeds. In the mouth, candida is called thrush, and may cause white thickened areas.  A Candida rash is most often treated with an antifungal cream, gel, or powder. . The rash will clear a few days after starting the medicine. Infections that don t go away may need a prescription medicine. In rare cases, a bacterial infection can also occur.  Home care  Your healthcare provider will advise using an antifungal cream, powder, or gel for the rash. He or she may also prescribe a medicine for the itch. Follow all instructions for using these medicines.  General care    Keep your skin clean by washing the area twice a day.    Use the medicine as directed until your rash is gone. Once the skin has healed, keep it dry to prevent another infection.     If you are overweight, talk with your healthcare provider about a plan to lose extra weight.    Don't wear tight-fitting clothes.    Follow-up care  Follow up with your healthcare provider, or as advised. Your rash will clear in 7 to 14 days. Call your provider if the rash is not gone after 14 days.  When to get medical advice  Call your healthcare provider right away if any of these occur:    Pain or redness that gets worse or spreads    Fluid coming from the skin    Yellow crusts on the skin    Fever of 100.4 F (38 C) or higher, or as directed by your provider  Delmy last reviewed this educational content on 10/1/2019    8538-3864 [employer]. All rights reserved. This information is not intended as a substitute for professional medical care. Always follow your healthcare professional's instructions.           Patient Education     Conjunctivitis, Nonspecific    The membrane that covers the white part of your eye (the conjunctiva) is inflamed. Inflammation happens when your body  responds to an injury, allergic reaction, infection, or illness. Symptoms of inflammation in the eye may include redness, irritation, itching, swelling, or burning. These symptoms should go away within the next 24 hours. Conjunctivitis may be related to a particle that was in your eye. If so, it may wash out with your tears or irrigation treatment. Being exposed to liquid chemicals or fumes may also cause this reaction.    Home care    Put a cold pack on the eye for 20 minutes at a time. This will reduce pain. To make a cold pack, put ice cubes in a plastic bag that seals at the top. Wrap the bag in a clean, thin towel or cloth.    Artificial tears may be prescribed to reduce irritation or redness. These should be used 3 to 4 times a day.    You may use acetaminophen or ibuprofen to control pain, unless another medicine was prescribed. If you have chronic liver or kidney disease, talk with your healthcare provider before using these medicines. Also talk with your provider if you have ever had a stomach ulcer or gastrointestinal bleeding.    Follow-up care  Follow up with your healthcare provider, or as advised.  When to seek medical advice  Call your healthcare provider right away if any of these occur:    Eyelid swells more    Eye pain gets worse    Redness or drainage from the eye gets worse    Blurry vision gets worse or you have increased sensitivity to light    Normal vision does not return within 24 to 48 hours  Delmy last reviewed this educational content on 2/1/2020 2000-2021 [employer]. All rights reserved. This information is not intended as a substitute for professional medical care. Always follow your healthcare professional's instructions.

## 2022-03-25 ENCOUNTER — PATIENT OUTREACH (OUTPATIENT)
Dept: INTERNAL MEDICINE | Facility: OTHER | Age: 47
End: 2022-03-25
Payer: MEDICARE

## 2022-03-25 NOTE — TELEPHONE ENCOUNTER
Patient Quality Outreach    Patient is due for the following:   Asthma  -  ACT needed    NEXT STEPS:   No follow up needed at this time.    Type of outreach:    Chart review performed, no outreach needed.      Questions for provider review:    Please discontinue asthma diagnosis if found relevant. Patient is on no medication for this diagnosis. Flagged on [facility] report.     Ami Bradford LPN on 3/25/2022 at 3:15 PM  Chart routed to Care Team.

## 2022-03-28 NOTE — TELEPHONE ENCOUNTER
Patient does not have asthma.  No diagnosis is listed in her history or problem list.  Please clarify how to remove her from the [facility] asthma list.

## 2022-04-11 ENCOUNTER — OFFICE VISIT (OUTPATIENT)
Dept: FAMILY MEDICINE | Facility: OTHER | Age: 47
End: 2022-04-11
Attending: FAMILY MEDICINE
Payer: MEDICARE

## 2022-04-11 VITALS
BODY MASS INDEX: 34.98 KG/M2 | TEMPERATURE: 98.1 F | DIASTOLIC BLOOD PRESSURE: 72 MMHG | RESPIRATION RATE: 16 BRPM | SYSTOLIC BLOOD PRESSURE: 98 MMHG | WEIGHT: 139.5 LBS | HEART RATE: 60 BPM

## 2022-04-11 DIAGNOSIS — B37.2 CANDIDIASIS OF SKIN: ICD-10-CM

## 2022-04-11 PROCEDURE — 99213 OFFICE O/P EST LOW 20 MIN: CPT | Performed by: FAMILY MEDICINE

## 2022-04-11 PROCEDURE — G0463 HOSPITAL OUTPT CLINIC VISIT: HCPCS

## 2022-04-11 RX ORDER — NYSTATIN 100000 U/G
CREAM TOPICAL 2 TIMES DAILY
Qty: 30 G | Refills: 2 | Status: SHIPPED | OUTPATIENT
Start: 2022-04-11 | End: 2022-07-25

## 2022-04-11 RX ORDER — TRIAMCINOLONE ACETONIDE 1 MG/G
CREAM TOPICAL 3 TIMES DAILY
Qty: 80 G | Refills: 2 | Status: SHIPPED | OUTPATIENT
Start: 2022-04-11 | End: 2023-08-23

## 2022-04-11 RX ORDER — MUPIROCIN 20 MG/G
OINTMENT TOPICAL 3 TIMES DAILY
Qty: 30 G | Refills: 2 | Status: SHIPPED | OUTPATIENT
Start: 2022-04-11 | End: 2024-07-16

## 2022-04-11 ASSESSMENT — PAIN SCALES - GENERAL: PAINLEVEL: NO PAIN (0)

## 2022-04-11 NOTE — NURSING NOTE
"Chief Complaint   Patient presents with     Rash     Patient presents to clinic today with her care giver, Roxanna. Roxanna stated she feels the medication the patient was prescribed last time is working, but over the weekend someone noticed a new red spot, so the boss stated to have her checked out today again.    Initial BP 98/72 (BP Location: Right arm, Patient Position: Sitting, Cuff Size: Adult Regular)   Pulse 60   Temp 98.1  F (36.7  C) (Tympanic)   Resp 16   Wt 63.3 kg (139 lb 8 oz)   Breastfeeding No   BMI 34.98 kg/m   Estimated body mass index is 34.98 kg/m  as calculated from the following:    Height as of 8/18/21: 1.345 m (4' 4.95\").    Weight as of this encounter: 63.3 kg (139 lb 8 oz).  Medication Reconciliation: Completed     Advanced Care Directive Reviewed    Wily Styles LPN  "

## 2022-04-11 NOTE — PATIENT INSTRUCTIONS
Would recommend every other night baths for hygiene to help with skin healing.  Millicent Santo, DO on 4/11/2022 at 3:28 PM

## 2022-04-11 NOTE — PROGRESS NOTES
Assessment & Plan     1. Candidiasis of skin  With likely combination irritation, yeast and bacterial due to location.  Repeat recent creams.  Encouraged improved hygiene as it is following area of heat/moisture.  Trial of baths again.  Consideration for biopsy of border if not improving with these measures - follow up with PCP if not improving.  - mupirocin (BACTROBAN) 2 % external ointment; Apply topically 3 times daily Apply to open areas of rash  Dispense: 30 g; Refill: 2  - nystatin (MYCOSTATIN) 813758 UNIT/GM external cream; Apply topically 2 times daily Combine small amount with triamcinolone cream in palm of hand and apply as directed  Dispense: 30 g; Refill: 2  - triamcinolone (KENALOG) 0.1 % external cream; Apply topically 3 times daily Combine small amount with nystatin cream in palm of hand and apply as directed.  Use for 2 weeks on, one week off.  Dispense: 80 g; Refill: 2    Millicent Santo [facility]      Reji Finney is a 47 year old who presents for the following health issues:    EVENS Finney is brought in by her caregiver for skin concerns of her buttocks.  She has had this previously.  Seen ~4x in the last year or so.   The last treatment worked the best; also hoping for baths every other day instead of showers.  Seemed to be better when she got more clean/soaked.  Within billy cleft.  No drainage.  Reddened.    Review of Systems   CONSTITUTIONAL: NEGATIVE for fever, chills, change in weight  ENT/MOUTH: NEGATIVE for ear, mouth and throat problems  RESP: NEGATIVE for significant cough or SOB  CV: NEGATIVE for chest pain, palpitations or peripheral edema      Objective    BP 98/72 (BP Location: Right arm, Patient Position: Sitting, Cuff Size: Adult Regular)   Pulse 60   Temp 98.1  F (36.7  C) (Tympanic)   Resp 16   Wt 63.3 kg (139 lb 8 oz)   Breastfeeding No   BMI 34.98 kg/m    Body mass index is 34.98 kg/m .  Physical Exam   GENERAL: alert, no distress,  obese, appears older than stated age and Down facies  SKIN:  cleft with fine speckled erythematous rash with extension of 2-3 cm onto buttocks.  Outer border of oblong type rash is more prominent with bright red satellite lesions extending from rash border compared to more central portions.  2 discrete old ulcerated lesions with healing borders on R inner buttock, one on L (all less than 1 cm).  No active drainage, induration or fluctuance.    No results found for any visits on 22.

## 2022-05-16 DIAGNOSIS — K21.9 GASTROESOPHAGEAL REFLUX DISEASE WITHOUT ESOPHAGITIS: ICD-10-CM

## 2022-05-18 RX ORDER — FAMOTIDINE 20 MG/1
20 TABLET, FILM COATED ORAL 2 TIMES DAILY
Qty: 180 TABLET | Refills: 1 | Status: SHIPPED | OUTPATIENT
Start: 2022-05-18 | End: 2022-09-02

## 2022-05-18 RX ORDER — FAMOTIDINE 40 MG/1
TABLET, FILM COATED ORAL
Qty: 90 TABLET | Refills: 3 | Status: SHIPPED | OUTPATIENT
Start: 2022-05-18 | End: 2023-05-23

## 2022-05-18 NOTE — TELEPHONE ENCOUNTER
"Per pharmacy fax, \"PLEASE SEND RX FOR 20MG TABS\" Manuel'd up as requested by pharmacy. Please review, sign and discontinue the 40 mg tab if appropriate.      Disp Refills Start End KATRIN   famotidine (PEPCID) 40 MG tablet 90 tablet 3 6/23/2021  No   Sig: TAKE 1/2 TAB (20MG) BY MOUTH TWICE A DAY   Sent to pharmacy as: Famotidine 40 MG Oral Tablet (PEPCID)   Class: E-Prescribe     Last Office Visit: 04/11/2022 with Dr. Santo  Future Office visit:         Requested Prescriptions   Pending Prescriptions Disp Refills     famotidine (PEPCID) 40 MG tablet [Pharmacy Med Name: FAMOTIDINE 40MG TABLET] 90 tablet 3     Sig: TAKE 1/2 TAB (20MG) BY MOUTH TWICE A DAY       H2 Blockers Protocol Passed - 5/16/2022  6:28 PM        Passed - Patient is age 12 or older        Passed - Recent (12 mo) or future (30 days) visit within the authorizing provider's specialty     Patient has had an office visit with the authorizing provider or a provider within the authorizing providers department within the previous 12 mos or has a future within next 30 days. See \"Patient Info\" tab in inbasket, or \"Choose Columns\" in Meds & Orders section of the refill encounter.              Passed - Medication is active on med list             Routing refill request to provider for review/approval.      Unable to complete prescription refill per RNMedication Refill Policy.................... Maci Vicente RN ....................  5/18/2022   12:12 PM          "

## 2022-05-20 DIAGNOSIS — F41.1 GAD (GENERALIZED ANXIETY DISORDER): ICD-10-CM

## 2022-05-20 DIAGNOSIS — M17.0 PRIMARY OSTEOARTHRITIS OF BOTH KNEES: ICD-10-CM

## 2022-05-20 DIAGNOSIS — R79.89 LOW VITAMIN B12 LEVEL: ICD-10-CM

## 2022-05-23 RX ORDER — CITALOPRAM HYDROBROMIDE 20 MG/1
20 TABLET ORAL DAILY
Qty: 90 TABLET | Refills: 1 | Status: SHIPPED | OUTPATIENT
Start: 2022-05-23 | End: 2022-09-02

## 2022-05-23 RX ORDER — LANOLIN ALCOHOL/MO/W.PET/CERES
1000 CREAM (GRAM) TOPICAL DAILY
Qty: 90 TABLET | Refills: 1 | Status: SHIPPED | OUTPATIENT
Start: 2022-05-23 | End: 2022-12-26

## 2022-05-23 NOTE — TELEPHONE ENCOUNTER
Routing refill request to provider for review/approval because:    LOV:   1/19/22    Li Malik RN on 5/23/2022 at 3:49 PM

## 2022-06-20 DIAGNOSIS — N92.6 MENSTRUAL DISORDER: ICD-10-CM

## 2022-06-20 NOTE — TELEPHONE ENCOUNTER
[id number] sent Rx request for the following:      Requested Prescriptions   Pending Prescriptions Disp Refills     TRI-LINYAH 0.18/0.215/0.25 MG-35 MCG tablet [Pharmacy Med Name: TRI-LINYAH 28-DAY TABLET] 84 tablet 3     Sig: TAKE 1 TABLET BY MOUTH DAILY   Last Prescription Date:   7/23/21  Last Fill Qty/Refills:         84, R-3    Last Office Visit:              4/11/22   Future Office visit:           None    Jennifer Oh RN .............. 6/20/2022  3:53 PM

## 2022-06-21 RX ORDER — NORGESTIMATE AND ETHINYL ESTRADIOL 7DAYSX3 28
KIT ORAL
Qty: 84 TABLET | Refills: 2 | Status: SHIPPED | OUTPATIENT
Start: 2022-06-21 | End: 2022-12-07

## 2022-07-23 DIAGNOSIS — B37.2 CANDIDIASIS OF SKIN: ICD-10-CM

## 2022-07-25 RX ORDER — NYSTATIN 100000 U/G
CREAM TOPICAL
Qty: 30 G | Refills: 2 | Status: SHIPPED | OUTPATIENT
Start: 2022-07-25 | End: 2024-07-16

## 2022-07-25 NOTE — TELEPHONE ENCOUNTER
"Requested Prescriptions   Pending Prescriptions Disp Refills     nystatin (MYCOSTATIN) 315645 UNIT/GM external cream [Pharmacy Med Name: NYSTATIN 100,000UNIT/G CREAM] 30 g 2     Sig: APPLY TOPICALLY 2 TIMES DAILY COMBINE SMALL AMOUNT WITH TRIAMCINOLONE CREAM INPALM OF HAND AND APPLY AS DIRECTED       Antifungal Agents Passed - 7/23/2022  9:03 AM        Passed - Recent (12 mo) or future (30 days) visit within the authorizing provider's specialty     Patient has had an office visit with the authorizing provider or a provider within the authorizing providers department within the previous 12 mos or has a future within next 30 days. See \"Patient Info\" tab in inbasket, or \"Choose Columns\" in Meds & Orders section of the refill encounter.              Passed - Not Fluconazole or Terconazole      If oral Fluconazole or Terconazole, may refill if indicated in progress notes.           Passed - Medication is active on med list     Last Written Prescription Date:  4/11/22  Last Fill Quantity: 30 g,   # refills: 2  Last Office Visit: 4/11/22 Gal  Future Office visit: none      Routing refill request to provider for review/approval   Brenda J. Goodell, RN on 7/25/2022 at 1:42 PM      "

## 2022-09-02 ENCOUNTER — OFFICE VISIT (OUTPATIENT)
Dept: INTERNAL MEDICINE | Facility: OTHER | Age: 47
End: 2022-09-02
Attending: NURSE PRACTITIONER
Payer: MEDICARE

## 2022-09-02 VITALS
OXYGEN SATURATION: 98 % | TEMPERATURE: 98.6 F | HEART RATE: 61 BPM | RESPIRATION RATE: 16 BRPM | BODY MASS INDEX: 33.74 KG/M2 | SYSTOLIC BLOOD PRESSURE: 110 MMHG | HEIGHT: 55 IN | DIASTOLIC BLOOD PRESSURE: 68 MMHG | WEIGHT: 145.8 LBS

## 2022-09-02 DIAGNOSIS — R46.81 OBSESSIVE BEHAVIORS: ICD-10-CM

## 2022-09-02 DIAGNOSIS — F02.818 DEMENTIA ASSOCIATED WITH OTHER UNDERLYING DISEASE WITH BEHAVIORAL DISTURBANCE (H): ICD-10-CM

## 2022-09-02 DIAGNOSIS — E03.8 OTHER SPECIFIED HYPOTHYROIDISM: ICD-10-CM

## 2022-09-02 DIAGNOSIS — F41.1 GAD (GENERALIZED ANXIETY DISORDER): ICD-10-CM

## 2022-09-02 DIAGNOSIS — R79.89 LOW VITAMIN B12 LEVEL: ICD-10-CM

## 2022-09-02 DIAGNOSIS — Q90.9 DOWN'S SYNDROME: Primary | ICD-10-CM

## 2022-09-02 LAB
ALBUMIN SERPL-MCNC: 3.5 G/DL (ref 3.5–5.7)
ALP SERPL-CCNC: 120 U/L (ref 34–104)
ALT SERPL W P-5'-P-CCNC: 31 U/L (ref 7–52)
ANION GAP SERPL CALCULATED.3IONS-SCNC: 7 MMOL/L (ref 3–14)
AST SERPL W P-5'-P-CCNC: 35 U/L (ref 13–39)
BILIRUB SERPL-MCNC: 0.8 MG/DL (ref 0.3–1)
BUN SERPL-MCNC: 17 MG/DL (ref 7–25)
CALCIUM SERPL-MCNC: 9.1 MG/DL (ref 8.6–10.3)
CHLORIDE BLD-SCNC: 102 MMOL/L (ref 98–107)
CO2 SERPL-SCNC: 27 MMOL/L (ref 21–31)
CREAT SERPL-MCNC: 0.77 MG/DL (ref 0.6–1.2)
ERYTHROCYTE [DISTWIDTH] IN BLOOD BY AUTOMATED COUNT: 14 % (ref 10–15)
GFR SERPL CREATININE-BSD FRML MDRD: >90 ML/MIN/1.73M2
GLUCOSE BLD-MCNC: 85 MG/DL (ref 70–105)
HCT VFR BLD AUTO: 38.1 % (ref 35–47)
HGB BLD-MCNC: 12.9 G/DL (ref 11.7–15.7)
MCH RBC QN AUTO: 33.9 PG (ref 26.5–33)
MCHC RBC AUTO-ENTMCNC: 33.9 G/DL (ref 31.5–36.5)
MCV RBC AUTO: 100 FL (ref 78–100)
PLATELET # BLD AUTO: 269 10E3/UL (ref 150–450)
POTASSIUM BLD-SCNC: 4.7 MMOL/L (ref 3.5–5.1)
PROT SERPL-MCNC: 7.1 G/DL (ref 6.4–8.9)
RBC # BLD AUTO: 3.81 10E6/UL (ref 3.8–5.2)
SODIUM SERPL-SCNC: 136 MMOL/L (ref 134–144)
T4 FREE SERPL-MCNC: 1.25 NG/DL (ref 0.6–1.6)
TSH SERPL DL<=0.005 MIU/L-ACNC: 1.3 MU/L (ref 0.4–4)
VIT B12 SERPL-MCNC: 1012 PG/ML (ref 180–914)
WBC # BLD AUTO: 7.1 10E3/UL (ref 4–11)

## 2022-09-02 PROCEDURE — G0463 HOSPITAL OUTPT CLINIC VISIT: HCPCS

## 2022-09-02 PROCEDURE — 82607 VITAMIN B-12: CPT | Mod: ZL | Performed by: NURSE PRACTITIONER

## 2022-09-02 PROCEDURE — 80053 COMPREHEN METABOLIC PANEL: CPT | Mod: ZL | Performed by: NURSE PRACTITIONER

## 2022-09-02 PROCEDURE — 85014 HEMATOCRIT: CPT | Mod: ZL | Performed by: NURSE PRACTITIONER

## 2022-09-02 PROCEDURE — 84439 ASSAY OF FREE THYROXINE: CPT | Mod: ZL | Performed by: NURSE PRACTITIONER

## 2022-09-02 PROCEDURE — 84443 ASSAY THYROID STIM HORMONE: CPT | Mod: ZL | Performed by: NURSE PRACTITIONER

## 2022-09-02 PROCEDURE — 99214 OFFICE O/P EST MOD 30 MIN: CPT | Performed by: NURSE PRACTITIONER

## 2022-09-02 PROCEDURE — 36415 COLL VENOUS BLD VENIPUNCTURE: CPT | Mod: ZL | Performed by: NURSE PRACTITIONER

## 2022-09-02 RX ORDER — CHOLECALCIFEROL (VITAMIN D3) 25 MCG
TABLET ORAL
COMMUNITY
Start: 2022-05-25 | End: 2024-07-16

## 2022-09-02 RX ORDER — CITALOPRAM 30 MG/1
30 CAPSULE ORAL DAILY
Qty: 90 CAPSULE | Refills: 3 | Status: SHIPPED | OUTPATIENT
Start: 2022-09-02 | End: 2022-12-07

## 2022-09-02 ASSESSMENT — ENCOUNTER SYMPTOMS
WHEEZING: 0
FATIGUE: 0
HEMATOCHEZIA: 0
CONFUSION: 1
DYSPHORIC MOOD: 0
SHORTNESS OF BREATH: 0
HALLUCINATIONS: 0
NERVOUS/ANXIOUS: 1
COUGH: 0

## 2022-09-02 ASSESSMENT — ANXIETY QUESTIONNAIRES
2. NOT BEING ABLE TO STOP OR CONTROL WORRYING: NOT AT ALL
1. FEELING NERVOUS, ANXIOUS, OR ON EDGE: NOT AT ALL
4. TROUBLE RELAXING: NOT AT ALL
3. WORRYING TOO MUCH ABOUT DIFFERENT THINGS: NOT AT ALL
7. FEELING AFRAID AS IF SOMETHING AWFUL MIGHT HAPPEN: NOT AT ALL
GAD7 TOTAL SCORE: 0
GAD7 TOTAL SCORE: 0
IF YOU CHECKED OFF ANY PROBLEMS ON THIS QUESTIONNAIRE, HOW DIFFICULT HAVE THESE PROBLEMS MADE IT FOR YOU TO DO YOUR WORK, TAKE CARE OF THINGS AT HOME, OR GET ALONG WITH OTHER PEOPLE: NOT DIFFICULT AT ALL
8. IF YOU CHECKED OFF ANY PROBLEMS, HOW DIFFICULT HAVE THESE MADE IT FOR YOU TO DO YOUR WORK, TAKE CARE OF THINGS AT HOME, OR GET ALONG WITH OTHER PEOPLE?: NOT DIFFICULT AT ALL
7. FEELING AFRAID AS IF SOMETHING AWFUL MIGHT HAPPEN: NOT AT ALL
6. BECOMING EASILY ANNOYED OR IRRITABLE: NOT AT ALL
GAD7 TOTAL SCORE: 0
5. BEING SO RESTLESS THAT IT IS HARD TO SIT STILL: NOT AT ALL

## 2022-09-02 ASSESSMENT — PAIN SCALES - GENERAL: PAINLEVEL: NO PAIN (0)

## 2022-09-02 ASSESSMENT — PATIENT HEALTH QUESTIONNAIRE - PHQ9
SUM OF ALL RESPONSES TO PHQ QUESTIONS 1-9: 1
SUM OF ALL RESPONSES TO PHQ QUESTIONS 1-9: 1
10. IF YOU CHECKED OFF ANY PROBLEMS, HOW DIFFICULT HAVE THESE PROBLEMS MADE IT FOR YOU TO DO YOUR WORK, TAKE CARE OF THINGS AT HOME, OR GET ALONG WITH OTHER PEOPLE: NOT DIFFICULT AT ALL

## 2022-09-02 NOTE — NURSING NOTE
"Chief Complaint   Patient presents with     Follow Up       FOOD SECURITY SCREENING QUESTIONS  Hunger Vital Signs:  Within the past 12 months we worried whether our food would run out before we got money to buy more. Never  Within the past 12 months the food we bought just didn't last and we didn't have money to get more. Never  Ami Bradford LPN 9/2/2022 9:39 AM      Initial /68 (BP Location: Right arm, Patient Position: Sitting, Cuff Size: Adult Regular)   Pulse 61   Temp 98.6  F (37  C) (Tympanic)   Resp 16   Ht 1.33 m (4' 4.36\")   Wt 66.1 kg (145 lb 12.8 oz)   SpO2 98%   BMI 37.39 kg/m   Estimated body mass index is 37.39 kg/m  as calculated from the following:    Height as of this encounter: 1.33 m (4' 4.36\").    Weight as of this encounter: 66.1 kg (145 lb 12.8 oz).  Medication Reconciliation: complete    Ami Bradford LPN  "

## 2022-09-02 NOTE — LETTER
September 6, 2022      Reg Valiente  [address] [address]        Dear ,    We are writing to inform you of your test results.    Your recent lab work all looked really good.  The vitamin B12 level is above sufficient range.  I would recommend you change the vitamin B12 supplement from daily to 3 times weekly.  I would recommend that you continue with all other medication as currently prescribed.    Resulted Orders   Vitamin B12   Result Value Ref Range    Vitamin B12 1,012 (H) 180 - 914 pg/mL   TSH   Result Value Ref Range    TSH 1.30 0.40 - 4.00 mU/L   T4 free   Result Value Ref Range    Free T4 1.25 0.60 - 1.60 ng/dL   Comprehensive metabolic panel   Result Value Ref Range    Sodium 136 134 - 144 mmol/L    Potassium 4.7 3.5 - 5.1 mmol/L    Chloride 102 98 - 107 mmol/L    Carbon Dioxide (CO2) 27 21 - 31 mmol/L    Anion Gap 7 3 - 14 mmol/L    Urea Nitrogen 17 7 - 25 mg/dL    Creatinine 0.77 0.60 - 1.20 mg/dL    Calcium 9.1 8.6 - 10.3 mg/dL    Glucose 85 70 - 105 mg/dL    Alkaline Phosphatase 120 (H) 34 - 104 U/L    AST 35 13 - 39 U/L    ALT 31 7 - 52 U/L    Protein Total 7.1 6.4 - 8.9 g/dL    Albumin 3.5 3.5 - 5.7 g/dL    Bilirubin Total 0.8 0.3 - 1.0 mg/dL    GFR Estimate >90 >60 mL/min/1.73m2      Comment:      Effective December 21, 2021 eGFRcr in adults is calculated using the 2021 CKD-EPI creatinine equation which includes age and gender (Krystle et al., NEJM, DOI: 10.1056/EBXLes5371282)   CBC with platelets   Result Value Ref Range    WBC Count 7.1 4.0 - 11.0 10e3/uL    RBC Count 3.81 3.80 - 5.20 10e6/uL    Hemoglobin 12.9 11.7 - 15.7 g/dL    Hematocrit 38.1 35.0 - 47.0 %     78 - 100 fL    MCH 33.9 (H) 26.5 - 33.0 pg    MCHC 33.9 31.5 - 36.5 g/dL    RDW 14.0 10.0 - 15.0 %    Platelet Count 269 150 - 450 10e3/uL       If you have any questions or concerns, please call the clinic at the number listed above.       Sincerely,      Mikayla Gtz NP

## 2022-09-02 NOTE — PROGRESS NOTES
ICD-10-CM    1. Down's syndrome  Q90.9    2. Dementia associated with other underlying disease with behavioral disturbance (H)  F02.81 Vitamin B12     Vitamin B12   3. Obsessive behaviors  R46.81 Citalopram Hydrobromide 30 MG CAPS   4. DOROTEO (generalized anxiety disorder)  F41.1 Citalopram Hydrobromide 30 MG CAPS     Comprehensive metabolic panel     Comprehensive metabolic panel   5. Low vitamin B12 level  E53.8 CBC with platelets     Vitamin B12     Vitamin B12     CBC with platelets   6. Other specified hypothyroidism  E03.8 CBC with platelets     T4 free     TSH     TSH     T4 free     CBC with platelets     Plan:  Due to the obsessive behaviors, will increase Celexa to 30 mg daily.  We will see her back in 6 weeks to follow-up to see if this is improving.  May consider adding Namenda or increasing Celexa dose at next visit.  The caregiver with her today tells me that she had been placed on a medication before for the mental health issues and she did not tolerate the medication caused her to be very confused.  She is not sure which medication that is.  She will check with the  and let us know.  Labs today include CBC, CHEM 13, TSH, free T4 and B12.  We also discussed age-related cancer screening.  Patient has never been sexually active.  It is recommended that she not have Pap smear nor colon cancer screening with colonoscopy due to underlying dementia and Down syndrome as these would be emotionally traumatic for patient.  They will discuss with her guardian and let us know if they would like us to proceed in other way.    Reji Finney is a 47 year old, presenting for the following health issues:  Follow Up      Patient is seen today for chronic disease management.  She has Alzheimer's dementia and Down syndrome.  Staff report that she has been having more obsessive behaviors.  She is getting more difficult to redirect.  She is never aggressive.  She does become frustrated and angry.   She had been placed on a medication before for these behaviors as she did not tolerate the medication however care attendant is not aware of the name of that medication.  She reports it caused more confusion and other problems.  She does take Celexa 20 mg daily.  Also has hypothyroidism and B12 deficiency.  She has been taking medications as directed.  She also has a history of asthma.  She does not have inhaler on hand.  She has not had any hospitalizations or emergency department visits for asthma in the past year.  She has not needed rescue inhaler nor has she had any cough, wheezing or shortness of breath.  Age-related screenings are reviewed including cervical and colon cancer screening.    History of Present Illness       Reason for visit:  Dementia Follow up    She eats 2-3 servings of fruits and vegetables daily.She consumes 0 sweetened beverage(s) daily.She exercises with enough effort to increase her heart rate 9 or less minutes per day.  She exercises with enough effort to increase her heart rate 3 or less days per week.   She is taking medications regularly.    Today's PHQ-9         PHQ-9 Total Score: 1    PHQ-9 Q9 Thoughts of better off dead/self-harm past 2 weeks :   Not at all    How difficult have these problems made it for you to do your work, take care of things at home, or get along with other people: Not difficult at all  Today's DOROTEO-7 Score: 0             Review of Systems   Constitutional: Negative for fatigue.   Respiratory: Negative for cough, shortness of breath and wheezing.    Gastrointestinal: Negative for hematochezia.        No bowel pattern changes   Endocrine: Negative for cold intolerance and heat intolerance.   Psychiatric/Behavioral: Positive for behavioral problems and confusion. Negative for dysphoric mood and hallucinations. The patient is nervous/anxious.             Objective    /68 (BP Location: Right arm, Patient Position: Sitting, Cuff Size: Adult Regular)   Pulse 61  "  Temp 98.6  F (37  C) (Tympanic)   Resp 16   Ht 1.33 m (4' 4.36\")   Wt 66.1 kg (145 lb 12.8 oz)   SpO2 98%   BMI 37.39 kg/m    Body mass index is 37.39 kg/m .  Physical Exam   Pleasant female who repetitively asked to have examination completed so she can leave and go to school.  She is very impatient.  This seems to be excessive and more of an obsessive behavior noted at this visit.  Morbidly obese.  Skin color pink.  Neck supple.  No thyromegaly or thyroid tenderness.  Lung fields clear to auscultation.  Cardiovascular regular.  Abdomen is without tenderness.  Extremities without edema.  Gait stable.                    [unfilled]  [unfilled]  "

## 2022-09-08 ASSESSMENT — ASTHMA QUESTIONNAIRES: ACT_TOTALSCORE: 25

## 2022-09-19 DIAGNOSIS — E03.8 OTHER SPECIFIED HYPOTHYROIDISM: ICD-10-CM

## 2022-09-19 DIAGNOSIS — K21.9 GASTROESOPHAGEAL REFLUX DISEASE WITHOUT ESOPHAGITIS: ICD-10-CM

## 2022-09-20 RX ORDER — LEVOTHYROXINE SODIUM 137 UG/1
137 TABLET ORAL DAILY
Qty: 90 TABLET | Refills: 3 | OUTPATIENT
Start: 2022-09-20

## 2022-09-20 RX ORDER — FAMOTIDINE 20 MG/1
20 TABLET, FILM COATED ORAL 2 TIMES DAILY
Qty: 180 TABLET | Refills: 1 | OUTPATIENT
Start: 2022-09-20

## 2022-09-20 NOTE — TELEPHONE ENCOUNTER
Redundant refill request refused: Too soon:    levothyroxine (SYNTHROID/LEVOTHROID) 137 MCG tablet 90 tablet 3 11/24/2021  --   Sig - Route: Take 1 tablet (137 mcg) by mouth daily - Oral   Sent to pharmacy as: Levothyroxine Sodium 137 MCG Oral Tablet (SYNTHROID/LEVOTHROID)   Class: E-Prescribe   Notes to Pharmacy: Dose reduction   Order: [id number]   E-Prescribing Status: Receipt confirmed by pharmacy (11/24/2021  7:12 AM CST)      [id number] - [place], [place] - [address]     famotidine (PEPCID) 20 MG tablet (Discontinued) 180 tablet 1 5/18/2022 9/2/2022 --   Sig - Route: Take 1 tablet (20 mg) by mouth 2 times daily - Oral     Last Prescription Date:     famotidine (PEPCID) 40 MG tablet 90 tablet 3 5/18/2022  No   Sig: TAKE 1/2 TAB (20MG) BY MOUTH TWICE A DAY     Refused, with note to pharmacy. Jennifer Oh RN .............. 9/20/2022  4:18 PM

## 2022-10-19 DIAGNOSIS — F41.1 GAD (GENERALIZED ANXIETY DISORDER): ICD-10-CM

## 2022-10-19 DIAGNOSIS — M17.0 PRIMARY OSTEOARTHRITIS OF BOTH KNEES: ICD-10-CM

## 2022-10-19 RX ORDER — QUETIAPINE FUMARATE 25 MG/1
TABLET, FILM COATED ORAL
Qty: 90 TABLET | Refills: 3 | OUTPATIENT
Start: 2022-10-19

## 2022-10-19 RX ORDER — CITALOPRAM HYDROBROMIDE 20 MG/1
20 TABLET ORAL DAILY
Qty: 90 TABLET | Refills: 1 | OUTPATIENT
Start: 2022-10-19

## 2022-10-19 NOTE — TELEPHONE ENCOUNTER
[facility] sent Rx request for the following:    QUETIAPINE 25MG TABLET  Last Prescription Date:   10/13/21  Last Fill Qty/Refills:         90, R-3    Last Office Visit:              9/2/22   Future Office visit:           10/25/22  Medication was discontinued on 9/2/22.  Génesis Wall RN on 10/19/2022 at 1:29 PM

## 2022-10-19 NOTE — TELEPHONE ENCOUNTER
[facility] sent Rx request for the following:    CITALOPRAM 20MG TABLET  DICLOFENAC SODIUM 50MG DR TAB  Last Prescription Date:   5/23/22  Last Fill Qty/Refills:         180, R-1    Last Office Visit:              9/2/22   Future Office visit:           10/25/22  Citalopram discontinued on 9/2/22. Refused.   Génesis Wall RN on 10/19/2022 at 2:30 PM

## 2022-10-20 DIAGNOSIS — E03.8 OTHER SPECIFIED HYPOTHYROIDISM: ICD-10-CM

## 2022-10-20 DIAGNOSIS — K21.9 GASTROESOPHAGEAL REFLUX DISEASE WITHOUT ESOPHAGITIS: Primary | ICD-10-CM

## 2022-10-20 RX ORDER — FAMOTIDINE 20 MG/1
20 TABLET, FILM COATED ORAL 2 TIMES DAILY
Qty: 180 TABLET | Refills: 0 | Status: SHIPPED | OUTPATIENT
Start: 2022-10-20 | End: 2022-12-22

## 2022-10-20 RX ORDER — LEVOTHYROXINE SODIUM 137 UG/1
137 TABLET ORAL DAILY
Qty: 90 TABLET | Refills: 0 | Status: SHIPPED | OUTPATIENT
Start: 2022-10-20 | End: 2022-12-22

## 2022-10-20 NOTE — TELEPHONE ENCOUNTER
[id number] of [place] sent Rx request for the following:      Requested Prescriptions   Pending Prescriptions Disp Refills     famotidine (PEPCID) 20 MG tablet       Sig: Take 1 tablet (20 mg) by mouth 2 times daily   Last Prescription Date:    famotidine (PEPCID) 40 MG tablet 90 tablet 3 5/18/2022  No   Sig: TAKE 1/2 TAB (20MG) BY MOUTH TWICE A DAY          levothyroxine (SYNTHROID/LEVOTHROID) 137 MCG tablet 90 tablet 3     Sig: Take 1 tablet (137 mcg) by mouth daily   Last Prescription Date:   11/27/21  Last Fill Qty/Refills:         90, R-3      Last Office Visit:              9/2/22   Future Office visit:             Next 5 appointments (look out 90 days)    Oct 25, 2022  9:40 AM  SHORT with Mikayla Gtz NP  [facility] ([facility] ) [address]  [place] [address]  [phone]        In clinical absence of patient's primary, Mikayla Gtz, patient is requesting that this message be sent to the covering provider for consideration please.    Jennifer Oh RN .............. 10/20/2022  11:22 AM

## 2022-10-25 ENCOUNTER — OFFICE VISIT (OUTPATIENT)
Dept: INTERNAL MEDICINE | Facility: OTHER | Age: 47
End: 2022-10-25
Attending: NURSE PRACTITIONER
Payer: MEDICARE

## 2022-10-25 VITALS
SYSTOLIC BLOOD PRESSURE: 138 MMHG | WEIGHT: 148.2 LBS | RESPIRATION RATE: 16 BRPM | HEART RATE: 71 BPM | TEMPERATURE: 97.9 F | DIASTOLIC BLOOD PRESSURE: 82 MMHG | BODY MASS INDEX: 38 KG/M2 | OXYGEN SATURATION: 97 %

## 2022-10-25 DIAGNOSIS — R46.81 OBSESSIVE BEHAVIORS: ICD-10-CM

## 2022-10-25 DIAGNOSIS — G30.0 EARLY ONSET ALZHEIMER'S DISEASE WITH BEHAVIORAL DISTURBANCE (H): ICD-10-CM

## 2022-10-25 DIAGNOSIS — F41.1 GAD (GENERALIZED ANXIETY DISORDER): Primary | ICD-10-CM

## 2022-10-25 DIAGNOSIS — F02.818 EARLY ONSET ALZHEIMER'S DISEASE WITH BEHAVIORAL DISTURBANCE (H): ICD-10-CM

## 2022-10-25 PROCEDURE — 99213 OFFICE O/P EST LOW 20 MIN: CPT | Performed by: NURSE PRACTITIONER

## 2022-10-25 PROCEDURE — G0463 HOSPITAL OUTPT CLINIC VISIT: HCPCS

## 2022-10-25 RX ORDER — LANOLIN ALCOHOL/MO/W.PET/CERES
3 CREAM (GRAM) TOPICAL AT BEDTIME
Qty: 90 TABLET | Refills: 3 | Status: SHIPPED | OUTPATIENT
Start: 2022-10-25 | End: 2023-01-25

## 2022-10-25 RX ORDER — MEMANTINE HYDROCHLORIDE 5 MG/1
TABLET ORAL
Qty: 60 TABLET | Refills: 11 | Status: SHIPPED | OUTPATIENT
Start: 2022-10-25 | End: 2023-01-25

## 2022-10-25 RX ORDER — QUETIAPINE FUMARATE 25 MG/1
TABLET, FILM COATED ORAL
COMMUNITY
Start: 2022-10-19 | End: 2022-12-07

## 2022-10-25 ASSESSMENT — PAIN SCALES - GENERAL: PAINLEVEL: NO PAIN (0)

## 2022-10-25 NOTE — PROGRESS NOTES
ASSESSMENT:    ICD-10-CM    1. DOROTEO (generalized anxiety disorder)  F41.1       2. Early onset Alzheimer's disease with behavioral disturbance (H)  G30.0 memantine (NAMENDA) 5 MG tablet    F02.818 melatonin 3 MG tablet      3. Obsessive behaviors  R46.81           PLAN:  Continue with Celexa 30 mg daily.  Med list from [facility] showed 20 mg daily.  Staff member will confirm that she is actually on 30 mg daily and if not then this dose will be increased.  Start Namenda 5 mg in the morning for 1 week and then increasing to twice daily.  Hopefully the combination of these 2 medications will help with some of the obsessive behaviors.  She does have advanced Alzheimer's.  Add melatonin 3 mg at bedtime to assist with sleep at the request of staff.    SUBJECTIVE:    Patient is here today to follow-up on obsessive behaviors.  They have not really noticed any difference with the change of Celexa dose.  I did review the [place] med list and it shows that she is only taking 20 mg daily rather than 30 mg daily which was increased at last office visit.  Staff member is not sure what dose she is actually taking.  She is very forgetful and has repetitive thoughts and actions.  She continues to process.  She is easily redirected and does not become angry or aggressive.  They are requesting that she have melatonin to help her sleep at bedtime.    PROBLEM LIST:  Patient Active Problem List   Diagnosis     Down's syndrome     DOROTEO (generalized anxiety disorder)     Menstrual disorder     Primary osteoarthritis of both knees     Gastroesophageal reflux disease without esophagitis     Other specified hypothyroidism     Dermatitis     Encounter for screening mammogram for breast cancer     Morbid obesity (H)     JENNIFER (obstructive sleep apnea)     Choledocholithiasis     Post-ERCP acute pancreatitis     Encounter for follow-up examination     Dementia associated with other underlying disease with behavioral disturbance     Obsessive behaviors      PAST MEDICAL HISTORY:  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     Mom reports negative AAS evaluation in the 1990's     Corneal ulcer 2004    caused by leaking of CPAP     Down's syndrome      Gastroesophageal reflux disease      JENNIFER (obstructive sleep apnea)     patient refuses CPAP     SURGICAL HISTORY:  Past Surgical History:   Procedure Laterality Date     ENDOSCOPIC RETROGRADE CHOLANGIOPANCREATOGRAM N/A 7/1/2019    Procedure: Endoscopic Retrograde Cholangiopancreatogram, dilation, stent , sludge and stone removal, sphincterotomy;  Surgeon: Guru Harper Alvarado MD;  Location: [facility] OR     ENDOSCOPIC RETROGRADE CHOLANGIOPANCREATOGRAM N/A 8/26/2019    Procedure: Endoscopic Retrograde Cholangiopancreatogram with biliary stent removal and stone removal;  Surgeon: Guru Harper Alvarado MD;  Location: [facility] OR     ENDOSCOPIC RETROGRADE CHOLANGIOPANCREATOGRAPHY, EXCHANGE TUBE/STENT N/A 7/24/2019    Procedure: Endoscopic Retrograde Cholangiopancreatography with Billary Stone Extraction,Dilation             and stent exchange;  Surgeon: Guru Harper Alvarado MD;  Location: [facility] OR     LAPAROSCOPIC CHOLECYSTECTOMY WITH CHOLANGIOGRAMS N/A 9/9/2019    Procedure: CHOLECYSTECTOMY, LAPAROSCOPIC, WITH CHOLANGIOGRAM;  Surgeon: Arvind Bailey MD;  Location: [facility] OR     NO HISTORY OF SURGERY      as reported by parents       SOCIAL HISTORY:  Social History     Socioeconomic History     Marital status: Single     Spouse name: Not on file     Number of children: Not on file     Years of education: Not on file     Highest education level: Not on file   Occupational History     Not on file   Tobacco Use     Smoking status: Never     Smokeless tobacco: Never   Vaping Use     Vaping Use: Never used   Substance and Sexual Activity     Alcohol use: No     Drug use: No     Sexual  activity: Not Currently     Birth control/protection: Pill   Other Topics Concern     Parent/sibling w/ CABG, MI or angioplasty before 65F 55M? Not Asked   Social History Narrative    Moving to [facility] June 20, 2019. July 2020: Parents moved back to [place] because Mom has dementia and [place] is closer to family.  Face times parents occasionally    Parents and sister Camila and brother in law Steve are guardians     Social Determinants of Health     Financial Resource Strain: Not on file   Food Insecurity: Not on file   Transportation Needs: Not on file   Physical Activity: Not on file   Stress: Not on file   Social Connections: Not on file   Intimate Partner Violence: Not on file   Housing Stability: Not on file     FAMILYHISTORY:  Family History   Problem Relation Age of Onset     Asthma Mother      Arthritis Mother      Dementia Mother      LUNG DISEASE Father         pulmonary fibrosis secondary to chemical exposure     Diabetes Father      Cerebrovascular Disease Father 90     Cerebrovascular Disease Maternal Grandmother 90     Heart Failure Paternal Grandfather         uncertain which  replaced     Coronary Artery Disease No family hx of      Heart Disease No family hx of      Bleeding Diathesis No family hx of      Pancreatic Cancer No family hx of      Colorectal Cancer No family hx of      CURRENT MEDICATIONS:   Current Outpatient Medications   Medication Sig Dispense Refill     Citalopram Hydrobromide 30 MG CAPS Take 30 mg by mouth daily 90 capsule 3     cyanocobalamin (VITAMIN B-12) 1000 MCG tablet TAKE 1 TABLET (1,000 MCG) BY MOUTH DAILY 90 tablet 1     diclofenac (VOLTAREN) 50 MG EC tablet TAKE 1 TABLET BY MOUTH TWICE A  tablet 1     famotidine (PEPCID) 20 MG tablet Take 1 tablet (20 mg) by mouth 2 times daily 180 tablet 0     famotidine (PEPCID) 40 MG tablet TAKE 1/2 TAB (20MG) BY MOUTH TWICE A DAY 90 tablet 3     levothyroxine (SYNTHROID/LEVOTHROID) 137 MCG tablet Take 1 tablet (137  mcg) by mouth daily 90 tablet 0     melatonin 3 MG tablet Take 1 tablet (3 mg) by mouth At Bedtime 90 tablet 3     memantine (NAMENDA) 5 MG tablet 1 tablet in am for 1 week then twice daily 60 tablet 11     Multiple Vitamin (TAB-A-MATTHIAS) TABS TAKE 1 TAB BY MOUTH ONCE DAILY 90 tablet 3     mupirocin (BACTROBAN) 2 % external ointment Apply topically 3 times daily Apply to open areas of rash 30 g 2     nystatin (MYCOSTATIN) 196234 UNIT/GM external cream APPLY TOPICALLY 2 TIMES DAILY COMBINE SMALL AMOUNT WITH TRIAMCINOLONE CREAM INPALM OF HAND AND APPLY AS DIRECTED 30 g 2     QUEtiapine (SEROQUEL) 25 MG tablet TAKE 1 TABLET (25 MG) BY MOUTH DAILY (WITH DINNER)       Sennosides-Docusate Sodium (SB DOCUSATE SODIUM/SENNA PO)        TRI-LINYAH 0.18/0.215/0.25 MG-35 MCG tablet TAKE 1 TABLET BY MOUTH DAILY 84 tablet 2     triamcinolone (KENALOG) 0.1 % external cream Apply topically 3 times daily Combine small amount with nystatin cream in palm of hand and apply as directed.  Use for 2 weeks on, one week off. 80 g 2     VITAMIN D3 25 MCG (1000 UT) tablet        ALLERGIES:  Sulfa drugs    REVIEW OF SYSTEMS:  Review of Systems  See HPI    OBJECTIVE:  /82 (BP Location: Right arm, Patient Position: Sitting, Cuff Size: Adult Regular)   Pulse 71   Temp 97.9  F (36.6  C) (Tympanic)   Resp 16   Wt 67.2 kg (148 lb 3.2 oz)   SpO2 97%   BMI 38.00 kg/m    EXAM:   Pleasant female with Down syndrome.  She is inpatient.  Still exhibiting obsessive thoughts and behaviors in exam room.  Poor memory recall.  Lung fields clear to auscultation.  Cardiovascular regular.      Mikayla Gtz, ARYAN

## 2022-10-25 NOTE — NURSING NOTE
"Chief Complaint   Patient presents with     Follow Up       FOOD SECURITY SCREENING QUESTIONS  Hunger Vital Signs:  Within the past 12 months we worried whether our food would run out before we got money to buy more. Never  Within the past 12 months the food we bought just didn't last and we didn't have money to get more. Never  Ami Bradford LPN 10/25/2022 9:52 AM      Initial /82 (BP Location: Right arm, Patient Position: Sitting, Cuff Size: Adult Regular)   Pulse 71   Temp 97.9  F (36.6  C) (Tympanic)   Resp 16   Wt 67.2 kg (148 lb 3.2 oz)   SpO2 97%   BMI 38.00 kg/m   Estimated body mass index is 38 kg/m  as calculated from the following:    Height as of 9/2/22: 1.33 m (4' 4.36\").    Weight as of this encounter: 67.2 kg (148 lb 3.2 oz).  Medication Reconciliation: complete    Ami Bradford LPN  "

## 2022-11-14 DIAGNOSIS — N92.6 MENSTRUAL DISORDER: ICD-10-CM

## 2022-11-16 RX ORDER — NORGESTIMATE AND ETHINYL ESTRADIOL 7DAYSX3 28
KIT ORAL
Qty: 84 TABLET | Refills: 2 | OUTPATIENT
Start: 2022-11-16

## 2022-11-16 NOTE — TELEPHONE ENCOUNTER
Filled 06/21/2022 #84 x 2. Pharmacy verified receipt of refill. Unable to complete prescription refill per RNMedication Refill Policy.................... Maci Vicente RN ....................  11/16/2022   2:46 PM       Disp Refills Start End KATRIN   TRI-LINYAH 0.18/0.215/0.25 MG-35 MCG tablet 84 tablet 2 6/21/2022  No   Sig: TAKE 1 TABLET BY MOUTH DAILY   Sent to pharmacy as: Tri-Linyah 0.18/0.215/0.25 MG-35 MCG Oral Tablet (norgestim-eth estrad triphasic)   Class: E-Prescribe   Order: [id number]   E-Prescribing Status: Receipt confirmed by pharmacy (6/21/2022  9:02 AM CDT)       [id number] - [place], [place] - [address]

## 2022-11-28 RX ORDER — QUETIAPINE FUMARATE 25 MG/1
TABLET, FILM COATED ORAL
Qty: 90 TABLET | Refills: 0 | OUTPATIENT
Start: 2022-11-28

## 2022-11-28 NOTE — TELEPHONE ENCOUNTER
[id number] sent Rx request for the following:      Requested Prescriptions   Pending Prescriptions Disp Refills     QUEtiapine (SEROQUEL) 25 MG tablet       Sig: TAKE 1 TABLET (25 MG) BY MOUTH DAILY (WITH DINNER)       Antipsychotic Medications Failed - 11/28/2022 11:28 AM        Failed - Lipid panel on file within the past 12 months     Recent Labs   Lab Test 07/06/20  1108   CHOL 165   TRIG 116   HDL 44   LDL 98   NHDL 121        Historically reported  Provider to associate diagnosis    Last Office Visit:              10/25/22   Future Office visit:           None    Jennifer Oh RN .............. 11/28/2022  11:30 AM

## 2022-11-30 DIAGNOSIS — Z00.00 HEALTH CARE MAINTENANCE: ICD-10-CM

## 2022-11-30 NOTE — TELEPHONE ENCOUNTER
[id number] of [place] sent Rx request for the following:      Requested Prescriptions   Pending Prescriptions Disp Refills     Multiple Vitamin (TAB-A-MATTHIAS) TABS [Pharmacy Med Name: TAB-A-MATTHIAS TABLET] 90 tablet 3     Sig: TAKE 1 TAB BY MOUTH ONCE DAILY   Last Prescription Date:   8/24/21  Last Fill Qty/Refills:         90, R-3    Last Office Visit:              10/25/22   Future Office visit:           None    Jennifer Oh RN .............. 11/30/2022  4:42 PM

## 2022-12-01 RX ORDER — MULTIVITAMIN WITH FOLIC ACID 400 MCG
TABLET ORAL
Qty: 90 TABLET | Refills: 2 | Status: SHIPPED | OUTPATIENT
Start: 2022-12-01 | End: 2023-08-23

## 2022-12-07 ENCOUNTER — TELEPHONE (OUTPATIENT)
Dept: INTERNAL MEDICINE | Facility: OTHER | Age: 47
End: 2022-12-07

## 2022-12-07 DIAGNOSIS — N92.6 MENSTRUAL DISORDER: ICD-10-CM

## 2022-12-07 DIAGNOSIS — R46.81 OBSESSIVE BEHAVIORS: ICD-10-CM

## 2022-12-07 DIAGNOSIS — F41.1 GAD (GENERALIZED ANXIETY DISORDER): ICD-10-CM

## 2022-12-07 RX ORDER — NORGESTIMATE AND ETHINYL ESTRADIOL 7DAYSX3 28
1 KIT ORAL DAILY
Qty: 84 TABLET | Refills: 3 | Status: SHIPPED | OUTPATIENT
Start: 2022-12-07 | End: 2023-11-13

## 2022-12-07 RX ORDER — CITALOPRAM 30 MG/1
30 CAPSULE ORAL DAILY
Qty: 90 CAPSULE | Refills: 3 | Status: SHIPPED | OUTPATIENT
Start: 2022-12-07

## 2022-12-07 RX ORDER — QUETIAPINE FUMARATE 25 MG/1
TABLET, FILM COATED ORAL
Qty: 90 TABLET | Refills: 3 | Status: SHIPPED | OUTPATIENT
Start: 2022-12-07 | End: 2023-01-25

## 2022-12-07 NOTE — TELEPHONE ENCOUNTER
Received fax from [facility] stating that patient needs citalopram 30 mg tablets sent to pharmacy, needs refill of Seroquel 25 mg daily which she had been taking up until November 30 when no longer had refills.  Also needs a refill of contraception for menses management.

## 2022-12-20 DIAGNOSIS — K21.9 GASTROESOPHAGEAL REFLUX DISEASE WITHOUT ESOPHAGITIS: ICD-10-CM

## 2022-12-20 DIAGNOSIS — E03.8 OTHER SPECIFIED HYPOTHYROIDISM: ICD-10-CM

## 2022-12-22 RX ORDER — LEVOTHYROXINE SODIUM 137 UG/1
137 TABLET ORAL DAILY
Qty: 90 TABLET | Refills: 2 | Status: SHIPPED | OUTPATIENT
Start: 2022-12-22 | End: 2023-09-21

## 2022-12-22 RX ORDER — FAMOTIDINE 20 MG/1
20 TABLET, FILM COATED ORAL 2 TIMES DAILY
Qty: 180 TABLET | Refills: 2 | Status: SHIPPED | OUTPATIENT
Start: 2022-12-22 | End: 2023-01-25

## 2022-12-22 NOTE — TELEPHONE ENCOUNTER
[id number] sent Rx request for the following:      Requested Prescriptions   Pending Prescriptions Disp Refills     famotidine (PEPCID) 20 MG tablet [Pharmacy Med Name: FAMOTIDINE 20MG TABLET] 180 tablet 0     Sig: TAKE 1 TABLET (20 MG) BY MOUTH 2 TIMES DAILY   Last Prescription Date:   10/20/22  Last Fill Qty/Refills:         180, R-0         levothyroxine (SYNTHROID/LEVOTHROID) 137 MCG tablet [Pharmacy Med Name: LEVOTHYROXINE 137MCG TABLET] 90 tablet 0     Sig: TAKE 1 TABLET (137 MCG) BY MOUTH DAILY   Last Prescription Date:   10/20/22  Last Fill Qty/Refills:         90, R-0      Last Office Visit:              10/25/22   Future Office visit:           None    In clinical absence of patient's primary, Mikayla Gtz, patient is requesting that this message be sent to the covering provider for consideration please.    Jennifer Oh RN .............. 12/22/2022  9:12 AM

## 2022-12-23 DIAGNOSIS — R79.89 LOW VITAMIN B12 LEVEL: ICD-10-CM

## 2022-12-26 RX ORDER — LANOLIN ALCOHOL/MO/W.PET/CERES
1000 CREAM (GRAM) TOPICAL DAILY
Qty: 90 TABLET | Refills: 1 | Status: SHIPPED | OUTPATIENT
Start: 2022-12-26 | End: 2023-05-22

## 2023-01-25 ENCOUNTER — OFFICE VISIT (OUTPATIENT)
Dept: INTERNAL MEDICINE | Facility: OTHER | Age: 48
End: 2023-01-25
Attending: NURSE PRACTITIONER
Payer: MEDICARE

## 2023-01-25 VITALS
BODY MASS INDEX: 34.11 KG/M2 | HEIGHT: 55 IN | TEMPERATURE: 97.8 F | DIASTOLIC BLOOD PRESSURE: 80 MMHG | WEIGHT: 147.4 LBS | HEART RATE: 60 BPM | SYSTOLIC BLOOD PRESSURE: 118 MMHG | RESPIRATION RATE: 16 BRPM | OXYGEN SATURATION: 98 %

## 2023-01-25 DIAGNOSIS — Z00.00 ENCOUNTER FOR MEDICARE ANNUAL WELLNESS EXAM: ICD-10-CM

## 2023-01-25 DIAGNOSIS — G30.0 EARLY ONSET ALZHEIMER'S DISEASE WITH BEHAVIORAL DISTURBANCE (H): Primary | ICD-10-CM

## 2023-01-25 DIAGNOSIS — F02.818 EARLY ONSET ALZHEIMER'S DISEASE WITH BEHAVIORAL DISTURBANCE (H): Primary | ICD-10-CM

## 2023-01-25 DIAGNOSIS — E03.8 OTHER SPECIFIED HYPOTHYROIDISM: ICD-10-CM

## 2023-01-25 DIAGNOSIS — Q90.9 DOWN'S SYNDROME: ICD-10-CM

## 2023-01-25 DIAGNOSIS — F41.1 GAD (GENERALIZED ANXIETY DISORDER): ICD-10-CM

## 2023-01-25 DIAGNOSIS — R46.81 OBSESSIVE BEHAVIORS: ICD-10-CM

## 2023-01-25 PROCEDURE — G0439 PPPS, SUBSEQ VISIT: HCPCS | Mod: 52 | Performed by: NURSE PRACTITIONER

## 2023-01-25 RX ORDER — QUETIAPINE FUMARATE 25 MG/1
25 TABLET, FILM COATED ORAL 2 TIMES DAILY
Qty: 180 TABLET | Refills: 3 | Status: SHIPPED | OUTPATIENT
Start: 2023-01-25 | End: 2023-12-26

## 2023-01-25 RX ORDER — MEMANTINE HYDROCHLORIDE 5 MG/1
5 TABLET ORAL DAILY
Qty: 60 TABLET | Refills: 11
Start: 2023-01-25 | End: 2023-08-23

## 2023-01-25 RX ORDER — UREA 10 %
LOTION (ML) TOPICAL
COMMUNITY
Start: 2022-11-19 | End: 2023-01-25

## 2023-01-25 ASSESSMENT — ANXIETY QUESTIONNAIRES
2. NOT BEING ABLE TO STOP OR CONTROL WORRYING: NEARLY EVERY DAY
6. BECOMING EASILY ANNOYED OR IRRITABLE: MORE THAN HALF THE DAYS
GAD7 TOTAL SCORE: 14
8. IF YOU CHECKED OFF ANY PROBLEMS, HOW DIFFICULT HAVE THESE MADE IT FOR YOU TO DO YOUR WORK, TAKE CARE OF THINGS AT HOME, OR GET ALONG WITH OTHER PEOPLE?: SOMEWHAT DIFFICULT
GAD7 TOTAL SCORE: 14
IF YOU CHECKED OFF ANY PROBLEMS ON THIS QUESTIONNAIRE, HOW DIFFICULT HAVE THESE PROBLEMS MADE IT FOR YOU TO DO YOUR WORK, TAKE CARE OF THINGS AT HOME, OR GET ALONG WITH OTHER PEOPLE: SOMEWHAT DIFFICULT
4. TROUBLE RELAXING: NEARLY EVERY DAY
1. FEELING NERVOUS, ANXIOUS, OR ON EDGE: SEVERAL DAYS
GAD7 TOTAL SCORE: 14
3. WORRYING TOO MUCH ABOUT DIFFERENT THINGS: NEARLY EVERY DAY
5. BEING SO RESTLESS THAT IT IS HARD TO SIT STILL: MORE THAN HALF THE DAYS
7. FEELING AFRAID AS IF SOMETHING AWFUL MIGHT HAPPEN: NOT AT ALL
7. FEELING AFRAID AS IF SOMETHING AWFUL MIGHT HAPPEN: NOT AT ALL

## 2023-01-25 ASSESSMENT — ENCOUNTER SYMPTOMS
PARESTHESIAS: 0
HEMATURIA: 0
MYALGIAS: 0
FREQUENCY: 0
DIARRHEA: 0
AGITATION: 0
HEADACHES: 0
JOINT SWELLING: 0
FEVER: 0
SHORTNESS OF BREATH: 0
CONFUSION: 1
SLEEP DISTURBANCE: 1
HEMATOCHEZIA: 0
ABDOMINAL PAIN: 0
ARTHRALGIAS: 0
PALPITATIONS: 0
CHILLS: 0
COUGH: 0
NAUSEA: 0
SORE THROAT: 0
CONSTIPATION: 0
DYSURIA: 0
WEAKNESS: 0
EYE PAIN: 0
HEARTBURN: 0
BREAST MASS: 0
NERVOUS/ANXIOUS: 0
DIZZINESS: 0

## 2023-01-25 ASSESSMENT — ACTIVITIES OF DAILY LIVING (ADL)
CURRENT_FUNCTION: BATHING REQUIRES ASSISTANCE
CURRENT_FUNCTION: TELEPHONE REQUIRES ASSISTANCE
CURRENT_FUNCTION: SHOPPING REQUIRES ASSISTANCE
CURRENT_FUNCTION: MONEY MANAGEMENT REQUIRES ASSISTANCE
CURRENT_FUNCTION: MEDICATION ADMINISTRATION REQUIRES ASSISTANCE
CURRENT_FUNCTION: PREPARING MEALS REQUIRES ASSISTANCE
CURRENT_FUNCTION: LAUNDRY REQUIRES ASSISTANCE
CURRENT_FUNCTION: TRANSPORTATION REQUIRES ASSISTANCE
CURRENT_FUNCTION: HOUSEWORK REQUIRES ASSISTANCE

## 2023-01-25 ASSESSMENT — PAIN SCALES - GENERAL: PAINLEVEL: NO PAIN (0)

## 2023-01-25 ASSESSMENT — PATIENT HEALTH QUESTIONNAIRE - PHQ9
SUM OF ALL RESPONSES TO PHQ QUESTIONS 1-9: 13
10. IF YOU CHECKED OFF ANY PROBLEMS, HOW DIFFICULT HAVE THESE PROBLEMS MADE IT FOR YOU TO DO YOUR WORK, TAKE CARE OF THINGS AT HOME, OR GET ALONG WITH OTHER PEOPLE: VERY DIFFICULT
SUM OF ALL RESPONSES TO PHQ QUESTIONS 1-9: 13

## 2023-01-25 NOTE — NURSING NOTE
"Chief Complaint   Patient presents with     Medicare Visit       FOOD SECURITY SCREENING QUESTIONS  Hunger Vital Signs:  Within the past 12 months we worried whether our food would run out before we got money to buy more. Never  Within the past 12 months the food we bought just didn't last and we didn't have money to get more. Never  Ami Bradford LPN 1/25/2023 2:54 PM      Initial /80 (BP Location: Right arm, Patient Position: Sitting, Cuff Size: Adult Regular)   Pulse 60   Temp 97.8  F (36.6  C) (Tympanic)   Resp 16   Ht 1.33 m (4' 4.36\")   Wt 66.9 kg (147 lb 6.4 oz)   SpO2 98%   BMI 37.80 kg/m   Estimated body mass index is 37.8 kg/m  as calculated from the following:    Height as of this encounter: 1.33 m (4' 4.36\").    Weight as of this encounter: 66.9 kg (147 lb 6.4 oz).  Medication Reconciliation: complete    Ami Bradford LPN  " diffuse

## 2023-01-25 NOTE — PATIENT INSTRUCTIONS
Patient Education   Personalized Prevention Plan  You are due for the preventive services outlined below.  Your care team is available to assist you in scheduling these services.  If you have already completed any of these items, please share that information with your care team to update in your medical record.  Health Maintenance Due   Topic Date Due     Hepatitis B Vaccine (1 of 3 - 3-dose series) Never done     Colorectal Cancer Screening  Never done     HIV Screening  Never done     Hepatitis C Screening  Never done     Mammogram  12/15/2022       Exercise for a Healthier Heart  You may wonder how you can improve the health of your heart. If you re thinking about exercise, you re on the right track. You don t need to become an athlete. But you do need a certain amount of brisk exercise to help strengthen your heart. If you have been diagnosed with a heart condition, your healthcare provider may advise exercise to help stabilize your condition. To help make exercise a habit, choose safe, fun activities.      Exercise with a friend. When activity is fun, you're more likely to stick with it.   Before you start  Check with your healthcare provider before starting an exercise program. This is especially important if you have not been active for a while. It's also important if you have a long-term (chronic) health problem such as heart disease, diabetes, or obesity. Or if you are at high risk for having these problems.   Why exercise?  Exercising regularly offers many healthy rewards. It can help you do all of the following:     Improve your blood cholesterol level to help prevent further heart trouble    Lower your blood pressure to help prevent a stroke or heart attack    Control diabetes, or reduce your risk of getting this disease    Improve your heart and lung function    Reach and stay at a healthy weight    Make your muscles stronger so you can stay active    Prevent falls and fractures by slowing the loss  of bone mass (osteoporosis)    Manage stress better    Reduce your blood pressure    Improve your sense of self and your body image  Exercise tips      Ease into your routine. Set small goals. Then build on them. If you are not sure what your activity level should be, talk with your healthcare provider first before starting an exercise routine.    Exercise on most days. Aim for a total of 150 minutes (2 hours and 30 minutes) or more of moderate-intensity aerobic activity each week. Or 75 minutes (1 hour and 15 minutes) or more of vigorous-intensity aerobic activity each week. Or try for a combination of both. Moderate activity means that you breathe heavier and your heart rate increases but you can still talk. Think about doing 40 minutes of moderate exercise, 3 to 4 times a week. For best results, activity should last for about 40 minutes to lower blood pressure and cholesterol. It's OK to work up to the 40-minute period over time. Examples of moderate-intensity activity are walking 1 mile in 15 minutes. Or doing 30 to 45 minutes of yard work.    Step up your daily activity level.  Along with your exercise program, try being more active the whole day. Walk instead of drive. Or park further away so that you take more steps each day. Do more household tasks or yard work. You may not be able to meet the advised mount of physical activity. But doing some moderate- or vigorous-intensity aerobic activity can help reduce your risk for heart disease. Your healthcare provider can help you figure out what is best for you.    Choose 1 or more activities you enjoy.  Walking is one of the easiest things you can do. You can also try swimming, riding a bike, dancing, or taking an exercise class.    When to call your healthcare provider  Call your healthcare provider if you have any of these:     Chest pain or feel dizzy or lightheaded    Burning, tightness, pressure, or heaviness in your chest, neck, shoulders, back, or  arms    Abnormal shortness of breath    More joint or muscle pain    A very fast or irregular heartbeat (palpitations)  [employer] last reviewed this educational content on 7/1/2019 2000-2021 [employer]. All rights reserved. This information is not intended as a substitute for professional medical care. Always follow your healthcare professional's instructions.        Activities of Daily Living    Your Health Risk Assessment indicates you have difficulties with activities of daily living such as housework, bathing, preparing meals, taking medication, etc. Please make a follow up appointment for us to address this issue in more detail.    Signs of Hearing Loss      Hearing much better with one ear can be a sign of hearing loss.   Hearing loss is a problem shared by many people. In fact, it is one of the most common health problems, particularly as people age. Most people age 65 and older have some hearing loss. By age 80, almost everyone does. Hearing loss often occurs slowly over the years. So you may not realize your hearing has gotten worse.  Have your hearing checked  Call your healthcare provider if you:    Have to strain to hear normal conversation    Have to watch other people s faces very carefully to follow what they re saying    Need to ask people to repeat what they ve said    Often misunderstand what people are saying    Turn the volume of the television or radio up so high that others complain    Feel that people are mumbling when they re talking to you    Find that the effort to hear leaves you feeling tired and irritated    Notice, when using the phone, that you hear better with one ear than the other  [employer] last reviewed this educational content on 1/1/2020 2000-2021 [employer]. All rights reserved. This information is not intended as a substitute for professional medical care. Always follow your healthcare professional's instructions.          Depression and  "Suicide in Older Adults    Nearly 2 million older Americans have some type of depression. Some of them even take their own lives. Yet depression among older adults is often ignored. Learn the warning signs. You may help spare a loved one needless pain. You may also save a life.   What is depression?  Depression is a common and serious illness that affects the way you think and feel. It is not a normal part of aging, nor is it a sign of weakness, a character flaw, or something you can snap out of. Most people with depression need treatment to get better. The most common symptom is a feeling of deep sadness. People who are depressed also may seem tired and listless. And nothing seems to give them pleasure. It s normal to grieve or be sad sometimes. But sadness lessens or passes with time. Depression rarely goes away or improves on its own. A person with clinical depression can't \"snap out of it.\" Other symptoms of depression are:     Sleeping more or less than normal    Eating more or less than normal    Having headaches, stomachaches, or other pains that don t go away    Feeling nervous,  empty,  or worthless    Crying a great deal    Thinking or talking about suicide or death    Loss of interest in activities previously enjoyed    Social isolation    Feeling confused or forgetful  What causes it?  The causes of depression aren t fully known. But it is thought to result from a complex blend of these factors:     Biochemistry. Certain chemicals in the brain play a role.    Genes. Depression does run in families.    Life stress. Life stresses can also trigger depression in some people. Older adults often face many stressors, such as death of friends or a spouse, health problems, and financial concerns.    Chronic conditions. This includes conditions such as diabetes, heart disease, or cancer. These can cause symptoms of depression. Medicine side effects can cause changes in thoughts and behaviors.  How you can " help  Often, depressed people may not want to ask for help. When they do, they may be ignored. Or, they may receive the wrong treatment. You can help by showing parents and older friends love and support. If they seem depressed, don t lecture the person, ignore the symptoms, or discount the symptoms as a  normal  part of aging -which they are not. Get involved, listen, and show interest and support.   Help them understand that depression is a treatable illness. Tell them you can help them find the right treatment. Offer to go to their healthcare provider's appointment with them for support when the symptoms are discussed. With their approval, contact a local mental health center, social service agency, or hospital about services.   You can be an advocate for him or her at healthcare appointments. Many older adults have chronic illnesses that can cause symptoms of depression. Medicine side effects can change thoughts and behaviors. You can help make sure that the healthcare provider looks at all of these factors. He or she should refer your family member or friend to a mental healthcare provider when needed. in some cases, untreated depression can lead to a misdiagnosis. A person may be diagnosed with a brain disorder such as dementia. If the healthcare provider does not take the issue of depression seriously, help your family member or friend to find another provider.   Don't be afraid to ask  If you think an older person you care about could be suicidal, ask,  Have you thought about suicide?  Most people will tell you the truth. If they say  yes,  they may already have a plan for how and when they will attempt it. Find out as much as you can. The more detailed the plan, and the easier it is to carry out, the more danger the person is in right now. Tell the person you are there for them and do not want them to harm him or herself. Don't wait to get help for the person. Call the person's healthcare provider, local  hospital, or emergency services.   To learn more    National Suicide Prevention Lifeline (crisis hotline) 196-507-HKFQ (724-395-6077)    National [place] of Mental Jiylhu037-932-6893mnz.[facility].nih.gov    National [place] on Mental Xzaxnpo492-919-6509ddh.mario.org    Mental Health Lziuawx497-074-5652uas.[facility].org    National Suicide Jvadiyy120-HELLERA (255-973-8817)    Call 911  Never leave the person alone. A person who is actively suicidal needs psychiatric care right away. They will need constant supervision. Never leave the person out of sight. Call 911 or the national 24-hour suicide crisis hotline at 266-684-TLTU (096-011-6971). You can also take the person to the closest emergency room.   [employer] last reviewed this educational content on 5/1/2020 2000-2021 [employer]. All rights reserved. This information is not intended as a substitute for professional medical care. Always follow your healthcare professional's instructions.

## 2023-01-25 NOTE — PROGRESS NOTES
"SUBJECTIVE:   Reg is a 47 year old who presents for Preventive Visit.  Patient is here today for Medicare wellness visit and follow-up on obsessive behaviors and insomnia.  She continues to live at [facility].  Caregiver that accompanies her today reports that she still has difficulty falling asleep at night.  She does not nap during the day.  She still has obsessive behaviors and sometimes is difficult to redirect.  She will have some agitation and aggressiveness if she did not sleep well the night before.  She is tolerating medications without side effects.    Patient has been advised of split billing requirements and indicates understanding: Yes  Are you in the first 12 months of your Medicare coverage?  No    Healthy Habits:     In general, how would you rate your overall health?  Excellent    Frequency of exercise:  None    Do you usually eat at least 4 servings of fruit and vegetables a day, include whole grains    & fiber and avoid regularly eating high fat or \"junk\" foods?  Yes    Taking medications regularly:  Yes    Medication side effects:  None    Ability to successfully perform activities of daily living:  Telephone requires assistance, transportation requires assistance, shopping requires assistance, preparing meals requires assistance, housework requires assistance, bathing requires assistance, laundry requires assistance, medication administration requires assistance and money management requires assistance    Home Safety:  No safety concerns identified    Hearing Impairment:  Difficulty following a conversation in a noisy restaurant or crowded room, need to ask people to speak up or repeat themselves, difficulty understanding soft or whispered speech and difficulty understanding speech on the telephone    In the past 6 months, have you been bothered by leaking of urine?  No    In general, how would you rate your overall mental or emotional health?  Good      PHQ-2 Total Score: 2    " Additional concerns today:  No      Have you ever done Advance Care Planning? (For example, a Health Directive, POLST, or a discussion with a medical provider or your loved ones about your wishes): Yes, advance care planning is on file.       Fall risk  Fallen 2 or more times in the past year?: No  Any fall with injury in the past year?: No    Cognitive Screening Not appropriate due to mental handicap    Do you have sleep apnea, excessive snoring or daytime drowsiness?: no    Reviewed and updated as needed this visit by clinical staff   Tobacco  Allergies       Soc Hx        Reviewed and updated as needed this visit by Provider                 Social History     Tobacco Use     Smoking status: Never     Smokeless tobacco: Never   Substance Use Topics     Alcohol use: No     If you drink alcohol do you typically have >3 drinks per day or >7 drinks per week? No    Alcohol Use 1/25/2023   Prescreen: >3 drinks/day or >7 drinks/week? Not Applicable               Current providers sharing in care for this patient include:   Patient Care Team:  Mikayla Gtz NP as PCP - General (Nurse Practitioner)  Mikayla Gtz NP as Assigned PCP    The following health maintenance items are reviewed in Epic and correct as of today:  Health Maintenance   Topic Date Due     HEPATITIS B IMMUNIZATION (1 of 3 - 3-dose series) Never done     COLORECTAL CANCER SCREENING  Never done     HIV SCREENING  Never done     HEPATITIS C SCREENING  Never done     MEDICARE ANNUAL WELLNESS VISIT  11/23/2022     MAMMO SCREENING  12/15/2022     ASTHMA CONTROL TEST  03/02/2023     ASTHMA ACTION PLAN  03/25/2023     ADVANCE CARE PLANNING  09/05/2024     LIPID  07/06/2025     DTAP/TDAP/TD IMMUNIZATION (2 - Td or Tdap) 07/06/2030     PHQ-2 (once per calendar year)  Completed     INFLUENZA VACCINE  Completed     COVID-19 Vaccine  Completed     Pneumococcal Vaccine: Pediatrics (0 to 5 Years) and At-Risk Patients (6 to 64 Years)  Aged Out      "IPV IMMUNIZATION  Aged Out     MENINGITIS IMMUNIZATION  Aged Out     PAP  Discontinued     Labs reviewed in EPIC    Any new diagnosis of family breast, ovarian, or bowel cancer?     FHS-7: No flowsheet data found.    Not indicated secondary to severe dementia  Pertinent mammograms are reviewed under the imaging tab.    Review of Systems   Constitutional: Negative for chills and fever.   HENT: Negative for congestion, ear pain, hearing loss and sore throat.    Eyes: Negative for pain and visual disturbance.   Respiratory: Negative for cough and shortness of breath.    Cardiovascular: Negative for chest pain, palpitations and peripheral edema.   Gastrointestinal: Negative for abdominal pain, constipation, diarrhea, heartburn, hematochezia and nausea.   Breasts:  Negative for tenderness, breast mass and discharge.   Genitourinary: Negative for dysuria, frequency, genital sores, hematuria, pelvic pain, urgency, vaginal bleeding and vaginal discharge.   Musculoskeletal: Negative for arthralgias, joint swelling and myalgias.   Skin: Negative for rash.   Neurological: Negative for dizziness, weakness, headaches and paresthesias.   Psychiatric/Behavioral: Positive for behavioral problems, confusion and sleep disturbance. Negative for agitation and mood changes. The patient is not nervous/anxious.         Obsessive behaviors         OBJECTIVE:   There were no vitals taken for this visit. Estimated body mass index is 38 kg/m  as calculated from the following:    Height as of 9/2/22: 1.33 m (4' 4.36\").    Weight as of 10/25/22: 67.2 kg (148 lb 3.2 oz).  Physical Exam  Pleasant female accompanied by [facility] staff member.  Overweight.  Well-groomed and well dressed.  She has repetitive verbalizations about moving to [place] from [place].  She repeated this throughout the entire visit.  She forgets that she had already said the same phrase.  Alert but not oriented to person place or time.  Oriented to self.  Skin color " "pink.  Lung fields clear to auscultation.  Cardiovascular regular.  Abdomen is soft and without tenderness.  Extremities without edema.  Gait stable        ASSESSMENT / PLAN:       ICD-10-CM    1. Dementia associated with other underlying disease with behavioral disturbance  F02.818       2. Other specified hypothyroidism  E03.8       3. DOROTEO (generalized anxiety disorder)  F41.1       4. Obsessive behaviors  R46.81 QUEtiapine (SEROQUEL) 25 MG tablet      5. Down's syndrome  Q90.9       6. Early onset Alzheimer's disease with behavioral disturbance (H)  G30.0 memantine (NAMENDA) 5 MG tablet    F02.818         Plan:  Medications reviewed and updated.  Increase Seroquel to 25 mg twice daily.  Hopefully this will help with insomnia and also obsessive behaviors.  Continue Namenda as currently prescribed.  Discontinue melatonin.  Up-to-date on lab work.  Patient is due for colon cancer screening however this is not an appropriate screening test secondary to advanced dementia and Down syndrome with behavior.      Patient has been advised of split billing requirements and indicates understanding: Yes      COUNSELING:  Reviewed preventive health counseling, as reflected in patient instructions       Regular exercise       Fall risk prevention       Advanced Planning       BMI:   Estimated body mass index is 38 kg/m  as calculated from the following:    Height as of 9/2/22: 1.33 m (4' 4.36\").    Weight as of 10/25/22: 67.2 kg (148 lb 3.2 oz).   Weight management plan: Discussed healthy diet and exercise guidelines      She reports that she has never smoked. She has never used smokeless tobacco.      Appropriate preventive services were discussed with this patient, including applicable screening as appropriate for cardiovascular disease, diabetes, osteopenia/osteoporosis, and glaucoma.  As appropriate for age/gender, discussed screening for colorectal cancer, prostate cancer, breast cancer, and cervical cancer. Checklist " reviewing preventive services available has been given to the patient.    Reviewed patients plan of care and provided an AVS. The Basic Care Plan (routine screening as documented in Health Maintenance) for Reg meets the Care Plan requirement. This Care Plan has been established and reviewed with the Patient and caregiver.      Mikayla Gtz NP  [facility]    Identified Health Risks:  Answers for HPI/ROS submitted by the patient on 1/25/2023  If you checked off any problems, how difficult have these problems made it for you to do your work, take care of things at home, or get along with other people?: Very difficult  PHQ9 TOTAL SCORE: 13  DOROTEO 7 TOTAL SCORE: 14        She is at risk for lack of exercise and has been provided with information to increase physical activity for the benefit of her well-being.  The patient reports that she has difficulty with activities of daily living. I have asked that the patient make a follow up appointment in prn weeks where this issue will be further evaluated and addressed.  The patient was provided with written information regarding signs of hearing loss.  The patient s PHQ-9 score is consistent with moderate depression. She was provided with information regarding depression and was advised to schedule a follow up appointment in prn weeks to further address this issue.

## 2023-03-20 DIAGNOSIS — M17.0 PRIMARY OSTEOARTHRITIS OF BOTH KNEES: ICD-10-CM

## 2023-03-22 NOTE — TELEPHONE ENCOUNTER
[id number] sent Rx request for the following:      Requested Prescriptions   Pending Prescriptions Disp Refills     diclofenac (VOLTAREN) 50 MG EC tablet [Pharmacy Med Name: DICLOFENAC SODIUM 50MG DR TAB] 180 tablet 1     Sig: TAKE 1 TABLET BY MOUTH TWICE A DAY   Last Prescription Date:   10/19/22  Last Fill Qty/Refills:         180, R-1    Last Office Visit:              1/25/23   Future Office visit:           None    Per LOV note: Return in about 53 weeks (around 1/31/2024) for Annual Wellness Visit.    Jennifer Oh RN .............. 3/22/2023  4:03 PM

## 2023-03-23 NOTE — TELEPHONE ENCOUNTER
In clinical absence of patient's primary, Mikayla Gtz, patient is requesting that this message be sent to the covering provider for consideration please.  Génesis Wall RN on 3/23/2023 at 3:09 PM

## 2023-03-24 ENCOUNTER — HOSPITAL ENCOUNTER (OUTPATIENT)
Dept: MAMMOGRAPHY | Facility: OTHER | Age: 48
Discharge: HOME | End: 2023-03-24
Attending: NURSE PRACTITIONER | Admitting: NURSE PRACTITIONER
Payer: MEDICARE

## 2023-03-24 DIAGNOSIS — Z12.31 VISIT FOR SCREENING MAMMOGRAM: ICD-10-CM

## 2023-03-24 PROCEDURE — 77067 SCR MAMMO BI INCL CAD: CPT

## 2023-04-06 ENCOUNTER — HOSPITAL ENCOUNTER (OUTPATIENT)
Dept: ULTRASOUND IMAGING | Facility: OTHER | Age: 48
Discharge: HOME | End: 2023-04-06
Attending: NURSE PRACTITIONER
Payer: MEDICARE

## 2023-04-06 ENCOUNTER — HOSPITAL ENCOUNTER (OUTPATIENT)
Dept: MAMMOGRAPHY | Facility: OTHER | Age: 48
Discharge: HOME | End: 2023-04-06
Attending: NURSE PRACTITIONER
Payer: MEDICARE

## 2023-04-06 DIAGNOSIS — R92.8 ABNORMAL FINDING ON BREAST IMAGING: ICD-10-CM

## 2023-04-06 PROCEDURE — 77061 BREAST TOMOSYNTHESIS UNI: CPT | Mod: LT

## 2023-04-06 PROCEDURE — 76642 ULTRASOUND BREAST LIMITED: CPT | Mod: LT

## 2023-05-10 ENCOUNTER — OFFICE VISIT (OUTPATIENT)
Dept: FAMILY MEDICINE | Facility: OTHER | Age: 48
End: 2023-05-10
Payer: MEDICARE

## 2023-05-10 VITALS
OXYGEN SATURATION: 93 % | DIASTOLIC BLOOD PRESSURE: 80 MMHG | TEMPERATURE: 98.9 F | RESPIRATION RATE: 18 BRPM | WEIGHT: 146.9 LBS | SYSTOLIC BLOOD PRESSURE: 138 MMHG | HEIGHT: 55 IN | BODY MASS INDEX: 33.99 KG/M2 | HEART RATE: 75 BPM

## 2023-05-10 DIAGNOSIS — L08.9 LOCAL INFECTION OF SKIN AND SUBCUTANEOUS TISSUE: ICD-10-CM

## 2023-05-10 DIAGNOSIS — R21 RASH AND NONSPECIFIC SKIN ERUPTION: Primary | ICD-10-CM

## 2023-05-10 PROCEDURE — 99213 OFFICE O/P EST LOW 20 MIN: CPT

## 2023-05-10 PROCEDURE — G0463 HOSPITAL OUTPT CLINIC VISIT: HCPCS

## 2023-05-10 RX ORDER — NYSTATIN 100000 U/G
CREAM TOPICAL 2 TIMES DAILY
Qty: 30 G | Refills: 3 | Status: SHIPPED | OUTPATIENT
Start: 2023-05-10 | End: 2023-10-25

## 2023-05-10 RX ORDER — DIAPER,BRIEF,INFANT-TODD,DISP
EACH MISCELLANEOUS 2 TIMES DAILY
Qty: 56 G | Refills: 1 | Status: SHIPPED | OUTPATIENT
Start: 2023-05-10 | End: 2024-07-16

## 2023-05-10 RX ORDER — BACITRACIN ZINC 500 [USP'U]/G
OINTMENT TOPICAL 2 TIMES DAILY
Qty: 113 G | Refills: 1 | Status: SHIPPED | OUTPATIENT
Start: 2023-05-10 | End: 2024-07-16

## 2023-05-10 RX ORDER — ZINC OXIDE
OINTMENT (GRAM) TOPICAL PRN
Qty: 113 G | Refills: 1 | Status: SHIPPED | OUTPATIENT
Start: 2023-05-10 | End: 2024-07-16

## 2023-05-10 RX ORDER — CEPHALEXIN 500 MG/1
500 CAPSULE ORAL 2 TIMES DAILY
Qty: 20 CAPSULE | Refills: 0 | Status: SHIPPED | OUTPATIENT
Start: 2023-05-10 | End: 2023-05-20

## 2023-05-10 ASSESSMENT — PAIN SCALES - GENERAL: PAINLEVEL: NO PAIN (0)

## 2023-05-10 NOTE — PATIENT INSTRUCTIONS
Mix hydrocortisone, Nystatin, Zinc oxide, and bacitracin to form a compound and apply 2-3 times per day until rash resolves    Please refer to your AVS for follow up and pain/symptoms management recommendations (I.e.: medications, helpful conservative treatment modalities, appropriate follow up if need to a specialist or family practice, etc.). Please return to urgent care if your symptoms change or worsen.     Discharge instructions:  -If you were prescribed a medication(s), please take this as prescribed/directed  -Monitor your symptoms, if changing/worsening, return to UC/ER or PCP for follow up    Cellulitis/Skin Infection   -If you received a prescription for a topical or oral antibiotic, please take/use as directed.   -Keep the area clean and dry.   -If needed - for pain control - we recommend alternating Tylenol and Ibuprofen if you are able to take these medications. Alternate every 4 hours as needed. I.e.: Ibuprofen at 8am, Tylenol 12pm, Ibuprofen 4pm    -Daily maximum of Tylenol is 4000mg (recommend staying under 3000mg)   -Daily maximum of Ibuprofen is 3200 mg    Watch for worsening symptoms such as fevers, chills, worsening redness, abnormal drainage from site of cellulitis, etc.

## 2023-05-10 NOTE — PROGRESS NOTES
ASSESSMENT/PLAN:    I have reviewed the nursing notes.  I have reviewed the findings, diagnosis, plan and need for follow up with the patient.    1. Rash and nonspecific skin eruption  2. Local infection of skin and subcutaneous tissue  - cephALEXin (KEFLEX) 500 MG capsule; Take 1 capsule (500 mg) by mouth 2 times daily for 10 days  Dispense: 20 capsule; Refill: 0  - nystatin (MYCOSTATIN) 363601 UNIT/GM external cream; Apply topically 2 times daily  Dispense: 30 g; Refill: 3  - hydrocortisone (CORTAID) 0.5 % external cream; Apply topically 2 times daily  Dispense: 56 g; Refill: 1  - bacitracin 500 UNIT/GM external ointment; Apply topically 2 times daily  Dispense: 113 g; Refill: 1  - zinc oxide (DESITIN) 40 % external ointment; Apply topically as needed for dry skin, irritation or skin protection  Dispense: 113 g; Refill: 1    Patient presents with a rash between her buttocks and concerns for possible UTI.  The rash between patient's buttocks is blistering, very tender to the touch, and show signs of possible infection.  Will treat with Keflex twice a day for 10 days.  Discussed with the staff member that is accompanying patient that there is no need to do a urinalysis at this time as the Keflex will provide coverage if patient does have a UTI.  Also prescribed nystatin, hydrocortisone cream, bacitracin ointment, and zinc oxide ointment to be mixed together to form a compound to be applied to the rash 2-3 times a day as needed until the rash resolves.  Advised to keep the area clean and dry and to change any soiled undergarments as soon as possible.    Discussed warning signs/symptoms indicative of need to f/u    Follow up if symptoms persist or worsen or concerns    I explained my diagnostic considerations and recommendations to the patient and her accompanying staff, who voiced understanding and agreement with the treatment plan. All questions were answered. We discussed potential side effects of any prescribed or  recommended therapies, as well as expectations for response to treatments.    Liborio Kendall, EB CNP  5/10/2023  4:29 PM    HPI:    Reg Valiente is a 48 year old female accompanied by assisted living staff who presents to [facility] today for concerns of redness on her buttocks    Patient has red rash between her buttocks that started about 6 days ago. They were using nystatin cream which was helping and then it got worse. Her staff that is with her today also states that she is having some confusion as well so she is concerned about a UTI. She has had some dysuria. Denies fever or chills. She has had some nausea. Patient denies diarrhea.    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     Mom reports negative AAS evaluation in the 1990's     Corneal ulcer 2004    caused by leaking of CPAP     Down's syndrome      Gastroesophageal reflux disease      JENNIFER (obstructive sleep apnea)     patient refuses CPAP     Past Surgical History:   Procedure Laterality Date     ENDOSCOPIC RETROGRADE CHOLANGIOPANCREATOGRAM N/A 7/1/2019    Procedure: Endoscopic Retrograde Cholangiopancreatogram, dilation, stent , sludge and stone removal, sphincterotomy;  Surgeon: Guru Harper Alvarado MD;  Location: [facility] OR     ENDOSCOPIC RETROGRADE CHOLANGIOPANCREATOGRAM N/A 8/26/2019    Procedure: Endoscopic Retrograde Cholangiopancreatogram with biliary stent removal and stone removal;  Surgeon: Guru Harper Alvarado MD;  Location: [facility] OR     ENDOSCOPIC RETROGRADE CHOLANGIOPANCREATOGRAPHY, EXCHANGE TUBE/STENT N/A 7/24/2019    Procedure: Endoscopic Retrograde Cholangiopancreatography with Billary Stone Extraction,Dilation             and stent exchange;  Surgeon: Guru Harper Alvarado MD;  Location: [facility] OR     LAPAROSCOPIC CHOLECYSTECTOMY WITH CHOLANGIOGRAMS N/A 9/9/2019    Procedure:  CHOLECYSTECTOMY, LAPAROSCOPIC, WITH CHOLANGIOGRAM;  Surgeon: Arvind Bailey MD;  Location: [facility] OR     NO HISTORY OF SURGERY      as reported by parents     Social History     Tobacco Use     Smoking status: Never     Smokeless tobacco: Never   Vaping Use     Vaping status: Never Used   Substance Use Topics     Alcohol use: No     Current Outpatient Medications   Medication Sig Dispense Refill     Citalopram Hydrobromide 30 MG CAPS Take 30 mg by mouth daily 90 capsule 3     cyanocobalamin (VITAMIN B-12) 1000 MCG tablet TAKE 1 TABLET (1,000 MCG) BY MOUTH DAILY 90 tablet 1     diclofenac (VOLTAREN) 50 MG EC tablet TAKE 1 TABLET BY MOUTH TWICE A  tablet 2     famotidine (PEPCID) 40 MG tablet TAKE 1/2 TAB (20MG) BY MOUTH TWICE A DAY 90 tablet 3     levothyroxine (SYNTHROID/LEVOTHROID) 137 MCG tablet TAKE 1 TABLET (137 MCG) BY MOUTH DAILY 90 tablet 2     memantine (NAMENDA) 5 MG tablet Take 1 tablet (5 mg) by mouth daily 1 tablet in am for 1 week then twice daily 60 tablet 11     Multiple Vitamin (TAB-A-MATTHIAS) TABS TAKE 1 TAB BY MOUTH ONCE DAILY 90 tablet 2     mupirocin (BACTROBAN) 2 % external ointment Apply topically 3 times daily Apply to open areas of rash 30 g 2     norgestim-eth estrad triphasic (TRI-LINYAH) 0.18/0.215/0.25 MG-35 MCG tablet Take 1 tablet by mouth daily 84 tablet 3     nystatin (MYCOSTATIN) 355335 UNIT/GM external cream APPLY TOPICALLY 2 TIMES DAILY COMBINE SMALL AMOUNT WITH TRIAMCINOLONE CREAM INPALM OF HAND AND APPLY AS DIRECTED 30 g 2     QUEtiapine (SEROQUEL) 25 MG tablet Take 1 tablet (25 mg) by mouth 2 times daily TAKE 1 TABLET (25 MG) BY MOUTH DAILY (WITH DINNER) 180 tablet 3     Sennosides-Docusate Sodium (SB DOCUSATE SODIUM/SENNA PO)        triamcinolone (KENALOG) 0.1 % external cream Apply topically 3 times daily Combine small amount with nystatin cream in palm of hand and apply as directed.  Use for 2 weeks on, one week off. 80 g 2     VITAMIN D3 25 MCG (1000 UT) tablet     "    Allergies   Allergen Reactions     Sulfa Antibiotics      Past medical history, past surgical history, current medications and allergies reviewed and accurate to the best of my knowledge.      ROS:  Refer to HPI    /80 (BP Location: Left arm, Patient Position: Sitting, Cuff Size: Adult Regular)   Pulse 75   Temp 98.9  F (37.2  C) (Temporal)   Resp 18   Ht 1.346 m (4' 5\")   Wt 66.6 kg (146 lb 14.4 oz)   SpO2 93%   BMI 36.77 kg/m      EXAM:  General Appearance: Well appearing 48 year old female, appropriate appearance for age. No acute distress   Respiratory: normal chest wall and respirations.  Normal effort.  Clear to auscultation bilaterally, no wheezing, crackles or rhonchi.  No increased work of breathing.  No cough appreciated.  Cardiac: RRR with no murmurs  Abdomen: soft, nontender, no rigidity, no rebound tenderness or guarding, normal bowel sounds present  :  No suprapubic tenderness to palpation.  Absent CVA tenderness to palpation.    Musculoskeletal:  Equal movement of bilateral upper extremities.  Equal movement of bilateral lower extremities.  Normal gait.    Dermatological: Diffuse erythematous papular rash on both sides of inner buttocks, the rash has some blistering and purulence, tenderness with palpation  Neuro: Alert and oriented to person, place, and time.   Psychological: normal affect, alert, oriented, and pleasant.     "

## 2023-05-10 NOTE — NURSING NOTE
"Chief Complaint   Patient presents with     possible yeast infection     The patient has had redness on the buttocks, possibly yeast infection. Also has been confused. No fevers       FOOD SECURITY SCREENING QUESTIONS  Hunger Vital Signs:  Within the past 12 months we worried whether our food would run out before we got money to buy more. Never  Within the past 12 months the food we bought just didn't last and we didn't have money to get more. Never  Ami Bradford LPN 5/10/2023 4:27 PM      Initial /80 (BP Location: Left arm, Patient Position: Sitting, Cuff Size: Adult Regular)   Pulse 75   Temp 98.9  F (37.2  C) (Temporal)   Resp 18   Ht 1.346 m (4' 5\")   Wt 66.6 kg (146 lb 14.4 oz)   SpO2 93%   BMI 36.77 kg/m   Estimated body mass index is 36.77 kg/m  as calculated from the following:    Height as of this encounter: 1.346 m (4' 5\").    Weight as of this encounter: 66.6 kg (146 lb 14.4 oz).  Medication Reconciliation: complete    Ami Bradford LPN  "

## 2023-05-12 ENCOUNTER — TELEPHONE (OUTPATIENT)
Dept: INTERNAL MEDICINE | Facility: OTHER | Age: 48
End: 2023-05-12
Payer: MEDICARE

## 2023-05-12 NOTE — TELEPHONE ENCOUNTER
"Fax received from [id number] of [place], with message regardin/10/23 3542 Sign Liborio Kendall APRN CNP      hydrocortisone (CORTAID) 0.5 % external cream 56 g 1 5/10/2023  --   Sig - Route: Apply topically 2 times daily - Topical     \"NDC is not covered. NDC is not eligible for PA.\"    Jennifer Oh RN .............. 2023  11:39 AM    "

## 2023-05-15 NOTE — TELEPHONE ENCOUNTER
I did not prescribe this medication for patient.  Her guardian can determine if they want to purchase OTC

## 2023-05-16 NOTE — TELEPHONE ENCOUNTER
Called [facility] and spoke with staff. They said it is ok to disregard. Pt can afford this and they will have pharmacy bill the facility. Jennifer Oh RN .............. 5/16/2023  8:36 AM

## 2023-05-20 DIAGNOSIS — K21.9 GASTROESOPHAGEAL REFLUX DISEASE WITHOUT ESOPHAGITIS: ICD-10-CM

## 2023-05-20 DIAGNOSIS — R79.89 LOW VITAMIN B12 LEVEL: ICD-10-CM

## 2023-05-22 RX ORDER — LANOLIN ALCOHOL/MO/W.PET/CERES
1000 CREAM (GRAM) TOPICAL DAILY
Qty: 90 TABLET | Refills: 1 | Status: SHIPPED | OUTPATIENT
Start: 2023-05-22 | End: 2023-09-30

## 2023-05-23 RX ORDER — FAMOTIDINE 40 MG/1
TABLET, FILM COATED ORAL
Qty: 90 TABLET | Refills: 2 | Status: SHIPPED | OUTPATIENT
Start: 2023-05-23 | End: 2023-12-26

## 2023-05-23 NOTE — TELEPHONE ENCOUNTER
Routing refill request to provider for review/approval because:    LOV: 1/25/23    Li Malik RN on 5/23/2023 at 3:08 PM

## 2023-08-21 DIAGNOSIS — G30.0 EARLY ONSET ALZHEIMER'S DISEASE WITH BEHAVIORAL DISTURBANCE (H): ICD-10-CM

## 2023-08-21 DIAGNOSIS — Z00.00 HEALTH CARE MAINTENANCE: ICD-10-CM

## 2023-08-21 DIAGNOSIS — F02.818 EARLY ONSET ALZHEIMER'S DISEASE WITH BEHAVIORAL DISTURBANCE (H): ICD-10-CM

## 2023-08-22 DIAGNOSIS — B37.2 CANDIDIASIS OF SKIN: ICD-10-CM

## 2023-08-23 RX ORDER — TRIAMCINOLONE ACETONIDE 1 MG/G
CREAM TOPICAL
Qty: 80 G | Refills: 0 | Status: SHIPPED | OUTPATIENT
Start: 2023-08-23 | End: 2023-09-28

## 2023-08-23 RX ORDER — MULTIVITAMIN WITH FOLIC ACID 400 MCG
TABLET ORAL
Qty: 90 TABLET | Refills: 2 | Status: SHIPPED | OUTPATIENT
Start: 2023-08-23 | End: 2024-04-25

## 2023-08-23 RX ORDER — MEMANTINE HYDROCHLORIDE 5 MG/1
5 TABLET ORAL 2 TIMES DAILY
Qty: 60 TABLET | Refills: 4 | Status: SHIPPED | OUTPATIENT
Start: 2023-08-23 | End: 2023-12-26

## 2023-08-23 NOTE — TELEPHONE ENCOUNTER
[id number] sent Rx request for the following:      Requested Prescriptions   Pending Prescriptions Disp Refills    memantine (NAMENDA) 5 MG tablet [Pharmacy Med Name: MEMANTINE HCL 5MG TABLET] 60 tablet 11     Sig: TAKE 1 TAB BY MOUTH TWICE ADAY       Miscellaneous Dementia Agents Passed - 8/23/2023 10:48 AM   Last Prescription Date:   1/25/23  Last Fill Qty/Refills:         60, R-11         Multiple Vitamin (TAB-A-MATTHIAS) TABS [Pharmacy Med Name: TAB-A-MATTHIAS TABLET] 90 tablet 2     Sig: TAKE 1 TAB BY MOUTH ONCE DAILY       Vitamin Supplements (Adult) Protocol Passed - 8/21/2023  9:34 AM       Last Prescription Date:   12/1/22  Last Fill Qty/Refills:         90, R-2   Last Office Visit:              1/25/23   Future Office visit:           none    Will complete refill request for Namenda as it was listed as No print out and was not sent to the pharmacy, has 5 refills remaining on current order. Will route Multivitamin to provider. Zulema Hartley RN on 8/23/2023 at 11:24 AM

## 2023-08-23 NOTE — TELEPHONE ENCOUNTER
[id number] sent Rx request for the following:      Requested Prescriptions   Pending Prescriptions Disp Refills    triamcinolone (KENALOG) 0.1 % external cream [Pharmacy Med Name: TRIAMCINOLONE 0.1% CREAM] 80 g 2     Sig: APPLY TOPICALLY 3 TIMES DAILY COMBINE SMALL AMOUNT WITH NYSTATIN CREAM IN PALMOF HAND AND APPLY AS DIRECTED. USE FOR 2 WEEKS ON, ONE WEEK     Last Prescription Date:   4/11/22  Last Fill Qty/Refills:         80 g, R-2    Last Office Visit:              1/25/23   Future Office visit:           None    Per LOV note:  Return in about 53 weeks (around 1/31/2024) for Annual Wellness Visit.     Jennifer Oh RN .............. 8/23/2023  10:57 AM

## 2023-09-13 ENCOUNTER — OFFICE VISIT (OUTPATIENT)
Dept: FAMILY MEDICINE | Facility: OTHER | Age: 48
End: 2023-09-13
Attending: NURSE PRACTITIONER
Payer: MEDICARE

## 2023-09-13 VITALS
BODY MASS INDEX: 34.99 KG/M2 | WEIGHT: 151.2 LBS | HEART RATE: 49 BPM | SYSTOLIC BLOOD PRESSURE: 97 MMHG | OXYGEN SATURATION: 91 % | TEMPERATURE: 98.7 F | HEIGHT: 55 IN | RESPIRATION RATE: 14 BRPM | DIASTOLIC BLOOD PRESSURE: 64 MMHG

## 2023-09-13 DIAGNOSIS — H10.9 BACTERIAL CONJUNCTIVITIS OF LEFT EYE: Primary | ICD-10-CM

## 2023-09-13 PROCEDURE — 99213 OFFICE O/P EST LOW 20 MIN: CPT

## 2023-09-13 PROCEDURE — G0463 HOSPITAL OUTPT CLINIC VISIT: HCPCS

## 2023-09-13 RX ORDER — ERYTHROMYCIN 5 MG/G
0.5 OINTMENT OPHTHALMIC 4 TIMES DAILY
Qty: 10 G | Refills: 0 | Status: SHIPPED | OUTPATIENT
Start: 2023-09-13 | End: 2023-09-18

## 2023-09-13 ASSESSMENT — ASTHMA QUESTIONNAIRES: ACT_TOTALSCORE: 16

## 2023-09-13 NOTE — PATIENT INSTRUCTIONS
Bacterial Conjunctivitis  -Eye drops/ointment as prescribed  -In regards to antibiotic drops/ointment, please use as directed (wash hands before putting in eye).   -Avoid touching the eye, as conjunctivitis/pink eye, is very contagious.   -Wash your hands regularly before touching your eye, if you must touch it. Wash your pillow case daily or every other day until symptoms clear up.   -Do not share eye drops (or ointment) with other individuals.   -Warm compresses are recommended as needed.   -Do not share contact or cosmetics with others

## 2023-09-13 NOTE — PROGRESS NOTES
ASSESSMENT/PLAN:    (H10.9) Bacterial conjunctivitis of left eye  (primary encounter diagnosis)  Comment: Patient with 1 day history of eye irritation, it has been crusting shut with purulent discharge. On exam left conjunctiva with erythema and irritation, purulent drainage and crusting, no eyelid swelling, pupils equal.  Exam and history consistent with a bacterial conjunctivitis.  Patient and her caregiver opted for ointment.  Plan: erythromycin (ROMYCIN) 5 MG/GM ophthalmic         ointment  Bacterial Conjunctivitis  -Eye drops/ointment as prescribed  -In regards to antibiotic drops/ointment, please use as directed (wash hands before putting in eye).   -Avoid touching the eye, as conjunctivitis/pink eye, is very contagious.   -Wash your hands regularly before touching your eye, if you must touch it. Wash your pillow case daily or every other day until symptoms clear up.   -Do not share eye drops (or ointment) with other individuals.   -Warm compresses are recommended as needed.   -Do not share contact or cosmetics with others    Discussed warning signs/symptoms indicative of need to f/u    Follow up if symptoms persist or worsen or concerns    I have reviewed the nursing notes.  I have reviewed the findings, diagnosis, plan and need for follow up with the patient.    I explained my diagnostic considerations and recommendations to the patient, who voiced understanding and agreement with the treatment plan. All questions were answered. We discussed potential side effects of any prescribed or recommended therapies, as well as expectations for response to treatments.    EB BOB CNP  9/13/2023  10:44 AM    HPI:    Reg Valiente is a 48 year old female  who presents to [facility] today for concerns of eye irritation.    She has had one day of symptoms. Eye has been crusting shut, purulent discharge.  They note there has been purulence on her glasses lenses well.    Allergy to Sulfa    PCP: HERMILO  Tresa.     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     Mom reports negative AAS evaluation in the 1990's    Corneal ulcer 2004    caused by leaking of CPAP    Down's syndrome     Gastroesophageal reflux disease     JENNIFER (obstructive sleep apnea)     patient refuses CPAP     Past Surgical History:   Procedure Laterality Date    ENDOSCOPIC RETROGRADE CHOLANGIOPANCREATOGRAM N/A 7/1/2019    Procedure: Endoscopic Retrograde Cholangiopancreatogram, dilation, stent , sludge and stone removal, sphincterotomy;  Surgeon: Guru Harper Alvarado MD;  Location: [facility] OR    ENDOSCOPIC RETROGRADE CHOLANGIOPANCREATOGRAM N/A 8/26/2019    Procedure: Endoscopic Retrograde Cholangiopancreatogram with biliary stent removal and stone removal;  Surgeon: Guru Harper Alvarado MD;  Location: [facility] OR    ENDOSCOPIC RETROGRADE CHOLANGIOPANCREATOGRAPHY, EXCHANGE TUBE/STENT N/A 7/24/2019    Procedure: Endoscopic Retrograde Cholangiopancreatography with Billary Stone Extraction,Dilation             and stent exchange;  Surgeon: Guru Harper Alvarado MD;  Location: [facility] OR    LAPAROSCOPIC CHOLECYSTECTOMY WITH CHOLANGIOGRAMS N/A 9/9/2019    Procedure: CHOLECYSTECTOMY, LAPAROSCOPIC, WITH CHOLANGIOGRAM;  Surgeon: Arvind Bailey MD;  Location: [facility] OR    NO HISTORY OF SURGERY      as reported by parents     Social History     Tobacco Use    Smoking status: Never    Smokeless tobacco: Never   Substance Use Topics    Alcohol use: No     Current Outpatient Medications   Medication Sig Dispense Refill    bacitracin 500 UNIT/GM external ointment Apply topically 2 times daily 113 g 1    Citalopram Hydrobromide 30 MG CAPS Take 30 mg by mouth daily 90 capsule 3    cyanocobalamin (VITAMIN B-12) 1000 MCG tablet TAKE 1 TABLET (1,000 MCG) BY MOUTH DAILY 90 tablet 1    diclofenac (VOLTAREN) 50 MG EC tablet TAKE 1  "TABLET BY MOUTH TWICE A  tablet 2    famotidine (PEPCID) 40 MG tablet TAKE 1/2 TAB (20MG) BY MOUTH TWICE A DAY 90 tablet 2    hydrocortisone (CORTAID) 0.5 % external cream Apply topically 2 times daily 56 g 1    levothyroxine (SYNTHROID/LEVOTHROID) 137 MCG tablet TAKE 1 TABLET (137 MCG) BY MOUTH DAILY 90 tablet 2    memantine (NAMENDA) 5 MG tablet TAKE 1 TAB BY MOUTH TWICE ADAY 60 tablet 4    Multiple Vitamin (TAB-A-MATTHIAS) TABS TAKE 1 TAB BY MOUTH ONCE DAILY 90 tablet 2    mupirocin (BACTROBAN) 2 % external ointment Apply topically 3 times daily Apply to open areas of rash 30 g 2    norgestim-eth estrad triphasic (TRI-LINYAH) 0.18/0.215/0.25 MG-35 MCG tablet Take 1 tablet by mouth daily 84 tablet 3    nystatin (MYCOSTATIN) 422070 UNIT/GM external cream Apply topically 2 times daily 30 g 3    nystatin (MYCOSTATIN) 695477 UNIT/GM external cream APPLY TOPICALLY 2 TIMES DAILY COMBINE SMALL AMOUNT WITH TRIAMCINOLONE CREAM INPALM OF HAND AND APPLY AS DIRECTED 30 g 2    QUEtiapine (SEROQUEL) 25 MG tablet Take 1 tablet (25 mg) by mouth 2 times daily TAKE 1 TABLET (25 MG) BY MOUTH DAILY (WITH DINNER) 180 tablet 3    Sennosides-Docusate Sodium (SB DOCUSATE SODIUM/SENNA PO)       triamcinolone (KENALOG) 0.1 % external cream APPLY TOPICALLY 3 TIMES DAILY COMBINE SMALL AMOUNT WITH NYSTATIN CREAM IN PALMOF HAND AND APPLY AS DIRECTED. USE FOR 2 WEEKS ON, ONE WEEK 80 g 0    VITAMIN D3 25 MCG (1000 UT) tablet       zinc oxide (DESITIN) 40 % external ointment Apply topically as needed for dry skin, irritation or skin protection 113 g 1     Allergies   Allergen Reactions    Sulfa Antibiotics      Past medical history, past surgical history, current medications and allergies reviewed and accurate to the best of my knowledge.      ROS:  Refer to HPI    BP 97/64 (BP Location: Right arm, Patient Position: Sitting, Cuff Size: Adult Regular)   Pulse (!) 49   Temp 98.7  F (37.1  C) (Tympanic)   Resp 14   Ht 1.346 m (4' 5\")   Wt " 68.6 kg (151 lb 3.2 oz)   LMP 08/23/2023 (Exact Date)   SpO2 91%   BMI 37.84 kg/m      EXAM:  General Appearance: Well appearing 48 year old female, appropriate appearance for age. No acute distress   Ears: Left TM intact, translucent with bony landmarks appreciated, no erythema, no effusion, no bulging, no purulence.  Right TM intact, translucent with bony landmarks appreciated, no erythema, no effusion, no bulging, no purulence.  Left auditory canal clear.  Right auditory canal clear.  Normal external ears, non tender.  Eyes: Left conjunctiva with erythema and irritation, purulent drainage and crusting, no eyelid swelling, pupils equal   Oropharynx: moist mucous membranes, posterior pharynx without erythema, no exudates or petechiae, no post nasal drip seen, no trismus, voice clear.    Sinuses:  No sinus tenderness upon palpation of the frontal or maxillary sinuses  Nose:  Bilateral nares: no erythema, no edema, no drainage or congestion   Neck: supple without adenopathy  Respiratory: normal chest wall and respirations.  Normal effort.  Clear to auscultation bilaterally, no wheezing, crackles or rhonchi.  No increased work of breathing.  No cough appreciated.  Cardiac: RRR with no murmurs    Musculoskeletal:  Equal movement of bilateral upper extremities.  Equal movement of bilateral lower extremities.  Normal gait.    Dermatological: no rashes noted of exposed skin  Neuro: Alert and oriented to person, place, and time.  Cranial nerves II-XII grossly intact with no focal or lateralizing deficits.  Muscle tone normal.  Gait normal. No tremor.   Psychological: normal affect, alert, oriented, and pleasant.

## 2023-09-13 NOTE — NURSING NOTE
"Chief Complaint   Patient presents with    Eye Problem         Initial BP 97/64 (BP Location: Right arm, Patient Position: Sitting, Cuff Size: Adult Regular)   Pulse (!) 49   Temp 98.7  F (37.1  C) (Tympanic)   Resp 14   Ht 1.346 m (4' 5\")   Wt 68.6 kg (151 lb 3.2 oz)   LMP 08/23/2023 (Exact Date)   SpO2 91%   BMI 37.84 kg/m   Estimated body mass index is 37.84 kg/m  as calculated from the following:    Height as of this encounter: 1.346 m (4' 5\").    Weight as of this encounter: 68.6 kg (151 lb 3.2 oz).     Advance Care Directive on file? unknown  Advance Care Directive provided to patient? no    FOOD SECURITY SCREENING QUESTIONS:    The next two questions are to help us understand your food security.  If you are feeling you need any assistance in this area, we have resources available to support you today.    Hunger Vital Signs:  Within the past 12 months we worried whether our food would run out before we got money to buy more. Never  Within the past 12 months the food we bought just didn't last and we didn't have money to get more. Never  Sofia Sanchez LPN on 9/13/2023 at 10:42 AM      Sofia Sanchez     "

## 2023-09-21 DIAGNOSIS — B37.2 CANDIDIASIS OF SKIN: ICD-10-CM

## 2023-09-21 DIAGNOSIS — E03.8 OTHER SPECIFIED HYPOTHYROIDISM: ICD-10-CM

## 2023-09-21 NOTE — LETTER
[facility]  [address]  [place] [id number] [phone]      September 28, 2023    Reg Valiente  [address] [place] [id number]    Dear Reg Valiente,     We recently received a refill request(s) prescribed by Mikayla Gtz.      We have refilled your medication for one time only.    In order to get any additional refills, call our scheduling center at [phone] to request a visit with your primary care provider for an annual physical.    Thank you,         Grand Alphonso Stewart RN

## 2023-09-28 RX ORDER — TRIAMCINOLONE ACETONIDE 1 MG/G
CREAM TOPICAL
Qty: 80 G | Refills: 0 | Status: SHIPPED | OUTPATIENT
Start: 2023-09-28 | End: 2023-10-30

## 2023-09-28 RX ORDER — LEVOTHYROXINE SODIUM 137 UG/1
137 TABLET ORAL DAILY
Qty: 90 TABLET | Refills: 0 | Status: SHIPPED | OUTPATIENT
Start: 2023-09-28 | End: 2023-12-26

## 2023-09-28 NOTE — TELEPHONE ENCOUNTER
"Requested Prescriptions   Pending Prescriptions Disp Refills    triamcinolone (KENALOG) 0.1 % external cream [Pharmacy Med Name: TRIAMCINOLONE 0.1% CREAM] 80 g 0     Sig: APPLY TOPICALLY 3 TIMES DAILY COMBINE SMALL AMOUNT WITH NYSTATIN CREAM IN PALMOF HAND AND APPLY AS DIRECTED. USE FOR 2 WEEKS ON, ONE WEEK       Topical Steroids and Nonsteroidals Protocol Passed - 9/21/2023 11:43 AM        Passed - Patient is age 6 or older        Passed - Authorizing prescriber's most recent note related to this medication read.     If refill request is for ophthalmic use, please forward request to provider for approval.          Passed - High potency steroid not ordered        Passed - Recent (12 mo) or future (30 days) visit within the authorizing provider's specialty     Patient has had an office visit with the authorizing provider or a provider within the authorizing providers department within the previous 12 mos or has a future within next 30 days. See \"Patient Info\" tab in inbasket, or \"Choose Columns\" in Meds & Orders section of the refill encounter.            Passed - Medication is active on med list          levothyroxine (SYNTHROID/LEVOTHROID) 137 MCG tablet 90 tablet 2     Sig: Take 1 tablet (137 mcg) by mouth daily       Thyroid Protocol Failed - 9/21/2023 11:43 AM        Failed - Normal TSH on file in past 12 months     Recent Labs   Lab Test 09/02/22  1034   TSH 1.30            Passed - Patient is 12 years or older        Passed - Recent (12 mo) or future (30 days) visit within the authorizing provider's specialty     Patient has had an office visit with the authorizing provider or a provider within the authorizing providers department within the previous 12 mos or has a future within next 30 days. See \"Patient Info\" tab in inbasket, or \"Choose Columns\" in Meds & Orders section of the refill encounter.            Passed - Medication is active on med list        Passed - No active pregnancy on record     If patient " is pregnant or has had a positive pregnancy test, please check TSH.          Passed - No positive pregnancy test in past 12 months     If patient is pregnant or has had a positive pregnancy test, please check TSH.            LOV-1/25/23  Patient is past due for lab work by less than a month and results were WNL.  Reminder letter sent to patient and request sent for MD to order lab work.  Will refill for 90 days per protocol. Prescription refilled per RN Medication RefillPolicy.................... Madelyn Sosa RN ....................  9/28/2023   5:04 PM

## 2023-09-29 DIAGNOSIS — R79.89 LOW VITAMIN B12 LEVEL: ICD-10-CM

## 2023-09-30 RX ORDER — LANOLIN ALCOHOL/MO/W.PET/CERES
1000 CREAM (GRAM) TOPICAL DAILY
Qty: 90 TABLET | Refills: 1 | Status: SHIPPED | OUTPATIENT
Start: 2023-09-30 | End: 2024-03-25

## 2023-10-21 DIAGNOSIS — F41.1 GAD (GENERALIZED ANXIETY DISORDER): Primary | ICD-10-CM

## 2023-10-24 DIAGNOSIS — B37.2 CANDIDIASIS OF SKIN: ICD-10-CM

## 2023-10-24 DIAGNOSIS — R21 RASH AND NONSPECIFIC SKIN ERUPTION: ICD-10-CM

## 2023-10-24 DIAGNOSIS — L08.9 LOCAL INFECTION OF SKIN AND SUBCUTANEOUS TISSUE: ICD-10-CM

## 2023-10-25 RX ORDER — NYSTATIN 100000 U/G
CREAM TOPICAL
Qty: 30 G | Refills: 3 | Status: SHIPPED | OUTPATIENT
Start: 2023-10-25 | End: 2024-07-16

## 2023-10-26 NOTE — TELEPHONE ENCOUNTER
"[id number] - [place], [place] - [address]     Last Prescription Date: N/A  Last Qty/Refills: N/A  Last Office Visit: 1-25-23  Future Office Visit: none scheduled      Requested Prescriptions   Pending Prescriptions Disp Refills    citalopram (CELEXA) 10 MG tablet [Pharmacy Med Name: CITALOPRAM 10MG TABLET] 90 tablet 3     Sig: TAKE 1 TAB BY MOUTH ONCE DAILY ALONG WITH 20MG TAB TO EQUAL 30MG DAILY       SSRIs Protocol Passed - 10/21/2023 11:43 AM        Passed - Recent (12 mo) or future (30 days) visit within the authorizing provider's specialty     Patient has had an office visit with the authorizing provider or a provider within the authorizing providers department within the previous 12 mos or has a future within next 30 days. See \"Patient Info\" tab in inbasket, or \"Choose Columns\" in Meds & Orders section of the refill encounter.              Passed - Medication is active on med list        Passed - Patient is age 18 or older        Passed - No active pregnancy on record        Passed - No positive pregnancy test in last 12 months             Per pharmacy, 30mg capsules were not covered by patient's insurance so she has been using 10mg + 20mg tablets since December 2022. No 10mg tablets noted on file. Routing to covering provider for refill consideration as PCP out of office.      Taniya Ibanez RN ....................  10/26/2023   2:17 PM      "

## 2023-10-29 RX ORDER — CITALOPRAM HYDROBROMIDE 10 MG/1
TABLET ORAL
Qty: 90 TABLET | Refills: 0 | Status: SHIPPED | OUTPATIENT
Start: 2023-10-29 | End: 2023-12-21

## 2023-10-30 RX ORDER — TRIAMCINOLONE ACETONIDE 1 MG/G
CREAM TOPICAL
Qty: 80 G | Refills: 0 | Status: SHIPPED | OUTPATIENT
Start: 2023-10-30 | End: 2023-12-30

## 2023-10-30 NOTE — TELEPHONE ENCOUNTER
TW sent Rx request for the following:      Requested Prescriptions   Pending Prescriptions Disp Refills    triamcinolone (KENALOG) 0.1 % external cream [Pharmacy Med Name: TRIAMCINOLONE 0.1% CREAM] 80 g 0     Sig: APPLY TOPICALLY 3 TIMES DAILY COMBINE SMALL AMOUNT WITH NYSTATIN CREAM IN PALM OF HAND AND APPLY AS DIRECTED. USE FOR 2 WEEKS ON, ONE WEEK     Passed refill RN protocol    Last Prescription Date:   9/28/23  Last Fill Qty/Refills:         80 g, R-0    Last Office Visit:              1/25/23   Future Office visit:           None    Juliana Watkins RN on 10/30/2023 at 6:25 PM

## 2023-11-06 DIAGNOSIS — N92.6 MENSTRUAL DISORDER: ICD-10-CM

## 2023-11-07 ENCOUNTER — MEDICAL CORRESPONDENCE (OUTPATIENT)
Dept: HEALTH INFORMATION MANAGEMENT | Facility: OTHER | Age: 48
End: 2023-11-07
Payer: MEDICARE

## 2023-11-13 RX ORDER — NORGESTIMATE AND ETHINYL ESTRADIOL 7DAYSX3 28
1 KIT ORAL DAILY
Qty: 84 TABLET | Refills: 3 | Status: SHIPPED | OUTPATIENT
Start: 2023-11-13

## 2023-11-13 NOTE — TELEPHONE ENCOUNTER
TW sent Rx request for the following:      Requested Prescriptions   Pending Prescriptions Disp Refills    TRI-LINYAH 0.18/0.215/0.25 MG-35 MCG tablet [Pharmacy Med Name: TRI-LINYAH 28-DAY TABLET] 84 tablet 3     Sig: TAKE 1 TABLET BY MOUTH DAILY   Passed RN refill protocol    Last Prescription Date:   12/7/22  Last Fill Qty/Refills:         84, R-3    Last Office Visit:              9/13/23   Future Office visit:           None    uJliana Watkins RN on 11/13/2023 at 4:11 PM
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
## 2023-12-19 DIAGNOSIS — M17.0 PRIMARY OSTEOARTHRITIS OF BOTH KNEES: ICD-10-CM

## 2023-12-19 DIAGNOSIS — F41.1 GAD (GENERALIZED ANXIETY DISORDER): ICD-10-CM

## 2023-12-21 RX ORDER — CITALOPRAM HYDROBROMIDE 10 MG/1
TABLET ORAL
Qty: 90 TABLET | Refills: 0 | Status: SHIPPED | OUTPATIENT
Start: 2023-12-21 | End: 2024-02-21

## 2023-12-21 NOTE — TELEPHONE ENCOUNTER
[id number] sent Rx request for the following:      Requested Prescriptions   Pending Prescriptions Disp Refills    citalopram (CELEXA) 10 MG tablet [Pharmacy Med Name: CITALOPRAM 10MG TABLET] 90 tablet 0     Sig: TAKE 1 TAB BY MOUTH ONCE DAILY ALONG WITH 20MG TAB TO EQUAL 30MG DAILY          Last Prescription Date:   10/29/23  Last Fill Qty/Refills:         90, R-0    Last Office Visit:              1/25/23   Future Office visit:           2/7/24    Prescription approved per [facility] Refill Protocol.    Overridden by Ines Beltrán APRN CNP on Oct 29, 2023 10:00 PM   Drug-Drug   1. DICLOFENAC (ORAL) / SELECTIVE SEROTONIN REUPTAKE INHIBITORS [Level: Major] [Reason: Tolerated medication/side effects in past]   Other Orders: diclofenac (VOLTAREN) 50 MG EC tablet            BYRON MERRITT RN on 12/21/2023 at 12:09 PM

## 2023-12-26 NOTE — TELEPHONE ENCOUNTER
Last Prescription Date: 3/23/23  Last Qty/Refills: 180 / R-2  Last Office Visit: 1/25/2023  Future Office Visit: 2/07/2024     Requested Prescriptions   Pending Prescriptions Disp Refills    diclofenac (VOLTAREN) 50 MG EC tablet [Pharmacy Med Name: DICLOFENAC SODIUM 50MG DR TAB] 180 tablet 2     Sig: TAKE 1 TABLET BY MOUTH TWICE A DAY       NSAID Medications Failed - 12/26/2023 12:46 PM        Failed - Normal ALT on file in past 12 months     Recent Labs   Lab Test 09/02/22  1034   ALT 31             Failed - Normal AST on file in past 12 months     Recent Labs   Lab Test 09/02/22  1034   AST 35             Failed - Normal CBC on file in past 12 months     Recent Labs   Lab Test 09/02/22  1034   WBC 7.1   RBC 3.81   HGB 12.9   HCT 38.1                    Failed - Always Fail Criteria - Chart Review Required     Validate Diagnosis. If the medication is requested for an acute flare of a chronic pain associated with a musculoskeletal or rheumatologic condition; okay to authorize if all other criteria are met. If not, then forward to provider for review.          Failed - Normal GFR on file in past 12 months     Recent Labs   Lab Test 09/02/22  1034 07/17/21  1801 07/06/20  1108   GFRESTIMATED >90   < > 70   GFRESTBLACK  --   --  85    < > = values in this interval not displayed.             Failed - Normal serum creatinine on file in past 12 months     Recent Labs   Lab Test 09/02/22  1034   CR 0.77       Ok to refill medication if creatinine is low       Routing refill request to provider for review/approval because:  Labs not current:  2022    Shirin Irizarry RN on 12/26/2023 at 12:53 PM

## 2023-12-30 DIAGNOSIS — B37.2 CANDIDIASIS OF SKIN: ICD-10-CM

## 2023-12-30 RX ORDER — TRIAMCINOLONE ACETONIDE 1 MG/G
CREAM TOPICAL
Qty: 80 G | Refills: 0 | Status: SHIPPED | OUTPATIENT
Start: 2023-12-30 | End: 2024-07-16

## 2024-01-12 ENCOUNTER — OFFICE VISIT (OUTPATIENT)
Dept: INTERNAL MEDICINE | Facility: OTHER | Age: 49
End: 2024-01-12
Payer: MEDICARE

## 2024-01-12 VITALS
WEIGHT: 151 LBS | RESPIRATION RATE: 16 BRPM | HEART RATE: 68 BPM | BODY MASS INDEX: 34.94 KG/M2 | HEIGHT: 55 IN | SYSTOLIC BLOOD PRESSURE: 122 MMHG | DIASTOLIC BLOOD PRESSURE: 78 MMHG | TEMPERATURE: 97.8 F | OXYGEN SATURATION: 98 %

## 2024-01-12 DIAGNOSIS — L89.899 PRESSURE ULCER OF BREAST: ICD-10-CM

## 2024-01-12 DIAGNOSIS — R23.4 SCAB: ICD-10-CM

## 2024-01-12 DIAGNOSIS — E66.01 MORBID OBESITY (H): ICD-10-CM

## 2024-01-12 DIAGNOSIS — Q90.9 DOWN'S SYNDROME: ICD-10-CM

## 2024-01-12 DIAGNOSIS — L89.303 PRESSURE INJURY OF BUTTOCK, STAGE 3, UNSPECIFIED LATERALITY (H): Primary | ICD-10-CM

## 2024-01-12 PROCEDURE — G0463 HOSPITAL OUTPT CLINIC VISIT: HCPCS

## 2024-01-12 PROCEDURE — 87205 SMEAR GRAM STAIN: CPT | Mod: ZL

## 2024-01-12 PROCEDURE — 99214 OFFICE O/P EST MOD 30 MIN: CPT

## 2024-01-12 ASSESSMENT — PAIN SCALES - GENERAL: PAINLEVEL: EXTREME PAIN (8)

## 2024-01-12 ASSESSMENT — ENCOUNTER SYMPTOMS: WOUND: 1

## 2024-01-12 NOTE — PATIENT INSTRUCTIONS
Keep area under breast dry- use InterDry underneath breasts. Hold off on creams for now. Keep DuoDerm on buttock ulcer- it can stay on for 7 days if it stays secure. Watch for worsening signs of infection- increased drainage, redness purulent drainage.  I will treat with antibitoics if ulcers grow out bacteria. Follow up with Sara as scheduled next month. Offload pressure as much as possible on ulcers.

## 2024-01-12 NOTE — PROGRESS NOTES
Assessment & Plan   Reg Valiente is a 48 year old presenting for the following health issues:      ICD-10-CM    1. Pressure injury of buttock, stage 3, unspecified laterality (H)  L89.303 Skin Aerobic Bacterial Culture Routine With Gram Stain     Skin Aerobic Bacterial Culture Routine With Gram Stain     ALLEVYN DRESSINGS     Wound Care Order for DME - ONLY FOR DME      2. Pressure ulcer of breast  L89.899 Skin Aerobic Bacterial Culture Routine With Gram Stain     Skin Aerobic Bacterial Culture Routine With Gram Stain      3. Morbid obesity (H)  E66.01       4. Down's syndrome  Q90.9       5. Scab  R23.4         Pressure ulcer education was provided-instructed to offload the areas is much as possible.  We will take a break from the creams as the areas are very moist today when assessing in clinic and the excessive moisture seems to be impairing wound healing.  Interdry fabric was provided and placed underneath patient's breasts.  Allevyn was placed on buttock ulcer.  Instructed to change every 3 to 5 days or as this falls off. Wound dressing has been ordered for future use. Staff member was provided with additional interdry and additional dressings.  Instructed staff member to look for signs of infection and to reach out if this develops.  Patient does have a close follow-up with PCP for reevaluation in several weeks.  We will treat with antibiotics if indicated on wound cultures, otherwise no obvious signs of infection were noted on examination.  Instructed to use Neosporin cream daily as needed on scab of nose, distract patient as able to avoid further picking.        No follow-ups on file.    EB Ariza CNP  [facility]      Subjective   Reg is a 48 year old, presenting for the following health issues:    Patient presents to clinic with group home staff member with concerns of worsening ulcers underneath left breast and of left gluteal cleft.  Patient has a history of Down  "syndrome, morbid obesity.  Staff member reports that she frequently is in the same position sitting, only will sleep on her back.  She has large breasts that collect moisture.  Staff member has been placing a mix of Desitin, triamcinolone, nystatin cream on the sores.  The patient has complained of pain at the gluteal cleft ulcer when perineal cares are provided. Denies any fevers, chills, worsening redness around the areas.  She also has a scab on the bridge of her nose that she continuously picks.        Review of Systems   Unable to perform ROS: Dementia   Skin:  Positive for wound.            Objective    /78 (BP Location: Right arm, Patient Position: Sitting, Cuff Size: Adult Regular)   Pulse 68   Temp 97.8  F (36.6  C) (Temporal)   Resp 16   Ht 1.346 m (4' 5\")   Wt 68.5 kg (151 lb)   SpO2 98%   BMI 37.79 kg/m    Body mass index is 37.79 kg/m .  Physical Exam  Vitals reviewed.   Constitutional:       General: She is not in acute distress.     Appearance: She is obese. She is not ill-appearing or toxic-appearing.   Skin:     Findings: Wound present.      Comments: Buttock ulcer approximately 1.5 inch diameter     Breast ulcer approximately 1 cm diameter    Wound bed of ulcer is clean- no obvious redness, drainage, or signs of active infection    Wound culture collected   Neurological:      Mental Status: She is alert.                            "

## 2024-01-12 NOTE — NURSING NOTE
"Chief Complaint   Patient presents with    RECHECK     Sores on body     Patient presents to the clinic today for a sore on her buttock and under her left breast.  Initial /78 (BP Location: Right arm, Patient Position: Sitting, Cuff Size: Adult Regular)   Pulse 68   Temp 97.8  F (36.6  C) (Temporal)   Resp 16   Ht 1.346 m (4' 5\")   Wt 68.5 kg (151 lb)   SpO2 98%   BMI 37.79 kg/m   Estimated body mass index is 37.79 kg/m  as calculated from the following:    Height as of this encounter: 1.346 m (4' 5\").    Weight as of this encounter: 68.5 kg (151 lb).  Meds Reconciled: complete      Zeinab Field LPN,LPN on 1/12/2024 at 3:33 PM  Ext. [phone]        Zeinab Field LPN  "

## 2024-01-14 LAB
BACTERIA SKIN AEROBE CULT: NO GROWTH
GRAM STAIN RESULT: ABNORMAL
GRAM STAIN RESULT: ABNORMAL

## 2024-01-15 DIAGNOSIS — L89.303 PRESSURE INJURY OF BUTTOCK, STAGE 3, UNSPECIFIED LATERALITY (H): Primary | ICD-10-CM

## 2024-01-15 LAB
BACTERIA SKIN AEROBE CULT: ABNORMAL
BACTERIA SKIN AEROBE CULT: ABNORMAL
GRAM STAIN RESULT: ABNORMAL
GRAM STAIN RESULT: ABNORMAL

## 2024-01-16 RX ORDER — CIPROFLOXACIN 500 MG/1
500 TABLET, FILM COATED ORAL 2 TIMES DAILY
Qty: 14 TABLET | Refills: 0 | Status: SHIPPED | OUTPATIENT
Start: 2024-01-16 | End: 2024-01-23

## 2024-01-16 NOTE — PROGRESS NOTES
After proper verification, patients group home was relayed the message from below.   Zeinab Field LPN on 1/16/2024 at 1:53 PM  EXT. [phone]

## 2024-01-16 NOTE — PROGRESS NOTES
Please notify group home staff that I sent in an antibiotic for her to start ciprofloxacin 2 times daily for 7 days for skin infection.  I sent this in to [facility]. Pharmacy has been consulted regarding antibiotic selection.

## 2024-01-18 ENCOUNTER — TELEPHONE (OUTPATIENT)
Dept: INTERNAL MEDICINE | Facility: OTHER | Age: 49
End: 2024-01-18
Payer: MEDICARE

## 2024-01-18 DIAGNOSIS — L89.303 PRESSURE INJURY OF BUTTOCK, STAGE 3, UNSPECIFIED LATERALITY (H): Primary | ICD-10-CM

## 2024-01-18 RX ORDER — IBUPROFEN 200 MG
1 CAPSULE ORAL DAILY PRN
Qty: 30 EACH | Refills: 11 | Status: SHIPPED | OUTPATIENT
Start: 2024-01-18 | End: 2024-07-16

## 2024-01-18 RX ORDER — SOD CHLORID/B6/ZINC/CITRIC ACD
SPRAY, NON-AEROSOL (ML) IRRIGATION DAILY PRN
Qty: 473 ML | Refills: 4 | Status: SHIPPED | OUTPATIENT
Start: 2024-01-18 | End: 2024-07-16

## 2024-01-18 NOTE — TELEPHONE ENCOUNTER
"Received fax from [facility] stating the following:    \"Patient was seen on 1-12-24 regarding worsening wound to left buttock and new wounds under left breast from [facility].  Can you please fax rx for the following wound supplies to [facility] at [phone]:    -Wound Cleanser  -Foam gentle adhesive 3x3 cm dressing (uses 1-2 per day-dependent on if she removes herself or soils from BM)  -Gauze pads  -Interdry sheets    Please call [facility] at [phone] (NELLIE Thomas) with any questions  Yareli Dyson LPN...................1/18/2024   8:02 AM   "

## 2024-02-07 ENCOUNTER — OFFICE VISIT (OUTPATIENT)
Dept: INTERNAL MEDICINE | Facility: OTHER | Age: 49
End: 2024-02-07
Attending: NURSE PRACTITIONER
Payer: MEDICARE

## 2024-02-07 VITALS
OXYGEN SATURATION: 95 % | SYSTOLIC BLOOD PRESSURE: 110 MMHG | RESPIRATION RATE: 16 BRPM | HEIGHT: 55 IN | HEART RATE: 67 BPM | BODY MASS INDEX: 36.93 KG/M2 | WEIGHT: 159.6 LBS | DIASTOLIC BLOOD PRESSURE: 70 MMHG | TEMPERATURE: 97.2 F

## 2024-02-07 DIAGNOSIS — L89.323 STAGE III PRESSURE ULCER OF LEFT BUTTOCK (H): ICD-10-CM

## 2024-02-07 DIAGNOSIS — E66.01 MORBID OBESITY (H): ICD-10-CM

## 2024-02-07 DIAGNOSIS — Z00.00 MEDICARE ANNUAL WELLNESS VISIT, SUBSEQUENT: Primary | ICD-10-CM

## 2024-02-07 DIAGNOSIS — E03.9 ACQUIRED HYPOTHYROIDISM: ICD-10-CM

## 2024-02-07 DIAGNOSIS — M17.0 PRIMARY OSTEOARTHRITIS OF BOTH KNEES: ICD-10-CM

## 2024-02-07 DIAGNOSIS — G30.0 EARLY ONSET ALZHEIMER'S DISEASE WITH BEHAVIORAL DISTURBANCE (H): ICD-10-CM

## 2024-02-07 DIAGNOSIS — F02.818 EARLY ONSET ALZHEIMER'S DISEASE WITH BEHAVIORAL DISTURBANCE (H): ICD-10-CM

## 2024-02-07 DIAGNOSIS — R46.81 OBSESSIVE BEHAVIORS: ICD-10-CM

## 2024-02-07 DIAGNOSIS — Q90.9 DOWN'S SYNDROME: ICD-10-CM

## 2024-02-07 LAB
ANION GAP SERPL CALCULATED.3IONS-SCNC: 8 MMOL/L (ref 7–15)
BUN SERPL-MCNC: 15.7 MG/DL (ref 6–20)
CALCIUM SERPL-MCNC: 8.4 MG/DL (ref 8.6–10)
CHLORIDE SERPL-SCNC: 106 MMOL/L (ref 98–107)
CREAT SERPL-MCNC: 0.71 MG/DL (ref 0.51–0.95)
DEPRECATED HCO3 PLAS-SCNC: 27 MMOL/L (ref 22–29)
EGFRCR SERPLBLD CKD-EPI 2021: >90 ML/MIN/1.73M2
GLUCOSE SERPL-MCNC: 98 MG/DL (ref 70–99)
POTASSIUM SERPL-SCNC: 3.8 MMOL/L (ref 3.4–5.3)
SODIUM SERPL-SCNC: 141 MMOL/L (ref 135–145)
TSH SERPL DL<=0.005 MIU/L-ACNC: 1.06 UIU/ML (ref 0.3–4.2)

## 2024-02-07 PROCEDURE — 80048 BASIC METABOLIC PNL TOTAL CA: CPT | Mod: ZL | Performed by: NURSE PRACTITIONER

## 2024-02-07 PROCEDURE — 36415 COLL VENOUS BLD VENIPUNCTURE: CPT | Mod: ZL | Performed by: NURSE PRACTITIONER

## 2024-02-07 PROCEDURE — G0439 PPPS, SUBSEQ VISIT: HCPCS | Performed by: NURSE PRACTITIONER

## 2024-02-07 PROCEDURE — 99213 OFFICE O/P EST LOW 20 MIN: CPT | Mod: 25 | Performed by: NURSE PRACTITIONER

## 2024-02-07 PROCEDURE — 84443 ASSAY THYROID STIM HORMONE: CPT | Mod: ZL | Performed by: NURSE PRACTITIONER

## 2024-02-07 PROCEDURE — G0463 HOSPITAL OUTPT CLINIC VISIT: HCPCS

## 2024-02-07 SDOH — HEALTH STABILITY: PHYSICAL HEALTH: ON AVERAGE, HOW MANY DAYS PER WEEK DO YOU ENGAGE IN MODERATE TO STRENUOUS EXERCISE (LIKE A BRISK WALK)?: 0 DAYS

## 2024-02-07 ASSESSMENT — ASTHMA QUESTIONNAIRES
ACT_TOTALSCORE: 24
QUESTION_5 LAST FOUR WEEKS HOW WOULD YOU RATE YOUR ASTHMA CONTROL: WELL CONTROLLED
QUESTION_4 LAST FOUR WEEKS HOW OFTEN HAVE YOU USED YOUR RESCUE INHALER OR NEBULIZER MEDICATION (SUCH AS ALBUTEROL): NOT AT ALL
QUESTION_3 LAST FOUR WEEKS HOW OFTEN DID YOUR ASTHMA SYMPTOMS (WHEEZING, COUGHING, SHORTNESS OF BREATH, CHEST TIGHTNESS OR PAIN) WAKE YOU UP AT NIGHT OR EARLIER THAN USUAL IN THE MORNING: NOT AT ALL
QUESTION_1 LAST FOUR WEEKS HOW MUCH OF THE TIME DID YOUR ASTHMA KEEP YOU FROM GETTING AS MUCH DONE AT WORK, SCHOOL OR AT HOME: NONE OF THE TIME
QUESTION_2 LAST FOUR WEEKS HOW OFTEN HAVE YOU HAD SHORTNESS OF BREATH: NOT AT ALL
ACT_TOTALSCORE: 24

## 2024-02-07 ASSESSMENT — SOCIAL DETERMINANTS OF HEALTH (SDOH): HOW OFTEN DO YOU GET TOGETHER WITH FRIENDS OR RELATIVES?: MORE THAN THREE TIMES A WEEK

## 2024-02-07 ASSESSMENT — PAIN SCALES - GENERAL: PAINLEVEL: NO PAIN (0)

## 2024-02-07 NOTE — PROGRESS NOTES
Preventive Care Visit  [facility] [place]  Mikayla Gtz NP, Internal Medicine  Feb 7, 2024      ICD-10-CM    1. Medicare annual wellness visit, subsequent  Z00.00       2. Down's syndrome  Q90.9 Miscellaneous Order for DME - ONLY FOR DME      3. Morbid obesity (H)  E66.01 Miscellaneous Order for DME - ONLY FOR DME      4. Early onset Alzheimer's disease with behavioral disturbance (H)  G30.0 Miscellaneous Order for DME - ONLY FOR DME    F02.818       5. Obsessive behaviors  R46.81       6. Primary osteoarthritis of both knees  M17.0 Basic metabolic panel     Basic metabolic panel      7. Acquired hypothyroidism  E03.9 TSH with free T4 reflex     TSH with free T4 reflex      8. Stage III pressure ulcer of left buttock (H)  L89.323 Wound Care Order for DME - ONLY FOR DME         Plan:  Medicare wellness visit complete.  Follow-up annually.  Patient with Down syndrome and Alzheimer's disease and guardians have opted to have no further age-related cancer screening studies.  See discussion in previous notes.  Behaviors currently controlled with citalopram and Seroquel.  Continue Namenda for Alzheimer's.  Follow with orthopedics as needed for injections.  Continue medication as currently prescribed.  Continues on contraception.  Consider within the next 1-2 years of stopping contraception checking FSH and estradiol in 8 weeks to determine if she has reached menopause.  Contraception risk low for patient.  Is being used to manage menses which has been distressful for patient in the past.  Shower chair ordered.  She is at risk of falls secondary to difficulty with ambulation and morbid obesity.  Pressure ulcer left buttock stage III healing nicely.  Continue with dressing change every other day.  Cleanse with soap and water, rinse clear then apply 3 inch Allevyn gentle border light.   Prescription sent to [facility] for supplies.  Follow-up in 1 month if not healed, sooner with concerns.   Offload pressure and keep skin free of moisture.    Reji Finney is a 48 year old, presenting for the following:  Medicare Visit        2/7/2024     1:08 PM   Additional Questions   Roomed by Ami SANTOYO       Health Care Directive  Patient has a Health Care Directive on file  Advance care planning document is on file and is current.    HPI  She is here today for Medicare wellness visit and chronic disease management.  She has Down syndrome with Alzheimer's disease and obsessive behaviors.  Sometimes she will get aggressive with staff but they have found improvement since using Seroquel twice daily.  She also has some anxiety which overall is well-controlled with SSRI.  She resides at group home.  Her sister and brother-in-law are healthcare power of .  It has been decided by them in the past that they did not want her to have any further age-related screening test as cause anxiety and stress to patient.  She continues on OCP for managing dysmenorrhea.  She also needs a shower chair.  Her gait is unsteady and staff are fearful that she is going to fall while she is in the shower.  She is morbidly obese and usually holds a staff member's hand for ambulation.  Also was recently diagnosed with a pressure ulcer.  They are doing dressing changes every other day.  Sometimes patient will take dressing off on her own when not directed.  They have been working to keep moisture from her skin.  Mostly she is continent of bowel and bladder but sometimes will have accidents.  Has known hypothyroidism and is due for thyroid labs.            2/7/2024   General Health   How would you rate your overall physical health? Good   Feel stress (tense, anxious, or unable to sleep) Only a little   (!) STRESS CONCERN      2/7/2024   Nutrition   Diet: Regular (no restrictions)         2/7/2024   Exercise   Days per week of moderate/strenous exercise 0 days   (!) EXERCISE CONCERN      2/7/2024   Social Factors   Frequency of  gathering with friends or relatives More than three times a week   Worry food won't last until get money to buy more No   Food not last or not have enough money for food? No   Do you have housing?  Yes   Are you worried about losing your housing? No   Lack of transportation? No   Unable to get utilities (heat,electricity)? No         2/7/2024   Fall Risk   Fallen 2 or more times in the past year? No   Trouble with walking or balance? No          2/7/2024   Activities of Daily Living- Home Safety   Needs help with the following daily activites None of the above   Safety concerns in the home None of the above         2/7/2024   Dental   Dentist two times every year? Yes         2/7/2024   Hearing Screening   Hearing concerns? None of the above         2/7/2024   Driving Risk Screening   Patient/family members have concerns about driving No         2/7/2024   General Alertness/Fatigue Screening   Have you been more tired than usual lately? No         2/7/2024   Urinary Incontinence Screening   Bothered by leaking urine in past 6 months No          No data to display                  Today's PHQ-2 Score:       2/7/2024    12:49 PM   PHQ-2 ( 1999 [employer])   Q1: Little interest or pleasure in doing things 0   Q2: Feeling down, depressed or hopeless 0   PHQ-2 Score 0   Q1: Little interest or pleasure in doing things Not at all   Q2: Feeling down, depressed or hopeless Not at all   PHQ-2 Score 0           2/7/2024   Substance Use   Alcohol more than 3/day or more than 7/wk Not Applicable   Do you have a current opioid prescription? No   How severe/bad is pain from 1 to 10? 0/10 (No Pain)   Do you use any other substances recreationally? No     Social History     Tobacco Use    Smoking status: Never    Smokeless tobacco: Never   Vaping Use    Vaping Use: Never used   Substance Use Topics    Alcohol use: No    Drug use: No          Mammogram Screening - Mammography discussed, not appropriate due to Down syndrome and  Alzheimer's disease.  Surgical history and/or SOGI form updated.          2/7/2024   One time HIV Screening   Previous HIV test? No     History of abnormal Pap smear: Not appropriate secondary to no history of sexual activity and causes unnecessary stress and anxiety and is traumatic for patient.       The 10-year ASCVD risk score (Estelita PENA, et al., 2019) is: 0.8%    Values used to calculate the score:      Age: 48 years      Sex: Female      Is Non- : No      Diabetic: No      Tobacco smoker: No      Systolic Blood Pressure: 110 mmHg      Is BP treated: No      HDL Cholesterol: 44 mg/dL      Total Cholesterol: 165 mg/dL        2/7/2024   Contraception/Family Planning   Questions about contraception or family planning No         Reviewed and updated as needed this visit by Provider                    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     Mom reports negative AAS evaluation in the 1990's    Corneal ulcer 2004    caused by leaking of CPAP    Down's syndrome     Gastroesophageal reflux disease     JENNIFER (obstructive sleep apnea)     patient refuses CPAP     Past Surgical History:   Procedure Laterality Date    ENDOSCOPIC RETROGRADE CHOLANGIOPANCREATOGRAM N/A 7/1/2019    Procedure: Endoscopic Retrograde Cholangiopancreatogram, dilation, stent , sludge and stone removal, sphincterotomy;  Surgeon: Guru Harper Alvarado MD;  Location: [facility] OR    ENDOSCOPIC RETROGRADE CHOLANGIOPANCREATOGRAM N/A 8/26/2019    Procedure: Endoscopic Retrograde Cholangiopancreatogram with biliary stent removal and stone removal;  Surgeon: Guru Harper Alvarado MD;  Location: [facility] OR    ENDOSCOPIC RETROGRADE CHOLANGIOPANCREATOGRAPHY, EXCHANGE TUBE/STENT N/A 7/24/2019    Procedure: Endoscopic Retrograde Cholangiopancreatography with Billary Stone Extraction,Dilation             and  stent exchange;  Surgeon: Guru Harper Alvarado MD;  Location: [facility] OR    LAPAROSCOPIC CHOLECYSTECTOMY WITH CHOLANGIOGRAMS N/A 9/9/2019    Procedure: CHOLECYSTECTOMY, LAPAROSCOPIC, WITH CHOLANGIOGRAM;  Surgeon: Arvind Bailey MD;  Location: [facility] OR    NO HISTORY OF SURGERY      as reported by parents     Patient Active Problem List   Diagnosis    Down's syndrome    DOROTEO (generalized anxiety disorder)    Menstrual disorder    Primary osteoarthritis of both knees    Gastroesophageal reflux disease without esophagitis    Other specified hypothyroidism    Dermatitis    Encounter for screening mammogram for breast cancer    Morbid obesity (H)    JENNIFER (obstructive sleep apnea)    Choledocholithiasis    Post-ERCP acute pancreatitis    Encounter for follow-up examination    Dementia associated with other underlying disease with behavioral disturbance (H)    Obsessive behaviors    Pressure injury of buttock, stage 3, unspecified laterality (H)    Pressure ulcer of breast     Past Surgical History:   Procedure Laterality Date    ENDOSCOPIC RETROGRADE CHOLANGIOPANCREATOGRAM N/A 7/1/2019    Procedure: Endoscopic Retrograde Cholangiopancreatogram, dilation, stent , sludge and stone removal, sphincterotomy;  Surgeon: Guru Harper Alvarado MD;  Location: [facility] OR    ENDOSCOPIC RETROGRADE CHOLANGIOPANCREATOGRAM N/A 8/26/2019    Procedure: Endoscopic Retrograde Cholangiopancreatogram with biliary stent removal and stone removal;  Surgeon: Guru Harper Alvarado MD;  Location: [facility] OR    ENDOSCOPIC RETROGRADE CHOLANGIOPANCREATOGRAPHY, EXCHANGE TUBE/STENT N/A 7/24/2019    Procedure: Endoscopic Retrograde Cholangiopancreatography with Billary Stone Extraction,Dilation             and stent exchange;  Surgeon: Guru Harper Alvarado MD;  Location: [facility] OR    LAPAROSCOPIC CHOLECYSTECTOMY WITH CHOLANGIOGRAMS N/A 9/9/2019    Procedure: CHOLECYSTECTOMY, LAPAROSCOPIC, WITH CHOLANGIOGRAM;   "Surgeon: Arvind Bailey MD;  Location: [facility] OR    NO HISTORY OF SURGERY      as reported by parents       Social History     Tobacco Use    Smoking status: Never    Smokeless tobacco: Never   Substance Use Topics    Alcohol use: No     Family History   Problem Relation Age of Onset    Asthma Mother     Arthritis Mother     Dementia Mother     LUNG DISEASE Father         pulmonary fibrosis secondary to chemical exposure    Diabetes Father     Cerebrovascular Disease Father 90    Cerebrovascular Disease Maternal Grandmother 90    Heart Failure Paternal Grandfather         uncertain which  replaced    Coronary Artery Disease No family hx of     Heart Disease No family hx of     Bleeding Diathesis No family hx of     Pancreatic Cancer No family hx of     Colorectal Cancer No family hx of          Current Outpatient Medications   Medication Sig Dispense Refill    Adhesive Bandages Foam MISC Foam gentle adhesive 3x3 cm dressing  Uses 1-2 daily 54 each 4    bacitracin 500 UNIT/GM external ointment Apply topically 2 times daily 113 g 1    citalopram (CELEXA) 10 MG tablet TAKE 1 TAB BY MOUTH ONCE DAILY ALONG WITH 20MG TAB TO EQUAL 30MG DAILY 90 tablet 0    Citalopram Hydrobromide 30 MG CAPS Take 30 mg by mouth daily 90 capsule 3    cyanocobalamin (VITAMIN B-12) 1000 MCG tablet TAKE 1 TABLET (1,000 MCG) BY MOUTH DAILY 90 tablet 1    diclofenac (VOLTAREN) 50 MG EC tablet TAKE 1 TABLET BY MOUTH TWICE A  tablet 2    famotidine (PEPCID) 40 MG tablet TAKE 1/2 TAB (20MG) BY MOUTH TWICE A DAY 90 tablet 0    Gauze Pads & Dressings (GAUZE PADS 4\"X4\") 4\"X4\" PADS 1 Pad daily as needed (wound care) 30 each 11    hydrocortisone (CORTAID) 0.5 % external cream Apply topically 2 times daily 56 g 1    levothyroxine (SYNTHROID/LEVOTHROID) 137 MCG tablet TAKE 1 TABLET (137 MCG) BY MOUTH DAILY 90 tablet 0    memantine (NAMENDA) 5 MG tablet TAKE 1 TAB BY MOUTH TWICE A  tablet 0    Multiple Vitamin (TAB-A-MATTHIAS) TABS TAKE 1 TAB " "BY MOUTH ONCE DAILY 90 tablet 2    mupirocin (BACTROBAN) 2 % external ointment Apply topically 3 times daily Apply to open areas of rash 30 g 2    nystatin (MYCOSTATIN) 185238 UNIT/GM external cream APPLY TOPICALLY TO AFFECTEDAREA(S) TWICE DAILY 30 g 3    nystatin (MYCOSTATIN) 472419 UNIT/GM external cream APPLY TOPICALLY 2 TIMES DAILY COMBINE SMALL AMOUNT WITH TRIAMCINOLONE CREAM INPALM OF HAND AND APPLY AS DIRECTED 30 g 2    QUEtiapine (SEROQUEL) 25 MG tablet TAKE 1 TABLET BY MOUTH TWICE A  tablet 0    Sennosides-Docusate Sodium (SB DOCUSATE SODIUM/SENNA PO)       Skin Protectants, Misc. (INTERDRY 10\"X144\") SHEE Externally apply 1 each topically daily 10 each 4    TRI-LINYAH 0.18/0.215/0.25 MG-35 MCG tablet TAKE 1 TABLET BY MOUTH DAILY 84 tablet 3    triamcinolone (KENALOG) 0.1 % external cream APPLY TOPICALLY 3 TIMES DAILY COMBINE SMALL AMOUNT WITH NYSTATIN CREAM IN PALMOF HAND AND APPLY AS DIRECTED. USE FOR 2 WEEKS ON, ONE WEEK 80 g 0    VITAMIN D3 25 MCG (1000 UT) tablet       Wound Cleanser LIQD Externally apply topically daily as needed (for wound cleanse) 473 mL 4    zinc oxide (DESITIN) 40 % external ointment Apply topically as needed for dry skin, irritation or skin protection 113 g 1     Allergies   Allergen Reactions    Sulfa Antibiotics      Current providers sharing in care for this patient include:  Patient Care Team:  Mikayla Gtz NP as PCP - General (Nurse Practitioner)  Mikayla Gtz NP as Assigned PCP    The following health maintenance items are reviewed in Epic and correct as of today:  Health Maintenance   Topic Date Due    HEPATITIS B IMMUNIZATION (1 of 3 - 3-dose series) Never done    COLORECTAL CANCER SCREENING  Never done    HIV SCREENING  Never done    HEPATITIS C SCREENING  Never done    ASTHMA ACTION PLAN  03/25/2023    INFLUENZA VACCINE (1) 09/01/2023    COVID-19 Vaccine (5 - 2023-24 season) 09/01/2023    MEDICARE ANNUAL WELLNESS VISIT  01/25/2024    ASTHMA " "CONTROL TEST  08/07/2024    TSH W/FREE T4 REFLEX  02/07/2025    MAMMO SCREENING  04/06/2025    LIPID  07/06/2025    GLUCOSE  02/07/2027    ADVANCE CARE PLANNING  01/25/2028    DTAP/TDAP/TD IMMUNIZATION (2 - Td or Tdap) 07/06/2030    PHQ-2 (once per calendar year)  Completed    Pneumococcal Vaccine: Pediatrics (0 to 5 Years) and At-Risk Patients (6 to 64 Years)  Aged Out    IPV IMMUNIZATION  Aged Out    HPV IMMUNIZATION  Aged Out    MENINGITIS IMMUNIZATION  Aged Out    RSV MONOCLONAL ANTIBODY  Aged Out    PAP  Discontinued     Review of Systems       Objective    Exam  /70 (BP Location: Right arm, Patient Position: Sitting, Cuff Size: Adult Regular)   Pulse 67   Temp 97.2  F (36.2  C) (Tympanic)   Resp 16   Ht 1.35 m (4' 5.15\")   Wt 72.4 kg (159 lb 9.6 oz)   LMP 01/22/2024 (Approximate)   SpO2 95%   BMI 39.72 kg/m     Estimated body mass index is 39.72 kg/m  as calculated from the following:    Height as of this encounter: 1.35 m (4' 5.15\").    Weight as of this encounter: 72.4 kg (159 lb 9.6 oz).    Physical Exam  Pleasant morbidly obese female no acute distress.  Accompanied by group home staff member.  She repeats herself frequently.  Poor short-term and long-term memory.  Down syndrome.  Skin color pink.  Sclera nonicteric.  Mucous remains moist.  Neck supple.  No adenopathy or thyromegaly.  Lung fields clear to auscultation throughout.  Cardiovascular regular rate and rhythm with normal heart sounds.  Abdomen soft and without tenderness masses or organomegaly.  Extremities without edema.  She walks with a broad-based gait and holds onto staff member hand for guidance.  Left inner buttock with a pressure ulcer measuring 0.7 by 0.5 by less than 0.1 centimeters that was covered with loosely adherent yellow slough.  Anasept moistened gauze used to mechanically debride wound.  Postdebridement wound shows 100% granulation tissue.  Moderate amount of serosanguineous drainage.  No surrounding erythema, " warmth or maceration.  Wound irrigated with Anasept and 3 inch Allevyn gentle border light dressing applied.          2/7/2024   Mini Cog   Mini-Cog Not Completed (choose reason) Mental handicap            Signed Electronically by: Mikayla Gtz, NP

## 2024-02-07 NOTE — LETTER
February 7, 2024      Reg Valiente  [address] [place] [id number]        Dear ,    We are writing to inform you of your test results.    Your test results fall within the expected range(s) or remain unchanged from previous results.  Please continue with current treatment plan.    Resulted Orders   Basic metabolic panel   Result Value Ref Range    Sodium 141 135 - 145 mmol/L      Comment:      Reference intervals for this test were updated on 09/26/2023 to more accurately reflect our healthy population. There may be differences in the flagging of prior results with similar values performed with this method. Interpretation of those prior results can be made in the context of the updated reference intervals.     Potassium 3.8 3.4 - 5.3 mmol/L    Chloride 106 98 - 107 mmol/L    Carbon Dioxide (CO2) 27 22 - 29 mmol/L    Anion Gap 8 7 - 15 mmol/L    Urea Nitrogen 15.7 6.0 - 20.0 mg/dL    Creatinine 0.71 0.51 - 0.95 mg/dL    GFR Estimate >90 >60 mL/min/1.73m2    Calcium 8.4 (L) 8.6 - 10.0 mg/dL    Glucose 98 70 - 99 mg/dL   TSH with free T4 reflex   Result Value Ref Range    TSH 1.06 0.30 - 4.20 uIU/mL       If you have any questions or concerns, please call the clinic at the number listed above.     Sincerely,      Mikayla Gtz NP

## 2024-02-07 NOTE — NURSING NOTE
"Chief Complaint   Patient presents with    Medicare Visit       FOOD SECURITY SCREENING QUESTIONS  Hunger Vital Signs:  Within the past 12 months we worried whether our food would run out before we got money to buy more. Never  Within the past 12 months the food we bought just didn't last and we didn't have money to get more. Never  Ami Bradford LPN 2/7/2024 1:12 PM      Initial /70 (BP Location: Right arm, Patient Position: Sitting, Cuff Size: Adult Regular)   Pulse 67   Temp 97.2  F (36.2  C) (Tympanic)   Resp 16   Ht 1.35 m (4' 5.15\")   Wt 72.4 kg (159 lb 9.6 oz)   LMP 01/22/2024 (Approximate)   SpO2 95%   BMI 39.72 kg/m   Estimated body mass index is 39.72 kg/m  as calculated from the following:    Height as of this encounter: 1.35 m (4' 5.15\").    Weight as of this encounter: 72.4 kg (159 lb 9.6 oz).  Medication Reconciliation: complete    Ami Bradford LPN    "

## 2024-02-07 NOTE — PROGRESS NOTES
Preventive Care Visit  [facility]  Mikayla Gtz NP, Internal Medicine  Feb 7, 2024  {Provider  Link to [facility] :453123}  {PROVIDER CHARTING PREFERENCE:539538}    Reji Finney is a 48 year old, presenting for the following:  Medicare Visit        2/7/2024     1:08 PM   Additional Questions   Roomed by Ami SANTOYO       Health Care Directive  Patient has a Health Care Directive on file  {(AWV REQUIRED) Advanced Care Planning Reviewed:483042}    HPI  ***  {MA/LPN/RN Pre-Provider Visit Orders- hCG/UA/Strep (Optional):090808}  {SUPERLIST (Optional):902994}  {additonal problems for provider to add (Optional):217345}      2/7/2024   General Health   How would you rate your overall physical health? Good   Feel stress (tense, anxious, or unable to sleep) Only a little   (!) STRESS CONCERN      2/7/2024   Nutrition   Diet: Regular (no restrictions)         2/7/2024   Exercise   Days per week of moderate/strenous exercise 0 days   (!) EXERCISE CONCERN      2/7/2024   Social Factors   Frequency of gathering with friends or relatives More than three times a week   Worry food won't last until get money to buy more No   Food not last or not have enough money for food? No   Do you have housing?  Yes   Are you worried about losing your housing? No   Lack of transportation? No   Unable to get utilities (heat,electricity)? No         2/7/2024   Fall Risk   Fallen 2 or more times in the past year? No   Trouble with walking or balance? No          2/7/2024   Activities of Daily Living- Home Safety   Needs help with the following daily activites None of the above   Safety concerns in the home None of the above         2/7/2024   Dental   Dentist two times every year? Yes         2/7/2024   Hearing Screening   Hearing concerns? None of the above         2/7/2024   Driving Risk Screening   Patient/family members have concerns about driving No         2/7/2024   General Alertness/Fatigue Screening    Have you been more tired than usual lately? No         2/7/2024   Urinary Incontinence Screening   Bothered by leaking urine in past 6 months No          No data to display                  Today's PHQ-2 Score:       2/7/2024    12:49 PM   PHQ-2 ( 1999 [employer])   Q1: Little interest or pleasure in doing things 0   Q2: Feeling down, depressed or hopeless 0   PHQ-2 Score 0   Q1: Little interest or pleasure in doing things Not at all   Q2: Feeling down, depressed or hopeless Not at all   PHQ-2 Score 0           2/7/2024   Substance Use   Alcohol more than 3/day or more than 7/wk Not Applicable   Do you have a current opioid prescription? No   How severe/bad is pain from 1 to 10? 0/10 (No Pain)   Do you use any other substances recreationally? No     Social History     Tobacco Use    Smoking status: Never    Smokeless tobacco: Never   Vaping Use    Vaping Use: Never used   Substance Use Topics    Alcohol use: No    Drug use: No     {If there are gaps in the social history shown above, please follow the link to update and then refresh the note Link to Social and Substance History :910958}     {Mammogram Decision Support (Optional):967608}          2/7/2024   One time HIV Screening   Previous HIV test? No     History of abnormal Pap smear: { :350773}       The 10-year ASCVD risk score (Estelita PENA, et al., 2019) is: 1%    Values used to calculate the score:      Age: 48 years      Sex: Female      Is Non- : No      Diabetic: No      Tobacco smoker: No      Systolic Blood Pressure: 122 mmHg      Is BP treated: No      HDL Cholesterol: 44 mg/dL      Total Cholesterol: 165 mg/dL        2/7/2024   Contraception/Family Planning   Questions about contraception or family planning No     {Use the storyboard to review patient history, after sections have been marked as reviewed, refresh note to capture documentation:185443}  { REQUIRED AWV use this link to review and update sexual activity history  after  "section has been marked as reviewed, refresh note to capture documentation:210249}  Reviewed and updated as needed this visit by Provider                    {HISTORY OPTIONS (Optional):923835}  Current providers sharing in care for this patient include:  Patient Care Team:  Mikayla Gtz NP as PCP - General (Nurse Practitioner)  Mikayla Gtz NP as Assigned PCP    The following health maintenance items are reviewed in Epic and correct as of today:  Health Maintenance   Topic Date Due    HEPATITIS B IMMUNIZATION (1 of 3 - 3-dose series) Never done    COLORECTAL CANCER SCREENING  Never done    HIV SCREENING  Never done    HEPATITIS C SCREENING  Never done    ASTHMA ACTION PLAN  03/25/2023    INFLUENZA VACCINE (1) 09/01/2023    COVID-19 Vaccine (5 - 2023-24 season) 09/01/2023    TSH W/FREE T4 REFLEX  09/02/2023    MEDICARE ANNUAL WELLNESS VISIT  01/25/2024    ASTHMA CONTROL TEST  08/07/2024    MAMMO SCREENING  04/06/2025    LIPID  07/06/2025    GLUCOSE  09/02/2025    ADVANCE CARE PLANNING  01/25/2028    DTAP/TDAP/TD IMMUNIZATION (2 - Td or Tdap) 07/06/2030    PHQ-2 (once per calendar year)  Completed    Pneumococcal Vaccine: Pediatrics (0 to 5 Years) and At-Risk Patients (6 to 64 Years)  Aged Out    IPV IMMUNIZATION  Aged Out    HPV IMMUNIZATION  Aged Out    MENINGITIS IMMUNIZATION  Aged Out    RSV MONOCLONAL ANTIBODY  Aged Out    PAP  Discontinued     Review of Systems  {ROS Picklists (Optional):218534}     Objective    Exam  There were no vitals taken for this visit.   Estimated body mass index is 37.79 kg/m  as calculated from the following:    Height as of 1/12/24: 1.346 m (4' 5\").    Weight as of 1/12/24: 68.5 kg (151 lb).    Physical Exam  {Exam Choices (Optional):932576}  {The Mini-Cog is incomplete, use link to complete and refresh note Link to Mini-Cog :576003}       No data to display              {A Mini-Cog total score of 0-2 suggests the possibility of dementia, score of 3-5 suggests no " dementia:965733}       Signed Electronically by: Mikayla Gtz NP  {Email feedback regarding this note to [email or web address]   :727057}

## 2024-02-19 DIAGNOSIS — K21.9 GASTROESOPHAGEAL REFLUX DISEASE WITHOUT ESOPHAGITIS: ICD-10-CM

## 2024-02-19 DIAGNOSIS — G30.0 EARLY ONSET ALZHEIMER'S DISEASE WITH BEHAVIORAL DISTURBANCE (H): ICD-10-CM

## 2024-02-19 DIAGNOSIS — F02.818 EARLY ONSET ALZHEIMER'S DISEASE WITH BEHAVIORAL DISTURBANCE (H): ICD-10-CM

## 2024-02-19 DIAGNOSIS — F41.1 GAD (GENERALIZED ANXIETY DISORDER): ICD-10-CM

## 2024-02-19 DIAGNOSIS — E03.8 OTHER SPECIFIED HYPOTHYROIDISM: ICD-10-CM

## 2024-02-19 DIAGNOSIS — R46.81 OBSESSIVE BEHAVIORS: Primary | ICD-10-CM

## 2024-02-19 DIAGNOSIS — R46.81 OBSESSIVE BEHAVIORS: ICD-10-CM

## 2024-02-21 RX ORDER — CITALOPRAM HYDROBROMIDE 20 MG/1
TABLET ORAL
Qty: 30 TABLET | Refills: 3 | Status: SHIPPED | OUTPATIENT
Start: 2024-02-21 | End: 2024-07-16

## 2024-02-21 RX ORDER — CITALOPRAM HYDROBROMIDE 10 MG/1
TABLET ORAL
Qty: 90 TABLET | Refills: 0 | Status: SHIPPED | OUTPATIENT
Start: 2024-02-21 | End: 2024-04-25

## 2024-02-21 RX ORDER — LEVOTHYROXINE SODIUM 137 UG/1
137 TABLET ORAL DAILY
Qty: 90 TABLET | Refills: 0 | Status: SHIPPED | OUTPATIENT
Start: 2024-02-21 | End: 2024-04-25

## 2024-02-21 RX ORDER — MEMANTINE HYDROCHLORIDE 5 MG/1
5 TABLET ORAL 2 TIMES DAILY
Qty: 180 TABLET | Refills: 0 | Status: SHIPPED | OUTPATIENT
Start: 2024-02-21 | End: 2024-04-25

## 2024-02-21 RX ORDER — QUETIAPINE FUMARATE 25 MG/1
25 TABLET, FILM COATED ORAL 2 TIMES DAILY
Qty: 180 TABLET | Refills: 0 | Status: SHIPPED | OUTPATIENT
Start: 2024-02-21 | End: 2024-04-25

## 2024-02-21 RX ORDER — FAMOTIDINE 40 MG/1
TABLET, FILM COATED ORAL
Qty: 90 TABLET | Refills: 0 | Status: SHIPPED | OUTPATIENT
Start: 2024-02-21 | End: 2024-04-25

## 2024-02-21 NOTE — TELEPHONE ENCOUNTER
[id number] sent Rx request for the following:      Requested Prescriptions   Pending Prescriptions Disp Refills    citalopram (CELEXA) 20 MG tablet [Pharmacy Med Name: CITALOPRAM 20MG TABLET] 30 tablet 3     Sig: TAKE 1 TAB BY MOUTH ONCE DAILY ALONG WITH 10MG TAB TO EQUAL 30MG DAILY       SSRIs Protocol Passed - 2/19/2024  4:35 PM        Passed - Recent (12 mo) or future (30 days) visit within the authorizing provider's specialty     The patient must have completed an in-person or virtual visit within the past 12 months or has a future visit scheduled within the next 90 days with the authorizing provider s specialty.  Urgent care and e-visits do not quality as an office visit for this protocol.          Passed - Medication is active on med list        Passed - Patient is age 18 or older        Passed - No active pregnancy on record        Passed - No positive pregnancy test in last 12 months             Last Prescription Date:   12/7/22  Last Fill Qty/Refills:         90, R-3    Last Office Visit:              2/7/24   Future Office visit:           none    Routing refill request to provider for review/approval because:  Drug interaction warning    Brenda Gamboa RN on 2/21/2024 at 2:16 PM

## 2024-02-21 NOTE — TELEPHONE ENCOUNTER
[id number] ([place], [place]) sent Rx request for the following:      Requested Prescriptions   Pending Prescriptions Disp Refills    citalopram (CELEXA) 10 MG tablet [Pharmacy Med Name: CITALOPRAM 10MG TABLET] 90 tablet 0     Sig: TAKE 1 TAB BY MOUTH ONCE DAILY ALONG WITH 20MG TAB TO EQUAL 30MG DAILY       SSRIs Protocol Passed - 2/19/2024  4:35 PM        Passed - Recent (12 mo) or future (30 days) visit within the authorizing provider's specialty     The patient must have completed an in-person or virtual visit within the past 12 months or has a future visit scheduled within the next 90 days with the authorizing provider s specialty.  Urgent care and e-visits do not quality as an office visit for this protocol.          Passed - Medication is active on med list        Passed - Patient is age 18 or older        Passed - No active pregnancy on record        Passed - No positive pregnancy test in last 12 months             Last Prescription Date:   12/21/23  Last Fill Qty/Refills:         90, R-0    Last Office Visit:              2/7/24   Future Office visit:           none    Routing refill request to provider for review/approval because:  Drug interaction warning      Brenda Gamboa RN on 2/21/2024 at 2:13 PM

## 2024-02-21 NOTE — TELEPHONE ENCOUNTER
[facility] sent Rx request for the following:      Requested Prescriptions   Pending Prescriptions Disp Refills    QUEtiapine (SEROQUEL) 25 MG tablet [Pharmacy Med Name: QUETIAPINE 25MG TABLET] 180 tablet 0     Sig: TAKE 1 TABLET BY MOUTH TWICE A DAY       Antipsychotic Medications Failed - 2/21/2024  2:16 PM        Failed - Lipid panel on file within the past 12 months     Recent Labs   Lab Test 07/06/20  1108   CHOL 165   TRIG 116   HDL 44   LDL 98   NHDL 121            Last Prescription Date:   12/26/23  Last Fill Qty/Refills:         180, R-0               famotidine (PEPCID) 40 MG tablet [Pharmacy Med Name: FAMOTIDINE 40MG TABLET] 90 tablet 0     Sig: TAKE 1/2 TAB (20MG) BY MOUTH TWICE A DAY       H2 Blockers Protocol Passed - 2/21/2024  2:16 PM        Last Prescription Date:   12/26/23  Last Fill Qty/Refills:         90, R-0           levothyroxine (SYNTHROID/LEVOTHROID) 137 MCG tablet [Pharmacy Med Name: LEVOTHYROXINE 137MCG TABLET] 90 tablet 0     Sig: TAKE 1 TABLET (137 MCG) BY MOUTH DAILY       Thyroid Protocol Passed - 2/21/2024  2:16 PM        Last Prescription Date:   12/26/23  Last Fill Qty/Refills:         90, R-0           memantine (NAMENDA) 5 MG tablet [Pharmacy Med Name: MEMANTINE HCL 5MG TABLET] 180 tablet 0     Sig: TAKE 1 TAB BY MOUTH TWICE ADAY       Miscellaneous Dementia Agents Passed - 2/21/2024  2:16 PM       Last Prescription Date:   12/26/23  Last Fill Qty/Refills:         180, R-0    Last Office Visit:              2/7/24   Future Office visit:           none      Routing refill request to provider for review/approval because:  Drug not on the [facility] refill protocol     Brenda Gamboa RN on 2/21/2024 at 2:20 PM

## 2024-03-22 DIAGNOSIS — R79.89 LOW VITAMIN B12 LEVEL: ICD-10-CM

## 2024-03-25 RX ORDER — LANOLIN ALCOHOL/MO/W.PET/CERES
1000 CREAM (GRAM) TOPICAL DAILY
Qty: 90 TABLET | Refills: 1 | Status: SHIPPED | OUTPATIENT
Start: 2024-03-25 | End: 2024-08-22

## 2024-03-25 NOTE — TELEPHONE ENCOUNTER
[facility] sent Rx request for the following:      Requested Prescriptions   Pending Prescriptions Disp Refills    cyanocobalamin (VITAMIN B-12) 1000 MCG tablet [Pharmacy Med Name: VITAMIN B-12 1000MCG TABLET] 90 tablet 1     Sig: TAKE 1 TABLET (1,000 MCG) BY MOUTH DAILY       Vitamin Supplements Protocol Failed - 3/25/2024 11:27 AM        Failed - Medication indicated for associated diagnosis     The medication is prescribed for one or more of the following conditions:     Vitamin deficiency           Last Prescription Date:   9/30/23  Last Fill Qty/Refills:         90, R-1    Last Office Visit:              2/7/24   Future Office visit:           none    Routing refill request to provider for review/approval because:  Drug not on the [facility] refill protocol     Brenda Gamboa RN on 3/25/2024 at 11:28 AM

## 2024-03-25 NOTE — PROGRESS NOTES
document embedded image  Patient Name: Reg Valiente    Address: [address] [place] [address]     YOB: 1975    [place] [address]    MR Number: [id number]    Phone: [phone]  PCP: Mikayla Gtz NP            Appointment Date: 10/29/19   Visit Provider: Moris Frausto MD    cc: ~    ENT Progress Note    Visit Reasons: Hearing Evaluation / Establish Care    HPI  History of Present Illness  Chief complaint:  Hearing loss    History  The patient is a 44-year-old female with Down syndrome who comes to the office today with a caregiver for evaluation of her ears and hearing and to establish care with us.  Been a long-time hearing aid user.  She apparently has lost her left hearing aid and only wears her right at present.  She has had no recent infection issues.    Exam  The external auditory canal on the right was occluded with cerumen.  Material was cleared using suction and alligator forceps.  The eardrum is intact somewhat thickened and sclerotic but there is no obvious fluid or inflammation.  The left ear canal is clear.  Remainder of the head neck exam is unremarkable  Audiogram-she appears to have bilateral mixed hearing loss worse on the right ear.    PFSH    PFSH:     Medical History (Updated 10/29/19 @ 14:50 by Moris Frausto MD)    Diagnosis unknown (Acute)  No eCW       A&P  Assessment & Plan  (1) Mixed hearing loss, bilateral:         Status: Acute        Code(s):  H90.6 - Mixed conductive and sensorineural hearing loss, bilateral        She was referred for hearing aid evaluation as well and will follow up with me yearly.      Moris Frausto MD              10/29/19 1528   <Electronically signed by Moris Frausto MD> 10/29/19 5075   

## 2024-04-19 DIAGNOSIS — G30.0 EARLY ONSET ALZHEIMER'S DISEASE WITH BEHAVIORAL DISTURBANCE (H): ICD-10-CM

## 2024-04-19 DIAGNOSIS — Z00.00 HEALTH CARE MAINTENANCE: ICD-10-CM

## 2024-04-19 DIAGNOSIS — E03.8 OTHER SPECIFIED HYPOTHYROIDISM: ICD-10-CM

## 2024-04-19 DIAGNOSIS — K21.9 GASTROESOPHAGEAL REFLUX DISEASE WITHOUT ESOPHAGITIS: ICD-10-CM

## 2024-04-19 DIAGNOSIS — F02.818 EARLY ONSET ALZHEIMER'S DISEASE WITH BEHAVIORAL DISTURBANCE (H): ICD-10-CM

## 2024-04-19 DIAGNOSIS — R46.81 OBSESSIVE BEHAVIORS: ICD-10-CM

## 2024-04-19 DIAGNOSIS — F41.1 GAD (GENERALIZED ANXIETY DISORDER): ICD-10-CM

## 2024-04-24 NOTE — TELEPHONE ENCOUNTER
[facility] ([place], [place]) sent Rx request for the following:      Requested Prescriptions   Pending Prescriptions Disp Refills    Multiple Vitamin (TAB-A-MATTHIAS) TABS [Pharmacy Med Name: TAB-A-MATTHIAS TABLET] 90 tablet 2     Sig: TAKE 1 TAB BY MOUTH ONCE DAILY       Vitamin Supplements Protocol Failed - 4/24/2024  3:26 PM        Failed - Medication indicated for associated diagnosis     The medication is prescribed for one or more of the following conditions:     Vitamin deficiency         Last Prescription Date:   8/23/23  Last Fill Qty/Refills:         90, R-2            QUEtiapine (SEROQUEL) 25 MG tablet [Pharmacy Med Name: QUETIAPINE 25MG TABLET] 180 tablet 0     Sig: TAKE 1 TABLET BY MOUTH TWICE A DAY       Antipsychotic Medications Failed - 4/24/2024  3:26 PM        Failed - Lipid panel on file within the past 12 months     Recent Labs   Lab Test 07/06/20  1108   CHOL 165   TRIG 116   HDL 44   LDL 98   NHDL 121            Last Prescription Date:   2/21/24  Last Fill Qty/Refills:         180, R-0            famotidine (PEPCID) 40 MG tablet [Pharmacy Med Name: FAMOTIDINE 40MG TABLET] 90 tablet 0     Sig: TAKE 1/2 TAB (20MG) BY MOUTH TWICE A DAY       H2 Blockers Protocol Passed - 4/24/2024  3:26 PM        Last Prescription Date:   2/21/24  Last Fill Qty/Refills:         90, R-0            memantine (NAMENDA) 5 MG tablet [Pharmacy Med Name: MEMANTINE HCL 5MG TABLET] 180 tablet 0     Sig: TAKE 1 TAB BY MOUTH TWICE ADAY       Miscellaneous Dementia Agents Passed - 4/24/2024  3:26 PM        Last Prescription Date:   2/21/24  Last Fill Qty/Refills:         180, R-0          levothyroxine (SYNTHROID/LEVOTHROID) 137 MCG tablet [Pharmacy Med Name: LEVOTHYROXINE 137MCG TABLET] 90 tablet 0     Sig: TAKE 1 TABLET (137 MCG) BY MOUTH DAILY       Thyroid Protocol Passed - 4/24/2024  3:26 PM        Last Prescription Date:   2/21/24  Last Fill Qty/Refills:         90, R-0          citalopram (CELEXA) 10 MG tablet  [Pharmacy Med Name: CITALOPRAM 10MG TABLET] 90 tablet 0     Sig: TAKE 1 TAB BY MOUTH ONCE DAILY ALONG WITH 20MG TAB TO EQUAL 30MG DAILY       SSRIs Protocol Passed - 4/24/2024  3:26 PM          Last Prescription Date:   2/21/24  Last Fill Qty/Refills:         90, R-0    Last Office Visit:              2/7/24   Future Office visit:           none    Routing refill request to provider for review/approval because:  Drug not on the [facility] refill protocol     Brenda Gamboa RN on 4/24/2024 at 3:30 PM

## 2024-04-25 RX ORDER — CITALOPRAM HYDROBROMIDE 10 MG/1
TABLET ORAL
Qty: 90 TABLET | Refills: 0 | Status: SHIPPED | OUTPATIENT
Start: 2024-04-25 | End: 2024-07-16

## 2024-04-25 RX ORDER — LEVOTHYROXINE SODIUM 137 UG/1
137 TABLET ORAL DAILY
Qty: 90 TABLET | Refills: 0 | Status: SHIPPED | OUTPATIENT
Start: 2024-04-25 | End: 2024-07-24

## 2024-04-25 RX ORDER — QUETIAPINE FUMARATE 25 MG/1
25 TABLET, FILM COATED ORAL 2 TIMES DAILY
Qty: 180 TABLET | Refills: 0 | Status: SHIPPED | OUTPATIENT
Start: 2024-04-25 | End: 2024-07-24

## 2024-04-25 RX ORDER — FAMOTIDINE 40 MG/1
TABLET, FILM COATED ORAL
Qty: 90 TABLET | Refills: 0 | Status: SHIPPED | OUTPATIENT
Start: 2024-04-25 | End: 2024-07-24

## 2024-04-25 RX ORDER — MEMANTINE HYDROCHLORIDE 5 MG/1
5 TABLET ORAL 2 TIMES DAILY
Qty: 180 TABLET | Refills: 0 | Status: SHIPPED | OUTPATIENT
Start: 2024-04-25 | End: 2024-07-24

## 2024-04-25 RX ORDER — MULTIVITAMIN WITH FOLIC ACID 400 MCG
TABLET ORAL
Qty: 90 TABLET | Refills: 2 | Status: SHIPPED | OUTPATIENT
Start: 2024-04-25

## 2024-07-16 ENCOUNTER — HOSPITAL ENCOUNTER (OUTPATIENT)
Dept: GENERAL RADIOLOGY | Facility: OTHER | Age: 49
Discharge: HOME | End: 2024-07-16
Attending: NURSE PRACTITIONER
Payer: MEDICARE

## 2024-07-16 ENCOUNTER — OFFICE VISIT (OUTPATIENT)
Dept: FAMILY MEDICINE | Facility: OTHER | Age: 49
End: 2024-07-16
Attending: NURSE PRACTITIONER
Payer: MEDICARE

## 2024-07-16 VITALS
TEMPERATURE: 98 F | DIASTOLIC BLOOD PRESSURE: 62 MMHG | BODY MASS INDEX: 37.23 KG/M2 | HEART RATE: 62 BPM | WEIGHT: 160.9 LBS | OXYGEN SATURATION: 99 % | RESPIRATION RATE: 24 BRPM | HEIGHT: 55 IN | SYSTOLIC BLOOD PRESSURE: 102 MMHG

## 2024-07-16 DIAGNOSIS — M17.11 ARTHRITIS OF RIGHT KNEE: ICD-10-CM

## 2024-07-16 DIAGNOSIS — N39.41 URGENCY INCONTINENCE: Primary | ICD-10-CM

## 2024-07-16 DIAGNOSIS — S89.91XA KNEE INJURY, RIGHT, INITIAL ENCOUNTER: ICD-10-CM

## 2024-07-16 LAB
ALBUMIN UR-MCNC: NEGATIVE MG/DL
APPEARANCE UR: CLEAR
BILIRUB UR QL STRIP: NEGATIVE
COLOR UR AUTO: ABNORMAL
GLUCOSE UR STRIP-MCNC: NEGATIVE MG/DL
HGB UR QL STRIP: NEGATIVE
KETONES UR STRIP-MCNC: NEGATIVE MG/DL
LEUKOCYTE ESTERASE UR QL STRIP: NEGATIVE
MUCOUS THREADS #/AREA URNS LPF: PRESENT /LPF
NITRATE UR QL: NEGATIVE
PH UR STRIP: 6 [PH] (ref 5–9)
RBC URINE: 1 /HPF
SP GR UR STRIP: 1.01 (ref 1–1.03)
UROBILINOGEN UR STRIP-MCNC: NORMAL MG/DL
WBC URINE: 1 /HPF

## 2024-07-16 PROCEDURE — 81001 URINALYSIS AUTO W/SCOPE: CPT | Mod: ZL | Performed by: NURSE PRACTITIONER

## 2024-07-16 PROCEDURE — G0463 HOSPITAL OUTPT CLINIC VISIT: HCPCS

## 2024-07-16 PROCEDURE — 73560 X-RAY EXAM OF KNEE 1 OR 2: CPT | Mod: RT

## 2024-07-16 PROCEDURE — 99214 OFFICE O/P EST MOD 30 MIN: CPT | Performed by: NURSE PRACTITIONER

## 2024-07-16 RX ORDER — SENNOSIDES 8.6 MG
1300 CAPSULE ORAL AT BEDTIME
Qty: 60 TABLET | Refills: 0 | Status: SHIPPED | OUTPATIENT
Start: 2024-07-16 | End: 2024-07-22

## 2024-07-16 ASSESSMENT — ENCOUNTER SYMPTOMS
ENDOCRINE NEGATIVE: 1
CARDIOVASCULAR NEGATIVE: 1
ACTIVITY CHANGE: 1
GASTROINTESTINAL NEGATIVE: 1
RESPIRATORY NEGATIVE: 1
JOINT SWELLING: 1

## 2024-07-16 NOTE — PROGRESS NOTES
Reg Valiente  1975    ASSESSMENT/PLAN      Presents to [facility] with primarily right knee pain, leading into difficulty sleeping and a difficult time getting to the bathroom leading into urinary incontinence.  Urinalysis negative for infection.  Right knee x-ray shows arthritis.  Patient's vitals are stable and she appears nontoxic.  Will plan Tylenol arthritis before bed and Voltaren gel as needed.  Refer to orthopedics for arthritis and pain.      1. Urgency incontinence  - Negative for infection  - UA with Microscopic reflex to Culture    2. Knee injury, right, initial encounter  3. Arthritis of right knee    - XR Knee Right 1/2 Views  - diclofenac (VOLTAREN) 1 % topical gel; Apply 2 g topically 4 times daily as needed for moderate pain  Dispense: 50 g; Refill: 0  - acetaminophen (TYLENOL) 650 MG CR tablet; Take 2 tablets (1,300 mg) by mouth at bedtime  Dispense: 60 tablet; Refill: 0  - Orthopedic  Referral; Future     - May use over-the-counter Tylenol or ibuprofen PRN  - Follow up as needed for new or worsening symptoms      *Explanation of diagnosis, treatment options and risk and benefits of medications reviewed with patient. Patient agrees with plan of care.  *All questions were answered.    *Red flags symptoms were discussed and patient was advised when they should return for reevaluation or for prompt emergency evaluation.   *Patient was given verbal and written instructions on plan of care. Instructions were printed or are available on Mychart on electronic AVS.   *We discussed potential side effects of any prescribed or recommended therapies, as well as expectations for response to treatments.  *Patient discharged in stable condition    Sana Travis, CNP  [facility]    SUBJECTIVE  CHIEF COMPLAINT/ REASON FOR VISIT  Patient presents with:  Knee Pain: R knee     HISTORY OF PRESENT ILLNESS  Reg Valiente is a pleasant 49 year old female presents to [facility]  "today with increasing right knee pain, difficulty sleeping and urinary incontinence.  Patient here with staff member who thinks the sleeping changes in the urinary continence are related to the knee pain as she is not comfortable and has a hard time getting to the bathroom.    I have reviewed the nursing notes.  I have reviewed allergies, medication list, problem list, and past medical history.    REVIEW OF SYSTEMS  Review of Systems   Constitutional:  Positive for activity change.   HENT: Negative.     Respiratory: Negative.     Cardiovascular: Negative.    Gastrointestinal: Negative.    Endocrine: Negative.    Genitourinary:  Positive for urgency.   Musculoskeletal:  Positive for gait problem and joint swelling.   Skin: Negative.    All other systems reviewed and are negative.       VITAL SIGNS  Vitals:    07/16/24 1651   BP: 102/62   BP Location: Right arm   Patient Position: Sitting   Cuff Size: Adult Regular   Pulse: 62   Resp: 24   Temp: 98  F (36.7  C)   TempSrc: Tympanic   SpO2: 99%   Weight: 73 kg (160 lb 14.4 oz)   Height: 1.35 m (4' 5.15\")      Body mass index is 40.05 kg/m .      OBJECTIVE  PHYSICAL EXAM  Physical Exam  Vitals and nursing note reviewed.   Constitutional:       Appearance: Normal appearance.   HENT:      Head: Normocephalic.      Nose: Nose normal.      Mouth/Throat:      Mouth: Mucous membranes are moist.   Eyes:      Pupils: Pupils are equal, round, and reactive to light.   Cardiovascular:      Rate and Rhythm: Normal rate and regular rhythm.   Pulmonary:      Effort: Pulmonary effort is normal.   Abdominal:      Palpations: Abdomen is soft.   Musculoskeletal:         General: Tenderness present. Normal range of motion.      Cervical back: Normal range of motion.      Comments: Right knee tender to touch, walking with a limp.   Skin:     General: Skin is warm and dry.      Capillary Refill: Capillary refill takes less than 2 seconds.   Neurological:      General: No focal deficit " present.      Mental Status: She is alert.            DIAGNOSTICS  Results for orders placed or performed in visit on 07/16/24   XR Knee Right 1/2 Views     Status: None    Narrative    PROCEDURE:  XR KNEE RIGHT 1/2 VIEWS    HISTORY: Knee injury, right, initial encounter    COMPARISON:  None.    TECHNIQUE:  2 views of the right knee were obtained.    FINDINGS:  There is chondrocalcinosis of the knee. The articular  spaces are normal in height. The distal femur patella proximal tibia  and fibula are intact.       Impression    IMPRESSION: No acute fracture.      JAVIER CRAIN MD         SYSTEM ID:  [id number]   UA with Microscopic reflex to Culture     Status: Abnormal    Specimen: Urine, Midstream   Result Value Ref Range    Color Urine Light Yellow Colorless, Straw, Light Yellow, Yellow    Appearance Urine Clear Clear    Glucose Urine Negative Negative mg/dL    Bilirubin Urine Negative Negative    Ketones Urine Negative Negative mg/dL    Specific Gravity Urine 1.014 1.000 - 1.030    Blood Urine Negative Negative    pH Urine 6.0 5.0 - 9.0    Protein Albumin Urine Negative Negative mg/dL    Urobilinogen Urine Normal Normal, 2.0 mg/dL    Nitrite Urine Negative Negative    Leukocyte Esterase Urine Negative Negative    Mucus Urine Present (A) None Seen /LPF    RBC Urine 1 <=2 /HPF    WBC Urine 1 <=5 /HPF    Narrative    Urine Culture not indicated

## 2024-07-16 NOTE — NURSING NOTE
"Pt presents to  with her staff, [facility]. Pt has been complaining of R knee pain since Sunday night - this has caused her to not want to get out of bed and she has started to be incontinent within the past few days.  Staff is not aware of any injury to the R knee recently, but there was an injury to it in the early 2000s.    Chief Complaint   Patient presents with    Knee Pain     R knee       FOOD SECURITY SCREENING QUESTIONS  Hunger Vital Signs:  Within the past 12 months we worried whether our food would run out before we got money to buy more. Never  Within the past 12 months the food we bought just didn't last and we didn't have money to get more. Never  Jennifer Dearmon 7/16/2024 4:55 PM      Initial /62 (BP Location: Right arm, Patient Position: Sitting, Cuff Size: Adult Regular)   Pulse 62   Temp 98  F (36.7  C) (Tympanic)   Resp 24   Ht 1.35 m (4' 5.15\")   Wt 73 kg (160 lb 14.4 oz)   LMP 07/01/2024 (Within Days)   SpO2 99%   BMI 40.05 kg/m   Estimated body mass index is 40.05 kg/m  as calculated from the following:    Height as of this encounter: 1.35 m (4' 5.15\").    Weight as of this encounter: 73 kg (160 lb 14.4 oz).  Medication Reconciliation: complete    Jennifer Dearmon  "

## 2024-07-20 DIAGNOSIS — R46.81 OBSESSIVE BEHAVIORS: ICD-10-CM

## 2024-07-20 DIAGNOSIS — M17.11 ARTHRITIS OF RIGHT KNEE: ICD-10-CM

## 2024-07-23 DIAGNOSIS — R46.81 OBSESSIVE BEHAVIORS: ICD-10-CM

## 2024-07-23 DIAGNOSIS — G30.0 EARLY ONSET ALZHEIMER'S DISEASE WITH BEHAVIORAL DISTURBANCE (H): ICD-10-CM

## 2024-07-23 DIAGNOSIS — K21.9 GASTROESOPHAGEAL REFLUX DISEASE WITHOUT ESOPHAGITIS: ICD-10-CM

## 2024-07-23 DIAGNOSIS — F02.818 EARLY ONSET ALZHEIMER'S DISEASE WITH BEHAVIORAL DISTURBANCE (H): ICD-10-CM

## 2024-07-23 DIAGNOSIS — E03.8 OTHER SPECIFIED HYPOTHYROIDISM: ICD-10-CM

## 2024-07-23 RX ORDER — CITALOPRAM HYDROBROMIDE 20 MG/1
TABLET ORAL
Qty: 30 TABLET | Refills: 3 | Status: SHIPPED | OUTPATIENT
Start: 2024-07-23

## 2024-07-23 RX ORDER — SENNOSIDES 8.6 MG
1300 CAPSULE ORAL AT BEDTIME
Qty: 60 TABLET | Refills: 0 | Status: SHIPPED | OUTPATIENT
Start: 2024-07-23 | End: 2024-08-29

## 2024-07-23 NOTE — TELEPHONE ENCOUNTER
[id number] sent Rx request for the following:      Requested Prescriptions   Pending Prescriptions Disp Refills    citalopram (CELEXA) 20 MG tablet [Pharmacy Med Name: CITALOPRAM 20MG TABLET] 30 tablet 3     Sig: TAKE 1 TAB BY MOUTH ONCE DAILY ALONG WITH 10MG TAB TO EQUAL 30MG DAILY       SSRIs Protocol Failed - 7/20/2024 10:06 AM        Failed - Medication indicated for associated diagnosis     Medication is associated with one or more of the following diagnoses:             Anxiety            Bipolar            Depression            Obsessive-compulsive disorder            Panic disorder            Postmenopausal flushing            Premenstrual dysphoric disorder            Social phobia          Last Prescription Date:   2/21/24 discontinued 7/16/24  Last Fill Qty/Refills:         30, R-3             acetaminophen (TYLENOL) 650 MG CR tablet 60 tablet 0     Sig: Take 2 tablets (1,300 mg) by mouth at bedtime       There is no refill protocol information for this order          Last Prescription Date:   7/16/24  Last Fill Qty/Refills:         60, R-0    Last Office Visit:              2/7/24   Future Office visit:           none    Routing refill request to provider for review/approval because:  Drug not active on patient's medication list    Brenda Gamboa RN on 7/23/2024 at 1:22 PM

## 2024-07-24 DIAGNOSIS — F41.1 GAD (GENERALIZED ANXIETY DISORDER): ICD-10-CM

## 2024-07-24 RX ORDER — LEVOTHYROXINE SODIUM 137 UG/1
137 TABLET ORAL DAILY
Qty: 90 TABLET | Refills: 0 | Status: SHIPPED | OUTPATIENT
Start: 2024-07-24

## 2024-07-24 RX ORDER — MEMANTINE HYDROCHLORIDE 5 MG/1
5 TABLET ORAL 2 TIMES DAILY
Qty: 180 TABLET | Refills: 0 | Status: SHIPPED | OUTPATIENT
Start: 2024-07-24

## 2024-07-24 RX ORDER — QUETIAPINE FUMARATE 25 MG/1
25 TABLET, FILM COATED ORAL 2 TIMES DAILY
Qty: 180 TABLET | Refills: 0 | Status: SHIPPED | OUTPATIENT
Start: 2024-07-24

## 2024-07-24 RX ORDER — FAMOTIDINE 40 MG/1
TABLET, FILM COATED ORAL
Qty: 90 TABLET | Refills: 0 | Status: SHIPPED | OUTPATIENT
Start: 2024-07-24

## 2024-07-24 NOTE — TELEPHONE ENCOUNTER
[id number] sent Rx request for the following:      Requested Prescriptions   Pending Prescriptions Disp Refills    levothyroxine (SYNTHROID/LEVOTHROID) 137 MCG tablet [Pharmacy Med Name: LEVOTHYROXINE 137MCG TABLET] 90 tablet 0     Sig: TAKE 1 TABLET (137 MCG) BY MOUTH DAILY       Thyroid Protocol Passed - 7/24/2024  1:41 PM        Passed - Patient is 12 years or older        Passed - Recent (12 mo) or future (30 days) visit within the authorizing provider's specialty     The patient must have completed an in-person or virtual visit within the past 12 months or has a future visit scheduled within the next 90 days with the authorizing provider s specialty.  Urgent care and e-visits do not quality as an office visit for this protocol.          Passed - Medication is active on med list        Passed - Medication indicated for associated diagnosis     Medication is associated with one or more of the following diagnoses:  Hypothyroidism  Thyroid stimulating hormone suppression therapy  Thyroid cancer          Passed - Normal TSH on file in past 12 months     Recent Labs   Lab Test 02/07/24  1356   TSH 1.06              Passed - No active pregnancy on record     If patient is pregnant or has had a positive pregnancy test, please check TSH.          Passed - No positive pregnancy test in past 12 months     If patient is pregnant or has had a positive pregnancy test, please check TSH.       Last Prescription Date:   4/25/24  Last Fill Qty/Refills:         90, R-0               memantine (NAMENDA) 5 MG tablet [Pharmacy Med Name: MEMANTINE HCL 5MG TABLET] 180 tablet 0     Sig: TAKE 1 TABLET BY MOUTH TWICE A DAY       Miscellaneous Dementia Agents Passed - 7/24/2024  1:41 PM        Passed - Medication is active on med list        Passed - Recent (12 mo) or future (90 days) visit within the authorizing provider's specialty     The patient must have completed an in-person or virtual visit within  the past 12 months or has a future visit scheduled within the next 90 days with the authorizing provider s specialty.  Urgent care and e-visits do not quality as an office visit for this protocol.          Passed - Medication indicated for associated diagnosis     Medication is associated with one or more of the following diagnoses:            Alzheimer's disease           Dementia           Schizophrenia            Passed - Patient is 18 years of age or older       Last Prescription Date:   4/25/24  Last Fill Qty/Refills:         180, R-0             QUEtiapine (SEROQUEL) 25 MG tablet [Pharmacy Med Name: QUETIAPINE 25MG TABLET] 180 tablet 0     Sig: TAKE 1 TABLET BY MOUTH TWICE A DAY       Antipsychotic Medications Failed - 7/24/2024  1:41 PM        Failed - Lipid panel on file within the past 12 months     Recent Labs   Lab Test 07/06/20  1108   CHOL 165   TRIG 116   HDL 44   LDL 98   NHDL 121               Failed - Medication indicated for associated diagnosis     Medication is associated with one or more of the following diagnoses:             Agitation            Autistic disorder            Bipolar disorder            Chemotherapy-induced nausea and vomiting            Delirium            Depression            Schizophrenia         Last Prescription Date:   4/25/24  Last Fill Qty/Refills:         180, R-0             famotidine (PEPCID) 40 MG tablet [Pharmacy Med Name: FAMOTIDINE 40MG TABLET] 90 tablet 0     Sig: TAKE 1/2 TAB (20MG) BY MOUTH TWICE A DAY       H2 Blockers Protocol Passed - 7/24/2024  1:41 PM        Passed - Patient is age 12 or older        Passed - Medication is active on med list        Passed - Medication indicated for associated diagnosis     Medication is associated with one or more of the following diagnoses:  Erosive esophagitis - Gastric hypersecretion   Gastric ulcer   Gastroesophageal reflux disease   Indigestion   Ulcer of duodenum   Esophagitis   Gastritis   Gastrointestinal  hemorrhage   Stress ulcer; Prophylaxis   Helicobacter pylori gastrointestinal tract infection - Ulcer of duodenum  Zollinger-Holloway syndrome             Passed - Recent (12 mo) or future (90 days) visit within the authorizing provider's specialty     The patient must have completed an in-person or virtual visit within the past 12 months or has a future visit scheduled within the next 90 days with the authorizing provider s specialty.  Urgent care and e-visits do not quality as an office visit for this protocol.               Last Prescription Date:   4/25/24  Last Fill Qty/Refills:         90, R-0    Last Office Visit:              2/7/24   Future Office visit:           none      Routing refill request to provider for review/approval because:  Labs not current:  Lipid    Brenda Gamboa RN on 7/24/2024 at 1:45 PM

## 2024-07-25 NOTE — TELEPHONE ENCOUNTER
[id number] sent Rx request for the following:      Requested Prescriptions   Pending Prescriptions Disp Refills    citalopram (CELEXA) 10 MG tablet [Pharmacy Med Name: CITALOPRAM 10MG TABLET] 90 tablet 0     Sig: TAKE 1 TAB BY MOUTH ONCE DAILY ALONG WITH 20MG TAB TO EQUAL 30MG DAILY       SSRIs Protocol Failed - 7/24/2024  1:50 PM        Failed - DOROTEO-7 score of less than 5 in past 6 months.     Please review last DOROTEO-7 score.             Last Prescription Date:   4/25/24- discontinued  Last Fill Qty/Refills:         90, R-0    Last Office Visit:              2/7/24   Future Office visit:           none    Routing refill request to provider for review/approval because:  Drug not active on patient's medication list    Brenda Gamboa RN on 7/25/2024 at 1:11 PM

## 2024-07-26 RX ORDER — CITALOPRAM HYDROBROMIDE 10 MG/1
TABLET ORAL
Qty: 90 TABLET | Refills: 0 | Status: SHIPPED | OUTPATIENT
Start: 2024-07-26

## 2024-08-20 DIAGNOSIS — F41.1 GAD (GENERALIZED ANXIETY DISORDER): ICD-10-CM

## 2024-08-20 DIAGNOSIS — M17.0 PRIMARY OSTEOARTHRITIS OF BOTH KNEES: ICD-10-CM

## 2024-08-21 DIAGNOSIS — R79.89 LOW VITAMIN B12 LEVEL: ICD-10-CM

## 2024-08-22 RX ORDER — LANOLIN ALCOHOL/MO/W.PET/CERES
1000 CREAM (GRAM) TOPICAL DAILY
Qty: 90 TABLET | Refills: 1 | Status: SHIPPED | OUTPATIENT
Start: 2024-08-22

## 2024-08-22 RX ORDER — CITALOPRAM HYDROBROMIDE 10 MG/1
TABLET ORAL
Qty: 90 TABLET | Refills: 0 | OUTPATIENT
Start: 2024-08-22

## 2024-08-22 NOTE — TELEPHONE ENCOUNTER
[facility] sent Rx request for the following:      Requested Prescriptions   Pending Prescriptions Disp Refills    cyanocobalamin (VITAMIN B-12) 1000 MCG tablet [Pharmacy Med Name: VITAMIN B-12 1000MCG TABLET] 90 tablet 1     Sig: TAKE 1 TABLET (1,000 MCG) BY MOUTH DAILY       Vitamin Supplements Protocol Failed - 8/22/2024  9:29 AM        Failed - Medication indicated for associated diagnosis     The medication is prescribed for one or more of the following conditions:     Vitamin deficiency     Last Prescription Date:   3/25/24  Last Fill Qty/Refills:         90, R-1    Last Office Visit:                7/16/24 2/7/24 (Px)    Future Office visit:           None    Unable to complete prescription refill per RN Medication Refill Policy. Jennifer Oh RN .............. 8/22/2024  9:35 AM

## 2024-08-22 NOTE — TELEPHONE ENCOUNTER
[facility]728 of [place] sent Rx request for the following:      Redundant refill request refused: Too soon:  citalopram (CELEXA) 10 MG tablet 90 tablet 0 7/26/2024 -- No   Sig: TAKE 1 TAB BY MOUTH ONCE DAILY ALONG WITH 20MG TAB TO EQUAL 30MG DAILY   Sent to pharmacy as: Citalopram Hydrobromide 10 MG Oral Tablet (celeXA)   Class: E-Prescribe   Order: [id number]   E-Prescribing Status: Receipt confirmed by pharmacy (7/26/2024  9:25 AM CDT)     Printout Tracking    External Result Report     Pharmacy    [facility][id number] - [place], [place] - [address] Oh RN .............. 8/22/2024  9:36 AM

## 2024-08-22 NOTE — TELEPHONE ENCOUNTER
[facility] sent Rx request for the following:      Requested Prescriptions   Pending Prescriptions Disp Refills    diclofenac (VOLTAREN) 50 MG EC tablet [Pharmacy Med Name: DICLOFENAC SODIUM 50MG DR TAB] 180 tablet 2     Sig: TAKE 1 TABLET BY MOUTH TWICE A DAY       NSAID Medications Failed - 8/22/2024  9:28 AM        Failed - Normal CBC on file in past 12 months     Recent Labs   Lab Test 09/02/22  1034   WBC 7.1   RBC 3.81   HGB 12.9   HCT 38.1              Failed - Always Fail Criteria - Chart Review Required     Last Prescription Date:   12/26/23  Last Fill Qty/Refills:         180, R-2    Last Office Visit:              7/16/24 (arthritis of knee discussed)   Future Office visit:           None    Unable to complete prescription refill per RN Medication Refill Policy. Jennifer Oh RN .............. 8/22/2024  9:41 AM

## 2024-08-28 DIAGNOSIS — M17.11 ARTHRITIS OF RIGHT KNEE: ICD-10-CM

## 2024-08-29 RX ORDER — SENNOSIDES 8.6 MG
1300 CAPSULE ORAL AT BEDTIME
Qty: 60 TABLET | Refills: 0 | Status: SHIPPED | OUTPATIENT
Start: 2024-08-29 | End: 2024-09-25

## 2024-08-29 NOTE — TELEPHONE ENCOUNTER
"[facility] #[id number] sent Rx request for the following:      Requested Prescriptions   Pending Prescriptions Disp Refills    acetaminophen (TYLENOL) 650 MG CR tablet [Pharmacy Med Name: ACETAMINOPHEN 650MG ER TABLET] 60 tablet 0     Sig: TAKE 2 TABLETS (1,300 MG) BY MOUTH AT BEDTIME       Analgesics (Non-Narcotic Tylenol and ASA Only) Failed - 8/29/2024 10:12 AM        Failed - Medication matches indication     Acetaminophen is associated with one or more of the following diagnoses:  Pain  Fever          Passed - Patient is 7 months old or older     If patient is a peds patient of the age 7 mos -12 years, ok to refill using weight-based dosing.     If >3g daily and/or sig is not \"prn\", check for liver enzymes. If normal in the last year, ok to refill.  If not, refer to the provider.          Passed - Medication is active on med list        Passed - Recent (12 mo) or future (90 days) visit within the authorizing provider's specialty     The patient must have completed an in-person or virtual visit within the past 12 months or has a future visit scheduled within the next 90 days with the authorizing provider s specialty.  Urgent care and e-visits do not quality as an office visit for this protocol.               Last Prescription Date:   7/23/24  Last Fill Qty/Refills:         60, R-0    Last Office Visit:              2/7/24  Future Office visit:           None    Unable to complete prescription refill per RN Medication Refill Policy.     Anshul Orozco RN on 8/29/2024 at 10:15 AM        "

## 2024-09-23 DIAGNOSIS — M17.11 ARTHRITIS OF RIGHT KNEE: ICD-10-CM

## 2024-09-25 RX ORDER — SENNOSIDES 8.6 MG
1300 CAPSULE ORAL AT BEDTIME
Qty: 60 TABLET | Refills: 0 | Status: SHIPPED | OUTPATIENT
Start: 2024-09-25

## 2024-09-25 NOTE — TELEPHONE ENCOUNTER
[facility] sent Rx request for the following:      Requested Prescriptions   Pending Prescriptions Disp Refills    acetaminophen (TYLENOL) 650 MG CR tablet [Pharmacy Med Name: ACETAMINOPHEN 650MG ER TABLET] 60 tablet 0     Sig: TAKE 2 TABLETS (1,300 MG) BY MOUTH AT BEDTIME       Analgesics (Non-Narcotic Tylenol and ASA Only) Failed - 9/23/2024  8:56 AM        Failed - Medication matches indication     Acetaminophen is associated with one or more of the following diagnoses:  Pain  Fever                                             \Unable to complete prescription refill per RN Medication Refill Policy.       Last Prescription Date:   8/29/24  Last Fill Qty/Refills:         60, R-0    Last Office Visit:              7/16/24   Future Office visit:     none

## 2024-10-21 DIAGNOSIS — G30.0 EARLY ONSET ALZHEIMER'S DISEASE WITH BEHAVIORAL DISTURBANCE (H): ICD-10-CM

## 2024-10-21 DIAGNOSIS — K21.9 GASTROESOPHAGEAL REFLUX DISEASE WITHOUT ESOPHAGITIS: ICD-10-CM

## 2024-10-21 DIAGNOSIS — E03.8 OTHER SPECIFIED HYPOTHYROIDISM: ICD-10-CM

## 2024-10-21 DIAGNOSIS — R46.81 OBSESSIVE BEHAVIORS: ICD-10-CM

## 2024-10-21 DIAGNOSIS — F02.818 EARLY ONSET ALZHEIMER'S DISEASE WITH BEHAVIORAL DISTURBANCE (H): ICD-10-CM

## 2024-10-28 DIAGNOSIS — F02.818 EARLY ONSET ALZHEIMER'S DISEASE WITH BEHAVIORAL DISTURBANCE (H): ICD-10-CM

## 2024-10-28 DIAGNOSIS — R46.81 OBSESSIVE BEHAVIORS: ICD-10-CM

## 2024-10-28 DIAGNOSIS — G30.0 EARLY ONSET ALZHEIMER'S DISEASE WITH BEHAVIORAL DISTURBANCE (H): ICD-10-CM

## 2024-10-28 DIAGNOSIS — K21.9 GASTROESOPHAGEAL REFLUX DISEASE WITHOUT ESOPHAGITIS: ICD-10-CM

## 2024-10-28 DIAGNOSIS — E03.8 OTHER SPECIFIED HYPOTHYROIDISM: ICD-10-CM

## 2024-10-28 RX ORDER — QUETIAPINE FUMARATE 25 MG/1
25 TABLET, FILM COATED ORAL 2 TIMES DAILY
Qty: 180 TABLET | Refills: 0 | Status: SHIPPED | OUTPATIENT
Start: 2024-10-28

## 2024-10-28 RX ORDER — CITALOPRAM HYDROBROMIDE 20 MG/1
TABLET ORAL
Qty: 30 TABLET | Refills: 3 | Status: SHIPPED | OUTPATIENT
Start: 2024-10-28

## 2024-10-28 RX ORDER — MEMANTINE HYDROCHLORIDE 5 MG/1
5 TABLET ORAL 2 TIMES DAILY
Qty: 180 TABLET | Refills: 0 | Status: SHIPPED | OUTPATIENT
Start: 2024-10-28

## 2024-10-28 RX ORDER — LEVOTHYROXINE SODIUM 137 UG/1
137 TABLET ORAL DAILY
Qty: 90 TABLET | Refills: 0 | Status: SHIPPED | OUTPATIENT
Start: 2024-10-28

## 2024-10-28 RX ORDER — FAMOTIDINE 40 MG/1
TABLET, FILM COATED ORAL
Qty: 90 TABLET | Refills: 0 | Status: SHIPPED | OUTPATIENT
Start: 2024-10-28

## 2024-10-28 NOTE — TELEPHONE ENCOUNTER
[facility] #[id number]    sent Rx request for the following:      Requested Prescriptions   Pending Prescriptions Disp Refills    citalopram (CELEXA) 20 MG tablet [Pharmacy Med Name: CITALOPRAM 20MG TABLET] 30 tablet 3     Sig: TAKE 1 TAB BY MOUTH ONCE DAILY ALONG WITH 10MG TAB TO EQUAL 30MG DAILY       SSRIs Protocol Failed - 10/28/2024  6:38 AM        Failed - Medication indicated for associated diagnosis     Medication is associated with one or more of the following diagnoses:              Anxiety             Bipolar  Depression  Obsessive-compulsive disorder             Panic disorder  Postmenopausal flushing             Premenstrual dysphoric disorder             Social phobia   Adjustment disorder with depressed mood   Mood disorder   Last Prescription Date:   07/23/2024  Last Fill Qty/Refills:         30, R-3       levothyroxine (SYNTHROID/LEVOTHROID) 137 MCG tablet [Pharmacy Med Name: LEVOTHYROXINE 137MCG TABLET] 90 tablet 0     Sig: TAKE 1 TABLET (137 MCG) BY MOUTH DAILY      Last Prescription Date:   07/24/2024  Last Fill Qty/Refills:         90, R-0       famotidine (PEPCID) 40 MG tablet [Pharmacy Med Name: FAMOTIDINE 40MG TABLET] 90 tablet 0     Sig: TAKE 1/2 TAB (20MG) BY MOUTH TWICE A DAY      Last Prescription Date:   07/24/24  Last Fill Qty/Refills:         90, R-0       QUEtiapine (SEROQUEL) 25 MG tablet [Pharmacy Med Name: QUETIAPINE 25MG TABLET] 180 tablet 0     Sig: TAKE 1 TABLET BY MOUTH TWICE A DAY       Antipsychotic Medications Failed - 10/28/2024  6:38 AM        Failed - Lipid panel on file within the past 12 months     Recent Labs   Lab Test 07/06/20  1108   CHOL 165   TRIG 116   HDL 44   LDL 98   NHDL 121           Failed - Medication indicated for associated diagnosis     Medication is associated with one or more of the following diagnoses:             Agitation            Autistic disorder            Bipolar disorder            Chemotherapy-induced nausea and vomiting             Delirium            Depression            Schizophrenia     Last Prescription Date:   07/24/24  Last Fill Qty/Refills:         180, R-0       memantine (NAMENDA) 5 MG tablet [Pharmacy Med Name: MEMANTINE HCL 5MG TABLET] 180 tablet 0     Sig: TAKE 1 TABLET BY MOUTH TWICE A DAY        Last Prescription Date:   7/24/24  Last Fill Qty/Refills:         180, R-0  Last Office Visit:              02/07/2024 (Px)   Future Office visit:           None     Saloni García RN on 10/28/2024 at 6:43 AM

## 2024-10-30 RX ORDER — LEVOTHYROXINE SODIUM 137 UG/1
137 TABLET ORAL DAILY
Qty: 90 TABLET | Refills: 0 | OUTPATIENT
Start: 2024-10-30

## 2024-10-30 RX ORDER — QUETIAPINE FUMARATE 25 MG/1
25 TABLET, FILM COATED ORAL 2 TIMES DAILY
Qty: 180 TABLET | Refills: 0 | OUTPATIENT
Start: 2024-10-30

## 2024-10-30 RX ORDER — MEMANTINE HYDROCHLORIDE 5 MG/1
5 TABLET ORAL 2 TIMES DAILY
Qty: 180 TABLET | Refills: 0 | OUTPATIENT
Start: 2024-10-30

## 2024-10-30 RX ORDER — CITALOPRAM HYDROBROMIDE 20 MG/1
TABLET ORAL
Qty: 30 TABLET | Refills: 3 | OUTPATIENT
Start: 2024-10-30

## 2024-10-30 RX ORDER — FAMOTIDINE 40 MG/1
TABLET, FILM COATED ORAL
Qty: 90 TABLET | Refills: 0 | OUTPATIENT
Start: 2024-10-30

## 2024-10-30 NOTE — TELEPHONE ENCOUNTER
[id number] requesting:    citalopram (CELEXA) 20 MG tablet 30 tablet 3 10/28/2024 -- No   Sig: TAKE 1 TAB BY MOUTH ONCE DAILY ALONG WITH 10MG TAB TO EQUAL 30MG DAILY   Sent to pharmacy as: Citalopram Hydrobromide 20 MG Oral Tablet (celeXA)   Class: E-Prescribe   Order: [id number]   E-Prescribing Status: Receipt confirmed by pharmacy (10/28/2024  7:42 AM CDT)     levothyroxine (SYNTHROID/LEVOTHROID) 137 MCG tablet 90 tablet 0 10/28/2024 -- No   Sig - Route: TAKE 1 TABLET (137 MCG) BY MOUTH DAILY - Oral   Sent to pharmacy as: Levothyroxine Sodium 137 MCG Oral Tablet (SYNTHROID/LEVOTHROID)   Class: E-Prescribe   Order: [id number]   E-Prescribing Status: Receipt confirmed by pharmacy (10/28/2024  7:42 AM CDT)     famotidine (PEPCID) 40 MG tablet 90 tablet 0 10/28/2024 -- No   Sig: TAKE 1/2 TAB (20MG) BY MOUTH TWICE A DAY   Sent to pharmacy as: Famotidine 40 MG Oral Tablet (PEPCID)   Class: E-Prescribe   Order: [id number]   E-Prescribing Status: Receipt confirmed by pharmacy (10/28/2024  7:42 AM CDT)       QUEtiapine (SEROQUEL) 25 MG tablet 180 tablet 0 10/28/2024 -- No   Sig - Route: TAKE 1 TABLET BY MOUTH TWICE A DAY - Oral   Sent to pharmacy as: QUEtiapine Fumarate 25 MG Oral Tablet (SEROquel)   Class: E-Prescribe   Order: [id number]   E-Prescribing Status: Receipt confirmed by pharmacy (10/28/2024  7:42 AM CDT)     memantine (NAMENDA) 5 MG tablet 180 tablet 0 10/28/2024 -- No   Sig - Route: TAKE 1 TABLET BY MOUTH TWICE A DAY - Oral   Sent to pharmacy as: Memantine HCl 5 MG Oral Tablet (NAMENDA)   Class: E-Prescribe   Order: [id number]   E-Prescribing Status: Receipt confirmed by pharmacy (10/28/2024  7:42 AM CDT)       Artur Gamboa RN on 10/30/2024 at 7:51 AM

## 2024-11-01 DIAGNOSIS — M17.11 ARTHRITIS OF RIGHT KNEE: ICD-10-CM

## 2024-11-01 RX ORDER — SENNOSIDES 8.6 MG
1300 CAPSULE ORAL AT BEDTIME
Qty: 60 TABLET | Refills: 0 | Status: SHIPPED | OUTPATIENT
Start: 2024-11-01

## 2024-11-01 NOTE — TELEPHONE ENCOUNTER
[facility] sent Rx request for the following:      Requested Prescriptions   Pending Prescriptions Disp Refills    acetaminophen (TYLENOL) 650 MG CR tablet 60 tablet 0     Sig: Take 2 tablets (1,300 mg) by mouth at bedtime.       Analgesics (Non-Narcotic Tylenol and ASA Only) Failed - 11/1/2024  2:41 PM        Failed - Medication matches indication     Acetaminophen is associated with one or more of the following diagnoses:  Pain  Fever       Last Prescription Date:   9/25/24  Last Fill Qty/Refills:         60, R-0    Last Office Visit:              7/16/24 (discussed)   Future Office visit:           None    Unable to complete prescription refill per RN Medication Refill Policy. Jennifer Oh RN .............. 11/1/2024  2:46 PM

## 2024-11-18 DIAGNOSIS — N92.6 MENSTRUAL DISORDER: ICD-10-CM

## 2024-11-19 DIAGNOSIS — F41.1 GAD (GENERALIZED ANXIETY DISORDER): ICD-10-CM

## 2024-11-19 DIAGNOSIS — R79.89 LOW VITAMIN B12 LEVEL: ICD-10-CM

## 2024-11-19 RX ORDER — LANOLIN ALCOHOL/MO/W.PET/CERES
1000 CREAM (GRAM) TOPICAL DAILY
Qty: 90 TABLET | Refills: 1 | OUTPATIENT
Start: 2024-11-19

## 2024-11-19 RX ORDER — CITALOPRAM HYDROBROMIDE 10 MG/1
TABLET ORAL
Qty: 90 TABLET | Refills: 0 | Status: SHIPPED | OUTPATIENT
Start: 2024-11-19

## 2024-11-19 RX ORDER — NORGESTIMATE AND ETHINYL ESTRADIOL 7DAYSX3 28
1 KIT ORAL DAILY
Qty: 84 TABLET | Refills: 0 | Status: SHIPPED | OUTPATIENT
Start: 2024-11-19

## 2024-11-19 NOTE — TELEPHONE ENCOUNTER
[facility] of [place] sent Rx request for the following:    Redundant refill request refused: Too soon:  cyanocobalamin (VITAMIN B-12) 1000 MCG tablet 90 tablet 1 8/22/2024 -- No   Sig - Route: TAKE 1 TABLET (1,000 MCG) BY MOUTH DAILY - Oral   Sent to pharmacy as: Vitamin B-12 1000 MCG Oral Tablet (VITAMIN B-12)   Class: E-Prescribe   Order: [id number]   E-Prescribing Status: Receipt confirmed by pharmacy (8/22/2024  9:42 AM CDT)      [id number] - [place], [place] - [address]      citalopram (CELEXA) 10 MG tablet [Pharmacy Med Name: CITALOPRAM 10MG TABLET] 90 tablet 0     Sig: TAKE 1 TAB BY MOUTH ONCE DAILY ALONG WITH 20MG TAB TO EQUAL 30MG DAILY   Last Prescription Date:   7/26/24  Last Fill Qty/Refills:         90, R-0      SSRIs Protocol Failed - 11/19/2024  2:18 PM        Failed - DOROTEO-7 score of less than 5 in past 6 months.     Please review last DOROTEO-7 score.      Last Office Visit:              2/7/24 (Px)   Future Office visit:           None    Unable to complete prescription refill per RN Medication Refill Policy. Jennifer Oh RN .............. 11/19/2024  2:22 PM

## 2024-11-19 NOTE — TELEPHONE ENCOUNTER
[facility] sent Rx request for the following:      Requested Prescriptions   Pending Prescriptions Disp Refills    TRI-LINYAH 0.18/0.215/0.25 MG-35 MCG tablet [Pharmacy Med Name: TRI-LINYAH 28-DAY TABLET] 84 tablet 3     Sig: TAKE 1 TABLET BY MOUTH DAILY   Last Prescription Date:   11/13/23  Last Fill Qty/Refills:         84, R-3    Last Office Visit:              2/7/24 (physical)  Future Office visit:           None    Prescription refilled per RN Medication Refill Policy.................... Jennifer Oh RN ....................  11/19/2024   9:56 AM

## 2024-11-21 DIAGNOSIS — M17.11 ARTHRITIS OF RIGHT KNEE: ICD-10-CM

## 2024-11-21 RX ORDER — SENNOSIDES 8.6 MG
1300 CAPSULE ORAL AT BEDTIME
Qty: 60 TABLET | Refills: 0 | Status: SHIPPED | OUTPATIENT
Start: 2024-11-21

## 2024-11-21 NOTE — TELEPHONE ENCOUNTER
[facility] sent Rx request for the following:      Requested Prescriptions   Pending Prescriptions Disp Refills    acetaminophen (TYLENOL) 650 MG CR tablet [Pharmacy Med Name: ACETAMINOPHEN 650MG ER TABLET] 60 tablet 0     Sig: TAKE 2 TABLETS (1,300 MG) BY MOUTH AT BEDTIME.       Analgesics (Non-Narcotic Tylenol and ASA Only) Failed - 11/21/2024  1:51 PM        Failed - Medication matches indication     Acetaminophen is associated with one or more of the following diagnoses:  Pain  Fever     Last Prescription Date:   11/1/24  Last Fill Qty/Refills:         60, R-0    Last Office Visit:              2/7/24 (Px)  Future Office visit:           None    Unable to complete prescription refill per RN Medication Refill Policy. Jennifer Oh RN .............. 11/21/2024  1:55 PM

## 2024-12-19 DIAGNOSIS — K21.9 GASTROESOPHAGEAL REFLUX DISEASE WITHOUT ESOPHAGITIS: ICD-10-CM

## 2024-12-19 DIAGNOSIS — Z00.00 HEALTH CARE MAINTENANCE: ICD-10-CM

## 2024-12-19 DIAGNOSIS — G30.0 EARLY ONSET ALZHEIMER'S DISEASE WITH BEHAVIORAL DISTURBANCE (H): ICD-10-CM

## 2024-12-19 DIAGNOSIS — F02.818 EARLY ONSET ALZHEIMER'S DISEASE WITH BEHAVIORAL DISTURBANCE (H): ICD-10-CM

## 2024-12-19 DIAGNOSIS — E03.8 OTHER SPECIFIED HYPOTHYROIDISM: ICD-10-CM

## 2024-12-19 DIAGNOSIS — R46.81 OBSESSIVE BEHAVIORS: ICD-10-CM

## 2024-12-24 RX ORDER — MULTIVITAMIN WITH FOLIC ACID 400 MCG
TABLET ORAL
Qty: 90 TABLET | Refills: 0 | Status: SHIPPED | OUTPATIENT
Start: 2024-12-24

## 2024-12-24 RX ORDER — LEVOTHYROXINE SODIUM 137 UG/1
137 TABLET ORAL DAILY
Qty: 90 TABLET | Refills: 0 | Status: SHIPPED | OUTPATIENT
Start: 2024-12-24

## 2024-12-24 RX ORDER — MEMANTINE HYDROCHLORIDE 5 MG/1
5 TABLET ORAL 2 TIMES DAILY
Qty: 180 TABLET | Refills: 0 | Status: SHIPPED | OUTPATIENT
Start: 2024-12-24

## 2024-12-24 RX ORDER — FAMOTIDINE 40 MG/1
TABLET, FILM COATED ORAL
Qty: 90 TABLET | Refills: 0 | Status: SHIPPED | OUTPATIENT
Start: 2024-12-24

## 2024-12-24 NOTE — TELEPHONE ENCOUNTER
[id number] sent Rx request for the following:      Requested Prescriptions   Pending Prescriptions Disp Refills    famotidine (PEPCID) 40 MG tablet [Pharmacy Med Name: FAMOTIDINE 40MG TABLET] 90 tablet 0     Sig: TAKE 1/2 TAB (20MG) BY MOUTH TWICE A DAY       H2 Blockers Protocol Passed - 12/24/2024 10:23 AM        Passed - Patient is age 12 or older        Passed - Medication is active on med list        Passed - Medication indicated for associated diagnosis     Medication is associated with one or more of the following diagnoses:  Erosive esophagitis - Gastric hypersecretion   Gastric ulcer   Gastroesophageal reflux disease   Indigestion   Ulcer of duodenum   Esophagitis   Gastritis   Gastrointestinal hemorrhage   Stress ulcer; Prophylaxis   Helicobacter pylori gastrointestinal tract infection - Ulcer of duodenum  Zollinger-Holloway syndrome  Cystic Fibrosis  Bronchiectasis            Passed - Recent (12 mo) or future (90 days) visit within the authorizing provider's specialty     The patient must have completed an in-person or virtual visit within the past 12 months or has a future visit scheduled within the next 90 days with the authorizing provider s specialty.  Urgent care and e-visits do not qualify as an office visit for this protocol.         Last Prescription Date:   10/28/24  Last Fill Qty/Refills:         90, R-0           levothyroxine (SYNTHROID/LEVOTHROID) 137 MCG tablet [Pharmacy Med Name: LEVOTHYROXINE 137MCG TABLET] 90 tablet 0     Sig: TAKE 1 TABLET (137 MCG) BY MOUTH DAILY       Thyroid Protocol Passed - 12/24/2024 10:23 AM        Passed - Patient is 12 years or older        Passed - Medication is active on med list        Passed - Recent (12 mo) or future (90 days) visit within the authorizing provider's specialty     The patient must have completed an in-person or virtual visit within the past 12 months or has a future visit scheduled within the next 90 days with  the authorizing provider s specialty.  Urgent care and e-visits do not qualify as an office visit for this protocol.          Passed - Medication indicated for associated diagnosis     Medication is associated with one or more of the following diagnoses:  Hypothyroidism  Thyroid stimulating hormone suppression therapy  Thyroid cancer  Acquired atrophy of thyroid          Passed - Normal TSH on file in past 12 months     Recent Labs   Lab Test 02/07/24  1356   TSH 1.06              Passed - No active pregnancy on record     If patient is pregnant or has had a positive pregnancy test, please check TSH.          Passed - No positive pregnancy test in past 12 months     If patient is pregnant or has had a positive pregnancy test, please check TSH.       Last Prescription Date:   10/28/24  Last Fill Qty/Refills:         90, R-0             QUEtiapine (SEROQUEL) 25 MG tablet [Pharmacy Med Name: QUETIAPINE 25MG TABLET] 180 tablet 0     Sig: TAKE 1 TABLET BY MOUTH TWICE A DAY       Antipsychotic Medications Failed - 12/24/2024 10:23 AM        Failed - Lipid panel on file within the past 12 months     Recent Labs   Lab Test 07/06/20  1108   CHOL 165   TRIG 116   HDL 44   LDL 98   NHDL 121               Failed - Medication indicated for associated diagnosis     Medication is associated with one or more of the following diagnoses:             Agitation            Autistic disorder            Bipolar disorder            Chemotherapy-induced nausea and vomiting            Delirium            Depression            Schizophrenia             Mood disorder         Last Prescription Date:   10/28/24  Last Fill Qty/Refills:         180, R-0             memantine (NAMENDA) 5 MG tablet [Pharmacy Med Name: MEMANTINE HCL 5MG TABLET] 180 tablet 0     Sig: TAKE 1 TABLET BY MOUTH TWICE A DAY       Miscellaneous Dementia Agents Passed - 12/24/2024 10:23 AM        Passed - Medication is active on med list        Passed - Recent (12 mo) or  future (90 days) visit within the authorizing provider's specialty     The patient must have completed an in-person or virtual visit within the past 12 months or has a future visit scheduled within the next 90 days with the authorizing provider s specialty.  Urgent care and e-visits do not qualify as an office visit for this protocol.          Passed - Medication indicated for associated diagnosis     Medication is associated with one or more of the following diagnoses:            Alzheimer's disease           Dementia           Schizophrenia            Passed - Patient is 18 years of age or older     Last Prescription Date:   10/28/24  Last Fill Qty/Refills:         180, R-0             Multiple Vitamin (TAB-A-MATTHIAS) TABS [Pharmacy Med Name: TAB-A-MATTHIAS MULTIVITAMIN TABLET] 90 tablet 2     Sig: TAKE 1 TAB BY MOUTH ONCE DAILY       Vitamin Supplements Protocol Passed - 12/24/2024 10:23 AM        Passed - The patient does not have an order for high-dose vitamin D        Passed - Medication is active on med list        Passed - Medication indicated for associated diagnosis     The medication is prescribed for one or more of the following conditions:     Vitamin deficiency   Osteopenia   Osteoporosis   Nutrition counseling   Nutrition education   Feeding ability finding   Bone density finding   Morbid Obesity   History of bypass of stomach   Idiopathic peripheral neuropathy   General examination of patient   Vitamin D deficiency   Folate deficiency anemia due to dietary causes   Sleeve resection of stomach   Rheumatoid factor level - finding   Crohn's disease   Psoriatic arthritis   Transplant of Kidney   Arthropathy   Alcoholism   Malabsorption syndrome   Disorder of bone and articular cartilage   Cystic Fibrosis  Bronchiectasis            Passed - The patient is 2 years of age or older        Passed - Recent (12 mo) or future (90 days) visit within the authorizing provider's specialty     The patient must have  completed an in-person or virtual visit within the past 12 months or has a future visit scheduled within the next 90 days with the authorizing provider s specialty.  Urgent care and e-visits do not qualify as an office visit for this protocol.               Last Prescription Date:   4/25/24  Last Fill Qty/Refills:         90, R-2    Last Office Visit:              2/7/24   Future Office visit:           none

## 2024-12-26 RX ORDER — QUETIAPINE FUMARATE 25 MG/1
25 TABLET, FILM COATED ORAL 2 TIMES DAILY
Qty: 180 TABLET | Refills: 0 | Status: SHIPPED | OUTPATIENT
Start: 2024-12-26

## 2024-12-30 DIAGNOSIS — R79.89 LOW VITAMIN B12 LEVEL: ICD-10-CM

## 2024-12-31 NOTE — TELEPHONE ENCOUNTER
Requested Prescriptions   Pending Prescriptions Disp Refills    cyanocobalamin (VITAMIN B-12) 1000 MCG tablet 90 tablet 1     Sig: Take 1 tablet (1,000 mcg) by mouth daily.       Vitamin Supplements Protocol Failed - 12/31/2024  3:37 PM        Failed - Medication indicated for associated diagnosis     The medication is prescribed for one or more of the following conditions:     Vitamin deficiency   Osteopenia   Osteoporosis   Nutrition counseling   Nutrition education   Feeding ability finding   Bone density finding   Morbid Obesity   History of bypass of stomach   Idiopathic peripheral neuropathy   General examination of patient   Vitamin D deficiency   Folate deficiency anemia due to dietary causes   Sleeve resection of stomach   Rheumatoid factor level - finding   Crohn's disease   Psoriatic arthritis   Transplant of Kidney   Arthropathy   Alcoholism   Malabsorption syndrome   Disorder of bone and articular cartilage   Cystic Fibrosis  Bronchiectasis        Passed - The patient does not have an order for high-dose vitamin D        Passed - Medication is active on med list        Passed - The patient is 2 years of age or older        Passed - Recent (12 mo) or future (90 days) visit within the authorizing provider's specialty     The patient must have completed an in-person or virtual visit within the past 12 months or has a future visit scheduled within the next 90 days with the authorizing provider s specialty.  Urgent care and e-visits do not qualify as an office visit for this protocol.     Last Written Prescription Date:  8/22/24  Last Fill Quantity: 90,   # refills: 1  Last Office Visit: 2/7/24  Future Office visit:   none    Routing refill request to provider for review/approval because:  Failed - Medication indicated for associated diagnosis    Unable to complete prescription refill per RN Medication Refill Policy.     Verified with Patricia at [facility] that patient has enough through  January.  OK to wait for Mikayla ONEAL to be back in office.  Heidi Holm RN ....................  12/31/2024   3:37 PM

## 2025-01-01 RX ORDER — LANOLIN ALCOHOL/MO/W.PET/CERES
1000 CREAM (GRAM) TOPICAL DAILY
Qty: 90 TABLET | Refills: 1 | Status: SHIPPED | OUTPATIENT
Start: 2025-01-01

## 2025-01-20 DIAGNOSIS — M17.11 ARTHRITIS OF RIGHT KNEE: ICD-10-CM

## 2025-01-20 DIAGNOSIS — N92.6 MENSTRUAL DISORDER: ICD-10-CM

## 2025-01-22 RX ORDER — NORGESTIMATE AND ETHINYL ESTRADIOL 7DAYSX3 28
1 KIT ORAL DAILY
Qty: 84 TABLET | Refills: 0 | Status: SHIPPED | OUTPATIENT
Start: 2025-01-22

## 2025-01-22 RX ORDER — SENNOSIDES 8.6 MG
1300 CAPSULE ORAL AT BEDTIME
Qty: 60 TABLET | Refills: 0 | Status: SHIPPED | OUTPATIENT
Start: 2025-01-22

## 2025-01-22 NOTE — TELEPHONE ENCOUNTER
Patient is scheduled for annual exam on February 24,2025.  Sandhya Roman on 1/22/2025 at 4:35 PM

## 2025-01-22 NOTE — TELEPHONE ENCOUNTER
[id number] sent Rx request for the following:      Requested Prescriptions   Pending Prescriptions Disp Refills    TRI-LINYAH 0.18/0.215/0.25 MG-35 MCG tablet [Pharmacy Med Name: TRI-LINYAH 28-DAY TABLET] 84 tablet 0     Sig: TAKE 1 TABLET BY MOUTH DAILY   Last Prescription Date:   11/19/24  Last Fill Qty/Refills:         84, R-0        acetaminophen (TYLENOL) 650 MG CR tablet [Pharmacy Med Name: ACETAMINOPHEN 650MG ER TABLET] 60 tablet 0     Sig: TAKE 2 TABLETS (1,300 MG) BY MOUTH AT BEDTIME.   Last Prescription Date:   12/27/24  Last Fill Qty/Refills:         60, R-0      Analgesics (Non-Narcotic Tylenol and ASA Only) Failed - 1/22/2025  2:09 PM        Failed - Medication matches indication     Acetaminophen is associated with one or more of the following diagnoses:  Pain  Fever     Last Office Visit:              2/7/24 (Px)  Future Office visit:           None    Pt due for annual exam after 2/7/25. Routing to provider for refill consideration. Routing to Unit scheduling pool, to assist Pt in scheduling appointment. Unable to complete prescription refill per RN Medication Refill Policy. Jennifer Oh RN .............. 1/22/2025  2:11 PM

## 2025-02-17 DIAGNOSIS — F41.1 GAD (GENERALIZED ANXIETY DISORDER): ICD-10-CM

## 2025-02-17 NOTE — TELEPHONE ENCOUNTER
[id number] sent Rx request for the following:      Requested Prescriptions   Pending Prescriptions Disp Refills    citalopram (CELEXA) 10 MG tablet [Pharmacy Med Name: CITALOPRAM 10MG TABLET] 90 tablet 0     Sig: TAKE 1 TAB BY MOUTH ONCE DAILY ALONG WITH 20MG TAB TO EQUAL 30MG DAILY       SSRIs Protocol Failed - 2/17/2025  3:43 PM        Failed - DOROTEO-7 score of less than 5 in past 6 months.     Please review last DOROTEO-7 score.        Last Prescription Date:   11/19/24  Last Fill Qty/Refills:         90, R-0    Last Office Visit:              2/7/24 (px)   Future Office visit:             Next 5 appointments (look out 90 days)      Feb 24, 2025 11:30 AM  (Arrive by 11:15 AM)  Adult Preventative Visit with [facility] ([facility]) [address]  [place] [id number] [phone]           Unable to complete prescription refill per RN Medication Refill Policy.     Anshul Orozco RN on 2/17/2025 at 3:44 PM

## 2025-02-18 RX ORDER — CITALOPRAM HYDROBROMIDE 10 MG/1
TABLET ORAL
Qty: 30 TABLET | Refills: 0 | Status: SHIPPED | OUTPATIENT
Start: 2025-02-18

## 2025-02-24 ENCOUNTER — OFFICE VISIT (OUTPATIENT)
Dept: INTERNAL MEDICINE | Facility: OTHER | Age: 50
End: 2025-02-24
Payer: MEDICARE

## 2025-02-24 VITALS
HEIGHT: 55 IN | BODY MASS INDEX: 34.3 KG/M2 | DIASTOLIC BLOOD PRESSURE: 49 MMHG | WEIGHT: 148.2 LBS | RESPIRATION RATE: 12 BRPM | SYSTOLIC BLOOD PRESSURE: 80 MMHG | HEART RATE: 50 BPM | TEMPERATURE: 98.6 F

## 2025-02-24 DIAGNOSIS — E03.8 OTHER SPECIFIED HYPOTHYROIDISM: ICD-10-CM

## 2025-02-24 DIAGNOSIS — F41.1 GAD (GENERALIZED ANXIETY DISORDER): ICD-10-CM

## 2025-02-24 DIAGNOSIS — Z00.00 ENCOUNTER FOR MEDICARE ANNUAL WELLNESS EXAM: Primary | ICD-10-CM

## 2025-02-24 DIAGNOSIS — Z23 NEED FOR PNEUMOCOCCAL 20-VALENT CONJUGATE VACCINATION: ICD-10-CM

## 2025-02-24 DIAGNOSIS — I95.9 HYPOTENSION, UNSPECIFIED HYPOTENSION TYPE: ICD-10-CM

## 2025-02-24 DIAGNOSIS — F02.818 EARLY ONSET ALZHEIMER'S DISEASE WITH BEHAVIORAL DISTURBANCE (H): ICD-10-CM

## 2025-02-24 DIAGNOSIS — Z23 NEEDS FLU SHOT: ICD-10-CM

## 2025-02-24 DIAGNOSIS — Z71.85 VACCINE COUNSELING: ICD-10-CM

## 2025-02-24 DIAGNOSIS — G30.0 EARLY ONSET ALZHEIMER'S DISEASE WITH BEHAVIORAL DISTURBANCE (H): ICD-10-CM

## 2025-02-24 DIAGNOSIS — Z23 NEED FOR IMMUNIZATION AGAINST INFLUENZA: ICD-10-CM

## 2025-02-24 DIAGNOSIS — R46.81 OBSESSIVE BEHAVIORS: ICD-10-CM

## 2025-02-24 DIAGNOSIS — K21.9 GASTROESOPHAGEAL REFLUX DISEASE WITHOUT ESOPHAGITIS: ICD-10-CM

## 2025-02-24 DIAGNOSIS — M17.0 PRIMARY OSTEOARTHRITIS OF BOTH KNEES: ICD-10-CM

## 2025-02-24 DIAGNOSIS — Z00.00 HEALTH CARE MAINTENANCE: ICD-10-CM

## 2025-02-24 DIAGNOSIS — N92.6 MENSTRUAL DISORDER: ICD-10-CM

## 2025-02-24 DIAGNOSIS — R74.8 ELEVATED LIVER ENZYMES: ICD-10-CM

## 2025-02-24 DIAGNOSIS — Z23 NEED FOR COVID-19 VACCINE: ICD-10-CM

## 2025-02-24 DIAGNOSIS — R79.89 LOW VITAMIN B12 LEVEL: ICD-10-CM

## 2025-02-24 LAB
ALBUMIN SERPL BCG-MCNC: 3.5 G/DL (ref 3.5–5.2)
ALP SERPL-CCNC: 201 U/L (ref 40–150)
ALT SERPL W P-5'-P-CCNC: 44 U/L (ref 0–50)
ANION GAP SERPL CALCULATED.3IONS-SCNC: 11 MMOL/L (ref 7–15)
AST SERPL W P-5'-P-CCNC: 46 U/L (ref 0–45)
BASOPHILS # BLD AUTO: 0.1 10E3/UL (ref 0–0.2)
BASOPHILS NFR BLD AUTO: 1 %
BILIRUB SERPL-MCNC: 0.5 MG/DL
BUN SERPL-MCNC: 14.7 MG/DL (ref 6–20)
CALCIUM SERPL-MCNC: 9 MG/DL (ref 8.8–10.4)
CHLORIDE SERPL-SCNC: 103 MMOL/L (ref 98–107)
CREAT SERPL-MCNC: 0.82 MG/DL (ref 0.51–0.95)
EGFRCR SERPLBLD CKD-EPI 2021: 87 ML/MIN/1.73M2
EOSINOPHIL # BLD AUTO: 0.2 10E3/UL (ref 0–0.7)
EOSINOPHIL NFR BLD AUTO: 2 %
ERYTHROCYTE [DISTWIDTH] IN BLOOD BY AUTOMATED COUNT: 14.1 % (ref 10–15)
GLUCOSE SERPL-MCNC: 100 MG/DL (ref 70–99)
HCO3 SERPL-SCNC: 25 MMOL/L (ref 22–29)
HCT VFR BLD AUTO: 35.6 % (ref 35–47)
HGB BLD-MCNC: 12.6 G/DL (ref 11.7–15.7)
IMM GRANULOCYTES # BLD: 0.1 10E3/UL
IMM GRANULOCYTES NFR BLD: 1 %
LYMPHOCYTES # BLD AUTO: 1.6 10E3/UL (ref 0.8–5.3)
LYMPHOCYTES NFR BLD AUTO: 23 %
MCH RBC QN AUTO: 34.1 PG (ref 26.5–33)
MCHC RBC AUTO-ENTMCNC: 35.4 G/DL (ref 31.5–36.5)
MCV RBC AUTO: 97 FL (ref 78–100)
MONOCYTES # BLD AUTO: 0.8 10E3/UL (ref 0–1.3)
MONOCYTES NFR BLD AUTO: 11 %
NEUTROPHILS # BLD AUTO: 4.5 10E3/UL (ref 1.6–8.3)
NEUTROPHILS NFR BLD AUTO: 62 %
NRBC # BLD AUTO: 0 10E3/UL
NRBC BLD AUTO-RTO: 0 /100
PLATELET # BLD AUTO: 295 10E3/UL (ref 150–450)
POTASSIUM SERPL-SCNC: 4.2 MMOL/L (ref 3.4–5.3)
PROT SERPL-MCNC: 7.3 G/DL (ref 6.4–8.3)
RBC # BLD AUTO: 3.69 10E6/UL (ref 3.8–5.2)
SODIUM SERPL-SCNC: 139 MMOL/L (ref 135–145)
TSH SERPL DL<=0.005 MIU/L-ACNC: 1.33 UIU/ML (ref 0.3–4.2)
WBC # BLD AUTO: 7.2 10E3/UL (ref 4–11)

## 2025-02-24 PROCEDURE — 36415 COLL VENOUS BLD VENIPUNCTURE: CPT | Mod: ZL

## 2025-02-24 PROCEDURE — 81003 URINALYSIS AUTO W/O SCOPE: CPT | Mod: ZL

## 2025-02-24 PROCEDURE — 85025 COMPLETE CBC W/AUTO DIFF WBC: CPT | Mod: ZL

## 2025-02-24 PROCEDURE — 90677 PCV20 VACCINE IM: CPT

## 2025-02-24 PROCEDURE — 80048 BASIC METABOLIC PNL TOTAL CA: CPT | Mod: ZL

## 2025-02-24 PROCEDURE — G0463 HOSPITAL OUTPT CLINIC VISIT: HCPCS | Mod: 25

## 2025-02-24 PROCEDURE — 36416 COLLJ CAPILLARY BLOOD SPEC: CPT | Mod: ZL

## 2025-02-24 PROCEDURE — 82310 ASSAY OF CALCIUM: CPT | Mod: ZL

## 2025-02-24 PROCEDURE — 84443 ASSAY THYROID STIM HORMONE: CPT | Mod: ZL

## 2025-02-24 PROCEDURE — G0009 ADMIN PNEUMOCOCCAL VACCINE: HCPCS

## 2025-02-24 RX ORDER — FAMOTIDINE 40 MG/1
TABLET, FILM COATED ORAL
Qty: 90 TABLET | Refills: 0 | Status: SHIPPED | OUTPATIENT
Start: 2025-02-24

## 2025-02-24 RX ORDER — LEVOTHYROXINE SODIUM 137 UG/1
137 TABLET ORAL DAILY
Qty: 90 TABLET | Refills: 0 | Status: SHIPPED | OUTPATIENT
Start: 2025-02-24 | End: 2025-02-26

## 2025-02-24 RX ORDER — NORGESTIMATE AND ETHINYL ESTRADIOL 7DAYSX3 28
1 KIT ORAL DAILY
Qty: 84 TABLET | Refills: 0 | Status: SHIPPED | OUTPATIENT
Start: 2025-02-24 | End: 2025-02-26

## 2025-02-24 RX ORDER — LANOLIN ALCOHOL/MO/W.PET/CERES
1000 CREAM (GRAM) TOPICAL DAILY
Qty: 90 TABLET | Refills: 1 | Status: SHIPPED | OUTPATIENT
Start: 2025-02-24 | End: 2025-02-26

## 2025-02-24 RX ORDER — MULTIVITAMIN WITH FOLIC ACID 400 MCG
1 TABLET ORAL DAILY
Qty: 90 TABLET | Refills: 0 | Status: SHIPPED | OUTPATIENT
Start: 2025-02-24

## 2025-02-24 RX ORDER — QUETIAPINE FUMARATE 25 MG/1
25 TABLET, FILM COATED ORAL 2 TIMES DAILY
Qty: 180 TABLET | Refills: 0 | Status: SHIPPED | OUTPATIENT
Start: 2025-02-24 | End: 2025-02-26

## 2025-02-24 RX ORDER — MEMANTINE HYDROCHLORIDE 5 MG/1
5 TABLET ORAL 2 TIMES DAILY
Qty: 180 TABLET | Refills: 0 | Status: SHIPPED | OUTPATIENT
Start: 2025-02-24 | End: 2025-02-26

## 2025-02-24 RX ORDER — CITALOPRAM 30 MG/1
30 CAPSULE ORAL DAILY
Qty: 90 CAPSULE | Refills: 3 | Status: SHIPPED | OUTPATIENT
Start: 2025-02-24 | End: 2025-02-26

## 2025-02-24 SDOH — HEALTH STABILITY: PHYSICAL HEALTH: ON AVERAGE, HOW MANY DAYS PER WEEK DO YOU ENGAGE IN MODERATE TO STRENUOUS EXERCISE (LIKE A BRISK WALK)?: 0 DAYS

## 2025-02-24 ASSESSMENT — SOCIAL DETERMINANTS OF HEALTH (SDOH): HOW OFTEN DO YOU GET TOGETHER WITH FRIENDS OR RELATIVES?: ONCE A WEEK

## 2025-02-24 ASSESSMENT — PAIN SCALES - GENERAL: PAINLEVEL_OUTOF10: NO PAIN (0)

## 2025-02-24 NOTE — PATIENT INSTRUCTIONS
Monitor blood pressures- 1-2 times a day- bring in readings in one week           Patient Education   Preventive Care Advice   This is general advice given by our system to help you stay healthy. However, your care team may have specific advice just for you. Please talk to your care team about your preventive care needs.  Nutrition  Eat 5 or more servings of fruits and vegetables each day.  Try wheat bread, brown rice and whole grain pasta (instead of white bread, rice, and pasta).  Get enough calcium and vitamin D. Check the label on foods and aim for 100% of the RDA (recommended daily allowance).  Lifestyle  Exercise at least 150 minutes each week  (30 minutes a day, 5 days a week).  Do muscle strengthening activities 2 days a week. These help control your weight and prevent disease.  No smoking.  Wear sunscreen to prevent skin cancer.  Have a dental exam and cleaning every 6 months.  Yearly exams  See your health care team every year to talk about:  Any changes in your health.  Any medicines your care team has prescribed.  Preventive care, family planning, and ways to prevent chronic diseases.  Shots (vaccines)   HPV shots (up to age 26), if you've never had them before.  Hepatitis B shots (up to age 59), if you've never had them before.  COVID-19 shot: Get this shot when it's due.  Flu shot: Get a flu shot every year.  Tetanus shot: Get a tetanus shot every 10 years.  Pneumococcal, hepatitis A, and RSV shots: Ask your care team if you need these based on your risk.  Shingles shot (for age 50 and up)  General health tests  Diabetes screening:  Starting at age 35, Get screened for diabetes at least every 3 years.  If you are younger than age 35, ask your care team if you should be screened for diabetes.  Cholesterol test: At age 39, start having a cholesterol test every 5 years, or more often if advised.  Bone density scan (DEXA): At age 50, ask your care team if you should have this scan for osteoporosis  (brittle bones).  Hepatitis C: Get tested at least once in your life.  STIs (sexually transmitted infections)  Before age 24: Ask your care team if you should be screened for STIs.  After age 24: Get screened for STIs if you're at risk. You are at risk for STIs (including HIV) if:  You are sexually active with more than one person.  You don't use condoms every time.  You or a partner was diagnosed with a sexually transmitted infection.  If you are at risk for HIV, ask about PrEP medicine to prevent HIV.  Get tested for HIV at least once in your life, whether you are at risk for HIV or not.  Cancer screening tests  Cervical cancer screening: If you have a cervix, begin getting regular cervical cancer screening tests starting at age 21.  Breast cancer scan (mammogram): If you've ever had breasts, begin having regular mammograms starting at age 40. This is a scan to check for breast cancer.  Colon cancer screening: It is important to start screening for colon cancer at age 45.  Have a colonoscopy test every 10 years (or more often if you're at risk) Or, ask your provider about stool tests like a FIT test every year or Cologuard test every 3 years.  To learn more about your testing options, visit:   .  For help making a decision, visit:   https://bit.ly/fi80509.  Prostate cancer screening test: If you have a prostate, ask your care team if a prostate cancer screening test (PSA) at age 55 is right for you.  Lung cancer screening: If you are a current or former smoker ages 50 to 80, ask your care team if ongoing lung cancer screenings are right for you.  For informational purposes only. Not to replace the advice of your health care provider. Copyright   2023 [employer]. All rights reserved. Clinically reviewed by the [facility] Transitions Program. Aptible 773468 - REV 01/24.  Learning About Activities of Daily Living  What are activities of daily living?     Activities of daily living (ADLs) are  the basic self-care tasks you do every day. These include eating, bathing, dressing, and moving around.  As you age, and if you have health problems, you may find that it's harder to do some of these tasks. If so, your doctor can suggest ideas that may help.  To measure what kind of help you may need, your doctor will ask how well you are able to do ADLs. Let your doctor know if there are any tasks that you are having trouble doing. This is an important first step to getting help. And when you have the help you need, you can stay as independent as possible.  How will a doctor assess your ADLs?  Asking about ADLs is part of a routine health checkup your doctor will likely do as you age. Your health check might be done in a doctor's office, in your home, or at a hospital. The goal is to find out if you are having any problems that could make it hard to care for yourself or that make it unsafe for you to be on your own.  To measure your ADLs, your doctor will ask how hard it is for you to do routine tasks. Your doctor may also want to know if you have changed the way you do a task because of a health problem. Your doctor may watch how you:  Walk back and forth.  Keep your balance while you stand or walk.  Move from sitting to standing or from a bed to a chair.  Button or unbutton a shirt or sweater.  Remove and put on your shoes.  It's common to feel a little worried or anxious if you find you can't do all the things you used to be able to do. Talking with your doctor about ADLs is a way to make sure you're as safe as possible and able to care for yourself as well as you can. You may want to bring a caregiver, friend, or family member to your checkup. They can help you talk to your doctor.  Follow-up care is a key part of your treatment and safety. Be sure to make and go to all appointments, and call your doctor if you are having problems. It's also a good idea to know your test results and keep a list of the medicines  you take.  Current as of: October 24, 2023  Content Version: 14.3    2024 [employer].   Care instructions adapted under license by your healthcare professional. If you have questions about a medical condition or this instruction, always ask your healthcare professional. [employer] disclaims any warranty or liability for your use of this information.    Preventing Falls: Care Instructions  Injuries and health problems such as trouble walking or poor eyesight can increase your risk of falling. So can some medicines. But there are things you can do to help prevent falls. You can exercise to get stronger. You can also arrange your home to make it safer.    Talk to your doctor about the medicines you take. Ask if any of them increase the risk of falls and whether they can be changed or stopped.   Try to exercise regularly. It can help improve your strength and balance. This can help lower your risk of falling.         Practice fall safety and prevention.   Wear low-heeled shoes that fit well and give your feet good support. Talk to your doctor if you have foot problems that make this hard.  Carry a cellphone or wear a medical alert device that you can use to call for help.  Use stepladders instead of chairs to reach high objects. Don't climb if you're at risk for falls. Ask for help, if needed.  Wear the correct eyeglasses, if you need them.        Make your home safer.   Remove rugs, cords, clutter, and furniture from walkways.  Keep your house well lit. Use night-lights in hallways and bathrooms.  Install and use sturdy handrails on stairways.  Wear nonskid footwear, even inside. Don't walk barefoot or in socks without shoes.        Be safe outside.   Use handrails, curb cuts, and ramps whenever possible.  Keep your hands free by using a shoulder bag or backpack.  Try to walk in well-lit areas. Watch out for uneven ground, changes in pavement, and debris.  Be careful in the winter. Walk on the  "grass or gravel when sidewalks are slippery. Use de-icer on steps and walkways. Add non-slip devices to shoes.    Put grab bars and nonskid mats in your shower or tub and near the toilet. Try to use a shower chair or bath bench when bathing.   Get into a tub or shower by putting in your weaker leg first. Get out with your strong side first. Have a phone or medical alert device in the bathroom with you.   Where can you learn more?  Go to https://www.[employer].net/patiented  Enter G117 in the search box to learn more about \"Preventing Falls: Care Instructions.\"  Current as of: July 31, 2024  Content Version: 14.3    2024 [employer].   Care instructions adapted under license by your healthcare professional. If you have questions about a medical condition or this instruction, always ask your healthcare professional. [employer] disclaims any warranty or liability for your use of this information.    Hearing Loss: Care Instructions  Overview     Hearing loss is a sudden or slow decrease in how well you hear. It can range from slight to profound. Permanent hearing loss can occur with aging. It also can happen when you are exposed long-term to loud noise. Examples include listening to loud music, riding motorcycles, or being around other loud machines.  Hearing loss can affect your work and home life. It can make you feel lonely or depressed. You may feel that you have lost your independence. But hearing aids and other devices can help you hear better and feel connected to others.  Follow-up care is a key part of your treatment and safety. Be sure to make and go to all appointments, and call your doctor if you are having problems. It's also a good idea to know your test results and keep a list of the medicines you take.  How can you care for yourself at home?  Avoid loud noises whenever possible. This helps keep your hearing from getting worse.  Always wear hearing protection around loud " "noises.  Wear a hearing aid as directed.  A professional can help you pick a hearing aid that will work best for you.  You can also get hearing aids over the counter for mild to moderate hearing loss.  Have hearing tests as your doctor suggests. They can show whether your hearing has changed. Your hearing aid may need to be adjusted.  Use other devices as needed. These may include:  Telephone amplifiers and hearing aids that can connect to a television, stereo, radio, or microphone.  Devices that use lights or vibrations. These alert you to the doorbell, a ringing telephone, or a baby monitor.  Television closed-captioning. This shows the words at the bottom of the screen. Most new TVs can do this.  TTY (text telephone). This lets you type messages back and forth on the telephone instead of talking or listening. These devices are also called TDD. When messages are typed on the keyboard, they are sent over the phone line to a receiving TTY. The message is shown on a monitor.  Use text messaging, social media, and email if it is hard for you to communicate by telephone.  Try to learn a listening technique called speechreading. It is not lipreading. You pay attention to people's gestures, expressions, posture, and tone of voice. These clues can help you understand what a person is saying. Face the person you are talking to, and have them face you. Make sure the lighting is good. You need to see the other person's face clearly.  Think about counseling if you need help to adjust to your hearing loss.  When should you call for help?  Watch closely for changes in your health, and be sure to contact your doctor if:    You think your hearing is getting worse.     You have new symptoms, such as dizziness or nausea.   Where can you learn more?  Go to https://www.healthwise.net/patiented  Enter R798 in the search box to learn more about \"Hearing Loss: Care Instructions.\"  Current as of: September 27, 2023  Content Version: " 14.3    2024 [employer].   Care instructions adapted under license by your healthcare professional. If you have questions about a medical condition or this instruction, always ask your healthcare professional. [employer] disclaims any warranty or liability for your use of this information.    Learning About Stress  What is stress?     Stress is your body's response to a hard situation. Your body can have a physical, emotional, or mental response. Stress is a fact of life for most people, and it affects everyone differently. What causes stress for you may not be stressful for someone else.  A lot of things can cause stress. You may feel stress when you go on a job interview, take a test, or run a race. This kind of short-term stress is normal and even useful. It can help you if you need to work hard or react quickly. For example, stress can help you finish an important job on time.  Long-term stress is caused by ongoing stressful situations or events. Examples of long-term stress include long-term health problems, ongoing problems at work, or conflicts in your family. Long-term stress can harm your health.  How does stress affect your health?  When you are stressed, your body responds as though you are in danger. It makes hormones that speed up your heart, make you breathe faster, and give you a burst of energy. This is called the fight-or-flight stress response. If the stress is over quickly, your body goes back to normal and no harm is done.  But if stress happens too often or lasts too long, it can have bad effects. Long-term stress can make you more likely to get sick, and it can make symptoms of some diseases worse. If you tense up when you are stressed, you may develop neck, shoulder, or low back pain. Stress is linked to high blood pressure and heart disease.  Stress also harms your emotional health. It can make you crum, tense, or depressed. Your relationships may suffer, and you may  not do well at work or school.  What can you do to manage stress?  You can try these things to help manage stress:   Do something active. Exercise or activity can help reduce stress. Walking is a great way to get started. Even everyday activities such as housecleaning or yard work can help.  Try yoga or samreen chi. These techniques combine exercise and meditation. You may need some training at first to learn them.  Do something you enjoy. For example, listen to music or go to a movie. Practice your hobby or do volunteer work.  Meditate. This can help you relax, because you are not worrying about what happened before or what may happen in the future.  Do guided imagery. Imagine yourself in any setting that helps you feel calm. You can use online videos, books, or a teacher to guide you.  Do breathing exercises. For example:  From a standing position, bend forward from the waist with your knees slightly bent. Let your arms dangle close to the floor.  Breathe in slowly and deeply as you return to a standing position. Roll up slowly and lift your head last.  Hold your breath for just a few seconds in the standing position.  Breathe out slowly and bend forward from the waist.  Let your feelings out. Talk, laugh, cry, and express anger when you need to. Talking with supportive friends or family, a counselor, or a ир[employer] leader about your feelings is a healthy way to relieve stress. Avoid discussing your feelings with people who make you feel worse.  Write. It may help to write about things that are bothering you. This helps you find out how much stress you feel and what is causing it. When you know this, you can find better ways to cope.  What can you do to prevent stress?  You might try some of these things to help prevent stress:  Manage your time. This helps you find time to do the things you want and need to do.  Get enough sleep. Your body recovers from the stresses of the day while you are sleeping.  Get support. Your  "family, friends, and community can make a difference in how you experience stress.  Limit your news feed. Avoid or limit time on social media or news that may make you feel stressed.  Do something active. Exercise or activity can help reduce stress. Walking is a great way to get started.  Where can you learn more?  Go to https://www.[employer].net/patiented  Enter N032 in the search box to learn more about \"Learning About Stress.\"  Current as of: October 24, 2023  Content Version: 14.3    2024 [employer].   Care instructions adapted under license by your healthcare professional. If you have questions about a medical condition or this instruction, always ask your healthcare professional. [employer] disclaims any warranty or liability for your use of this information.    Learning About Sleeping Well  What does sleeping well mean?     Sleeping well means getting enough sleep to feel good and stay healthy. How much sleep is enough varies among people.  The number of hours you sleep and how you feel when you wake up are both important. If you do not feel refreshed, you probably need more sleep. Another sign of not getting enough sleep is feeling tired during the day.  Experts recommend that adults get at least 7 or more hours of sleep per day. Children and older adults need more sleep.  Why is getting enough sleep important?  Getting enough quality sleep is a basic part of good health. When your sleep suffers, your physical health, mood, and your thoughts can suffer too. You may find yourself feeling more grumpy or stressed. Not getting enough sleep also can lead to serious problems, including injury, accidents, anxiety, and depression.  What might cause poor sleeping?  Many things can cause sleep problems, including:  Changes to your sleep schedule.  Stress. Stress can be caused by fear about a single event, such as giving a speech. Or you may have ongoing stress, such as worry about work or " "school.  Depression, anxiety, and other mental or emotional conditions.  Changes in your sleep habits or surroundings. This includes changes that happen where you sleep, such as noise, light, or sleeping in a different bed. It also includes changes in your sleep pattern, such as having jet lag or working a late shift.  Health problems, such as pain, breathing problems, and restless legs syndrome.  Lack of regular exercise.  Using alcohol, nicotine, or caffeine before bed.  How can you help yourself?  Here are some tips that may help you sleep more soundly and wake up feeling more refreshed.  Your sleeping area   Use your bedroom only for sleeping and sex. A bit of light reading may help you fall asleep. But if it doesn't, do your reading elsewhere in the house. Try not to use your TV, computer, smartphone, or tablet while you are in bed.  Be sure your bed is big enough to stretch out comfortably, especially if you have a sleep partner.  Keep your bedroom quiet, dark, and cool. Use curtains, blinds, or a sleep mask to block out light. To block out noise, use earplugs, soothing music, or a \"white noise\" machine.  Your evening and bedtime routine   Create a relaxing bedtime routine. You might want to take a warm shower or bath, or listen to soothing music.  Go to bed at the same time every night. And get up at the same time every morning, even if you feel tired.  What to avoid   Limit caffeine (coffee, tea, caffeinated sodas) during the day, and don't have any for at least 6 hours before bedtime.  Avoid drinking alcohol before bedtime. Alcohol can cause you to wake up more often during the night.  Try not to smoke or use tobacco, especially in the evening. Nicotine can keep you awake.  Limit naps during the day, especially close to bedtime.  Avoid lying in bed awake for too long. If you can't fall asleep or if you wake up in the middle of the night and can't get back to sleep within about 20 minutes, get out of bed and " "go to another room until you feel sleepy.  Avoid taking medicine right before bed that may keep you awake or make you feel hyper or energized. Your doctor can tell you if your medicine may do this and if you can take it earlier in the day.  If you can't sleep   Imagine yourself in a peaceful, pleasant scene. Focus on the details and feelings of being in a place that is relaxing.  Get up and do a quiet or boring activity until you feel sleepy.  Avoid drinking any liquids before going to bed to help prevent waking up often to use the bathroom.  Where can you learn more?  Go to https://www.[employer].net/patiented  Enter J942 in the search box to learn more about \"Learning About Sleeping Well.\"  Current as of: July 31, 2024  Content Version: 14.3    2024 [employer].   Care instructions adapted under license by your healthcare professional. If you have questions about a medical condition or this instruction, always ask your healthcare professional. [employer] disclaims any warranty or liability for your use of this information.       "

## 2025-02-24 NOTE — LETTER
February 26, 2025      Reg Valiente  [address] [place] [id number]        Dear ,    We are writing to inform you of your test results.    Overall labwork is stable- there is elevation of liver enzymes noted which can be due to medications or other disease processes. The levels are stable and unchanged from previous readings. Please let us know if you would like further workup on this, otherwise we will continue to monitor for any new gastrointestinal symptoms and let us know if this were to occur.      Resulted Orders   Comprehensive Metabolic Panel   Result Value Ref Range    Sodium 139 135 - 145 mmol/L    Potassium 4.2 3.4 - 5.3 mmol/L    Carbon Dioxide (CO2) 25 22 - 29 mmol/L    Anion Gap 11 7 - 15 mmol/L    Urea Nitrogen 14.7 6.0 - 20.0 mg/dL    Creatinine 0.82 0.51 - 0.95 mg/dL    GFR Estimate 87 >60 mL/min/1.73m2      Comment:      eGFR calculated using 2021 CKD-EPI equation.    Calcium 9.0 8.8 - 10.4 mg/dL    Chloride 103 98 - 107 mmol/L    Glucose 100 (H) 70 - 99 mg/dL    Alkaline Phosphatase 201 (H) 40 - 150 U/L    AST 46 (H) 0 - 45 U/L    ALT 44 0 - 50 U/L    Protein Total 7.3 6.4 - 8.3 g/dL    Albumin 3.5 3.5 - 5.2 g/dL    Bilirubin Total 0.5 <=1.2 mg/dL   TSH Reflex GH   Result Value Ref Range    TSH 1.33 0.30 - 4.20 uIU/mL   UA with Microscopic reflex to Culture   Result Value Ref Range    Color Urine Yellow Colorless, Straw, Light Yellow, Yellow    Appearance Urine Clear Clear    Glucose Urine Negative Negative mg/dL    Bilirubin Urine Negative Negative    Ketones Urine Negative Negative mg/dL    Specific Gravity Urine 1.033 (H) 1.000 - 1.030    Blood Urine Negative Negative    pH Urine 6.5 5.0 - 9.0    Protein Albumin Urine 10 (A) Negative mg/dL    Urobilinogen Urine Normal Normal, 2.0 mg/dL    Nitrite Urine Negative Negative    Leukocyte Esterase Urine Negative Negative    Bacteria Urine Few (A) None Seen /HPF    Mucus Urine Present (A) None Seen /LPF    RBC Urine 2 <=2  /HPF    WBC Urine 4 <=5 /HPF    Squamous Epithelials Urine 1 <=1 /HPF    Narrative    Urine Culture not indicated   CBC with platelets and differential   Result Value Ref Range    WBC Count 7.2 4.0 - 11.0 10e3/uL    RBC Count 3.69 (L) 3.80 - 5.20 10e6/uL    Hemoglobin 12.6 11.7 - 15.7 g/dL    Hematocrit 35.6 35.0 - 47.0 %    MCV 97 78 - 100 fL    MCH 34.1 (H) 26.5 - 33.0 pg    MCHC 35.4 31.5 - 36.5 g/dL    RDW 14.1 10.0 - 15.0 %    Platelet Count 295 150 - 450 10e3/uL    % Neutrophils 62 %    % Lymphocytes 23 %    % Monocytes 11 %    % Eosinophils 2 %    % Basophils 1 %    % Immature Granulocytes 1 %    NRBCs per 100 WBC 0 <1 /100    Absolute Neutrophils 4.5 1.6 - 8.3 10e3/uL    Absolute Lymphocytes 1.6 0.8 - 5.3 10e3/uL    Absolute Monocytes 0.8 0.0 - 1.3 10e3/uL    Absolute Eosinophils 0.2 0.0 - 0.7 10e3/uL    Absolute Basophils 0.1 0.0 - 0.2 10e3/uL    Absolute Immature Granulocytes 0.1 <=0.4 10e3/uL    Absolute NRBCs 0.0 10e3/uL       If you have any questions or concerns, please call the clinic at the number listed above.       Sincerely,      EB Ariza CNP    Electronically signed

## 2025-02-24 NOTE — NURSING NOTE
"Chief Complaint   Patient presents with    Physical       Initial BP (!) 80/49 (BP Location: Right arm, Patient Position: Sitting, Cuff Size: Adult Regular)   Pulse 50   Temp 98.6  F (37  C) (Tympanic)   Resp 12   Ht 1.334 m (4' 4.5\")   Wt 67.2 kg (148 lb 3.2 oz)   LMP 02/08/2025 (Approximate)   Breastfeeding No   BMI 37.80 kg/m   Estimated body mass index is 37.8 kg/m  as calculated from the following:    Height as of this encounter: 1.334 m (4' 4.5\").    Weight as of this encounter: 67.2 kg (148 lb 3.2 oz).  Medication Review: complete    The next two questions are to help us understand your food security.  If you are feeling you need any assistance in this area, we have resources available to support you today.          2/24/2025   SDOH- Food Insecurity   Within the past 12 months, did you worry that your food would run out before you got money to buy more? N   Within the past 12 months, did the food you bought just not last and you didn t have money to get more? N         Health Care Directive:  Patient has a Health Care Directive on file      Kellie Franks      "

## 2025-02-24 NOTE — PROGRESS NOTES
Preventive Care Visit  [facility]  EB Ariza CNP, Internal Medicine  Feb 24, 2025        Assessment & Plan     ICD-10-CM    1. Encounter for Medicare annual wellness exam  Z00.00 CBC and Differential     Comprehensive Metabolic Panel     TSH Reflex GH     UA with Microscopic reflex to Culture     CBC and Differential     Comprehensive Metabolic Panel     TSH Reflex GH     UA with Microscopic reflex to Culture      2. Need for pneumococcal 20-valent conjugate vaccination  Z23 PNEUMOCOCCAL 20 VALENT CONJUGATE (PREVNAR 20)      3. Need for immunization against influenza  Z23       4. Need for COVID-19 vaccine  Z23       5. Vaccine counseling  Z71.85 CANCELED: COVID-19 12+ (PFIZER)      6. Needs flu shot  Z23 CANCELED: INFLUENZA VACCINE TRIVALENT(FLUBLOK)      7. Hypotension, unspecified hypotension type  I95.9 EKG 12-lead complete w/read - Clinics      8. Obsessive behaviors  R46.81 Citalopram Hydrobromide 30 MG CAPS     QUEtiapine (SEROQUEL) 25 MG tablet     DISCONTINUED: Citalopram Hydrobromide 30 MG CAPS     DISCONTINUED: QUEtiapine (SEROQUEL) 25 MG tablet      9. DOROTEO (generalized anxiety disorder)  F41.1 Citalopram Hydrobromide 30 MG CAPS     DISCONTINUED: Citalopram Hydrobromide 30 MG CAPS      10. Low vitamin B12 level  R79.89 cyanocobalamin (VITAMIN B-12) 1000 MCG tablet     DISCONTINUED: cyanocobalamin (VITAMIN B-12) 1000 MCG tablet      11. Primary osteoarthritis of both knees  M17.0 diclofenac (VOLTAREN) 50 MG EC tablet     DISCONTINUED: diclofenac (VOLTAREN) 50 MG EC tablet      12. Gastroesophageal reflux disease without esophagitis  K21.9 famotidine (PEPCID) 40 MG tablet      13. Other specified hypothyroidism  E03.8 levothyroxine (SYNTHROID/LEVOTHROID) 137 MCG tablet     DISCONTINUED: levothyroxine (SYNTHROID/LEVOTHROID) 137 MCG tablet      14. Early onset Alzheimer's disease with behavioral disturbance (H)  G30.0 memantine (NAMENDA) 5 MG tablet    F02.818 DISCONTINUED:  memantine (NAMENDA) 5 MG tablet      15. Health care maintenance  Z00.00 Multiple Vitamin (TAB-A-MATTHIAS) TABS      16. Menstrual disorder  N92.6 norgestim-eth estrad triphasic (TRI-LINYAH) 0.18/0.215/0.25 MG-35 MCG tablet     DISCONTINUED: norgestim-eth estrad triphasic (TRI-LINYAH) 0.18/0.215/0.25 MG-35 MCG tablet           Hypotension: Noted to be 80/49 in clinic today which is a change from baseline.  Group home does not monitor her blood pressures at home.  I recommended that she have daily blood pressure monitoring at home. No new symptoms have been noticed by staff.  Staff states that she drinks a lot of tea, recommend cutting back on tea/caffeine.  She will return in 1 week with blood pressure readings.  I did discuss with her guardian Kvng on the phone regarding updating her advance care directive.  Patient was combative during lab draw today,Kvng did report that he would like labwork to be done and follow up in clinic. Discussed progression of her condition and updating her plan of care would be ideal for future decision making. Staff was sent home with advance care directive paperwork to be updated.     Dementia: Progressing with worsening obsessiveness and insomnia. Continues on citalopram, namenda, and seroquel. Would recommend dosage increases pending blood pressures stabilizing. Will follow up next week regarding this.     Hypothyroidism: TSH is stable at 1.33, continue on Synthroid 137 mcg daily.    Elevated liver enzymes: This has been chronic for patient- suspect this is possibly medication related- she is on hormonal birth control and psych medications. Her numbers have improved from previous readings. We will continue to monitor this periodically and staff was informed to notify us if she develops new GI symptoms.     The longitudinal plan of care for the diagnosis(es)/condition(s) as documented were addressed during this visit. Due to the added complexity in care, I will continue to support  "Reg in the subsequent management and with ongoing continuity of care.           Total time: 45 minutes spent on the date of the encounter doing chart review, history and exam, documentation and further activities as noted above.             BMI  Estimated body mass index is 37.8 kg/m  as calculated from the following:    Height as of this encounter: 1.334 m (4' 4.5\").    Weight as of this encounter: 67.2 kg (148 lb 3.2 oz).       Counseling  Appropriate preventive services were addressed with this patient via screening, questionnaire, or discussion as appropriate for fall prevention, nutrition, physical activity, Tobacco-use cessation, social engagement, weight loss and cognition.  Checklist reviewing preventive services available has been given to the patient.  Reviewed patient's diet, addressing concerns and/or questions.   She is at risk for psychosocial distress and has been provided with information to reduce risk.   Discussed possible causes of fatigue. Updated plan of care.  Patient reported difficulty with activities of daily living were addressed today.The patient was provided with written information regarding signs of hearing loss.         Return in about 1 week (around 3/3/2025).      EB Ariza [facility]    Review of Systems   Unable to perform ROS: Dementia   Psychiatric/Behavioral:  Positive for decreased concentration and sleep disturbance. The patient is nervous/anxious.          Reji Finney is a 50 year old, presenting for the following:  Physical  Patient presents to clinic with staff member for annual wellness visit.  Staff member notes that patient has had increased difficulty with sleeping, anxiety, obsessions.  Patient has a history of Down syndrome, dementia.          Health Care Directive  Patient has a Health Care Directive on file  Advance care planning document is on file but is outdated.  Patient encouraged to update.      2/24/2025 "   General Health   How would you rate your overall physical health? (!) FAIR   Feel stress (tense, anxious, or unable to sleep) Very much   (!) STRESS CONCERN      2/24/2025   Nutrition   Diet: Other   If other, please elaborate: low caffiene         2/24/2025   Exercise   Days per week of moderate/strenous exercise 0 days   (!) EXERCISE CONCERN      2/24/2025   Social Factors   Frequency of gathering with friends or relatives Once a week   Worry food won't last until get money to buy more No   Food not last or not have enough money for food? No   Do you have housing? (Housing is defined as stable permanent housing and does not include staying ouside in a car, in a tent, in an abandoned building, in an overnight shelter, or couch-surfing.) Yes   Are you worried about losing your housing? No   Lack of transportation? No   Unable to get utilities (heat,electricity)? No         2/24/2025   Fall Risk   Fallen 2 or more times in the past year? No   Trouble with walking or balance? Yes   Gait Speed Test (Document in seconds) 17.09   Gait Speed Test Interpretation Greater than 5.01 seconds - ABNORMAL          2/24/2025   Activities of Daily Living- Home Safety   Needs help with the following daily activites Telephone use    Transportation    Shopping    Preparing meals    Housework    Bathing    Laundry    Medication administration    Money management   Safety concerns in the home None of the above       Multiple values from one day are sorted in reverse-chronological order         2/24/2025   Dental   Dentist two times every year? Yes         2/24/2025   Hearing Screening   Hearing concerns? (!) I NEED TO ASK PEOPLE TO SPEAK UP OR REPEAT THEMSELVES.    (!) IT'S HARDER TO UNDERSTAND WOMEN'S VOICES THAN MEN'S VOICES.    (!) IT'S HARD TO FOLLOW A CONVERSATION IN A NOISY RESTAURANT OR CROWDED ROOM.    (!) TROUBLE UNDESTANDING A SPEAKER IN A PUBLIC MEETING OR [employer] SERVICE.    (!) TROUBLE UNDERSTANDING SOFT OR WHISPERED  SPEECH.       Multiple values from one day are sorted in reverse-chronological order         2/24/2025   Driving Risk Screening   Patient/family members have concerns about driving (!) DECLINE         2/24/2025   General Alertness/Fatigue Screening   Have you been more tired than usual lately? (!) YES         2/24/2025   Urinary Incontinence Screening   Bothered by leaking urine in past 6 months No            Today's PHQ-2 Score:       2/24/2025    11:15 AM   PHQ-2 ( 1999 Pfizer)   PHQ-2 Score Incomplete           2/24/2025   Substance Use   Alcohol more than 3/day or more than 7/wk Not Applicable   Do you have a current opioid prescription? No   How severe/bad is pain from 1 to 10? 5/10   Do you use any other substances recreationally? No     Social History     Tobacco Use    Smoking status: Never    Smokeless tobacco: Never   Vaping Use    Vaping status: Never Used   Substance Use Topics    Alcohol use: No    Drug use: No                    2/24/2025   One time HIV Screening   Previous HIV test? No     History of abnormal Pap smear: Pap smears have been discontinued due to progressive dementia       ASCVD Risk   The ASCVD Risk score (Estelita DK, et al., 2019) failed to calculate for the following reasons:    The valid systolic blood pressure range is 90 to 200 mmHg            2/24/2025   Contraception/Family Planning   Questions about contraception or family planning No         Reviewed and updated as needed this visit by Provider                      Current providers sharing in care for this patient include:  Patient Care Team:  Mikayla Gtz NP as PCP - General (Nurse Practitioner)  Mikayla Gtz NP as Assigned PCP    The following health maintenance items are reviewed in Epic and correct as of today:  Health Maintenance   Topic Date Due    HIV SCREENING  Never done    HEPATITIS C SCREENING  Never done    HEPATITIS B IMMUNIZATION (1 of 3 - 19+ 3-dose series) Never done    INFLUENZA  "VACCINE (1) 09/01/2024    COVID-19 Vaccine (5 - 2024-25 season) 09/01/2024    PHQ-2 (once per calendar year)  01/01/2025    ZOSTER IMMUNIZATION (1 of 2) 02/09/2025    MAMMO SCREENING  04/06/2025    LIPID  07/06/2025    MEDICARE ANNUAL WELLNESS VISIT  02/24/2026    TSH W/FREE T4 REFLEX  02/24/2026    GLUCOSE  02/24/2028    ADVANCE CARE PLANNING  02/26/2030    DTAP/TDAP/TD IMMUNIZATION (2 - Td or Tdap) 07/06/2030    Pneumococcal Vaccine: 50+ Years  Completed    HPV IMMUNIZATION  Aged Out    MENINGITIS IMMUNIZATION  Aged Out    PAP  Discontinued    COLORECTAL CANCER SCREENING  Discontinued            Objective    Exam  BP (!) 80/49 (BP Location: Right arm, Patient Position: Sitting, Cuff Size: Adult Regular)   Pulse 50   Temp 98.6  F (37  C) (Tympanic)   Resp 12   Ht 1.334 m (4' 4.5\")   Wt 67.2 kg (148 lb 3.2 oz)   LMP 02/08/2025 (Approximate)   Breastfeeding No   BMI 37.80 kg/m     Estimated body mass index is 37.8 kg/m  as calculated from the following:    Height as of this encounter: 1.334 m (4' 4.5\").    Weight as of this encounter: 67.2 kg (148 lb 3.2 oz).    Physical Exam  Vitals reviewed.   Constitutional:       Appearance: She is obese.   Neurological:      Mental Status: She is alert. Mental status is at baseline.      Gait: Gait abnormal.   Psychiatric:         Attention and Perception: She is inattentive.         Speech: Speech is delayed.         Behavior: Behavior is uncooperative.       Results for orders placed or performed in visit on 02/24/25   Comprehensive Metabolic Panel     Status: Abnormal   Result Value Ref Range    Sodium 139 135 - 145 mmol/L    Potassium 4.2 3.4 - 5.3 mmol/L    Carbon Dioxide (CO2) 25 22 - 29 mmol/L    Anion Gap 11 7 - 15 mmol/L    Urea Nitrogen 14.7 6.0 - 20.0 mg/dL    Creatinine 0.82 0.51 - 0.95 mg/dL    GFR Estimate 87 >60 mL/min/1.73m2    Calcium 9.0 8.8 - 10.4 mg/dL    Chloride 103 98 - 107 mmol/L    Glucose 100 (H) 70 - 99 mg/dL    Alkaline Phosphatase 201 (H) 40 " - 150 U/L    AST 46 (H) 0 - 45 U/L    ALT 44 0 - 50 U/L    Protein Total 7.3 6.4 - 8.3 g/dL    Albumin 3.5 3.5 - 5.2 g/dL    Bilirubin Total 0.5 <=1.2 mg/dL   TSH Reflex GH     Status: Normal   Result Value Ref Range    TSH 1.33 0.30 - 4.20 uIU/mL   UA with Microscopic reflex to Culture     Status: Abnormal    Specimen: Urine, NOS   Result Value Ref Range    Color Urine Yellow Colorless, Straw, Light Yellow, Yellow    Appearance Urine Clear Clear    Glucose Urine Negative Negative mg/dL    Bilirubin Urine Negative Negative    Ketones Urine Negative Negative mg/dL    Specific Gravity Urine 1.033 (H) 1.000 - 1.030    Blood Urine Negative Negative    pH Urine 6.5 5.0 - 9.0    Protein Albumin Urine 10 (A) Negative mg/dL    Urobilinogen Urine Normal Normal, 2.0 mg/dL    Nitrite Urine Negative Negative    Leukocyte Esterase Urine Negative Negative    Bacteria Urine Few (A) None Seen /HPF    Mucus Urine Present (A) None Seen /LPF    RBC Urine 2 <=2 /HPF    WBC Urine 4 <=5 /HPF    Squamous Epithelials Urine 1 <=1 /HPF    Narrative    Urine Culture not indicated   CBC with platelets and differential     Status: Abnormal   Result Value Ref Range    WBC Count 7.2 4.0 - 11.0 10e3/uL    RBC Count 3.69 (L) 3.80 - 5.20 10e6/uL    Hemoglobin 12.6 11.7 - 15.7 g/dL    Hematocrit 35.6 35.0 - 47.0 %    MCV 97 78 - 100 fL    MCH 34.1 (H) 26.5 - 33.0 pg    MCHC 35.4 31.5 - 36.5 g/dL    RDW 14.1 10.0 - 15.0 %    Platelet Count 295 150 - 450 10e3/uL    % Neutrophils 62 %    % Lymphocytes 23 %    % Monocytes 11 %    % Eosinophils 2 %    % Basophils 1 %    % Immature Granulocytes 1 %    NRBCs per 100 WBC 0 <1 /100    Absolute Neutrophils 4.5 1.6 - 8.3 10e3/uL    Absolute Lymphocytes 1.6 0.8 - 5.3 10e3/uL    Absolute Monocytes 0.8 0.0 - 1.3 10e3/uL    Absolute Eosinophils 0.2 0.0 - 0.7 10e3/uL    Absolute Basophils 0.1 0.0 - 0.2 10e3/uL    Absolute Immature Granulocytes 0.1 <=0.4 10e3/uL    Absolute NRBCs 0.0 10e3/uL   CBC and Differential      Status: Abnormal    Narrative    The following orders were created for panel order CBC and Differential.  Procedure                               Abnormality         Status                     ---------                               -----------         ------                     CBC with platelets and d...[[id number]]  Abnormal            Final result                 Please view results for these tests on the individual orders.               2/24/2025   Mini Cog   Mini-Cog Not Completed (choose reason) Mental handicap   Clock Draw Score --   3 Item Recall 1 object recalled              Signed Electronically by: EB Ariza CNP

## 2025-02-26 ENCOUNTER — APPOINTMENT (OUTPATIENT)
Dept: LAB | Facility: OTHER | Age: 50
End: 2025-02-26
Payer: MEDICARE

## 2025-02-26 PROBLEM — R74.8 ELEVATED LIVER ENZYMES: Status: ACTIVE | Noted: 2025-02-26

## 2025-02-26 LAB
ALBUMIN UR-MCNC: 10 MG/DL
APPEARANCE UR: CLEAR
BACTERIA #/AREA URNS HPF: ABNORMAL /HPF
BILIRUB UR QL STRIP: NEGATIVE
COLOR UR AUTO: YELLOW
GLUCOSE UR STRIP-MCNC: NEGATIVE MG/DL
HGB UR QL STRIP: NEGATIVE
KETONES UR STRIP-MCNC: NEGATIVE MG/DL
LEUKOCYTE ESTERASE UR QL STRIP: NEGATIVE
MUCOUS THREADS #/AREA URNS LPF: PRESENT /LPF
NITRATE UR QL: NEGATIVE
PH UR STRIP: 6.5 [PH] (ref 5–9)
RBC URINE: 2 /HPF
SP GR UR STRIP: 1.03 (ref 1–1.03)
SQUAMOUS EPITHELIAL: 1 /HPF
UROBILINOGEN UR STRIP-MCNC: NORMAL MG/DL
WBC URINE: 4 /HPF

## 2025-02-26 RX ORDER — NORGESTIMATE AND ETHINYL ESTRADIOL 7DAYSX3 28
1 KIT ORAL DAILY
Qty: 84 TABLET | Refills: 4 | Status: SHIPPED | OUTPATIENT
Start: 2025-02-26

## 2025-02-26 RX ORDER — MEMANTINE HYDROCHLORIDE 5 MG/1
5 TABLET ORAL 2 TIMES DAILY
Refills: 4 | COMMUNITY
Start: 2025-02-26

## 2025-02-26 RX ORDER — LEVOTHYROXINE SODIUM 137 UG/1
137 TABLET ORAL DAILY
Refills: 4 | COMMUNITY
Start: 2025-02-26

## 2025-02-26 RX ORDER — CITALOPRAM 30 MG/1
30 CAPSULE ORAL DAILY
COMMUNITY
Start: 2025-02-26

## 2025-02-26 RX ORDER — QUETIAPINE FUMARATE 25 MG/1
25 TABLET, FILM COATED ORAL 2 TIMES DAILY
COMMUNITY
Start: 2025-02-26

## 2025-02-26 RX ORDER — LANOLIN ALCOHOL/MO/W.PET/CERES
1000 CREAM (GRAM) TOPICAL DAILY
COMMUNITY
Start: 2025-02-26

## 2025-02-26 ASSESSMENT — ENCOUNTER SYMPTOMS
NERVOUS/ANXIOUS: 1
SLEEP DISTURBANCE: 1
DECREASED CONCENTRATION: 1

## 2025-03-04 ENCOUNTER — OFFICE VISIT (OUTPATIENT)
Dept: INTERNAL MEDICINE | Facility: OTHER | Age: 50
End: 2025-03-04
Attending: NURSE PRACTITIONER
Payer: MEDICARE

## 2025-03-04 VITALS
HEART RATE: 63 BPM | RESPIRATION RATE: 16 BRPM | OXYGEN SATURATION: 96 % | SYSTOLIC BLOOD PRESSURE: 130 MMHG | TEMPERATURE: 97 F | DIASTOLIC BLOOD PRESSURE: 68 MMHG

## 2025-03-04 DIAGNOSIS — I95.9 HYPOTENSION, UNSPECIFIED HYPOTENSION TYPE: ICD-10-CM

## 2025-03-04 DIAGNOSIS — Q90.9 DOWN'S SYNDROME: ICD-10-CM

## 2025-03-04 DIAGNOSIS — F51.04 PSYCHOPHYSIOLOGICAL INSOMNIA: ICD-10-CM

## 2025-03-04 DIAGNOSIS — F02.818 EARLY ONSET ALZHEIMER'S DISEASE WITH BEHAVIORAL DISTURBANCE (H): Primary | ICD-10-CM

## 2025-03-04 DIAGNOSIS — G30.0 EARLY ONSET ALZHEIMER'S DISEASE WITH BEHAVIORAL DISTURBANCE (H): Primary | ICD-10-CM

## 2025-03-04 PROCEDURE — G0463 HOSPITAL OUTPT CLINIC VISIT: HCPCS

## 2025-03-04 ASSESSMENT — ANXIETY QUESTIONNAIRES: GAD7 TOTAL SCORE: INCOMPLETE

## 2025-03-04 ASSESSMENT — PAIN SCALES - GENERAL: PAINLEVEL_OUTOF10: NO PAIN (0)

## 2025-03-04 NOTE — NURSING NOTE
"Chief Complaint   Patient presents with    Follow Up       FOOD SECURITY SCREENING QUESTIONS  Hunger Vital Signs:  Within the past 12 months we worried whether our food would run out before we got money to buy more. Never  Within the past 12 months the food we bought just didn't last and we didn't have money to get more. Never  Ami Bradford RN 3/4/2025 8:12 AM      Initial /68 (BP Location: Right arm, Patient Position: Sitting, Cuff Size: Adult Regular)   Pulse 63   Temp 97  F (36.1  C) (Tympanic)   Resp 16   LMP 02/08/2025 (Approximate)   SpO2 96%  Estimated body mass index is 37.8 kg/m  as calculated from the following:    Height as of 2/24/25: 1.334 m (4' 4.5\").    Weight as of 2/24/25: 67.2 kg (148 lb 3.2 oz).  Medication Reconciliation: complete    Ami Bradford RN    "

## 2025-03-04 NOTE — PROGRESS NOTES
ICD-10-CM    1. Early onset Alzheimer's disease with behavioral disturbance (H)  G30.0     F02.818       2. Psychophysiological insomnia  F51.04       3. Hypotension, unspecified hypotension type  I95.9       4. Down's syndrome  Q90.9          Plan:  -Discontinue Tylenol PM as this can contribute to combativeness.  May take Tylenol Extra Strength for joint pain as currently prescribed.  -Continue Seroquel 25 mg in morning but increase evening dose to 50 mg.  This can help with sleep and combative behaviors.  -Blood pressure well-controlled.  May continue with drinking 3 cups of tea per day diluted with water which will be a reduction down from 5 glasses/day.  Continue to monitor blood pressure.  Follow-up if low blood pressures return.  Follow-up in 4 weeks if behavioral issues and sleep not improved.    Reji Finney is a 50 year old, presenting for the following health issues:  Follow Up      3/4/2025     8:09 AM   Additional Questions   Roomed by Ami BALLARD     She is here today for follow-up on low blood pressure.  She was seen on February 24 for Medicare wellness visit.  At that time her blood pressure was low.  She had lab work completed which overall was unremarkable.  She had mild elevation of alkaline phosphatase.  This has been kind of a chronic pattern for her.  She does have significant arthritis in her knees.  She is prescribed acetaminophen but staff who accompanies her to this visit reports they have actually been giving her Tylenol PM at bedtime to see if it would help with sleep.  Sometimes she will be awake from 9 AM until 5 AM the following day.  She becomes combative and obsessive during the nighttime shift.  She is taking Seroquel 25 mg twice daily.    History of Present Illness       Hypertension: She presents for follow up of hypertension.  She does check blood pressure  regularly outside of the clinic. Outpatient blood pressures have not been over 140/90. She does not follow a low salt  diet.     She eats 2-3 servings of fruits and vegetables daily.She consumes 1 sweetened beverage(s) daily.She exercises with enough effort to increase her heart rate 9 or less minutes per day.  She exercises with enough effort to increase her heart rate 3 or less days per week.   She is taking medications regularly.                      Objective    /68 (BP Location: Right arm, Patient Position: Sitting, Cuff Size: Adult Regular)   Pulse 63   Temp 97  F (36.1  C) (Tympanic)   Resp 16   LMP 02/08/2025 (Approximate)   SpO2 96%   There is no height or weight on file to calculate BMI.  Physical Exam   Home blood pressure readings reviewed and ranged from 116//89.  Pleasant female with Down syndrome accompanied by group home staff.            Signed Electronically by: Mikayla Gtz NP

## 2025-03-08 ENCOUNTER — HOSPITAL ENCOUNTER (EMERGENCY)
Facility: OTHER | Age: 50
Discharge: HOME | End: 2025-03-08
Attending: PHYSICIAN ASSISTANT
Payer: MEDICARE

## 2025-03-08 VITALS
OXYGEN SATURATION: 97 % | HEART RATE: 77 BPM | TEMPERATURE: 99 F | BODY MASS INDEX: 37.75 KG/M2 | RESPIRATION RATE: 18 BRPM | WEIGHT: 148 LBS

## 2025-03-08 DIAGNOSIS — R22.0 LEFT FACIAL SWELLING: ICD-10-CM

## 2025-03-08 LAB
ALBUMIN SERPL BCG-MCNC: 3.2 G/DL (ref 3.5–5.2)
ALP SERPL-CCNC: 174 U/L (ref 40–150)
ALT SERPL W P-5'-P-CCNC: 25 U/L (ref 0–50)
ANION GAP SERPL CALCULATED.3IONS-SCNC: 9 MMOL/L (ref 7–15)
AST SERPL W P-5'-P-CCNC: 33 U/L (ref 0–45)
BASOPHILS # BLD AUTO: 0 10E3/UL (ref 0–0.2)
BASOPHILS NFR BLD AUTO: 1 %
BILIRUB SERPL-MCNC: 0.4 MG/DL
BUN SERPL-MCNC: 7.6 MG/DL (ref 6–20)
CALCIUM SERPL-MCNC: 8.7 MG/DL (ref 8.8–10.4)
CHLORIDE SERPL-SCNC: 101 MMOL/L (ref 98–107)
CREAT SERPL-MCNC: 0.62 MG/DL (ref 0.51–0.95)
CRP SERPL-MCNC: 157.65 MG/L
EGFRCR SERPLBLD CKD-EPI 2021: >90 ML/MIN/1.73M2
EOSINOPHIL # BLD AUTO: 0.1 10E3/UL (ref 0–0.7)
EOSINOPHIL NFR BLD AUTO: 1 %
ERYTHROCYTE [DISTWIDTH] IN BLOOD BY AUTOMATED COUNT: 14.5 % (ref 10–15)
ERYTHROCYTE [SEDIMENTATION RATE] IN BLOOD BY WESTERGREN METHOD: 54 MM/HR (ref 0–30)
GLUCOSE SERPL-MCNC: 108 MG/DL (ref 70–99)
HCO3 SERPL-SCNC: 26 MMOL/L (ref 22–29)
HCT VFR BLD AUTO: 34 % (ref 35–47)
HGB BLD-MCNC: 11.6 G/DL (ref 11.7–15.7)
IMM GRANULOCYTES # BLD: 0 10E3/UL
IMM GRANULOCYTES NFR BLD: 0 %
LACTATE SERPL-SCNC: 1 MMOL/L (ref 0.7–2)
LYMPHOCYTES # BLD AUTO: 1.2 10E3/UL (ref 0.8–5.3)
LYMPHOCYTES NFR BLD AUTO: 16 %
MAGNESIUM SERPL-MCNC: 1.9 MG/DL (ref 1.7–2.3)
MCH RBC QN AUTO: 33.6 PG (ref 26.5–33)
MCHC RBC AUTO-ENTMCNC: 34.1 G/DL (ref 31.5–36.5)
MCV RBC AUTO: 99 FL (ref 78–100)
MONOCYTES # BLD AUTO: 0.7 10E3/UL (ref 0–1.3)
MONOCYTES NFR BLD AUTO: 10 %
NEUTROPHILS # BLD AUTO: 5.4 10E3/UL (ref 1.6–8.3)
NEUTROPHILS NFR BLD AUTO: 72 %
NRBC # BLD AUTO: 0 10E3/UL
NRBC BLD AUTO-RTO: 0 /100
PLATELET # BLD AUTO: 253 10E3/UL (ref 150–450)
POTASSIUM SERPL-SCNC: 3.6 MMOL/L (ref 3.4–5.3)
PROT SERPL-MCNC: 7.1 G/DL (ref 6.4–8.3)
RBC # BLD AUTO: 3.45 10E6/UL (ref 3.8–5.2)
SODIUM SERPL-SCNC: 136 MMOL/L (ref 135–145)
WBC # BLD AUTO: 7.4 10E3/UL (ref 4–11)

## 2025-03-08 PROCEDURE — 99284 EMERGENCY DEPT VISIT MOD MDM: CPT | Performed by: PHYSICIAN ASSISTANT

## 2025-03-08 PROCEDURE — 36415 COLL VENOUS BLD VENIPUNCTURE: CPT | Performed by: PHYSICIAN ASSISTANT

## 2025-03-08 PROCEDURE — 85041 AUTOMATED RBC COUNT: CPT | Performed by: PHYSICIAN ASSISTANT

## 2025-03-08 PROCEDURE — 250N000013 HC RX MED GY IP 250 OP 250 PS 637: Performed by: PHYSICIAN ASSISTANT

## 2025-03-08 PROCEDURE — 82040 ASSAY OF SERUM ALBUMIN: CPT | Performed by: PHYSICIAN ASSISTANT

## 2025-03-08 PROCEDURE — 86140 C-REACTIVE PROTEIN: CPT | Performed by: PHYSICIAN ASSISTANT

## 2025-03-08 PROCEDURE — 85652 RBC SED RATE AUTOMATED: CPT | Performed by: PHYSICIAN ASSISTANT

## 2025-03-08 PROCEDURE — 99283 EMERGENCY DEPT VISIT LOW MDM: CPT | Performed by: PHYSICIAN ASSISTANT

## 2025-03-08 PROCEDURE — 83735 ASSAY OF MAGNESIUM: CPT | Performed by: PHYSICIAN ASSISTANT

## 2025-03-08 PROCEDURE — 85004 AUTOMATED DIFF WBC COUNT: CPT | Performed by: PHYSICIAN ASSISTANT

## 2025-03-08 PROCEDURE — 83605 ASSAY OF LACTIC ACID: CPT | Performed by: PHYSICIAN ASSISTANT

## 2025-03-08 RX ORDER — IOPAMIDOL 755 MG/ML
100 INJECTION, SOLUTION INTRAVASCULAR ONCE
Status: DISCONTINUED | OUTPATIENT
Start: 2025-03-08 | End: 2025-03-08 | Stop reason: HOSPADM

## 2025-03-08 RX ADMIN — AMOXICILLIN AND CLAVULANATE POTASSIUM 1 TABLET: 875; 125 TABLET, FILM COATED ORAL at 18:41

## 2025-03-08 ASSESSMENT — ACTIVITIES OF DAILY LIVING (ADL)
ADLS_ACUITY_SCORE: 46
ADLS_ACUITY_SCORE: 46

## 2025-03-08 ASSESSMENT — COLUMBIA-SUICIDE SEVERITY RATING SCALE - C-SSRS
2. HAVE YOU ACTUALLY HAD ANY THOUGHTS OF KILLING YOURSELF IN THE PAST MONTH?: NO
6. HAVE YOU EVER DONE ANYTHING, STARTED TO DO ANYTHING, OR PREPARED TO DO ANYTHING TO END YOUR LIFE?: NO
1. IN THE PAST MONTH, HAVE YOU WISHED YOU WERE DEAD OR WISHED YOU COULD GO TO SLEEP AND NOT WAKE UP?: NO

## 2025-03-08 NOTE — ED NOTES
Staff attempted multiple times to start IV on patient.  Patient is refusing PIV.  Provider notified of patients refusal.  Patients staff member is at the bedside and aware of situation.

## 2025-03-08 NOTE — ED TRIAGE NOTES
Patient presents with left sided facial swelling. She has hx of dementia. Care giver notices a difference in her face and reports that patient has been pulling at her teeth the last couple days. Unable to tell us if she hurts.      Triage Assessment (Adult)       Row Name 03/08/25 [id number]          Triage Assessment    Airway WDL WDL        Respiratory WDL    Respiratory WDL WDL        Skin Circulation/Temperature WDL    Skin Circulation/Temperature WDL WDL        Cardiac WDL    Cardiac WDL WDL        Peripheral/Neurovascular WDL    Peripheral Neurovascular WDL WDL        Cognitive/Neuro/Behavioral WDL    Cognitive/Neuro/Behavioral WDL WDL

## 2025-03-09 NOTE — DISCHARGE INSTRUCTIONS
-Take Augmentin 1 tablet twice daily for 10 days for treatment of presumed infection.  -Patient has any worsening of facial swelling despite oral antibiotics, please return to the ER for repeat evaluation.  -Patient did not want an IV today so we will do an outpatient trial antibiotics first.  However, symptoms do worsen, she will require IV access and CT imaging.  This may require us to sedate her to accomplish this.  -Recommend close up with her primary care doctor next week for reevaluation if symptoms improved.

## 2025-03-18 DIAGNOSIS — R46.81 OBSESSIVE BEHAVIORS: ICD-10-CM

## 2025-03-19 RX ORDER — QUETIAPINE FUMARATE 25 MG/1
TABLET, FILM COATED ORAL
Qty: 90 TABLET | Refills: 0 | OUTPATIENT
Start: 2025-03-19

## 2025-03-19 NOTE — TELEPHONE ENCOUNTER
This is a duplicate Order. See other order from 03/145/2025  Jaelyn Davenport RN on 3/19/2025 at 7:53 AM

## 2025-03-20 DIAGNOSIS — M17.11 ARTHRITIS OF RIGHT KNEE: ICD-10-CM

## 2025-03-20 RX ORDER — SENNOSIDES 8.6 MG
1300 CAPSULE ORAL AT BEDTIME
Qty: 60 TABLET | Refills: 11 | Status: SHIPPED | OUTPATIENT
Start: 2025-03-20

## 2025-03-20 NOTE — TELEPHONE ENCOUNTER
[id number] sent Rx request for the following:      Requested Prescriptions   Pending Prescriptions Disp Refills    acetaminophen (TYLENOL) 650 MG CR tablet [Pharmacy Med Name: ACETAMINOPHEN 650MG ER TABLET] 60 tablet 0     Sig: TAKE 2 TABLETS (1,300 MG) BY MOUTH AT BEDTIME.       Analgesics (Non-Narcotic Tylenol and ASA Only) Failed - 3/20/2025 10:04 AM        Failed - Medication matches indication     Acetaminophen is associated with one or more of the following diagnoses:  Pain  Fever     Arthritis of right knee [M17.11]     Last Prescription Date:   2/21/5  Last Fill Qty/Refills:         60, R-0    Last Office Visit:              2/24/25 (px)   Future Office visit:            None    Unable to complete prescription refill per RN Medication Refill Policy.     Anshul Orozco RN on 3/20/2025 at 10:06 AM

## 2025-03-22 NOTE — ED PROVIDER NOTES
EMERGENCY DEPARTMENT ENCOUNTER      NAME: Reg Valiente  AGE: 50 year old female  YOB: 1975  MRN: [id number]  EVALUATION DATE & TIME: 3/8/2025  4:55 PM    PCP: Mikayla Gtz    ED PROVIDER: Heladio Jorge PA-C       CHIEF COMPLAINT:  Chief Complaint   Patient presents with    Facial Swelling         HPI  Reg Valiente is a pleasant 50 year old female with history of Down syndrome who presents to the ER with her caregiver via private vehicle for evaluation of left-sided facial swelling.  Ongoing facial swelling for the past 2 to 3 days.  Patient says that she is not having any pain although caregivers states that she has been pulling at her teeth.  Normal appetite.  Moving neck okay.  No fevers or chills.  No hoarseness.  Denies any history of anaphylaxis or angioedema.      REVIEW OF SYSTEMS   Review of Systems  As above, otherwise ROS is unremarkable.      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     Mom reports negative AAS evaluation in the 1990's    Corneal ulcer 2004    caused by leaking of CPAP    Down's syndrome     Gastroesophageal reflux disease     JENNIFER (obstructive sleep apnea)     patient refuses CPAP         PAST SURGICAL HISTORY:  Past Surgical History:   Procedure Laterality Date    ENDOSCOPIC RETROGRADE CHOLANGIOPANCREATOGRAM N/A 7/1/2019    Procedure: Endoscopic Retrograde Cholangiopancreatogram, dilation, stent , sludge and stone removal, sphincterotomy;  Surgeon: Guru Harper Alvarado MD;  Location: [facility] OR    ENDOSCOPIC RETROGRADE CHOLANGIOPANCREATOGRAM N/A 8/26/2019    Procedure: Endoscopic Retrograde Cholangiopancreatogram with biliary stent removal and stone removal;  Surgeon: Guru Harper Alvarado MD;  Location: [facility] OR    ENDOSCOPIC RETROGRADE CHOLANGIOPANCREATOGRAPHY, EXCHANGE TUBE/STENT N/A  7/24/2019    Procedure: Endoscopic Retrograde Cholangiopancreatography with Billary Stone Extraction,Dilation             and stent exchange;  Surgeon: Guru Harper Alvarado MD;  Location: [facility] OR    LAPAROSCOPIC CHOLECYSTECTOMY WITH CHOLANGIOGRAMS N/A 9/9/2019    Procedure: CHOLECYSTECTOMY, LAPAROSCOPIC, WITH CHOLANGIOGRAM;  Surgeon: Arvind Bailey MD;  Location: [facility] OR    NO HISTORY OF SURGERY      as reported by parents         CURRENT MEDICATIONS:    Current Outpatient Medications   Medication Instructions    acetaminophen (TYLENOL) 1,300 mg, Oral, AT BEDTIME    Adhesive Bandages Foam MISC Foam gentle adhesive 3x3 cm dressing  Uses 1-2 daily    amoxicillin-clavulanate (AUGMENTIN) 875-125 MG tablet 1 tablet, Oral, 2 TIMES DAILY    Citalopram Hydrobromide 30 mg, DAILY    cyanocobalamin (VITAMIN B-12) 1,000 mcg, DAILY    diclofenac (VOLTAREN) 50 MG EC tablet TAKE 1 TABLET BY MOUTH TWICE A DAY as needed    diclofenac (VOLTAREN) 2 g, Topical, 4 TIMES DAILY PRN    famotidine (PEPCID) 40 MG tablet TAKE 1/2 TAB (20MG) BY MOUTH TWICE A DAY    levothyroxine (SYNTHROID/LEVOTHROID) 137 mcg, DAILY    memantine (NAMENDA) 5 mg, 2 TIMES DAILY    Multiple Vitamin (TAB-A-MATTHIAS) TABS 1 tablet, Oral, DAILY    norgestim-eth estrad triphasic (TRI-LINYAH) 0.18/0.215/0.25 MG-35 MCG tablet 1 tablet, Oral, DAILY    QUEtiapine (SEROQUEL) 25 MG tablet Take 1 pill (25 mg) in the morning and 2 pills (50 mg) in the evening.    Sennosides-Docusate Sodium (SB DOCUSATE SODIUM/SENNA PO) No dose, route, or frequency recorded.         ALLERGIES:  Allergies   Allergen Reactions    Sulfa Antibiotics          FAMILY HISTORY:  Family History   Problem Relation Age of Onset    Asthma Mother     Arthritis Mother     Dementia Mother     LUNG DISEASE Father         pulmonary fibrosis secondary to chemical exposure    Diabetes Father     Cerebrovascular Disease Father 90    Cerebrovascular Disease Maternal Grandmother 90    Heart Failure  Paternal Grandfather         uncertain which  replaced    Coronary Artery Disease No family hx of     Heart Disease No family hx of     Bleeding Diathesis No family hx of     Pancreatic Cancer No family hx of     Colorectal Cancer No family hx of          SOCIAL HISTORY:   Social History     Socioeconomic History    Marital status: Single     Spouse name: None    Number of children: None    Years of education: None    Highest education level: None   Tobacco Use    Smoking status: Never    Smokeless tobacco: Never   Vaping Use    Vaping status: Never Used   Substance and Sexual Activity    Alcohol use: No    Drug use: No    Sexual activity: Not Currently     Birth control/protection: Pill   Social History Narrative    Moving to [facility] June 20, 2019. July 2020: Parents moved back to [place] because Mom has dementia and [place] is closer to family.  Face times parents occasionally    Parents and sister Camila and brother in law Steve are guardians     Social Drivers of Health     Financial Resource Strain: Low Risk  (2/24/2025)    Financial Resource Strain     Within the past 12 months, have you or your family members you live with been unable to get utilities (heat, electricity) when it was really needed?: No   Food Insecurity: Low Risk  (2/24/2025)    Food Insecurity     Within the past 12 months, did you worry that your food would run out before you got money to buy more?: No     Within the past 12 months, did the food you bought just not last and you didn t have money to get more?: No   Transportation Needs: Low Risk  (2/24/2025)    Transportation Needs     Within the past 12 months, has lack of transportation kept you from medical appointments, getting your medicines, non-medical meetings or appointments, work, or from getting things that you need?: No   Physical Activity: Unknown (2/24/2025)    Exercise Vital Sign     Days of Exercise per Week: 0 days   Stress: Stress Concern Present (2/24/2025)     [employer] [place] [employer] - Occupational Stress Questionnaire     Feeling of Stress : Very much   Social Connections: Unknown (2/24/2025)    Social Connection and Isolation Panel [NHANES]     Frequency of Social Gatherings with Friends and Family: Once a week   Interpersonal Safety: Low Risk  (3/4/2025)    Interpersonal Safety     Do you feel physically and emotionally safe where you currently live?: Yes     Within the past 12 months, have you been hit, slapped, kicked or otherwise physically hurt by someone?: No     Within the past 12 months, have you been humiliated or emotionally abused in other ways by your partner or ex-partner?: No   Housing Stability: Low Risk  (2/24/2025)    Housing Stability     Do you have housing? : Yes     Are you worried about losing your housing?: No       ==================================================================================================================================    PHYSICAL EXAM    VITAL SIGNS: Pulse 77   Temp 99  F (37.2  C) (Tympanic)   Resp 18   Wt 67.1 kg (148 lb)   LMP 02/08/2025 (Approximate)   SpO2 97%   BMI 37.75 kg/m      No data found.    Physical Exam  Vital signs reviewed.  Nursing note reviewed.    Constitutional: Alert, normal appearance, patient not ill-appearing or diaphoretic.  Patient appeared to not be in discomfort from pain. No hoarseness or drooling.  Questional swelling over the left side of the face.  No facial tenderness.  Ears: TMs, canals, and external ears normal appearing bilaterally.  No mastoiditis bilaterally  Nose: Nml appearing  Mouth/throat: Patient's mouth was moist and clear.  No throat swelling.  No posterior oropharynx erythema.  Uvula midline.  Patient without dental tenderness patient able to open mouth fully at least 3 fingers without trismus.  No submental tenderness.  No signs of Alberto's angina.  No signs of dental abscess.  No signs of retropharyngeal abscess or peritonsillar abscess.   Eyes:  Conjunctivae normal.  Neck: Normal range of motion.  Supple.  No tenderness.  No meningeal signs or neck rigidity  Cardiovascular: Normal rate.  Normal pulses.  Heart sounds are normal without murmurs, gallops, or rubs  Pulmonary: Effort and breath sounds are normal.  No wheezes, rales, or rhonchi.  Musculoskeletal: Normal range of motion.  Normal gait  Skin: Warm and dry  Neurological: No focal deficits noted  Psychiatric/behavioral: Normal mood.       ==================================================================================================================================    LABS & RADIOLOGY:  All pertinent labs reviewed and interpreted. Reviewed all pertinent imaging. Please see official radiology report.  Results for orders placed or performed during the hospital encounter of 03/08/25   Comprehensive metabolic panel   Result Value Ref Range    Sodium 136 135 - 145 mmol/L    Potassium 3.6 3.4 - 5.3 mmol/L    Carbon Dioxide (CO2) 26 22 - 29 mmol/L    Anion Gap 9 7 - 15 mmol/L    Urea Nitrogen 7.6 6.0 - 20.0 mg/dL    Creatinine 0.62 0.51 - 0.95 mg/dL    GFR Estimate >90 >60 mL/min/1.73m2    Calcium 8.7 (L) 8.8 - 10.4 mg/dL    Chloride 101 98 - 107 mmol/L    Glucose 108 (H) 70 - 99 mg/dL    Alkaline Phosphatase 174 (H) 40 - 150 U/L    AST 33 0 - 45 U/L    ALT 25 0 - 50 U/L    Protein Total 7.1 6.4 - 8.3 g/dL    Albumin 3.2 (L) 3.5 - 5.2 g/dL    Bilirubin Total 0.4 <=1.2 mg/dL   Result Value Ref Range    CRP Inflammation 157.65 (H) <5.00 mg/L   Erythrocyte sedimentation rate auto   Result Value Ref Range    Erythrocyte Sedimentation Rate 54 (H) 0 - 30 mm/hr   Lactic acid whole blood with 1x repeat in 2 hr when >2   Result Value Ref Range    Lactic Acid, Initial 1.0 0.7 - 2.0 mmol/L   Result Value Ref Range    Magnesium 1.9 1.7 - 2.3 mg/dL   CBC with platelets and differential   Result Value Ref Range    WBC Count 7.4 4.0 - 11.0 10e3/uL    RBC Count 3.45 (L) 3.80 - 5.20 10e6/uL    Hemoglobin 11.6 (L) 11.7 -  15.7 g/dL    Hematocrit 34.0 (L) 35.0 - 47.0 %    MCV 99 78 - 100 fL    MCH 33.6 (H) 26.5 - 33.0 pg    MCHC 34.1 31.5 - 36.5 g/dL    RDW 14.5 10.0 - 15.0 %    Platelet Count 253 150 - 450 10e3/uL    % Neutrophils 72 %    % Lymphocytes 16 %    % Monocytes 10 %    % Eosinophils 1 %    % Basophils 1 %    % Immature Granulocytes 0 %    NRBCs per 100 WBC 0 <1 /100    Absolute Neutrophils 5.4 1.6 - 8.3 10e3/uL    Absolute Lymphocytes 1.2 0.8 - 5.3 10e3/uL    Absolute Monocytes 0.7 0.0 - 1.3 10e3/uL    Absolute Eosinophils 0.1 0.0 - 0.7 10e3/uL    Absolute Basophils 0.0 0.0 - 0.2 10e3/uL    Absolute Immature Granulocytes 0.0 <=0.4 10e3/uL    Absolute NRBCs 0.0 10e3/uL     No orders to display         ==================================================================================================================================    ED COURSE, MEDICAL DECISION MAKING, FINAL IMPRESSION AND PLAN:     Assessment / Plan:  1. Left facial swelling        The patient was interviewed and examined.  HPI and physical exam as below.  Differential diagnosis and MDM Key Documentation Elements as below.  Vitals, triage note, and nursing notes were reviewed.  Pulse 77   Temp 99  F (37.2  C) (Tympanic)   Resp 18   Wt 67.1 kg (148 lb)   LMP 02/08/2025 (Approximate)   SpO2 97%   BMI 37.75 kg/m      Differential includes but is not limited to dental infection, Alberto's angina, angioedema, anaphylaxis    Patient is afebrile with otherwise normal vitals.  Patient was in no acute distress.  Questional swelling the left side of the face but patient able to open mouth fully.  No sign Alberto's angina.  No hoarseness or drooling.  Handling oral secretions well.  White blood cell count normal.  Hemoglobin stable 1.6.  Elevation in CRP at 157.65 and elevation of ESR 54.  Normal lactic acid.  Alk phos elevated 174.    Attempts at IV or declined by patient.  Unable to get a CTA so will defer CT at this time.  After discussion with caregiver, we  will do an outpatient trial of oral antibiotics to see if this helps with her swelling.  No signs of anaphylaxis to warrant immediate epinephrine administration.  The patient is any worsening of symptoms despite being on oral antibiotics, patient return to the ER for repeat evaluation and most likely sedation for IV.  Augmentin 1 tab every 12 hours for 10 days.  Ibuprofen and Tylenol for any discomfort.  Discharged home in stable condition.    Pertinent Labs & Imaging studies reviewed. (See chart for details)  Results for orders placed or performed during the hospital encounter of 03/08/25   Comprehensive metabolic panel   Result Value Ref Range    Sodium 136 135 - 145 mmol/L    Potassium 3.6 3.4 - 5.3 mmol/L    Carbon Dioxide (CO2) 26 22 - 29 mmol/L    Anion Gap 9 7 - 15 mmol/L    Urea Nitrogen 7.6 6.0 - 20.0 mg/dL    Creatinine 0.62 0.51 - 0.95 mg/dL    GFR Estimate >90 >60 mL/min/1.73m2    Calcium 8.7 (L) 8.8 - 10.4 mg/dL    Chloride 101 98 - 107 mmol/L    Glucose 108 (H) 70 - 99 mg/dL    Alkaline Phosphatase 174 (H) 40 - 150 U/L    AST 33 0 - 45 U/L    ALT 25 0 - 50 U/L    Protein Total 7.1 6.4 - 8.3 g/dL    Albumin 3.2 (L) 3.5 - 5.2 g/dL    Bilirubin Total 0.4 <=1.2 mg/dL   Result Value Ref Range    CRP Inflammation 157.65 (H) <5.00 mg/L   Erythrocyte sedimentation rate auto   Result Value Ref Range    Erythrocyte Sedimentation Rate 54 (H) 0 - 30 mm/hr   Lactic acid whole blood with 1x repeat in 2 hr when >2   Result Value Ref Range    Lactic Acid, Initial 1.0 0.7 - 2.0 mmol/L   Result Value Ref Range    Magnesium 1.9 1.7 - 2.3 mg/dL   CBC with platelets and differential   Result Value Ref Range    WBC Count 7.4 4.0 - 11.0 10e3/uL    RBC Count 3.45 (L) 3.80 - 5.20 10e6/uL    Hemoglobin 11.6 (L) 11.7 - 15.7 g/dL    Hematocrit 34.0 (L) 35.0 - 47.0 %    MCV 99 78 - 100 fL    MCH 33.6 (H) 26.5 - 33.0 pg    MCHC 34.1 31.5 - 36.5 g/dL    RDW 14.5 10.0 - 15.0 %    Platelet Count 253 150 - 450 10e3/uL    % Neutrophils  "72 %    % Lymphocytes 16 %    % Monocytes 10 %    % Eosinophils 1 %    % Basophils 1 %    % Immature Granulocytes 0 %    NRBCs per 100 WBC 0 <1 /100    Absolute Neutrophils 5.4 1.6 - 8.3 10e3/uL    Absolute Lymphocytes 1.2 0.8 - 5.3 10e3/uL    Absolute Monocytes 0.7 0.0 - 1.3 10e3/uL    Absolute Eosinophils 0.1 0.0 - 0.7 10e3/uL    Absolute Basophils 0.0 0.0 - 0.2 10e3/uL    Absolute Immature Granulocytes 0.0 <=0.4 10e3/uL    Absolute NRBCs 0.0 10e3/uL     No results found for: \"ABORH\"      Reassessments, Medications, Interventions, & Response to Treatments:  -as above    Medications given during today's ER visit:  Medications   amoxicillin-clavulanate (AUGMENTIN) 875-125 MG per tablet 1 tablet (1 tablet Oral $Given 3/8/25 9341)       Consultations:  None    Decision Rules, Medical Calculators, and Risk Stratification Tools:  None    MDM Key Documentation Elements for Patient's Evaluation:  Differential diagnosis to include high risk considerations: As above  Escalation to admission/observation considered: Admission/observation considered, but patient does not meet admission criteria  Discussions and management with other clinicians:    3a. Independent interpretation of testing performed by another health professional:  -No  3b. Discussion of management or test interpretation with another health professional: -No  Independent interpretation of tests:  Ordering and/or review of 3+ test(s)  Discussion of test interpretations with radiology:  No  History obtained from source other than patient or assessment requiring an independent historian:  No  Review of non-ED/external records:  review of 3= records  Diagnostic tests considered but not ultimately performed/deferred:  -CT soft tissue neck  Prescription medications considered but not prescribed:  -Clindamycin  Chronic conditions affecting care:  -Down syndrome, dementia  Care affected by social determinants of health:  -None    The patient's management involved:   - " Laboratory studies  - Prescription drug management      A shared decision making model was used. Time was taken to answer all questions.  Patient and/or associated parties understood and were agreeable to treatment plan.  Plan and all results were discussed. Warning signs and close return precautions to return to the ED given. Copy of results given. Discharged in stable condition. Discharged with discharge instructions outlining plan for further care and follow up.      New prescriptions started at today's ER visit  Discharge Medication List as of 3/8/2025  6:43 PM        START taking these medications    Details   amoxicillin-clavulanate (AUGMENTIN) 875-125 MG tablet Take 1 tablet by mouth 2 times daily., Disp-20 tablet, R-0, E-Prescribe             ==================================================================================================================================      I, Miguel Jorge PA-C, personally performed the services described in this documentation, and it is both accurate and complete.       Heladio Jorge PA-C  03/22/25 0603

## 2025-03-26 ENCOUNTER — ANCILLARY ORDERS (OUTPATIENT)
Dept: INTERNAL MEDICINE | Facility: OTHER | Age: 50
End: 2025-03-26
Payer: MEDICARE

## 2025-03-26 DIAGNOSIS — Z12.31 VISIT FOR SCREENING MAMMOGRAM: Primary | ICD-10-CM

## 2025-03-27 ENCOUNTER — HOSPITAL ENCOUNTER (EMERGENCY)
Facility: OTHER | Age: 50
Discharge: HOME | End: 2025-03-27
Attending: STUDENT IN AN ORGANIZED HEALTH CARE EDUCATION/TRAINING PROGRAM
Payer: MEDICARE

## 2025-03-27 ENCOUNTER — APPOINTMENT (OUTPATIENT)
Dept: GENERAL RADIOLOGY | Facility: OTHER | Age: 50
End: 2025-03-27
Attending: STUDENT IN AN ORGANIZED HEALTH CARE EDUCATION/TRAINING PROGRAM
Payer: MEDICARE

## 2025-03-27 ENCOUNTER — TELEPHONE (OUTPATIENT)
Dept: INTERNAL MEDICINE | Facility: OTHER | Age: 50
End: 2025-03-27

## 2025-03-27 VITALS
DIASTOLIC BLOOD PRESSURE: 95 MMHG | BODY MASS INDEX: 34.25 KG/M2 | OXYGEN SATURATION: 95 % | SYSTOLIC BLOOD PRESSURE: 148 MMHG | RESPIRATION RATE: 16 BRPM | WEIGHT: 148 LBS | TEMPERATURE: 99.5 F | HEART RATE: 80 BPM | HEIGHT: 55 IN

## 2025-03-27 DIAGNOSIS — M25.521 RIGHT ELBOW PAIN: ICD-10-CM

## 2025-03-27 DIAGNOSIS — R41.0 CONFUSION: ICD-10-CM

## 2025-03-27 LAB
ALBUMIN SERPL BCG-MCNC: 2.7 G/DL (ref 3.5–5.2)
ALBUMIN UR-MCNC: 20 MG/DL
ALP SERPL-CCNC: 175 U/L (ref 40–150)
ALT SERPL W P-5'-P-CCNC: 27 U/L (ref 0–50)
ANION GAP SERPL CALCULATED.3IONS-SCNC: 11 MMOL/L (ref 7–15)
APPEARANCE UR: CLEAR
AST SERPL W P-5'-P-CCNC: 27 U/L (ref 0–45)
BACTERIA #/AREA URNS HPF: ABNORMAL /HPF
BASOPHILS # BLD AUTO: 0 10E3/UL (ref 0–0.2)
BASOPHILS NFR BLD AUTO: 0 %
BILIRUB SERPL-MCNC: 0.6 MG/DL
BILIRUB UR QL STRIP: NEGATIVE
BUN SERPL-MCNC: 7.8 MG/DL (ref 6–20)
CALCIUM SERPL-MCNC: 8.2 MG/DL (ref 8.8–10.4)
CHLORIDE SERPL-SCNC: 101 MMOL/L (ref 98–107)
COLOR UR AUTO: YELLOW
CREAT SERPL-MCNC: 0.58 MG/DL (ref 0.51–0.95)
CRP SERPL-MCNC: 195.73 MG/L
EGFRCR SERPLBLD CKD-EPI 2021: >90 ML/MIN/1.73M2
EOSINOPHIL # BLD AUTO: 0 10E3/UL (ref 0–0.7)
EOSINOPHIL NFR BLD AUTO: 0 %
ERYTHROCYTE [DISTWIDTH] IN BLOOD BY AUTOMATED COUNT: 14 % (ref 10–15)
FLUAV RNA SPEC QL NAA+PROBE: NEGATIVE
FLUBV RNA RESP QL NAA+PROBE: NEGATIVE
GLUCOSE SERPL-MCNC: 102 MG/DL (ref 70–99)
GLUCOSE UR STRIP-MCNC: NEGATIVE MG/DL
HCO3 SERPL-SCNC: 26 MMOL/L (ref 22–29)
HCT VFR BLD AUTO: 31 % (ref 35–47)
HGB BLD-MCNC: 10.5 G/DL (ref 11.7–15.7)
HGB UR QL STRIP: NEGATIVE
HOLD SPECIMEN: NORMAL
HYALINE CASTS: 1 /LPF
IMM GRANULOCYTES # BLD: 0.1 10E3/UL
IMM GRANULOCYTES NFR BLD: 0 %
KETONES UR STRIP-MCNC: NEGATIVE MG/DL
LEUKOCYTE ESTERASE UR QL STRIP: ABNORMAL
LYMPHOCYTES # BLD AUTO: 0.9 10E3/UL (ref 0.8–5.3)
LYMPHOCYTES NFR BLD AUTO: 8 %
MCH RBC QN AUTO: 33.1 PG (ref 26.5–33)
MCHC RBC AUTO-ENTMCNC: 33.9 G/DL (ref 31.5–36.5)
MCV RBC AUTO: 98 FL (ref 78–100)
MONOCYTES # BLD AUTO: 1 10E3/UL (ref 0–1.3)
MONOCYTES NFR BLD AUTO: 9 %
MUCOUS THREADS #/AREA URNS LPF: PRESENT /LPF
NEUTROPHILS # BLD AUTO: 9.4 10E3/UL (ref 1.6–8.3)
NEUTROPHILS NFR BLD AUTO: 83 %
NITRATE UR QL: NEGATIVE
NRBC # BLD AUTO: 0 10E3/UL
NRBC BLD AUTO-RTO: 0 /100
PH UR STRIP: 7.5 [PH] (ref 5–9)
PLATELET # BLD AUTO: 351 10E3/UL (ref 150–450)
POTASSIUM SERPL-SCNC: 3.6 MMOL/L (ref 3.4–5.3)
PROCALCITONIN SERPL IA-MCNC: 0.09 NG/ML
PROT SERPL-MCNC: 6.6 G/DL (ref 6.4–8.3)
RBC # BLD AUTO: 3.17 10E6/UL (ref 3.8–5.2)
RBC URINE: <1 /HPF
RSV RNA SPEC NAA+PROBE: NEGATIVE
SARS-COV-2 RNA RESP QL NAA+PROBE: NEGATIVE
SODIUM SERPL-SCNC: 138 MMOL/L (ref 135–145)
SP GR UR STRIP: 1.02 (ref 1–1.03)
SQUAMOUS EPITHELIAL: 5 /HPF
T4 FREE SERPL-MCNC: 0.9 NG/DL (ref 0.9–1.7)
TSH SERPL DL<=0.005 MIU/L-ACNC: 5.41 UIU/ML (ref 0.3–4.2)
UROBILINOGEN UR STRIP-MCNC: NORMAL MG/DL
WBC # BLD AUTO: 11.4 10E3/UL (ref 4–11)
WBC URINE: 1 /HPF

## 2025-03-27 PROCEDURE — 84145 PROCALCITONIN (PCT): CPT | Performed by: STUDENT IN AN ORGANIZED HEALTH CARE EDUCATION/TRAINING PROGRAM

## 2025-03-27 PROCEDURE — 84439 ASSAY OF FREE THYROXINE: CPT | Performed by: STUDENT IN AN ORGANIZED HEALTH CARE EDUCATION/TRAINING PROGRAM

## 2025-03-27 PROCEDURE — 81001 URINALYSIS AUTO W/SCOPE: CPT | Performed by: STUDENT IN AN ORGANIZED HEALTH CARE EDUCATION/TRAINING PROGRAM

## 2025-03-27 PROCEDURE — 99284 EMERGENCY DEPT VISIT MOD MDM: CPT | Performed by: STUDENT IN AN ORGANIZED HEALTH CARE EDUCATION/TRAINING PROGRAM

## 2025-03-27 PROCEDURE — 73030 X-RAY EXAM OF SHOULDER: CPT | Mod: RT

## 2025-03-27 PROCEDURE — 80053 COMPREHEN METABOLIC PANEL: CPT | Performed by: STUDENT IN AN ORGANIZED HEALTH CARE EDUCATION/TRAINING PROGRAM

## 2025-03-27 PROCEDURE — 86140 C-REACTIVE PROTEIN: CPT | Performed by: STUDENT IN AN ORGANIZED HEALTH CARE EDUCATION/TRAINING PROGRAM

## 2025-03-27 PROCEDURE — 85025 COMPLETE CBC W/AUTO DIFF WBC: CPT | Performed by: STUDENT IN AN ORGANIZED HEALTH CARE EDUCATION/TRAINING PROGRAM

## 2025-03-27 PROCEDURE — 87637 SARSCOV2&INF A&B&RSV AMP PRB: CPT | Performed by: STUDENT IN AN ORGANIZED HEALTH CARE EDUCATION/TRAINING PROGRAM

## 2025-03-27 PROCEDURE — 71045 X-RAY EXAM CHEST 1 VIEW: CPT

## 2025-03-27 PROCEDURE — 84443 ASSAY THYROID STIM HORMONE: CPT | Performed by: STUDENT IN AN ORGANIZED HEALTH CARE EDUCATION/TRAINING PROGRAM

## 2025-03-27 PROCEDURE — 36415 COLL VENOUS BLD VENIPUNCTURE: CPT | Performed by: STUDENT IN AN ORGANIZED HEALTH CARE EDUCATION/TRAINING PROGRAM

## 2025-03-27 PROCEDURE — 250N000013 HC RX MED GY IP 250 OP 250 PS 637: Performed by: STUDENT IN AN ORGANIZED HEALTH CARE EDUCATION/TRAINING PROGRAM

## 2025-03-27 PROCEDURE — 99284 EMERGENCY DEPT VISIT MOD MDM: CPT | Mod: 25 | Performed by: STUDENT IN AN ORGANIZED HEALTH CARE EDUCATION/TRAINING PROGRAM

## 2025-03-27 PROCEDURE — 73070 X-RAY EXAM OF ELBOW: CPT | Mod: RT

## 2025-03-27 PROCEDURE — 73060 X-RAY EXAM OF HUMERUS: CPT | Mod: RT

## 2025-03-27 RX ADMIN — IBUPROFEN 600 MG: 200 TABLET, FILM COATED ORAL at 10:00

## 2025-03-27 ASSESSMENT — ACTIVITIES OF DAILY LIVING (ADL)
ADLS_ACUITY_SCORE: 46

## 2025-03-27 ASSESSMENT — COLUMBIA-SUICIDE SEVERITY RATING SCALE - C-SSRS
1. IN THE PAST MONTH, HAVE YOU WISHED YOU WERE DEAD OR WISHED YOU COULD GO TO SLEEP AND NOT WAKE UP?: NO
2. HAVE YOU ACTUALLY HAD ANY THOUGHTS OF KILLING YOURSELF IN THE PAST MONTH?: NO
6. HAVE YOU EVER DONE ANYTHING, STARTED TO DO ANYTHING, OR PREPARED TO DO ANYTHING TO END YOUR LIFE?: NO

## 2025-03-27 NOTE — DISCHARGE INSTRUCTIONS
X-rays of right elbow, humerus, and shoulder showed some soft tissue swelling around the elbow. No broken bone or dislocation was seen. Chest x-ray with no obvious pneumonia. Labs did not show any obvious cause for confusion today. We also tested urine which did not appear infected.    She was given ibuprofen here and a sling. Recommend alternating ibuprofen and tylenol for the next couple days, then as needed. Always take ibuprofen with food to avoid stomach ulcers/bleeds.    Recommend close follow up with PCP at appointment provided.    Return to ER for concerning change or worsening symptoms.

## 2025-03-27 NOTE — ED TRIAGE NOTES
"Pt here with EMS into [facility], pt comes from [facility] with c/o right elbow pain and \"pain all over\", pt is more confused per staff, but has a hx of dementia, VSS     Triage Assessment (Adult)       Row Name 03/27/25 0739          Triage Assessment    Airway WDL WDL        Respiratory WDL    Respiratory WDL WDL        Skin Circulation/Temperature WDL    Skin Circulation/Temperature WDL WDL        Cardiac WDL    Cardiac WDL WDL        Peripheral/Neurovascular WDL    Peripheral Neurovascular WDL WDL        Cognitive/Neuro/Behavioral WDL    Cognitive/Neuro/Behavioral WDL WDL                     "

## 2025-03-27 NOTE — ED PROVIDER NOTES
"  History     Chief Complaint   Patient presents with    Elbow Pain       Reg Valiente is a 50 year old female who presents from SNF for confusion and right elbow pain.  Patient with down syndrome and unable to provide any significant history.  She denies any confusion or any pain, however when moving her extremities she has right elbow and shoulder and humerus discomfort by facial expression. Just completed augmentin 10 day course for facial swelling, with completion about 10 days ago.    Triage:  Pt here with EMS into [facility], pt comes from [facility] with c/o right elbow pain and \"pain all over\", pt is more confused per staff, but has a hx of dementia, VSS      Allergies   Allergen Reactions    Sulfa Antibiotics        Patient Active Problem List    Diagnosis Date Noted    Elevated liver enzymes 02/26/2025     Priority: Medium    Pressure injury of buttock, stage 3, unspecified laterality (H) 01/12/2024     Priority: Medium    Pressure ulcer of breast 01/12/2024     Priority: Medium    Obsessive behaviors 09/02/2022     Priority: Medium    Dementia associated with other underlying disease with behavioral disturbance (H) 08/04/2021     Priority: Medium    Encounter for follow-up examination 07/20/2021     Priority: Medium    Post-ERCP acute pancreatitis 07/01/2019     Priority: Medium    Choledocholithiasis 06/25/2019     Priority: Medium    Down's syndrome 08/14/2018     Priority: Medium    DOROTEO (generalized anxiety disorder) 08/14/2018     Priority: Medium    Menstrual disorder 08/14/2018     Priority: Medium    Primary osteoarthritis of both knees 08/14/2018     Priority: Medium    Gastroesophageal reflux disease without esophagitis 08/14/2018     Priority: Medium    Other specified hypothyroidism 08/14/2018     Priority: Medium    Dermatitis 08/14/2018     Priority: Medium    Encounter for screening mammogram for breast cancer 08/14/2018     Priority: Medium    Morbid obesity (H) 08/14/2018     Priority: " Medium    JENNIFER (obstructive sleep apnea) 08/14/2018     Priority: Medium       Past Medical History:   Diagnosis Date    Arthritis of knee     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     Corneal ulcer 2004    Down's syndrome     Gastroesophageal reflux disease     JENNIFER (obstructive sleep apnea)        Past Surgical History:   Procedure Laterality Date    ENDOSCOPIC RETROGRADE CHOLANGIOPANCREATOGRAM N/A 7/1/2019    Procedure: Endoscopic Retrograde Cholangiopancreatogram, dilation, stent , sludge and stone removal, sphincterotomy;  Surgeon: Guru Harper Alvarado MD;  Location: [facility] OR    ENDOSCOPIC RETROGRADE CHOLANGIOPANCREATOGRAM N/A 8/26/2019    Procedure: Endoscopic Retrograde Cholangiopancreatogram with biliary stent removal and stone removal;  Surgeon: Guru Harper Alvarado MD;  Location: [facility] OR    ENDOSCOPIC RETROGRADE CHOLANGIOPANCREATOGRAPHY, EXCHANGE TUBE/STENT N/A 7/24/2019    Procedure: Endoscopic Retrograde Cholangiopancreatography with Billary Stone Extraction,Dilation             and stent exchange;  Surgeon: Guru Harper Alvarado MD;  Location: [facility] OR    LAPAROSCOPIC CHOLECYSTECTOMY WITH CHOLANGIOGRAMS N/A 9/9/2019    Procedure: CHOLECYSTECTOMY, LAPAROSCOPIC, WITH CHOLANGIOGRAM;  Surgeon: Arvind Bailey MD;  Location:  OR    NO HISTORY OF SURGERY      as reported by parents       Family History   Problem Relation Age of Onset    Asthma Mother     Arthritis Mother     Dementia Mother     LUNG DISEASE Father         pulmonary fibrosis secondary to chemical exposure    Diabetes Father     Cerebrovascular Disease Father 90    Cerebrovascular Disease Maternal Grandmother 90    Heart Failure Paternal Grandfather         uncertain which  replaced    Coronary Artery Disease No family hx of     Heart Disease No family hx of     Bleeding Diathesis No family hx of     Pancreatic Cancer No family hx of     Colorectal Cancer No family hx of         Social History     Tobacco Use    Smoking status: Never    Smokeless tobacco: Never   Vaping Use    Vaping status: Never Used   Substance Use Topics    Alcohol use: No    Drug use: No       Medications:    acetaminophen (TYLENOL) 650 MG CR tablet  Adhesive Bandages Foam MISC  amoxicillin-clavulanate (AUGMENTIN) 875-125 MG tablet  Citalopram Hydrobromide 30 MG CAPS  cyanocobalamin (VITAMIN B-12) 1000 MCG tablet  diclofenac (VOLTAREN) 1 % topical gel  diclofenac (VOLTAREN) 50 MG EC tablet  famotidine (PEPCID) 40 MG tablet  levothyroxine (SYNTHROID/LEVOTHROID) 137 MCG tablet  memantine (NAMENDA) 5 MG tablet  Multiple Vitamin (TAB-A-MATTHIAS) TABS  norgestim-eth estrad triphasic (TRI-LINYAH) 0.18/0.215/0.25 MG-35 MCG tablet  QUEtiapine (SEROQUEL) 25 MG tablet  Sennosides-Docusate Sodium (SB DOCUSATE SODIUM/SENNA PO)        Review of Systems: See HPI for pertinent negatives and positives. All other systems reviewed and found to be negative.    Physical Exam   BP (!) 148/95   Pulse 80   Temp 99.5  F (37.5  C) (Tympanic)   Resp 16   Ht 1.219 m (4')   Wt 67.1 kg (148 lb)   LMP 02/08/2025 (Approximate)   SpO2 95%   BMI 45.16 kg/m       General: awake, comfortable  HEENT: atraumatic  Respiratory: normal effort, clear to auscultation bilaterally  Cardiovascular: regular rate and rhythm, no murmurs  Abdomen: soft, nondistended, nontender  Extremities: no deformities, edema; right elbow and humerus and right shoulder tender to palpation   Skin: warm, dry, no rashes or discoloration  Neuro: alert, no focal deficits, cognitive deficit consistent with down syndrome  Psych: appropriate mood and affect    ED Course      Results for orders placed or performed during the hospital encounter of 03/27/25 (from the past 24 hours)   Influenza A/B, RSV and SARS-CoV2 PCR (COVID-19) Nasopharyngeal    Specimen: Nasopharyngeal; Swab   Result Value Ref Range    Influenza A PCR Negative Negative    Influenza B PCR Negative Negative     RSV PCR Negative Negative    SARS CoV2 PCR Negative Negative    Narrative    Testing was performed using the Xpert Xpress CoV2/Flu/RSV Assay on the [employer] Gene[employer]pert Instrument. This test should be ordered for the detection of SARS-CoV2, influenza, and RSV viruses in individuals with signs and symptoms of respiratory tract infection. This test is for in vitro diagnostic use under the [place] FDA for laboratories certified under CLIA to perform high or moderate complexity testing. This test has been [place] FDA cleared. A negative result does not rule out the presence of PCR inhibitors in the specimen or target RNA in concentration below the limit of detection for the assay. If only one viral target is positive but coinfection with multiple targets is suspected, the sample should be re-tested with another FDA cleared, approved, or authorized test, if coninfection would change clinical management. This test was validated by the [facility]. These laboratories are certified under the Clinical Laboratory Improvement Amendments of 1988 (CLIA-88) as qualified to perfom high complexity laboratory testing.   CBC with platelets differential    Narrative    The following orders were created for panel order CBC with platelets differential.  Procedure                               Abnormality         Status                     ---------                               -----------         ------                     CBC with platelets and ...[[id number]]  Abnormal            Final result                 Please view results for these tests on the individual orders.   Comprehensive metabolic panel   Result Value Ref Range    Sodium 138 135 - 145 mmol/L    Potassium 3.6 3.4 - 5.3 mmol/L    Carbon Dioxide (CO2) 26 22 - 29 mmol/L    Anion Gap 11 7 - 15 mmol/L    Urea Nitrogen 7.8 6.0 - 20.0 mg/dL    Creatinine 0.58 0.51 - 0.95 mg/dL    GFR Estimate >90 >60 mL/min/1.73m2    Calcium 8.2 (L) 8.8 - 10.4 mg/dL    Chloride 101 98 -  107 mmol/L    Glucose 102 (H) 70 - 99 mg/dL    Alkaline Phosphatase 175 (H) 40 - 150 U/L    AST 27 0 - 45 U/L    ALT 27 0 - 50 U/L    Protein Total 6.6 6.4 - 8.3 g/dL    Albumin 2.7 (L) 3.5 - 5.2 g/dL    Bilirubin Total 0.6 <=1.2 mg/dL   CBC with platelets and differential   Result Value Ref Range    WBC Count 11.4 (H) 4.0 - 11.0 10e3/uL    RBC Count 3.17 (L) 3.80 - 5.20 10e6/uL    Hemoglobin 10.5 (L) 11.7 - 15.7 g/dL    Hematocrit 31.0 (L) 35.0 - 47.0 %    MCV 98 78 - 100 fL    MCH 33.1 (H) 26.5 - 33.0 pg    MCHC 33.9 31.5 - 36.5 g/dL    RDW 14.0 10.0 - 15.0 %    Platelet Count 351 150 - 450 10e3/uL    % Neutrophils 83 %    % Lymphocytes 8 %    % Monocytes 9 %    % Eosinophils 0 %    % Basophils 0 %    % Immature Granulocytes 0 %    NRBCs per 100 WBC 0 <1 /100    Absolute Neutrophils 9.4 (H) 1.6 - 8.3 10e3/uL    Absolute Lymphocytes 0.9 0.8 - 5.3 10e3/uL    Absolute Monocytes 1.0 0.0 - 1.3 10e3/uL    Absolute Eosinophils 0.0 0.0 - 0.7 10e3/uL    Absolute Basophils 0.0 0.0 - 0.2 10e3/uL    Absolute Immature Granulocytes 0.1 <=0.4 10e3/uL    Absolute NRBCs 0.0 10e3/uL   Extra Tube    Narrative    The following orders were created for panel order Extra Tube.  Procedure                               Abnormality         Status                     ---------                               -----------         ------                     Extra Blue Top Tube[[id number]]                             Final result               Extra Red Top Tube[[id number]]                              Final result               Extra Green Top (Lithiu...[[id number]]                      Final result                 Please view results for these tests on the individual orders.   Extra Blue Top Tube   Result Value Ref Range    Hold Specimen JIC    Extra Red Top Tube   Result Value Ref Range    Hold Specimen JIC    Extra Green Top (Lithium Heparin) ON ICE   Result Value Ref Range    Hold Specimen JIC    CRP inflammation   Result Value Ref Range     CRP Inflammation 195.73 (H) <5.00 mg/L   Procalcitonin   Result Value Ref Range    Procalcitonin 0.09 <0.50 ng/mL   TSH Reflex GH   Result Value Ref Range    TSH 5.41 (H) 0.30 - 4.20 uIU/mL   T4 free   Result Value Ref Range    Free T4 0.90 0.90 - 1.70 ng/dL   XR Elbow Right 2 Views    Narrative    XR ELBOW RIGHT 2 VIEWS    HISTORY: 50 years Female down syndrome so limited history - complaints  of elbow pain    COMPARISON: 8/14/2015    TECHNIQUE: Right elbow 2 views    FINDINGS: There is subcutaneous edema. There is no evidence of joint  effusion fracture or dislocation.    There is degenerative bony proliferation. Small ossified bodies are  present about the elbow as were seen previously.      Impression    IMPRESSION: Degenerative arthritic changes. No evidence of fracture or  dislocation.    There is diffuse subcutaneous edema.    HESHAM LYN MD         SYSTEM ID:  [id number]   XR Shoulder Right 2 Views    Narrative    XR SHOULDER RIGHT 2 VIEWS    HISTORY: 50 years Female down syndrome so limited history - complaints  of elbow pain but also with shoulder pain    COMPARISON: None    TECHNIQUE: 2 views right shoulder    FINDINGS: The glenohumeral and clavicular articulations are congruent.  There is mild-to-moderate acromioclavicular osteoarthritic change.  There is no evidence of fracture or dislocation. No concerning osseous  changes are present.      Impression    IMPRESSION: Degenerative changes. No evidence of fracture or  dislocation.    HESHAM LYN MD         SYSTEM ID:  [id number]   XR Humerus Right G/E 2 Views    Narrative    XR HUMERUS RIGHT G/E 2 VIEWS    HISTORY: 50 years Female right upper arm pain    COMPARISON: None    TECHNIQUE: Right humerus 2 views    FINDINGS: The humerus is intact. There is no evidence of fracture. No  concerning osteosclerotic or osteolytic lesions are present. There is  some degenerative osteophytic change of the humeral head.      Impression    IMPRESSION: No evidence  of fracture or dislocation. No concerning  osseous changes.    HESHAM LYN MD         SYSTEM ID:  [id number]   UA with Microscopic reflex to Culture    Specimen: Urine, Midstream   Result Value Ref Range    Color Urine Yellow Colorless, Straw, Light Yellow, Yellow    Appearance Urine Clear Clear    Glucose Urine Negative Negative mg/dL    Bilirubin Urine Negative Negative    Ketones Urine Negative Negative mg/dL    Specific Gravity Urine 1.019 1.000 - 1.030    Blood Urine Negative Negative    pH Urine 7.5 5.0 - 9.0    Protein Albumin Urine 20 (A) Negative mg/dL    Urobilinogen Urine Normal Normal mg/dL    Nitrite Urine Negative Negative    Leukocyte Esterase Urine Small (A) Negative    Bacteria Urine Few (A) None Seen /HPF    Mucus Urine Present (A) None Seen /LPF    RBC Urine <1 <=2 /HPF    WBC Urine 1 <=5 /HPF    Squamous Epithelials Urine 5 (H) <=1 /HPF    Hyaline Casts Urine 1 <=2 /LPF    Narrative    Urine Culture not indicated   XR Chest Port 1 View    Narrative    PROCEDURE: XR CHEST PORT 1 VIEW 3/27/2025 11:09 AM    HISTORY: occult pna?    COMPARISONS: 11/23/2021.    TECHNIQUE: Single portable view.    FINDINGS: Heart is probably normal in size allowing for portable  technique and low level of inspiration. There is some diffuse  interstitial prominence, change when compared to the prior exam. No  confluent infiltrate or pleural effusion is seen. There is some  ectasia of the aorta.    There is degenerative change in the left shoulder. There is a right  convex scoliosis.         Impression    IMPRESSION: Bilateral interstitial prominence. This may represent  interstitial infiltrate or edema. There was not present on the prior  exam but could still represent some interstitial fibrosis which has  developed in the interval.    SALBADOR WILSON MD         SYSTEM ID:  [id number]       Medications   ibuprofen (ADVIL/MOTRIN) tablet 600 mg (600 mg Oral $Given 3/27/25 1000)       Assessments & Plan (with Medical  Decision Making)     I have reviewed the nursing notes.    ED Course as of 03/27/25 1307   Thu Mar 27, 2025   1044 Per caregiver select medications were recently discontinued, including synthroid. Adding TSH test.        50 year old female evaluated for right elbow pain and confusion.  On exam she does have discomfort over her right elbow humerus and shoulder.  There is no deformity or signs of infection or obvious swelling.  She is able to flex her elbow and use her shoulder.  Recently completed course of Augmentin for facial edema, with last dose about 10 days ago.  XR of elbow, humerus, and shoulder without fracture. There is some edema surrounding elbow. Labs remarkable for mild leukocytosis 11.4 with left shift.  CRP is quite elevated 196 but has been near this range 2 weeks ago.  Procalcitonin low suggesting against bacterial infection.  Synthroid recently discontinued however free T4 was normal today.  Looking for signs for infection was unremarkable including her UA and chest x-ray and viral 4 Plex.  She was placed in a sling for her right upper extremity discomfort.  Recommend symptomatic treatment with over-the-counter medications.  We set up PCP appointment for close follow-up.  At this point do not appreciate a serious bacterial infection that requires treatment or other obvious cause for her apparent confusion.  She is alert at numerous time points during encounter and unclear if she is far off her baseline with Down syndrome history. With no emergent condition identified, patient appears appropriate for close outpatient management.     Discharged home with below plan and attached instructions on diagnosis provided including ED return precautions.     I have reviewed the findings, diagnosis, plan, and need for any follow up with the patient.    Patient instructions:   X-rays of right elbow, humerus, and shoulder showed some soft tissue swelling around the elbow. No broken bone or dislocation was seen.  Chest x-ray with no obvious pneumonia. Labs did not show any obvious cause for confusion today. We also tested urine which did not appear infected.    She was given ibuprofen here and a sling. Recommend alternating ibuprofen and tylenol for the next couple days, then as needed. Always take ibuprofen with food to avoid stomach ulcers/bleeds.    Recommend close follow up with PCP at appointment provided.    Return to ER for concerning change or worsening symptoms.        Discharge Medication List as of 3/27/2025 11:35 AM          Final diagnoses:   Right elbow pain   Confusion       3/27/2025   [facility]     Ruben Jonas MD  03/27/25 [id number]

## 2025-03-27 NOTE — TELEPHONE ENCOUNTER
Writer spoke to Jessa from [facility] regarding patients medication, She stated that Diclofenac, levothyroxine, Cyanocobalamin and Memantine were discontinued according to Anali Pickett, Provider VIRIDIANA stated that these medications were current on her medication list. a new signed medication list was faxed over to [facility] and to [facility].   Zeinab Field LPN on 3/27/2025 at 1:24 PM  EXT. [phone]

## 2025-03-27 NOTE — TELEPHONE ENCOUNTER
Would like a call back on med changes that were made Feb. States they were not made aware of it and patient is having some side effects and they just want confirmation of changes.       Lynda Bermudez on 3/27/2025 at 12:23 PM

## 2025-04-07 ENCOUNTER — MEDICAL CORRESPONDENCE (OUTPATIENT)
Dept: HEALTH INFORMATION MANAGEMENT | Facility: OTHER | Age: 50
End: 2025-04-07
Payer: MEDICARE

## 2025-04-15 ENCOUNTER — OFFICE VISIT (OUTPATIENT)
Dept: INTERNAL MEDICINE | Facility: OTHER | Age: 50
End: 2025-04-15
Attending: NURSE PRACTITIONER
Payer: MEDICARE

## 2025-04-15 VITALS
WEIGHT: 155.2 LBS | RESPIRATION RATE: 20 BRPM | TEMPERATURE: 98.9 F | BODY MASS INDEX: 35.92 KG/M2 | SYSTOLIC BLOOD PRESSURE: 110 MMHG | HEIGHT: 55 IN | HEART RATE: 76 BPM | DIASTOLIC BLOOD PRESSURE: 74 MMHG | OXYGEN SATURATION: 95 %

## 2025-04-15 DIAGNOSIS — F02.818 DEMENTIA ASSOCIATED WITH OTHER UNDERLYING DISEASE WITH BEHAVIORAL DISTURBANCE (H): ICD-10-CM

## 2025-04-15 DIAGNOSIS — Z74.09 IMPAIRED MOBILITY: ICD-10-CM

## 2025-04-15 DIAGNOSIS — Q90.9 DOWN'S SYNDROME: ICD-10-CM

## 2025-04-15 DIAGNOSIS — M25.529 ELBOW PAIN, UNSPECIFIED LATERALITY: Primary | ICD-10-CM

## 2025-04-15 DIAGNOSIS — D64.9 ANEMIA, UNSPECIFIED TYPE: ICD-10-CM

## 2025-04-15 DIAGNOSIS — M17.0 PRIMARY OSTEOARTHRITIS OF BOTH KNEES: ICD-10-CM

## 2025-04-15 DIAGNOSIS — F41.0 ANXIETY ATTACK: ICD-10-CM

## 2025-04-15 LAB
ALBUMIN SERPL BCG-MCNC: 3.3 G/DL (ref 3.5–5.2)
ALP SERPL-CCNC: 167 U/L (ref 40–150)
ALT SERPL W P-5'-P-CCNC: 22 U/L (ref 0–50)
ANION GAP SERPL CALCULATED.3IONS-SCNC: 6 MMOL/L (ref 7–15)
AST SERPL W P-5'-P-CCNC: 30 U/L (ref 0–45)
BILIRUB SERPL-MCNC: 0.4 MG/DL
BUN SERPL-MCNC: 19.8 MG/DL (ref 6–20)
CALCIUM SERPL-MCNC: 8.9 MG/DL (ref 8.8–10.4)
CHLORIDE SERPL-SCNC: 101 MMOL/L (ref 98–107)
CREAT SERPL-MCNC: 0.72 MG/DL (ref 0.51–0.95)
CRP SERPL-MCNC: 26.72 MG/L
EGFRCR SERPLBLD CKD-EPI 2021: >90 ML/MIN/1.73M2
ERYTHROCYTE [DISTWIDTH] IN BLOOD BY AUTOMATED COUNT: 14.7 % (ref 10–15)
ERYTHROCYTE [SEDIMENTATION RATE] IN BLOOD BY WESTERGREN METHOD: 61 MM/HR (ref 0–30)
FERRITIN SERPL-MCNC: 66 NG/ML (ref 6–175)
FOLATE SERPL-MCNC: >20 NG/ML (ref 4.6–34.8)
GLUCOSE SERPL-MCNC: 62 MG/DL (ref 70–99)
HCO3 SERPL-SCNC: 28 MMOL/L (ref 22–29)
HCT VFR BLD AUTO: 33 % (ref 35–47)
HGB BLD-MCNC: 10.8 G/DL (ref 11.7–15.7)
IRON BINDING CAPACITY (ROCHE): 289 UG/DL (ref 240–430)
IRON SATN MFR SERPL: 18 % (ref 15–46)
IRON SERPL-MCNC: 52 UG/DL (ref 37–145)
MCH RBC QN AUTO: 32.3 PG (ref 26.5–33)
MCHC RBC AUTO-ENTMCNC: 32.7 G/DL (ref 31.5–36.5)
MCV RBC AUTO: 99 FL (ref 78–100)
PLATELET # BLD AUTO: 369 10E3/UL (ref 150–450)
POTASSIUM SERPL-SCNC: 3.9 MMOL/L (ref 3.4–5.3)
PROT SERPL-MCNC: 7.8 G/DL (ref 6.4–8.3)
RBC # BLD AUTO: 3.34 10E6/UL (ref 3.8–5.2)
RHEUMATOID FACT SERPL-ACNC: <10 IU/ML
SODIUM SERPL-SCNC: 135 MMOL/L (ref 135–145)
VIT B12 SERPL-MCNC: 1216 PG/ML (ref 232–1245)
WBC # BLD AUTO: 5.9 10E3/UL (ref 4–11)

## 2025-04-15 PROCEDURE — 85652 RBC SED RATE AUTOMATED: CPT | Mod: ZL | Performed by: NURSE PRACTITIONER

## 2025-04-15 PROCEDURE — 86431 RHEUMATOID FACTOR QUANT: CPT | Mod: ZL | Performed by: NURSE PRACTITIONER

## 2025-04-15 PROCEDURE — 83550 IRON BINDING TEST: CPT | Mod: ZL | Performed by: NURSE PRACTITIONER

## 2025-04-15 PROCEDURE — 36415 COLL VENOUS BLD VENIPUNCTURE: CPT | Mod: ZL | Performed by: NURSE PRACTITIONER

## 2025-04-15 PROCEDURE — 86038 ANTINUCLEAR ANTIBODIES: CPT | Mod: ZL | Performed by: NURSE PRACTITIONER

## 2025-04-15 PROCEDURE — 82607 VITAMIN B-12: CPT | Mod: ZL | Performed by: NURSE PRACTITIONER

## 2025-04-15 PROCEDURE — 82435 ASSAY OF BLOOD CHLORIDE: CPT | Mod: ZL | Performed by: NURSE PRACTITIONER

## 2025-04-15 PROCEDURE — 86200 CCP ANTIBODY: CPT | Mod: ZL | Performed by: NURSE PRACTITIONER

## 2025-04-15 PROCEDURE — 82728 ASSAY OF FERRITIN: CPT | Mod: ZL | Performed by: NURSE PRACTITIONER

## 2025-04-15 PROCEDURE — G0463 HOSPITAL OUTPT CLINIC VISIT: HCPCS

## 2025-04-15 PROCEDURE — 85027 COMPLETE CBC AUTOMATED: CPT | Mod: ZL | Performed by: NURSE PRACTITIONER

## 2025-04-15 PROCEDURE — 82746 ASSAY OF FOLIC ACID SERUM: CPT | Mod: ZL | Performed by: NURSE PRACTITIONER

## 2025-04-15 PROCEDURE — 82947 ASSAY GLUCOSE BLOOD QUANT: CPT | Mod: ZL | Performed by: NURSE PRACTITIONER

## 2025-04-15 PROCEDURE — 86140 C-REACTIVE PROTEIN: CPT | Mod: ZL | Performed by: NURSE PRACTITIONER

## 2025-04-15 RX ORDER — HYDROXYZINE HYDROCHLORIDE 10 MG/1
TABLET, FILM COATED ORAL
Qty: 6 TABLET | Refills: 1 | Status: SHIPPED | OUTPATIENT
Start: 2025-04-15

## 2025-04-15 ASSESSMENT — ANXIETY QUESTIONNAIRES
8. IF YOU CHECKED OFF ANY PROBLEMS, HOW DIFFICULT HAVE THESE MADE IT FOR YOU TO DO YOUR WORK, TAKE CARE OF THINGS AT HOME, OR GET ALONG WITH OTHER PEOPLE?: SOMEWHAT DIFFICULT
1. FEELING NERVOUS, ANXIOUS, OR ON EDGE: MORE THAN HALF THE DAYS
6. BECOMING EASILY ANNOYED OR IRRITABLE: MORE THAN HALF THE DAYS
GAD7 TOTAL SCORE: 13
4. TROUBLE RELAXING: SEVERAL DAYS
IF YOU CHECKED OFF ANY PROBLEMS ON THIS QUESTIONNAIRE, HOW DIFFICULT HAVE THESE PROBLEMS MADE IT FOR YOU TO DO YOUR WORK, TAKE CARE OF THINGS AT HOME, OR GET ALONG WITH OTHER PEOPLE: SOMEWHAT DIFFICULT
5. BEING SO RESTLESS THAT IT IS HARD TO SIT STILL: SEVERAL DAYS
3. WORRYING TOO MUCH ABOUT DIFFERENT THINGS: MORE THAN HALF THE DAYS
2. NOT BEING ABLE TO STOP OR CONTROL WORRYING: NEARLY EVERY DAY
GAD7 TOTAL SCORE: 13
GAD7 TOTAL SCORE: 13
7. FEELING AFRAID AS IF SOMETHING AWFUL MIGHT HAPPEN: MORE THAN HALF THE DAYS
7. FEELING AFRAID AS IF SOMETHING AWFUL MIGHT HAPPEN: MORE THAN HALF THE DAYS

## 2025-04-15 ASSESSMENT — PAIN SCALES - GENERAL: PAINLEVEL_OUTOF10: NO PAIN (0)

## 2025-04-15 NOTE — NURSING NOTE
"Chief Complaint   Patient presents with    Emergency Room F/U     Right elbow pain and confusion         Initial BP 89/56 (BP Location: Right arm, Patient Position: Sitting, Cuff Size: Adult Regular)   Pulse 76   Temp 98.9  F (37.2  C) (Temporal)   Resp 20   Ht (!) 0.102 m (4\")   Wt 70.4 kg (155 lb 3.2 oz)   LMP 04/09/2025 (Exact Date)   SpO2 95%   Breastfeeding No   BMI 6819.84 kg/m   Estimated body mass index is 6,819.84 kg/m  as calculated from the following:    Height as of this encounter: 0.102 m (4\").    Weight as of this encounter: 70.4 kg (155 lb 3.2 oz).  Medication Review: complete    The next two questions are to help us understand your food security.  If you are feeling you need any assistance in this area, we have resources available to support you today.          2/24/2025   SDOH- Food Insecurity   Within the past 12 months, did you worry that your food would run out before you got money to buy more? N   Within the past 12 months, did the food you bought just not last and you didn t have money to get more? N         Health Care Directive:  Patient has a Health Care Directive on file      Kellie Franks      "

## 2025-04-15 NOTE — PROGRESS NOTES
ICD-10-CM    1. Elbow pain, unspecified laterality  M25.529 CBC with platelets     Comprehensive metabolic panel     Erythrocyte sedimentation rate auto     CRP inflammation     Anti Nuclear Mey IgG by IFA with Reflex     Cyclic Citrullinated Peptide Antibody IgG     Rheumatoid factor     Rheumatoid factor     Cyclic Citrullinated Peptide Antibody IgG     Anti Nuclear Mey IgG by IFA with Reflex     CRP inflammation     Erythrocyte sedimentation rate auto     Comprehensive metabolic panel     CBC with platelets      2. Down's syndrome  Q90.9 Walker Order for DME - ONLY FOR DME      3. Dementia associated with other underlying disease with behavioral disturbance (H)  F02.818 Walker Order for DME - ONLY FOR DME      4. Anemia, unspecified type  D64.9 Vitamin B12     Folate     Ferritin     Iron & Iron Binding Capacity     Iron & Iron Binding Capacity     Ferritin     Folate     Vitamin B12      5. Anxiety attack  F41.0 hydrOXYzine HCl (ATARAX) 10 MG tablet      6. Impaired mobility  Z74.09 Walker Order for DME - ONLY FOR DME      7. Primary osteoarthritis of both knees  M17.0 Walker Order for DME - ONLY FOR DME         Plan:  Patient returned back to baseline.  Will do additional lab workup for the acute short-term synovitis and also the facial swelling.  She had elevated CRP, low hemoglobin, increased alkaline phosphatase and low albumin.  Will check additional studies for the anemia and autoimmune disorders.  Prescription provided for walker with wheels due to impaired gait and mobility with bilateral osteoarthritis of knees and severe morbid obesity.  Trial hydroxyzine 10 mg 1 hour prior to clinic appointments with lab draw.  If not effective then we will increase dose or try alternative.    Time spent today on history intake, record review, physical examination, reviewing lab and diagnostic studies, counseling and care coordination: 42 minutes.       Reji Finney is a 50 year old, presenting for the  following health issues:  Emergency Room F/U (Right elbow pain and confusion/)        4/15/2025    10:21 AM   Additional Questions   Roomed by Kellie LEHMAN   Accompanied by Heron - staff     HPI    He is here today to follow-up on emergency department visits from March 8, 2025 and March 27, 2025.  First ED visit was for facial swelling and the next ED visit was for right elbow and shoulder pain and disability and confusion beyond baseline.  She does have underlying Alzheimer's disease.  She had lab work completed at ED.  Initially she was started on Augmentin for the facial swelling.  It was then recommended that she use acetaminophen or ibuprofen for elbow pain in addition to wearing a sling.  Thyroid labs checked and mild increased TSH with normal free T4.  Staff who accompany her today states she is returned back to her baseline.  She has not had any other evidence of facial swelling.  No dental or periodontal issues.  She did have a thorough oral examination at ED visit which was negative.  Had x-ray completed which showed no significant arthritis of the right upper extremity.  Labs from those visits show progressive drop in hemoglobin with normal MCV and platelets, increased alkaline phosphatase and albumin which has been dropping as well.  CRP was quite elevated, sed rate mildly abnormal.  Staff member reports she has not had any other evidence of intermittent synovitis.  Appetite usually good.  She prefers fast foods and will eat a bagel with cream cheese every morning for breakfast.  She does eat meals at [facility] that are served 3 times daily.  There has been no evidence of change in bowel pattern, rectal bleeding or dark stools.  She continues to have a menstrual cycle usually around every 28 days lasting no longer than 5 days but no heavy bleeding or clotting.  She has also had some anxiety and agitation prior to lab draws.  Staff are requesting that she have something to help calm the anxiety prior  to clinic visits.  She is also in need of a wheeled walker with a seat.                  Objective    /74   Pulse 76   Temp 98.9  F (37.2  C) (Temporal)   Resp 20   Ht 1.219 m (4')   Wt 70.4 kg (155 lb 3.2 oz)   LMP 04/09/2025 (Exact Date)   SpO2 95%   Breastfeeding No   BMI 47.36 kg/m    Body mass index is 47.36 kg/m .  Physical Exam   Morbidly obese female with Down syndrome in usual state of health and cognition at baseline accompanied by [facility] staff member.  Pleasant and cooperative.  Well-groomed and well-dressed.  No facial swelling.  Full range of motion of shoulders, elbows, wrist and digits.  Lung fields clear to auscultation.  Cardiovascular regular rate and rhythm.    Emergency department notes, laboratory diagnostic studies reviewed from March 8, 2025 and March 27, 2025 visits.            Signed Electronically by: Mikayla Gtz NP    Answers submitted by the patient for this visit:  Patient Health Questionnaire (Submitted on 4/15/2025)  PHQ9 TOTAL SCORE: Incomplete  Patient Health Questionnaire (G7) (Submitted on 4/15/2025)  DOROTEO 7 TOTAL SCORE: 13

## 2025-04-15 NOTE — LETTER
April 16, 2025      Reg Valiente  [address] [address]        Dear ,    We are writing to inform you of your test results.    Overall lab work looks good.  Inflammatory markers show improvement from previous with the CRP trending down significantly and sed rate similar to where it had been in the past.  Hemoglobin is also similar to previous.  Since your right upper extremity joint pain and facial swelling have significantly resolved I think at this time we monitor lab work again at a later date.  If the pain and facial swelling return however I would like you to be evaluated.    Resulted Orders   Iron & Iron Binding Capacity   Result Value Ref Range    Iron 52 37 - 145 ug/dL    Iron Binding Capacity 289 240 - 430 ug/dL    Iron Sat Index 18 15 - 46 %   Ferritin   Result Value Ref Range    Ferritin 66 6 - 175 ng/mL   Folate   Result Value Ref Range    Folic Acid >20.0 4.6 - 34.8 ng/mL   Vitamin B12   Result Value Ref Range    Vitamin B12 1,216 232 - 1,245 pg/mL   Rheumatoid factor   Result Value Ref Range    Rheumatoid Factor <10 <14 IU/mL   Cyclic Citrullinated Peptide Antibody IgG   Result Value Ref Range    Cyclic Citrullinated Peptide Antibody IgG 2.7 <7.0 U/mL      Comment:      Negative   Anti Nuclear Mey IgG by IFA with Reflex   Result Value Ref Range    ERYN interpretation Negative Negative      Comment:        Negative:              <1:40  Borderline Positive:   1:40 - 1:80  Positive:              >1:80   CRP inflammation   Result Value Ref Range    CRP Inflammation 26.72 (H) <5.00 mg/L   Erythrocyte sedimentation rate auto   Result Value Ref Range    Erythrocyte Sedimentation Rate 61 (H) 0 - 30 mm/hr   Comprehensive metabolic panel   Result Value Ref Range    Sodium 135 135 - 145 mmol/L    Potassium 3.9 3.4 - 5.3 mmol/L    Carbon Dioxide (CO2) 28 22 - 29 mmol/L    Anion Gap 6 (L) 7 - 15 mmol/L    Urea Nitrogen 19.8 6.0 - 20.0 mg/dL    Creatinine 0.72 0.51 - 0.95 mg/dL     GFR Estimate >90 >60 mL/min/1.73m2      Comment:      eGFR calculated using 2021 CKD-EPI equation.    Calcium 8.9 8.8 - 10.4 mg/dL    Chloride 101 98 - 107 mmol/L    Glucose 62 (L) 70 - 99 mg/dL    Alkaline Phosphatase 167 (H) 40 - 150 U/L    AST 30 0 - 45 U/L    ALT 22 0 - 50 U/L    Protein Total 7.8 6.4 - 8.3 g/dL    Albumin 3.3 (L) 3.5 - 5.2 g/dL    Bilirubin Total 0.4 <=1.2 mg/dL   CBC with platelets   Result Value Ref Range    WBC Count 5.9 4.0 - 11.0 10e3/uL    RBC Count 3.34 (L) 3.80 - 5.20 10e6/uL    Hemoglobin 10.8 (L) 11.7 - 15.7 g/dL    Hematocrit 33.0 (L) 35.0 - 47.0 %    MCV 99 78 - 100 fL    MCH 32.3 26.5 - 33.0 pg    MCHC 32.7 31.5 - 36.5 g/dL    RDW 14.7 10.0 - 15.0 %    Platelet Count 369 150 - 450 10e3/uL       If you have any questions or concerns, please call the clinic at the number listed above.       Sincerely,      Mikayla Gtz NP    Electronically signed

## 2025-04-16 LAB
ANA SER QL IF: NEGATIVE
CCP AB SER IA-ACNC: 2.7 U/ML

## 2025-04-21 DIAGNOSIS — R79.89 LOW VITAMIN B12 LEVEL: ICD-10-CM

## 2025-04-21 DIAGNOSIS — G30.0 EARLY ONSET ALZHEIMER'S DISEASE WITH BEHAVIORAL DISTURBANCE (H): ICD-10-CM

## 2025-04-21 DIAGNOSIS — R46.81 OBSESSIVE BEHAVIORS: ICD-10-CM

## 2025-04-21 DIAGNOSIS — F02.818 EARLY ONSET ALZHEIMER'S DISEASE WITH BEHAVIORAL DISTURBANCE (H): ICD-10-CM

## 2025-04-21 DIAGNOSIS — K21.9 GASTROESOPHAGEAL REFLUX DISEASE WITHOUT ESOPHAGITIS: ICD-10-CM

## 2025-04-21 DIAGNOSIS — E03.8 OTHER SPECIFIED HYPOTHYROIDISM: ICD-10-CM

## 2025-04-22 RX ORDER — MEMANTINE HYDROCHLORIDE 5 MG/1
5 TABLET ORAL
Qty: 180 TABLET | Refills: 3 | Status: SHIPPED | OUTPATIENT
Start: 2025-04-22

## 2025-04-22 RX ORDER — LEVOTHYROXINE SODIUM 137 UG/1
137 TABLET ORAL
Qty: 90 TABLET | Refills: 3 | Status: SHIPPED | OUTPATIENT
Start: 2025-04-22

## 2025-04-22 RX ORDER — FAMOTIDINE 20 MG/1
20 TABLET, FILM COATED ORAL
Qty: 180 TABLET | Refills: 3 | Status: SHIPPED | OUTPATIENT
Start: 2025-04-22

## 2025-04-22 RX ORDER — LANOLIN ALCOHOL/MO/W.PET/CERES
1000 CREAM (GRAM) TOPICAL
Qty: 90 TABLET | Refills: 3 | Status: SHIPPED | OUTPATIENT
Start: 2025-04-22

## 2025-04-22 NOTE — TELEPHONE ENCOUNTER
[id number] sent Rx request for the following:      Requested Prescriptions   Pending Prescriptions Disp Refills    cyanocobalamin (VITAMIN B-12) 1000 MCG tablet [Pharmacy Med Name: VITAMIN B-12 1000MCG TABLET] 30 tablet 0     Sig: TAKE 1 TAB BY MOUTH ONCE DAILY   Historical    Vitamin Supplements Protocol Failed - 4/22/2025  1:25 PM        Failed - Medication is active on med list and the sig matches. RN to manually verify dose and sig if red X/fail.     If the protocol passes (green check), you do not need to verify med dose and sig.    A prescription matches if they are the same clinical intention.    For Example: once daily and every morning are the same.    The protocol can not identify upper and lower case letters as matching and will fail.     For Example: Take 1 tablet (50 mg) by mouth daily     TAKE 1 TABLET (50 MG) BY MOUTH DAILY    For all fails (red x), verify dose and sig.    If the refill does match what is on file, the RN can still proceed to approve the refill request.       If they do not match, route to the appropriate provider.        Failed - Medication indicated for associated diagnosis     The medication is prescribed for one or more of the following conditions:     Vitamin deficiency   Osteopenia   Osteoporosis   Nutrition counseling   Nutrition education   Feeding ability finding   Bone density finding   Morbid Obesity   History of bypass of stomach   Idiopathic peripheral neuropathy   General examination of patient   Vitamin D deficiency   Folate deficiency anemia due to dietary causes   Sleeve resection of stomach   Rheumatoid factor level - finding   Crohn's disease   Psoriatic arthritis   Transplant of Kidney   Arthropathy   Alcoholism   Malabsorption syndrome   Disorder of bone and articular cartilage   Cystic Fibrosis  Bronchiectasis       famotidine (PEPCID) 20 MG tablet [Pharmacy Med Name: FAMOTIDINE 20MG TABLET] 180 tablet 0     Sig: TAKE 1 TABLET BY  MOUTH TWICE A DAY   Last Prescription Date:   2/24/25  Last Fill Qty/Refills:         90, R-0      H2 Blockers Protocol Failed - 4/22/2025  1:25 PM        Failed - Medication is active on med list and the sig matches. RN to manually verify dose and sig if red X/fail.     If the protocol passes (green check), you do not need to verify med dose and sig.    A prescription matches if they are the same clinical intention.    For Example: once daily and every morning are the same.    The protocol can not identify upper and lower case letters as matching and will fail.     For Example: Take 1 tablet (50 mg) by mouth daily     TAKE 1 TABLET (50 MG) BY MOUTH DAILY    For all fails (red x), verify dose and sig.    If the refill does match what is on file, the RN can still proceed to approve the refill request.       If they do not match, route to the appropriate provider.       levothyroxine (SYNTHROID/LEVOTHROID) 137 MCG tablet [Pharmacy Med Name: LEVOTHYROXINE 137MCG TABLET] 30 tablet 0     Sig: TAKE 1 TAB BY MOUTH ONCE DAILY   Historical    Thyroid Protocol Failed - 4/22/2025  1:25 PM        Failed - Medication is active on med list and the sig matches. RN to manually verify dose and sig if red X/fail.     If the protocol passes (green check), you do not need to verify med dose and sig.    A prescription matches if they are the same clinical intention.    For Example: once daily and every morning are the same.    The protocol can not identify upper and lower case letters as matching and will fail.     For Example: Take 1 tablet (50 mg) by mouth daily     TAKE 1 TABLET (50 MG) BY MOUTH DAILY    For all fails (red x), verify dose and sig.    If the refill does match what is on file, the RN can still proceed to approve the refill request.       If they do not match, route to the appropriate provider.        Failed - Normal TSH on file in past 12 months     Recent Labs   Lab Test 03/27/25  0835   TSH 5.41*          memantine  (NAMENDA) 5 MG tablet [Pharmacy Med Name: MEMANTINE HCL 5MG TABLET] 60 tablet 0     Sig: TAKE 1 TABLET BY MOUTH TWICE DAILY   Historical    Miscellaneous Dementia Agents Failed - 4/22/2025  1:25 PM        Failed - Medication is active on med list and the sig matches. RN to manually verify dose and sig if red X/fail.     If the protocol passes (green check), you do not need to verify med dose and sig.    A prescription matches if they are the same clinical intention.    For Example: once daily and every morning are the same.    The protocol can not identify upper and lower case letters as matching and will fail.     For Example: Take 1 tablet (50 mg) by mouth daily     TAKE 1 TABLET (50 MG) BY MOUTH DAILY    For all fails (red x), verify dose and sig.    If the refill does match what is on file, the RN can still proceed to approve the refill request.       If they do not match, route to the appropriate provider.     Last Office Visit:              4/15/25 (ER follow up)   Future Office visit:           None    Unable to complete prescription refill per RN Medication Refill Policy. Jennifer Oh, Refill RN .............. 4/22/2025  1:30 PM

## 2025-04-23 ENCOUNTER — HOSPITAL ENCOUNTER (OUTPATIENT)
Dept: MAMMOGRAPHY | Facility: OTHER | Age: 50
Discharge: HOME | End: 2025-04-23
Attending: NURSE PRACTITIONER
Payer: MEDICARE

## 2025-04-23 DIAGNOSIS — Z12.31 VISIT FOR SCREENING MAMMOGRAM: ICD-10-CM

## 2025-04-23 PROCEDURE — 77063 BREAST TOMOSYNTHESIS BI: CPT

## 2025-04-23 RX ORDER — QUETIAPINE FUMARATE 25 MG/1
TABLET, FILM COATED ORAL
Qty: 90 TABLET | Refills: 0 | Status: SHIPPED | OUTPATIENT
Start: 2025-04-23

## 2025-04-23 NOTE — TELEPHONE ENCOUNTER
[id number] sent Rx request for the following:      Requested Prescriptions   Pending Prescriptions Disp Refills    QUEtiapine (SEROQUEL) 25 MG tablet [Pharmacy Med Name: QUETIAPINE 25MG TABLET] 90 tablet 0     Sig: TAKE 1 TAB BY MOUTH IN THE MORNING; TAKE 2 TABS (50MG) BY MOUTHAT BEDTIME       Antipsychotic Medications Failed - 4/23/2025  9:47 AM        Failed - Lipid panel on file within the past 12 months     Recent Labs   Lab Test 07/06/20  1108   CHOL 165   TRIG 116   HDL 44   LDL 98   NHDL 121           Failed - Medication is active on med list and the sig matches. RN to manually verify dose and sig if red X/fail.     If the protocol passes (green check), you do not need to verify med dose and sig.    A prescription matches if they are the same clinical intention.    For Example: once daily and every morning are the same.    The protocol can not identify upper and lower case letters as matching and will fail.     For Example: Take 1 tablet (50 mg) by mouth daily     TAKE 1 TABLET (50 MG) BY MOUTH DAILY    For all fails (red x), verify dose and sig.    If the refill does match what is on file, the RN can still proceed to approve the refill request.       If they do not match, route to the appropriate provider.          Failed - Medication indicated for associated diagnosis     Medication is associated with one or more of the following diagnoses:             Agitation            Autistic disorder            Bipolar disorder            Chemotherapy-induced nausea and vomiting            Delirium            Depression            Schizophrenia             Mood disorder     Last Prescription Date:   3/14/25  Last Fill Qty/Refills:         90, R-0    Last Office Visit:              4/15/25 (ER follow up)   Future Office visit:           None    Unable to complete prescription refill per RN Medication Refill Policy. Jennifer Oh, Refill RN .............. 4/23/2025  9:50 AM

## 2025-04-28 ENCOUNTER — TELEPHONE (OUTPATIENT)
Dept: INTERNAL MEDICINE | Facility: OTHER | Age: 50
End: 2025-04-28
Payer: MEDICARE

## 2025-04-28 DIAGNOSIS — M17.0 PRIMARY OSTEOARTHRITIS OF BOTH KNEES: ICD-10-CM

## 2025-04-28 NOTE — TELEPHONE ENCOUNTER
Needing clarification on her Diclofenac tablets. Needs to know if they are scheduled or PRN.     Lynda Bermudez on 4/28/2025 at 8:25 AM

## 2025-04-29 NOTE — TELEPHONE ENCOUNTER
"Mikayla Gtz used to give diclofenac twice daily scheduled rather than twice daily PRN. It is currently ordered as twice daily PRN. Patient is not cognitively able to request it, so when she needs to stand and winces \"It's like we're behind the eight ball.\" Rx pending as scheduled. Indira Amaro RN on 4/29/2025 at 8:42 AM     "

## 2025-05-15 DIAGNOSIS — Z00.00 HEALTH CARE MAINTENANCE: ICD-10-CM

## 2025-05-19 RX ORDER — MULTIVITAMIN WITH FOLIC ACID 400 MCG
1 TABLET ORAL DAILY
Qty: 90 TABLET | Refills: 2 | Status: SHIPPED | OUTPATIENT
Start: 2025-05-19

## 2025-05-19 NOTE — TELEPHONE ENCOUNTER
[id number] sent Rx request for the following:      Requested Prescriptions   Pending Prescriptions Disp Refills    Multiple Vitamin (TAB-A-MATTHIAS) TABS [Pharmacy Med Name: TAB-A-MATTHIAS MULTIVITAMIN TABLET] 90 tablet 0     Sig: TAKE 1 TABLET BY MOUTH DAILY.       Vitamin Supplements Protocol Passed - 5/19/2025  9:41 AM      Last Prescription Date:   2/24/2025  Last Fill Qty/Refills:         90, R-0    Last Office Visit:              2/24/2025   Future Office visit:           none      Prescription approved per [facility] Refill Protocol.  Kendall Vasquez RN on 5/19/2025 at 9:43 AM

## 2025-06-11 ENCOUNTER — APPOINTMENT (OUTPATIENT)
Dept: CT IMAGING | Facility: OTHER | Age: 50
End: 2025-06-11
Attending: PHYSICIAN ASSISTANT
Payer: MEDICARE

## 2025-06-11 ENCOUNTER — HOSPITAL ENCOUNTER (EMERGENCY)
Facility: OTHER | Age: 50
Discharge: NURSING, REHAB OR LONG-TERM CARE | End: 2025-06-11
Attending: PHYSICIAN ASSISTANT
Payer: MEDICARE

## 2025-06-11 VITALS
SYSTOLIC BLOOD PRESSURE: 152 MMHG | OXYGEN SATURATION: 94 % | HEART RATE: 126 BPM | TEMPERATURE: 97.4 F | DIASTOLIC BLOOD PRESSURE: 112 MMHG

## 2025-06-11 DIAGNOSIS — R56.9 SEIZURE (H): ICD-10-CM

## 2025-06-11 LAB
ANION GAP SERPL CALCULATED.3IONS-SCNC: 11 MMOL/L (ref 7–15)
BASOPHILS # BLD AUTO: 0.1 10E3/UL (ref 0–0.2)
BASOPHILS NFR BLD AUTO: 1 %
BUN SERPL-MCNC: 15.3 MG/DL (ref 6–20)
CALCIUM SERPL-MCNC: 8.3 MG/DL (ref 8.8–10.4)
CHLORIDE SERPL-SCNC: 106 MMOL/L (ref 98–107)
CREAT SERPL-MCNC: 0.69 MG/DL (ref 0.51–0.95)
EGFRCR SERPLBLD CKD-EPI 2021: >90 ML/MIN/1.73M2
EOSINOPHIL # BLD AUTO: 0.2 10E3/UL (ref 0–0.7)
EOSINOPHIL NFR BLD AUTO: 2 %
ERYTHROCYTE [DISTWIDTH] IN BLOOD BY AUTOMATED COUNT: 16 % (ref 10–15)
GLUCOSE BLDC GLUCOMTR-MCNC: 139 MG/DL (ref 70–99)
GLUCOSE SERPL-MCNC: 122 MG/DL (ref 70–99)
HCO3 SERPL-SCNC: 25 MMOL/L (ref 22–29)
HCT VFR BLD AUTO: 32.6 % (ref 35–47)
HGB BLD-MCNC: 10.7 G/DL (ref 11.7–15.7)
HOLD SPECIMEN: NORMAL
HOLD SPECIMEN: NORMAL
IMM GRANULOCYTES # BLD: 0 10E3/UL
IMM GRANULOCYTES NFR BLD: 0 %
LYMPHOCYTES # BLD AUTO: 1.3 10E3/UL (ref 0.8–5.3)
LYMPHOCYTES NFR BLD AUTO: 18 %
MAGNESIUM SERPL-MCNC: 1.8 MG/DL (ref 1.7–2.3)
MCH RBC QN AUTO: 32.2 PG (ref 26.5–33)
MCHC RBC AUTO-ENTMCNC: 32.8 G/DL (ref 31.5–36.5)
MCV RBC AUTO: 98 FL (ref 78–100)
MONOCYTES # BLD AUTO: 0.6 10E3/UL (ref 0–1.3)
MONOCYTES NFR BLD AUTO: 8 %
NEUTROPHILS # BLD AUTO: 5.2 10E3/UL (ref 1.6–8.3)
NEUTROPHILS NFR BLD AUTO: 71 %
NRBC # BLD AUTO: 0 10E3/UL
NRBC BLD AUTO-RTO: 0 /100
PLATELET # BLD AUTO: 266 10E3/UL (ref 150–450)
POTASSIUM SERPL-SCNC: 3.6 MMOL/L (ref 3.4–5.3)
PROLACTIN SERPL 3RD IS-MCNC: 54 NG/ML (ref 5–23)
RBC # BLD AUTO: 3.32 10E6/UL (ref 3.8–5.2)
SODIUM SERPL-SCNC: 142 MMOL/L (ref 135–145)
TROPONIN T SERPL HS-MCNC: 10 NG/L
WBC # BLD AUTO: 7.4 10E3/UL (ref 4–11)

## 2025-06-11 PROCEDURE — 82310 ASSAY OF CALCIUM: CPT | Performed by: PHYSICIAN ASSISTANT

## 2025-06-11 PROCEDURE — 250N000011 HC RX IP 250 OP 636: Performed by: PHYSICIAN ASSISTANT

## 2025-06-11 PROCEDURE — 83735 ASSAY OF MAGNESIUM: CPT | Performed by: PHYSICIAN ASSISTANT

## 2025-06-11 PROCEDURE — 70450 CT HEAD/BRAIN W/O DYE: CPT | Mod: 76

## 2025-06-11 PROCEDURE — 36415 COLL VENOUS BLD VENIPUNCTURE: CPT | Performed by: PHYSICIAN ASSISTANT

## 2025-06-11 PROCEDURE — 70450 CT HEAD/BRAIN W/O DYE: CPT | Mod: 26 | Performed by: RADIOLOGY

## 2025-06-11 PROCEDURE — 93010 ELECTROCARDIOGRAM REPORT: CPT | Performed by: INTERNAL MEDICINE

## 2025-06-11 PROCEDURE — 85004 AUTOMATED DIFF WBC COUNT: CPT | Performed by: PHYSICIAN ASSISTANT

## 2025-06-11 PROCEDURE — 99285 EMERGENCY DEPT VISIT HI MDM: CPT | Mod: 25 | Performed by: PHYSICIAN ASSISTANT

## 2025-06-11 PROCEDURE — 96374 THER/PROPH/DIAG INJ IV PUSH: CPT | Performed by: PHYSICIAN ASSISTANT

## 2025-06-11 PROCEDURE — 99284 EMERGENCY DEPT VISIT MOD MDM: CPT | Performed by: PHYSICIAN ASSISTANT

## 2025-06-11 PROCEDURE — 84484 ASSAY OF TROPONIN QUANT: CPT | Performed by: PHYSICIAN ASSISTANT

## 2025-06-11 PROCEDURE — 250N000013 HC RX MED GY IP 250 OP 250 PS 637: Performed by: PHYSICIAN ASSISTANT

## 2025-06-11 PROCEDURE — 82962 GLUCOSE BLOOD TEST: CPT

## 2025-06-11 PROCEDURE — 84146 ASSAY OF PROLACTIN: CPT | Performed by: PHYSICIAN ASSISTANT

## 2025-06-11 PROCEDURE — 93005 ELECTROCARDIOGRAM TRACING: CPT | Performed by: PHYSICIAN ASSISTANT

## 2025-06-11 PROCEDURE — 70450 CT HEAD/BRAIN W/O DYE: CPT

## 2025-06-11 RX ORDER — LORAZEPAM 0.5 MG/1
0.5 TABLET ORAL ONCE
Status: COMPLETED | OUTPATIENT
Start: 2025-06-11 | End: 2025-06-11

## 2025-06-11 RX ORDER — LEVETIRACETAM 500 MG/5ML
1500 INJECTION, SOLUTION, CONCENTRATE INTRAVENOUS ONCE
Status: COMPLETED | OUTPATIENT
Start: 2025-06-11 | End: 2025-06-11

## 2025-06-11 RX ORDER — LEVETIRACETAM 500 MG/1
500 TABLET ORAL 2 TIMES DAILY
Status: DISCONTINUED | OUTPATIENT
Start: 2025-06-11 | End: 2025-06-11

## 2025-06-11 RX ORDER — LEVETIRACETAM 500 MG/1
500 TABLET ORAL 2 TIMES DAILY
Qty: 60 TABLET | Refills: 0 | Status: SHIPPED | OUTPATIENT
Start: 2025-06-11 | End: 2025-07-11

## 2025-06-11 RX ADMIN — LORAZEPAM 0.5 MG: 0.5 TABLET ORAL at 09:14

## 2025-06-11 RX ADMIN — LEVETIRACETAM 1500 MG: 100 INJECTION, SOLUTION INTRAVENOUS at 16:43

## 2025-06-11 RX ADMIN — LORAZEPAM 0.5 MG: 0.5 TABLET ORAL at 14:17

## 2025-06-11 ASSESSMENT — ACTIVITIES OF DAILY LIVING (ADL)
ADLS_ACUITY_SCORE: 46

## 2025-06-11 ASSESSMENT — COLUMBIA-SUICIDE SEVERITY RATING SCALE - C-SSRS: IS THE PATIENT NOT ABLE TO COMPLETE C-SSRS: UNABLE TO VERBALIZE

## 2025-06-11 NOTE — ED TRIAGE NOTES
"Pt presents to ED via EMS from [place]. Pt had a 25 second witnessed seizure with a 15 minute postictal state. Pt is restless upon arrival stating \"I want to get out of here\". Pt has down syndrome and dementia. BP (!) 152/112   Pulse (!) 126   Temp 97.4  F (36.3  C) (Tympanic)   LMP 04/09/2025 (Exact Date)   SpO2 94%      Triage Assessment (Adult)       Row Name 06/11/25 0901          Triage Assessment    Airway WDL WDL     Additional Documentation Breath Sounds (Group)        Respiratory WDL    Respiratory WDL WDL        Breath Sounds    Breath Sounds TAVON;LLL;RUL;RLL     TAVON Breath Sounds Posterior:     LLL Breath Sounds coarse;wheezes, expiratory     RUL Breath Sounds coarse     RLL Breath Sounds coarse;wheezes, expiratory        Skin Circulation/Temperature WDL    Skin Circulation/Temperature WDL WDL        Cardiac WDL    Cardiac WDL WDL        Peripheral/Neurovascular WDL    Peripheral Neurovascular WDL WDL        Cognitive/Neuro/Behavioral WDL    Cognitive/Neuro/Behavioral WDL X     Level of Consciousness confused     Arousal Level opens eyes spontaneously     Orientation disoriented to;place;time;situation     Mood/Behavior anxious;hyperactive (agitated, impulsive)                     "

## 2025-06-11 NOTE — ED PROVIDER NOTES
"  History     Chief Complaint   Patient presents with    Seizures     HPI  Reg Valiente is a 50 year old female who presents to the ED for evaluation of seizures.   Pt presents to ED via EMS from [facility]. Pt had a 25 second witnessed seizure with a 15 minute postictal state. Pt is restless upon arrival stating \"I want to get out of here\". Pt has down syndrome and dementia.        Allergies:  Allergies   Allergen Reactions    Sulfa Antibiotics        Problem List:    Patient Active Problem List    Diagnosis Date Noted    Elevated liver enzymes 02/26/2025     Priority: Medium    Pressure injury of buttock, stage 3, unspecified laterality (H) 01/12/2024     Priority: Medium    Pressure ulcer of breast 01/12/2024     Priority: Medium    Obsessive behaviors 09/02/2022     Priority: Medium    Dementia associated with other underlying disease with behavioral disturbance (H) 08/04/2021     Priority: Medium    Encounter for follow-up examination 07/20/2021     Priority: Medium    Post-ERCP acute pancreatitis 07/01/2019     Priority: Medium    Choledocholithiasis 06/25/2019     Priority: Medium    Down's syndrome 08/14/2018     Priority: Medium    DOROTEO (generalized anxiety disorder) 08/14/2018     Priority: Medium    Menstrual disorder 08/14/2018     Priority: Medium    Primary osteoarthritis of both knees 08/14/2018     Priority: Medium    Gastroesophageal reflux disease without esophagitis 08/14/2018     Priority: Medium    Other specified hypothyroidism 08/14/2018     Priority: Medium    Dermatitis 08/14/2018     Priority: Medium    Encounter for screening mammogram for breast cancer 08/14/2018     Priority: Medium    Morbid obesity (H) 08/14/2018     Priority: Medium    JENNIFER (obstructive sleep apnea) 08/14/2018     Priority: Medium        Past Medical History:    Past Medical History:   Diagnosis Date    Arthritis of knee     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     Corneal ulcer 2004    Down's " syndrome     Gastroesophageal reflux disease     JENNIFER (obstructive sleep apnea)        Past Surgical History:    Past Surgical History:   Procedure Laterality Date    ENDOSCOPIC RETROGRADE CHOLANGIOPANCREATOGRAM N/A 7/1/2019    Procedure: Endoscopic Retrograde Cholangiopancreatogram, dilation, stent , sludge and stone removal, sphincterotomy;  Surgeon: Guru Harper Alvarado MD;  Location: [facility] OR    ENDOSCOPIC RETROGRADE CHOLANGIOPANCREATOGRAM N/A 8/26/2019    Procedure: Endoscopic Retrograde Cholangiopancreatogram with biliary stent removal and stone removal;  Surgeon: Guru Harper Alvarado MD;  Location: [facility] OR    ENDOSCOPIC RETROGRADE CHOLANGIOPANCREATOGRAPHY, EXCHANGE TUBE/STENT N/A 7/24/2019    Procedure: Endoscopic Retrograde Cholangiopancreatography with Billary Stone Extraction,Dilation             and stent exchange;  Surgeon: Guru Harper Alvarado MD;  Location: [facility] OR    LAPAROSCOPIC CHOLECYSTECTOMY WITH CHOLANGIOGRAMS N/A 9/9/2019    Procedure: CHOLECYSTECTOMY, LAPAROSCOPIC, WITH CHOLANGIOGRAM;  Surgeon: Arvind Bailey MD;  Location: [facility] OR    NO HISTORY OF SURGERY      as reported by parents       Family History:    Family History   Problem Relation Age of Onset    Asthma Mother     Arthritis Mother     Dementia Mother     LUNG DISEASE Father         pulmonary fibrosis secondary to chemical exposure    Diabetes Father     Cerebrovascular Disease Father 90    Cerebrovascular Disease Maternal Grandmother 90    Heart Failure Paternal Grandfather         uncertain which  replaced    Coronary Artery Disease No family hx of     Heart Disease No family hx of     Bleeding Diathesis No family hx of     Pancreatic Cancer No family hx of     Colorectal Cancer No family hx of        Social History:  Marital Status:  Single [1]  Social History     Tobacco Use    Smoking status: Never    Smokeless tobacco: Never   Vaping Use    Vaping status: Never Used   Substance Use  Topics    Alcohol use: No    Drug use: No        Medications:    Citalopram Hydrobromide 30 MG CAPS  diclofenac (VOLTAREN) 50 MG EC tablet  famotidine (PEPCID) 20 MG tablet  levETIRAcetam (KEPPRA) 500 MG tablet  levothyroxine (SYNTHROID/LEVOTHROID) 137 MCG tablet  memantine (NAMENDA) 5 MG tablet  QUEtiapine (SEROQUEL) 25 MG tablet  acetaminophen (TYLENOL) 650 MG CR tablet  cyanocobalamin (VITAMIN B-12) 1000 MCG tablet  hydrOXYzine HCl (ATARAX) 10 MG tablet  Multiple Vitamin (TAB-A-MATTHIAS) TABS  norgestim-eth estrad triphasic (TRI-LINYAH) 0.18/0.215/0.25 MG-35 MCG tablet  Sennosides-Docusate Sodium (SB DOCUSATE SODIUM/SENNA PO)          Review of Systems   Unable to perform ROS: Dementia       Physical Exam   BP: (!) 152/112 (Pt moving and not redirectable)  Pulse: (!) 126  Temp: 97.4  F (36.3  C)  SpO2: 94 %      Physical Exam  Constitutional:       General: She is not in acute distress.     Appearance: She is well-developed. She is not diaphoretic.   HENT:      Head: Normocephalic and atraumatic.   Eyes:      Pupils: Pupils are equal, round, and reactive to light.   Cardiovascular:      Rate and Rhythm: Normal rate and regular rhythm.   Pulmonary:      Effort: Pulmonary effort is normal.      Breath sounds: Normal breath sounds.   Abdominal:      Palpations: Abdomen is soft.      Tenderness: There is no abdominal tenderness.   Musculoskeletal:         General: No deformity.   Skin:     General: Skin is warm and dry.      Coloration: Skin is not jaundiced.      Findings: No rash.   Neurological:      Mental Status: She is alert. Mental status is at baseline.         ED Course        Procedures         EKG read and interpreted by me at 1034. HR 73, NSR, T wave inversion V1, otherwise no ST changes.     Critical Care time:  none         Results for orders placed or performed during the hospital encounter of 06/11/25 (from the past 24 hours)   CT Head w/o Contrast    Narrative    PROCEDURE: CT HEAD W/O CONTRAST      HISTORY: hx down syndrome and possible seizure today. 4 hr repeat head  CT. earlier imaging=  right frontal region abnormality..    COMPARISON: 6/11/2025    TECHNIQUE: CT images were obtained from the skull base to the vertex.  Axial, coronal and sagittal images were reviewed.   Radiation dose for this scan was reduced using automated exposure  control, adjustment of the mA and/or kV according to patient size, or  iterative reconstruction technique.    FINDINGS: There is mild, diffuse atrophy. No acute intracranial  hemorrhage, mass effect,  or midline shift. A small focus of increased  attenuation in the right frontal lobe is no longer evident, suggesting  this was artifact.    The grey-white matter interface is preserved. Scattered  hypoattenuation within the white matter is most compatible with  microvascular ischemic change.     The calvarium is intact. The mastoid air cells are clear.  The  visualized paranasal sinuses are clear.       Impression    IMPRESSION: No acute intracranial findings. Previously seen right  frontal lobe abnormality is no longer identified, suggesting this was  artifact.      SIVA LAM MD         SYSTEM ID:  [id number]       Medications   LORazepam (ATIVAN) tablet 0.5 mg (0.5 mg Oral $Given 6/11/25 0914)   LORazepam (ATIVAN) tablet 0.5 mg (0.5 mg Oral $Given 6/11/25 1417)   levETIRAcetam (KEPPRA) injection for EMERGENT loading dose 1,500 mg (1,500 mg Intravenous $Given 6/11/25 1643)       Assessments & Plan (with Medical Decision Making)   Nontoxic but restless upon arrival. PT's caregiver is with her who says she is acting normally at this time.     Lab findings appear stable, however, elevated prolactin.     Original CT:  Small focus of increased density in the right frontal  region may be artifact. The possibility of a punctate hemorrhage or  small mass is not entirely excluded. Suggest short interval follow-up  CT versus MRI.    Repeat CT:   No acute intracranial findings.  Previously seen right  frontal lobe abnormality is no longer identified, suggesting this was  artifact.      I spoke with Dr. Pepe, neurologist from [facility]. He agrees that most likely she had a seizure today which is more likely for an individual of her age with dementia and down syndrome. We will give a keppra load and send home on keppra BID. Consideration could be given to outpt MRI, depending on family/pt wishes, however, yield is most likely low from that study.     I spoke with Camila, pt's sister. She is ok with PCP follow up. She would like to wait on PCP follow-up before obtaining any further advanced studies.    Overall, Reg appears back to her baseline with no sign of systemic illness and appears safe for discharge.    Levi Miller PA-C    I have reviewed the nursing notes.    I have reviewed the findings, diagnosis, plan and need for follow up with the patient.          Discharge Medication List as of 6/11/2025  4:59 PM        START taking these medications    Details   levETIRAcetam (KEPPRA) 500 MG tablet Take 1 tablet (500 mg) by mouth 2 times daily., Disp-60 tablet, R-0, E-Prescribe             Final diagnoses:   Seizure (H)       6/11/2025   [facility]       Levi Miller PA  06/12/25 1101

## 2025-06-11 NOTE — DISCHARGE INSTRUCTIONS
Get plenty of fluids and rest.  I do think you suffered a seizure today, remainder of studies appear very stable.  I spoke with your sister, Camila on the phone she agrees with our findings and the plan of action.  Referral was placed for you to follow-up with PCP to discuss whether any further blood pressures improved studies could be considered.  Return if there are worsening or concerning symptoms.

## 2025-06-11 NOTE — PROGRESS NOTES
Pt was up to bedside  commode with staff from [facility].   Chantal Zaragoza RN on 6/11/2025 at 2:21 PM

## 2025-06-12 LAB
ATRIAL RATE - MUSE: 73 BPM
DIASTOLIC BLOOD PRESSURE - MUSE: NORMAL MMHG
INTERPRETATION ECG - MUSE: NORMAL
P AXIS - MUSE: 64 DEGREES
PR INTERVAL - MUSE: 178 MS
QRS DURATION - MUSE: 70 MS
QT - MUSE: 400 MS
QTC - MUSE: 440 MS
R AXIS - MUSE: -6 DEGREES
SYSTOLIC BLOOD PRESSURE - MUSE: NORMAL MMHG
T AXIS - MUSE: 30 DEGREES
VENTRICULAR RATE- MUSE: 73 BPM

## 2025-06-19 DIAGNOSIS — R46.81 OBSESSIVE BEHAVIORS: ICD-10-CM

## 2025-06-23 RX ORDER — QUETIAPINE FUMARATE 25 MG/1
TABLET, FILM COATED ORAL
Qty: 90 TABLET | Refills: 1 | Status: SHIPPED | OUTPATIENT
Start: 2025-06-23

## 2025-06-23 NOTE — TELEPHONE ENCOUNTER
Pharmacy [id number] sent Rx request for the following:      Requested Prescriptions   Pending Prescriptions Disp Refills    QUEtiapine (SEROQUEL) 25 MG tablet [Pharmacy Med Name: QUETIAPINE 25MG TABLET] 90 tablet 0     Sig: TAKE 1 TAB BY MOUTH IN THE MORNING; TAKE 2 TABS (50MG) BY MOUTHAT BEDTIME       Antipsychotic Medications Failed - 6/23/2025  3:21 PM        Failed - Most recent blood pressure under 140/90 in past 12 months        Failed - Lipid panel on file within the past 12 months     Recent Labs   Lab Test 07/06/20  1108   CHOL 165   TRIG 116   HDL 44   LDL 98   NHDL 121           Failed - Medication indicated for associated diagnosis     Medication is associated with one or more of the following diagnoses:             Agitation            Autistic disorder            Bipolar disorder            Chemotherapy-induced nausea and vomiting            Delirium            Depression            Schizophrenia             Mood disorder     Last Prescription Date:   4/23/25  Last Fill Qty/Refills:         90, R-0    Last Office Visit:                4/15/25   2/24/25 (Px)    Future Office visit:           None    Unable to complete prescription refill per RN Medication Refill Policy. Jennifer Oh, Refill RN .............. 6/23/2025  3:23 PM

## 2025-07-10 ENCOUNTER — TELEPHONE (OUTPATIENT)
Dept: INTERNAL MEDICINE | Facility: OTHER | Age: 50
End: 2025-07-10
Payer: MEDICARE

## 2025-07-10 NOTE — TELEPHONE ENCOUNTER
Called and spoke with Jessa from [place]. The patient was seen in the Emergency Room on 6/11 and was given a 30 day supply of Keppra 500 mg twice a day. The patient will be out of the prescription tomorrow and they are wondering if this should be renewed or If Mikayla ONEAL would like to see her in the clinic. Please advise.   Ami Bradford RN on 7/10/2025 at 3:17 PM

## 2025-07-10 NOTE — TELEPHONE ENCOUNTER
Jenna called to discuss patients Kepra. They are not sure what they are supposed to do regarding filling RX    Lynda Bermudez on 7/10/2025 at 9:08 AM

## 2025-07-11 NOTE — TELEPHONE ENCOUNTER
Please get patient scheduled with Sara on her next available for ER follow up and I can fill quantity to bridge her until follow up of her keppra.

## 2025-07-14 NOTE — TELEPHONE ENCOUNTER
Emile was contacted and an appointment was made with Sara Gtz for 8.6.2025.  Betzy Patterson on 7/14/2025 at 9:26 AM

## 2025-08-06 ENCOUNTER — TELEPHONE (OUTPATIENT)
Dept: INTERNAL MEDICINE | Facility: OTHER | Age: 50
End: 2025-08-06

## 2025-08-06 ENCOUNTER — OFFICE VISIT (OUTPATIENT)
Dept: INTERNAL MEDICINE | Facility: OTHER | Age: 50
End: 2025-08-06
Attending: NURSE PRACTITIONER
Payer: MEDICARE

## 2025-08-06 VITALS
RESPIRATION RATE: 20 BRPM | DIASTOLIC BLOOD PRESSURE: 75 MMHG | BODY MASS INDEX: 35.96 KG/M2 | HEART RATE: 80 BPM | TEMPERATURE: 98.7 F | HEIGHT: 55 IN | OXYGEN SATURATION: 92 % | SYSTOLIC BLOOD PRESSURE: 111 MMHG | WEIGHT: 155.4 LBS

## 2025-08-06 DIAGNOSIS — F02.818 EARLY ONSET ALZHEIMER'S DISEASE WITH BEHAVIORAL DISTURBANCE (H): ICD-10-CM

## 2025-08-06 DIAGNOSIS — F41.0 ANXIETY ATTACK: ICD-10-CM

## 2025-08-06 DIAGNOSIS — G40.89 OTHER SEIZURES (H): ICD-10-CM

## 2025-08-06 DIAGNOSIS — R46.81 OBSESSIVE BEHAVIORS: ICD-10-CM

## 2025-08-06 DIAGNOSIS — G30.0 EARLY ONSET ALZHEIMER'S DISEASE WITH BEHAVIORAL DISTURBANCE (H): ICD-10-CM

## 2025-08-06 DIAGNOSIS — F41.1 GAD (GENERALIZED ANXIETY DISORDER): Primary | ICD-10-CM

## 2025-08-06 PROCEDURE — G0463 HOSPITAL OUTPT CLINIC VISIT: HCPCS

## 2025-08-06 RX ORDER — HYDROXYZINE HYDROCHLORIDE 25 MG/1
TABLET, FILM COATED ORAL
Qty: 60 TABLET | Refills: 4 | Status: SHIPPED | OUTPATIENT
Start: 2025-08-06 | End: 2025-08-06

## 2025-08-06 RX ORDER — HYDROXYZINE HYDROCHLORIDE 25 MG/1
TABLET, FILM COATED ORAL
Qty: 30 TABLET | Refills: 4 | Status: SHIPPED | OUTPATIENT
Start: 2025-08-06

## 2025-08-06 ASSESSMENT — ANXIETY QUESTIONNAIRES: GAD7 TOTAL SCORE: INCOMPLETE

## 2025-08-06 ASSESSMENT — PAIN SCALES - GENERAL: PAINLEVEL_OUTOF10: NO PAIN (0)

## 2025-08-19 ENCOUNTER — OFFICE VISIT (OUTPATIENT)
Dept: PSYCHIATRY | Facility: OTHER | Age: 50
End: 2025-08-19
Attending: NURSE PRACTITIONER

## 2025-08-19 VITALS
OXYGEN SATURATION: 98 % | HEART RATE: 62 BPM | WEIGHT: 156.6 LBS | RESPIRATION RATE: 16 BRPM | SYSTOLIC BLOOD PRESSURE: 118 MMHG | DIASTOLIC BLOOD PRESSURE: 75 MMHG | BODY MASS INDEX: 47.79 KG/M2

## 2025-08-19 DIAGNOSIS — F02.818 EARLY ONSET ALZHEIMER'S DISEASE WITH BEHAVIORAL DISTURBANCE (H): ICD-10-CM

## 2025-08-19 DIAGNOSIS — F41.0 ANXIETY ATTACK: ICD-10-CM

## 2025-08-19 DIAGNOSIS — R46.81 OBSESSIVE BEHAVIORS: ICD-10-CM

## 2025-08-19 DIAGNOSIS — G30.0 EARLY ONSET ALZHEIMER'S DISEASE WITH BEHAVIORAL DISTURBANCE (H): ICD-10-CM

## 2025-08-19 ASSESSMENT — PATIENT HEALTH QUESTIONNAIRE - PHQ9
10. IF YOU CHECKED OFF ANY PROBLEMS, HOW DIFFICULT HAVE THESE PROBLEMS MADE IT FOR YOU TO DO YOUR WORK, TAKE CARE OF THINGS AT HOME, OR GET ALONG WITH OTHER PEOPLE: VERY DIFFICULT
SUM OF ALL RESPONSES TO PHQ QUESTIONS 1-9: 11
SUM OF ALL RESPONSES TO PHQ QUESTIONS 1-9: 11

## (undated) DEVICE — SOL WATER IRRIG 1000ML BOTTLE 2F7114

## (undated) DEVICE — ENDO BASKET RETRIEVAL TRAPEZOID 2.0CM 1087

## (undated) DEVICE — ENDO BITE BLOCK ADULT OMNI-BLOC

## (undated) DEVICE — KIT ENDO FIRST STEP DISINFECTANT 200ML W/POUCH EP-4

## (undated) DEVICE — SUCTION MANIFOLD DORNOCH ULTRA CART UL-CL500

## (undated) DEVICE — NDL-INSUFFLATION 120MM

## (undated) DEVICE — ENDO TUBING CO2 SMARTCAP STERILE DISP 100145CO2EXT

## (undated) DEVICE — CLIP APPLIER ENDO ROTATING 10MM MED/LG ER320

## (undated) DEVICE — SU VICRYL 0 UR-6 27" J603H

## (undated) DEVICE — INFLATION DEVICE BIG 60 ENDO-AN6012

## (undated) DEVICE — CATH RETRIEVAL BALLOON EXTRACTOR PRO RX-S INJ ABOVE 9-12MM

## (undated) DEVICE — ENDO FUSION OMNI-TOME G31903

## (undated) DEVICE — ENDO SNARE POLYPECTOMY OVAL 15MM LOOP SD-240U-15

## (undated) DEVICE — GUIDEWIRE NOVAGOLD .018X260CM STR TIP M00552000

## (undated) DEVICE — ESU CORD MONOPOLAR 10'  E0510

## (undated) DEVICE — BIOPSY VALVE BIOSHIELD 00711135

## (undated) DEVICE — ADH SKIN CLOSURE PREMIERPRO EXOFIN 1.0ML 3470

## (undated) DEVICE — STOCKCOCK IV 3-WAY 42384-411

## (undated) DEVICE — ENDO POUCH UNIV RETRIEVAL SYSTEM INZII 10MM CD001

## (undated) DEVICE — ESU HOLDER LAP INST DISP PURPLE LONG 330MM H-PRO-330

## (undated) DEVICE — ENDO CATH BALLOON DILATION HURRICANE 06MMX4X180CM M00545920

## (undated) DEVICE — SPECIMEN CONTAINER 4OZ

## (undated) DEVICE — WIRE GUIDE 0.025"X270CM STR VISIGLIDE G-240-2527S

## (undated) DEVICE — SLEEVE COMPRESSION SCD KNEE MED 74022

## (undated) DEVICE — TUBING INSUFFLATOR W/FILTER OLYMPUS WA95005A

## (undated) DEVICE — PACK ENDOSCOPY GI CUSTOM UMMC

## (undated) DEVICE — PACK LAPAROSCOPY LF SBA15LPFCA

## (undated) DEVICE — CATH CHOLANGIOGRAM KUMAR CC-019

## (undated) DEVICE — ENDO FUSION OMNI-TOME 21 FS-OMNI-21 G48675

## (undated) DEVICE — COVER LIGHT HANDLE LT-F02

## (undated) DEVICE — ENDO EXTRACTOR BALLOON 09-12MM 4690

## (undated) DEVICE — ENDO TROCAR FIRST ENTRY KII FIOS Z-THRD 12X100MM CTF73

## (undated) DEVICE — GLOVE PROTEXIS POWDER FREE SMT 7.5  2D72PT75X

## (undated) DEVICE — DRAPE C-ARM 60X42" 1013

## (undated) DEVICE — INTR ENDOSCOPIC STENT FUSION OASIS 09.0FRX200CM

## (undated) DEVICE — ENDO TROCAR SLEEVE KII 5X100MM CTR03

## (undated) DEVICE — SYR 30ML LL W/O NDL 302832

## (undated) DEVICE — ESU GROUND PAD ADULT W/CORD E7507

## (undated) DEVICE — ENDO TROCAR SLEEVE KII Z-THREADED 05X100MM CTS02

## (undated) DEVICE — DRSG MEDIPORE 2X2 3/4" 3562

## (undated) DEVICE — KIT CONNECTOR FOR OLYMPUS ENDOSCOPES DEFENDO 100310

## (undated) DEVICE — SOL WATER 1500ML

## (undated) DEVICE — SU MONOCRYL 4-0 PS-2 27" UND Y426H

## (undated) DEVICE — GLOVE BIOGEL INDICATOR 7.5 LF 41675

## (undated) DEVICE — ENDO CATH BALLOON DILATION HURRICANE 08MMX4X180CM M00545940

## (undated) DEVICE — PREP CHLORAPREP 26ML TINTED ORANGE  260815

## (undated) DEVICE — CATH BALLOON ELATION ESOPH/PYLORIC 6-7-8MMX180CM EPB6

## (undated) DEVICE — SYR 10ML LL W/O NDL

## (undated) DEVICE — ENDO BASKET RETRIEVAL F/BILE DUCT STN FG-V431P

## (undated) RX ORDER — SODIUM CHLORIDE 9 MG/ML
INJECTION, SOLUTION INTRAVENOUS
Status: DISPENSED
Start: 2019-05-18

## (undated) RX ORDER — ONDANSETRON 2 MG/ML
INJECTION INTRAMUSCULAR; INTRAVENOUS
Status: DISPENSED
Start: 2019-08-26

## (undated) RX ORDER — ONDANSETRON 2 MG/ML
INJECTION INTRAMUSCULAR; INTRAVENOUS
Status: DISPENSED
Start: 2019-07-01

## (undated) RX ORDER — ATROPINE SULFATE 0.4 MG/ML
AMPUL (ML) INJECTION
Status: DISPENSED
Start: 2019-09-09

## (undated) RX ORDER — EPHEDRINE SULFATE 50 MG/ML
INJECTION, SOLUTION INTRAMUSCULAR; INTRAVENOUS; SUBCUTANEOUS
Status: DISPENSED
Start: 2019-07-01

## (undated) RX ORDER — LORAZEPAM 0.5 MG/1
TABLET ORAL
Status: DISPENSED
Start: 2025-06-11

## (undated) RX ORDER — ONDANSETRON 2 MG/ML
INJECTION INTRAMUSCULAR; INTRAVENOUS
Status: DISPENSED
Start: 2019-09-09

## (undated) RX ORDER — GLYCOPYRROLATE 0.2 MG/ML
INJECTION, SOLUTION INTRAMUSCULAR; INTRAVENOUS
Status: DISPENSED
Start: 2019-08-26

## (undated) RX ORDER — LIDOCAINE HYDROCHLORIDE 20 MG/ML
INJECTION, SOLUTION EPIDURAL; INFILTRATION; INTRACAUDAL; PERINEURAL
Status: DISPENSED
Start: 2019-09-09

## (undated) RX ORDER — FENTANYL CITRATE 50 UG/ML
INJECTION, SOLUTION INTRAMUSCULAR; INTRAVENOUS
Status: DISPENSED
Start: 2019-07-24

## (undated) RX ORDER — GLYCOPYRROLATE 0.2 MG/ML
INJECTION, SOLUTION INTRAMUSCULAR; INTRAVENOUS
Status: DISPENSED
Start: 2019-09-09

## (undated) RX ORDER — IBUPROFEN 200 MG
TABLET ORAL
Status: DISPENSED
Start: 2025-03-27

## (undated) RX ORDER — EPHEDRINE SULFATE 50 MG/ML
INJECTION, SOLUTION INTRAVENOUS
Status: DISPENSED
Start: 2019-09-09

## (undated) RX ORDER — FENTANYL CITRATE 50 UG/ML
INJECTION, SOLUTION INTRAMUSCULAR; INTRAVENOUS
Status: DISPENSED
Start: 2019-09-09

## (undated) RX ORDER — DEXTROSE, SODIUM CHLORIDE, SODIUM LACTATE, POTASSIUM CHLORIDE, AND CALCIUM CHLORIDE 5; .6; .31; .03; .02 G/100ML; G/100ML; G/100ML; G/100ML; G/100ML
INJECTION, SOLUTION INTRAVENOUS
Status: DISPENSED
Start: 2019-07-24

## (undated) RX ORDER — FENTANYL CITRATE 50 UG/ML
INJECTION, SOLUTION INTRAMUSCULAR; INTRAVENOUS
Status: DISPENSED
Start: 2019-07-01

## (undated) RX ORDER — KETAMINE HYDROCHLORIDE 50 MG/ML
INJECTION, SOLUTION INTRAMUSCULAR; INTRAVENOUS
Status: DISPENSED
Start: 2019-09-09

## (undated) RX ORDER — LIDOCAINE HYDROCHLORIDE 10 MG/ML
INJECTION, SOLUTION EPIDURAL; INFILTRATION; INTRACAUDAL; PERINEURAL
Status: DISPENSED
Start: 2019-09-09

## (undated) RX ORDER — BUPIVACAINE HYDROCHLORIDE AND EPINEPHRINE 5; 5 MG/ML; UG/ML
INJECTION, SOLUTION EPIDURAL; INTRACAUDAL; PERINEURAL
Status: DISPENSED
Start: 2019-09-09

## (undated) RX ORDER — KETOROLAC TROMETHAMINE 30 MG/ML
INJECTION, SOLUTION INTRAMUSCULAR; INTRAVENOUS
Status: DISPENSED
Start: 2019-09-09

## (undated) RX ORDER — PHENYLEPHRINE HCL IN 0.9% NACL 1 MG/10 ML
SYRINGE (ML) INTRAVENOUS
Status: DISPENSED
Start: 2019-07-01

## (undated) RX ORDER — NEOSTIGMINE METHYLSULFATE 0.5 MG/ML
INJECTION INTRAVENOUS
Status: DISPENSED
Start: 2019-09-09

## (undated) RX ORDER — DEXAMETHASONE SODIUM PHOSPHATE 4 MG/ML
INJECTION, SOLUTION INTRA-ARTICULAR; INTRALESIONAL; INTRAMUSCULAR; INTRAVENOUS; SOFT TISSUE
Status: DISPENSED
Start: 2019-09-09

## (undated) RX ORDER — SCOLOPAMINE TRANSDERMAL SYSTEM 1 MG/1
PATCH, EXTENDED RELEASE TRANSDERMAL
Status: DISPENSED
Start: 2019-07-24

## (undated) RX ORDER — FENTANYL CITRATE 50 UG/ML
INJECTION, SOLUTION INTRAMUSCULAR; INTRAVENOUS
Status: DISPENSED
Start: 2019-08-26

## (undated) RX ORDER — IBUPROFEN 400 MG/1
TABLET, FILM COATED ORAL
Status: DISPENSED
Start: 2025-03-27

## (undated) RX ORDER — ONDANSETRON 2 MG/ML
INJECTION INTRAMUSCULAR; INTRAVENOUS
Status: DISPENSED
Start: 2019-05-18

## (undated) RX ORDER — CEFAZOLIN SODIUM 2 G/100ML
INJECTION, SOLUTION INTRAVENOUS
Status: DISPENSED
Start: 2019-09-09

## (undated) RX ORDER — PROPOFOL 10 MG/ML
INJECTION, EMULSION INTRAVENOUS
Status: DISPENSED
Start: 2019-09-09

## (undated) RX ORDER — ONDANSETRON 2 MG/ML
INJECTION INTRAMUSCULAR; INTRAVENOUS
Status: DISPENSED
Start: 2019-07-24